# Patient Record
Sex: FEMALE | Race: WHITE | NOT HISPANIC OR LATINO | Employment: OTHER | URBAN - METROPOLITAN AREA
[De-identification: names, ages, dates, MRNs, and addresses within clinical notes are randomized per-mention and may not be internally consistent; named-entity substitution may affect disease eponyms.]

---

## 2017-04-26 DIAGNOSIS — Z12.31 ENCOUNTER FOR SCREENING MAMMOGRAM FOR MALIGNANT NEOPLASM OF BREAST: ICD-10-CM

## 2017-09-14 ENCOUNTER — TRANSCRIBE ORDERS (OUTPATIENT)
Dept: ADMINISTRATIVE | Facility: HOSPITAL | Age: 67
End: 2017-09-14

## 2017-09-14 ENCOUNTER — APPOINTMENT (OUTPATIENT)
Dept: RADIOLOGY | Facility: MEDICAL CENTER | Age: 67
End: 2017-09-14
Payer: COMMERCIAL

## 2017-09-14 ENCOUNTER — LAB REQUISITION (OUTPATIENT)
Dept: LAB | Facility: HOSPITAL | Age: 67
End: 2017-09-14
Payer: COMMERCIAL

## 2017-09-14 ENCOUNTER — ALLSCRIPTS OFFICE VISIT (OUTPATIENT)
Dept: OTHER | Facility: OTHER | Age: 67
End: 2017-09-14

## 2017-09-14 ENCOUNTER — GENERIC CONVERSION - ENCOUNTER (OUTPATIENT)
Dept: OTHER | Facility: OTHER | Age: 67
End: 2017-09-14

## 2017-09-14 DIAGNOSIS — D64.9 ANEMIA: ICD-10-CM

## 2017-09-14 DIAGNOSIS — E03.9 HYPOTHYROIDISM: ICD-10-CM

## 2017-09-14 DIAGNOSIS — R06.00 DYSPNEA: ICD-10-CM

## 2017-09-14 DIAGNOSIS — R06.03 ACUTE RESPIRATORY DISTRESS: Primary | ICD-10-CM

## 2017-09-14 LAB
ALBUMIN SERPL BCP-MCNC: 3 G/DL (ref 3.5–5)
ALP SERPL-CCNC: 66 U/L (ref 46–116)
ALT SERPL W P-5'-P-CCNC: 11 U/L (ref 12–78)
ANION GAP SERPL CALCULATED.3IONS-SCNC: 5 MMOL/L (ref 4–13)
AST SERPL W P-5'-P-CCNC: 21 U/L (ref 5–45)
BASOPHILS # BLD AUTO: 0.01 THOUSANDS/ΜL (ref 0–0.1)
BASOPHILS NFR BLD AUTO: 0 % (ref 0–1)
BILIRUB SERPL-MCNC: 0.37 MG/DL (ref 0.2–1)
BUN SERPL-MCNC: 11 MG/DL (ref 5–25)
CALCIUM SERPL-MCNC: 8.4 MG/DL (ref 8.3–10.1)
CHLORIDE SERPL-SCNC: 106 MMOL/L (ref 100–108)
CHOLEST SERPL-MCNC: 151 MG/DL (ref 50–200)
CO2 SERPL-SCNC: 27 MMOL/L (ref 21–32)
CREAT SERPL-MCNC: 0.82 MG/DL (ref 0.6–1.3)
EOSINOPHIL # BLD AUTO: 0.07 THOUSAND/ΜL (ref 0–0.61)
EOSINOPHIL NFR BLD AUTO: 1 % (ref 0–6)
ERYTHROCYTE [DISTWIDTH] IN BLOOD BY AUTOMATED COUNT: 14.7 % (ref 11.6–15.1)
GFR SERPL CREATININE-BSD FRML MDRD: 75 ML/MIN/1.73SQ M
GLUCOSE P FAST SERPL-MCNC: 82 MG/DL (ref 65–99)
HCT VFR BLD AUTO: 35.6 % (ref 34.8–46.1)
HDLC SERPL-MCNC: 35 MG/DL (ref 40–60)
HGB BLD-MCNC: 11.2 G/DL (ref 11.5–15.4)
LDLC SERPL CALC-MCNC: 93 MG/DL (ref 0–100)
LYMPHOCYTES # BLD AUTO: 2.31 THOUSANDS/ΜL (ref 0.6–4.47)
LYMPHOCYTES NFR BLD AUTO: 43 % (ref 14–44)
MCH RBC QN AUTO: 28.5 PG (ref 26.8–34.3)
MCHC RBC AUTO-ENTMCNC: 31.5 G/DL (ref 31.4–37.4)
MCV RBC AUTO: 91 FL (ref 82–98)
MONOCYTES # BLD AUTO: 0.21 THOUSAND/ΜL (ref 0.17–1.22)
MONOCYTES NFR BLD AUTO: 4 % (ref 4–12)
NEUTROPHILS # BLD AUTO: 2.82 THOUSANDS/ΜL (ref 1.85–7.62)
NEUTS SEG NFR BLD AUTO: 52 % (ref 43–75)
NRBC BLD AUTO-RTO: 0 /100 WBCS
PLATELET # BLD AUTO: 203 THOUSANDS/UL (ref 149–390)
PMV BLD AUTO: 10.7 FL (ref 8.9–12.7)
POTASSIUM SERPL-SCNC: 4.5 MMOL/L (ref 3.5–5.3)
PROT SERPL-MCNC: 9.4 G/DL (ref 6.4–8.2)
RBC # BLD AUTO: 3.93 MILLION/UL (ref 3.81–5.12)
SODIUM SERPL-SCNC: 138 MMOL/L (ref 136–145)
T4 FREE SERPL-MCNC: 1.16 NG/DL (ref 0.76–1.46)
TRIGL SERPL-MCNC: 115 MG/DL
TSH SERPL DL<=0.05 MIU/L-ACNC: 4.7 UIU/ML (ref 0.36–3.74)
WBC # BLD AUTO: 5.42 THOUSAND/UL (ref 4.31–10.16)

## 2017-09-14 PROCEDURE — 84443 ASSAY THYROID STIM HORMONE: CPT | Performed by: FAMILY MEDICINE

## 2017-09-14 PROCEDURE — 84439 ASSAY OF FREE THYROXINE: CPT | Performed by: FAMILY MEDICINE

## 2017-09-14 PROCEDURE — 85025 COMPLETE CBC W/AUTO DIFF WBC: CPT | Performed by: FAMILY MEDICINE

## 2017-09-14 PROCEDURE — 71020 HB CHEST X-RAY 2VW FRONTAL&LATL: CPT

## 2017-09-14 PROCEDURE — 80053 COMPREHEN METABOLIC PANEL: CPT | Performed by: FAMILY MEDICINE

## 2017-09-14 PROCEDURE — 80061 LIPID PANEL: CPT | Performed by: FAMILY MEDICINE

## 2017-09-18 ENCOUNTER — GENERIC CONVERSION - ENCOUNTER (OUTPATIENT)
Dept: OTHER | Facility: OTHER | Age: 67
End: 2017-09-18

## 2017-09-25 ENCOUNTER — HOSPITAL ENCOUNTER (OUTPATIENT)
Dept: NON INVASIVE DIAGNOSTICS | Facility: CLINIC | Age: 67
Discharge: HOME | End: 2017-09-25
Payer: COMMERCIAL

## 2017-09-25 DIAGNOSIS — R06.03 ACUTE RESPIRATORY DISTRESS: ICD-10-CM

## 2017-09-25 LAB
ARRHY DURING EX: NORMAL
CHEST PAIN STATEMENT: NORMAL
MAX DIASTOLIC BP: 64 MMHG
MAX HEART RATE: 142 BPM
MAX PREDICTED HEART RATE: 155 BPM
MAX. SYSTOLIC BP: 164 MMHG
PROTOCOL NAME: NORMAL
TARGET HR FORMULA: NORMAL
TEST INDICATION: NORMAL
TIME IN EXERCISE PHASE: 299 S

## 2017-09-25 PROCEDURE — 93350 STRESS TTE ONLY: CPT

## 2017-09-26 ENCOUNTER — GENERIC CONVERSION - ENCOUNTER (OUTPATIENT)
Dept: OTHER | Facility: OTHER | Age: 67
End: 2017-09-26

## 2017-11-03 DIAGNOSIS — E03.9 HYPOTHYROIDISM: ICD-10-CM

## 2017-11-03 DIAGNOSIS — L02.419 CUTANEOUS ABSCESS OF EXTREMITY: ICD-10-CM

## 2017-11-13 ENCOUNTER — APPOINTMENT (OUTPATIENT)
Dept: LAB | Facility: HOSPITAL | Age: 67
End: 2017-11-13
Payer: COMMERCIAL

## 2017-11-13 ENCOUNTER — OFFICE VISIT (OUTPATIENT)
Dept: URGENT CARE | Facility: MEDICAL CENTER | Age: 67
End: 2017-11-13
Payer: COMMERCIAL

## 2017-11-13 DIAGNOSIS — L02.419 CUTANEOUS ABSCESS OF EXTREMITY: ICD-10-CM

## 2017-11-13 PROCEDURE — 99214 OFFICE O/P EST MOD 30 MIN: CPT

## 2017-11-13 PROCEDURE — 87070 CULTURE OTHR SPECIMN AEROBIC: CPT

## 2017-11-13 PROCEDURE — 10060 I&D ABSCESS SIMPLE/SINGLE: CPT

## 2017-11-13 PROCEDURE — 87186 SC STD MICRODIL/AGAR DIL: CPT

## 2017-11-13 PROCEDURE — 87205 SMEAR GRAM STAIN: CPT

## 2017-11-13 PROCEDURE — 87147 CULTURE TYPE IMMUNOLOGIC: CPT

## 2017-11-15 ENCOUNTER — OFFICE VISIT (OUTPATIENT)
Dept: URGENT CARE | Facility: MEDICAL CENTER | Age: 67
End: 2017-11-15
Payer: COMMERCIAL

## 2017-11-15 LAB
BACTERIA WND AEROBE CULT: ABNORMAL
GRAM STN SPEC: ABNORMAL
GRAM STN SPEC: ABNORMAL

## 2017-11-15 PROCEDURE — 99213 OFFICE O/P EST LOW 20 MIN: CPT

## 2017-11-17 NOTE — PROGRESS NOTES
Assessment    1  Abscess packing removal (V58 30) (Z48 00)    Discussion/Summary  Discussion Summary:   Today you were evaluated for a wound re-check and packing removal  You wound appears to be healing well at this time  dressing dailywound can get wet, however be sure to pat it drythe course of the antibiotictylenol/motrin as needed  with PCP within 1 week-Go to ER with worsening symptoms, increased redness, fever, increased drainage, pain with movement of your elbow, fever, or any new concerns  Understands and agrees with treatment plan: The treatment plan was reviewed with the patient/guardian  The patient/guardian understands and agrees with the treatment plan      Chief Complaint    1  Skin Wound  Chief Complaint Free Text Note Form: Patient here for wound check left elbow  History of Present Illness  HPI: Patient presents today for a wound recheck and packing removal of an abscess to her left arm that was drained on Monday  Patient states that overall it is feeling better however she still has some pain to the area and has noticed increased drainage  Patient denies any pain in her elbow or pain with range of motion  She denies any fevers or chills  She has been taking the Bactrim as directed  Hospital Based Practices Required Assessment:  Pain Assessment  the patient states they have pain  The pain is located in the left elbow  (on a scale of 0 to 10, the patient rates the pain at 2 )  Abuse And Domestic Violence Screen   Yes, the patient is safe at home  Depression And Suicide Screen  No, the patient has not had thoughts of hurting themself  No, the patient has not felt depressed in the past 7 days  Prefered Language is  [place]  Primary Language is  English  Review of Systems  Focused-Female:  Constitutional: no fever-- and-- no chills  Musculoskeletal: no arthralgias  Integumentary: skin wound  Neurological: no numbness-- and-- no tingling  ROS Reviewed:   ROS reviewed        Active Problems  1  Abscess of elbow (682 3) (L02 419)   2  Abscess of left arm (682 3) (L02 414)   3  Acute colitis (558 9) (K52 9)   4  Anemia (285 9) (D64 9)   5  Cervical adenopathy (785 6) (R59 0)   6  Depression (311) (F32 9)   7  Diverticulitis of colon (562 11) (K57 32)   8  Dyspnea (786 09) (R06 00)   9  Encounter for screening mammogram for breast cancer (V76 12) (Z12 31)   10  Esophageal reflux (530 81) (K21 9)   11  Flu vaccine need (V04 81) (Z23)   12  Hypothyroidism (244 9) (E03 9)   13  Infected sebaceous cyst (706 2) (L72 3,L08 9)   14  Left shoulder pain (719 41) (M25 512)   15  Rhinitis (472 0) (J31 0)   16  Sjogren's syndrome (710 2) (M35 00)   17  Sjogren's syndrome (710 2) (M35 00)   18  Visit for pre-operative examination (V72 84) (Y67 210)    Past Medical History  1  History of cataract (V12 49) (Z86 69)   2  History of Menopause (V49 81)  Active Problems And Past Medical History Reviewed: The active problems and past medical history were reviewed and updated today  Family History  Mother    1  Family history of    2  Family history of cardiac disorder (V17 49) (Z82 49)   3  Family history of colon cancer (V16 0) (Z80 0)   4  No pertinent family history  Father    5  Family history of    6  Family history of liver cancer (V16 0) (Z80 0)  Sibling    7  Family history of cardiac disorder (V17 49) (Z82 49)  Family History Reviewed: The family history was reviewed and updated today  Social History   · Denied: History of Alcohol abuse   · Caffeine Use   · Denied: History of Drug use   · Never a smoker   · Never A Smoker  Social History Reviewed: The social history was reviewed and updated today  The social history was reviewed and is unchanged  Surgical History    1  History of Cataract Surgery   2  Denied: History Of Prior Surgery   3  History of Oral Surgery Tooth Extraction  Surgical History Reviewed: The surgical history was reviewed and updated today  Current Meds   1  Advil TABS; Therapy: (Recorded:90Qkk9279) to Recorded   2  ALPRAZolam 0 25 MG Oral Tablet; take 1 tablet by mouth twice a day if needed; Therapy: 12QZW9373 to (Evaluate:29Jan2017)  Requested for: 67JLB0723; Last Rx:85Hny4251 Ordered   3  Levothyroxine Sodium 75 MCG Oral Tablet; TAKE 1 TABLET DAILY; Therapy: 82IYL0388 to (Evaluate:16Jan2018)  Requested for: 58HOG0794; Last Rx:82Tqn2763 Ordered   4  Multiple Vitamin TABS; Therapy: (Recorded:29Bee7359) to Recorded   5  Omeprazole 20 MG Oral Capsule Delayed Release; take 1 capsule by mouth daily; Therapy: 22QDF5610 to (Alexandra Clements)  Requested for: 49ZPD1937; Last Rx:61Bxk5052 Ordered   6  Sulfamethoxazole-Trimethoprim 800-160 MG Oral Tablet; TAKE 1 TABLET TWICE DAILY WITH FOOD; Therapy: 57EAO6118 to (Say Presley)  Requested for: 54PLR5729; Last Rx:28Stx0078 Ordered  Medication List Reviewed: The medication list was reviewed and updated today  Allergies    1  No Known Drug Allergies    Vitals  Signs   Recorded: 40HKT8815 02:15PM   Temperature: 97 7 F, Tympanic  Heart Rate: 76  Respiration: 16  Systolic: 495  Diastolic: 70  Height: 5 ft 6 in  Weight: 127 lb   BMI Calculated: 20 5  BSA Calculated: 1 65  O2 Saturation: 99  Pain Scale: 3    Physical Exam   Constitutional  General appearance: No acute distress, well appearing and well nourished  Musculoskeletal  Inspection/palpation of joints, bones, and muscles: Normal  -- Left elbow is non-tender with full AROM  5/5 strength  Skin  Skin and subcutaneous tissue: Abnormal  -- Wound just superior to left elbow with packing in place  Upon removal of packing, I am able to express minimal purulent material  There is minimal surrounding erythema  No warmth  There is very mild tenderness upon palpation of the inferior aspect of the wound  However no fluctuance  Neurologic  Sensation: No sensory loss     Psychiatric  Orientation to person, place, and time: Normal        Signatures Electronically signed by : Cherylene Cuff, [address]; Nov 15 2017  3:06PM EST                       (Author)    Electronically signed by : WILLY Poole ; Nov 16 2017  4:02PM EST                       (Co-author)

## 2017-11-17 NOTE — PROGRESS NOTES
Assessment    1  Abscess of left arm (682 3) (L02 414)    Plan  Abscess of elbow    · (1) WOUND CULTURE; Status:Active - Retrospective By Protocol Authorization; Requested for:13Nov2017; Abscess of left arm    · Start: Sulfamethoxazole-Trimethoprim 800-160 MG Oral Tablet (Bactrim DS); TAKE 1TABLET TWICE DAILY WITH FOOD    Discussion/Summary  Discussion Summary:   Today you were found to have an abscess on your left arm that was incised and drained  No signs concerning for septic joint or septic bursitis  wound dry and covered with dressing  You may change the dressing daily and if it gets saturatedbactrim as directedtylenol/motrin as needed  in 2 days for re-check and packing removal-Go to ER with worsening symptoms, fever, worsening pain, increased redness, or any new concerns  Medication Side Effects Reviewed: Possible side effects of new medications were reviewed with the patient/guardian today  Understands and agrees with treatment plan: The treatment plan was reviewed with the patient/guardian  The patient/guardian understands and agrees with the treatment plan      Chief Complaint    1  Elbow Problem  Chief Complaint Free Text Note Form: Left elbow red swollen painful and draining patient states it's been hurting for 1 week but the redness just started last pm swelling and pain has been 1 week  denies injury  History of Present Illness  HPI: The patient presents today for an evaluation of left elbow pain  The patient states that it started hurting last Monday night after getting home from work  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 however she doesn't remember any injury or trauma  Last night she states that it started hurting worse and she noticed that it was red  The patient states that it started draining this morning  The patient rates her pain as 3/10  The patient denies fever or chills  She denies injury or trauma     Hospital Based Practices Required Assessment:  Pain Assessment  the patient states they have pain  (on a scale of 0 to 10, the patient rates the pain at 3 )  Abuse And Domestic Violence Screen   Yes, the patient is safe at home  -- The patient states no one is hurting them  Depression And Suicide Screen  No, the patient has not had thoughts of hurting themself  No, the patient has not felt depressed in the past 7 days  Prefered Language is  [place]  Primary Language is  English  Review of Systems  Focused-Female:  Constitutional: no fever-- and-- no chills  Cardiovascular: no chest pain  Respiratory: no shortness of breath  Musculoskeletal: joint swelling  Integumentary: skin wound  Neurological: no numbness-- and-- no tingling  ROS Reviewed:   ROS reviewed  Active Problems  1  Acute colitis (558 9) (K52 9)  2  Anemia (285 9) (D64 9)  3  Cervical adenopathy (785 6) (R59 0)  4  Depression (311) (F32 9)  5  Diverticulitis of colon (562 11) (K57 32)  6  Dyspnea (786 09) (R06 00)  7  Encounter for screening mammogram for breast cancer (V76 12) (Z12 31)  8  Esophageal reflux (530 81) (K21 9)  9  Flu vaccine need (V04 81) (Z23)  10  Hypothyroidism (244 9) (E03 9)  11  Infected sebaceous cyst (706 2) (L72 3,L08 9)  12  Left shoulder pain (719 41) (M25 512)  13  Rhinitis (472 0) (J31 0)  14  Sjogren's syndrome (710 2) (M35 00)  15  Sjogren's syndrome (710 2) (M35 00)  16  Visit for pre-operative examination (V72 84) (Q73 097)    Past Medical History  1  History of cataract (V12 49) (Z86 69)  2  History of Menopause (V49 81)  Active Problems And Past Medical History Reviewed: The active problems and past medical history were reviewed and updated today  Family History  Mother   1  Family history of   2  Family history of cardiac disorder (V17 49) (Z82 49)  3  Family history of colon cancer (V16 0) (Z80 0)  4  No pertinent family history  Father   5  Family history of   6  Family history of liver cancer (V16 0) (Z80 0)  Sibling   7   Family history of cardiac disorder (V17 49) (Z82 49)  Family History Reviewed: The family history was reviewed and updated today  Social History   · Denied: History of Alcohol abuse   · Caffeine Use   · Denied: History of Drug use   · Never a smoker   · Never A Smoker  Social History Reviewed: The social history was reviewed and updated today  The social history was reviewed and is unchanged  Surgical History    1  History of Cataract Surgery  2  Denied: History Of Prior Surgery  3  History of Oral Surgery Tooth Extraction  Surgical History Reviewed: The surgical history was reviewed and updated today  Current Meds  1  Advil TABS; Therapy: (Recorded:14Zsx7801) to Recorded  2  ALPRAZolam 0 25 MG Oral Tablet; take 1 tablet by mouth twice a day if needed; Therapy: 35VBA4448 to (Evaluate:29Jan2017)  Requested for: 79KMG4710; Last Rx:04Xfi6364 Ordered  3  Levothyroxine Sodium 75 MCG Oral Tablet; TAKE 1 TABLET DAILY; Therapy: 29WWB8212 to (Evaluate:16Jan2018)  Requested for: 46YKW0404; Last Rx:05Dxz7149 Ordered  4  Multiple Vitamin TABS; Therapy: (Recorded:17Gxw7553) to Recorded  5  Omeprazole 20 MG Oral Capsule Delayed Release; take 1 capsule by mouth daily; Therapy: 75EIP5355 to (Rhea Henson)  Requested for: 90ILE8904; Last Rx:47Wxi8325 Ordered  Medication List Reviewed: The medication list was reviewed and updated today  Allergies    1  No Known Drug Allergies    Vitals  Signs   Recorded: 72GQC7022 10:12AM   Temperature: 97 8 F  Heart Rate: 83  Respiration: 16  Systolic: 994  Diastolic: 67  O2 Saturation: 98  Pain Scale: 3    Physical Exam   Constitutional  General appearance: No acute distress, well appearing and well nourished  Musculoskeletal  Inspection/palpation of joints, bones, and muscles: Normal  -- The left elbow is non-tender upon palpation  Full AROM of the left elbow  Skin  Skin and subcutaneous tissue: Abnormal  -- 2 x 2 cm abscess of the skin above the left elbow   There is central fluctuance and already some drainage of material  There is some mild surrounding erythema  Neurologic  Sensation: No sensory loss  Procedure   Procedure: incision and drainage of an abscess located on the -- (left arm)  Were discussed with the patient  Verbal consent was obtained prior to the procedure  The site was prepped with Betadine  Anesthesia: The area was anesthetized with 3 ml of lidocaine 1% with epinephrine  Procedure Note:  The area was incised with a #11 blade  A moderate  amount of fluid was drained  The specimen was place in buffered formalin and sent for pathology  Dressing: in iodoform,-- the cavity was loosely packed with gauze-- and-- a sterile dressing was placed  Post-Procedure:  Patient Status:  the patient tolerated the procedure well  Complications: there were no complications  Wound care instructions were given to the patient  Follow-up in the office in 2 day(s)  Message  Return to work or school:   Junior Patel is under my professional care   She was seen in my office on 11/13/17   She is able to return to work on  11/14/17            Signatures   Electronically signed by : Aleena Pelayo, [facility]; Nov 13 2017 11:52AM EST                       (Author)    Electronically signed by : WILLY Acosta ; Nov 16 2017  4:01PM EST                       (Co-author)

## 2017-12-27 DIAGNOSIS — Z12.31 ENCOUNTER FOR SCREENING MAMMOGRAM FOR MALIGNANT NEOPLASM OF BREAST: ICD-10-CM

## 2018-01-11 NOTE — RESULT NOTES
Verified Results  (1) TSH WITH FT4 REFLEX 27Epw4613 02:33PM Madison Parra Order Number: [id number]     Test Name Result Flag Reference   TSH 3 980 uIU/mL H 0 358-3 740   Patients undergoing fluorescein dye angiography may retain small amounts of fluorescein in the body for 48-72 hours post procedure  Samples containing fluorescein can produce falsely depressed TSH values  If the patient had this procedure,a specimen should be resubmitted post fluorescein clearance  The recommended reference ranges for TSH during pregnancy are as follows:  First trimester 0 1 to 2 5 uIU/mL  Second trimester  0 2 to 3 0 uIU/mL  Third trimester 0 3 to 3 0 uIU/m   T4,FREE 1 08 ng/dL  0 76-1 46       Discussion/Summary   Thyroid function okay  Continue present treatment

## 2018-01-13 NOTE — PROGRESS NOTES
Assessment    1  Acute upper respiratory infection (465 9) (J06 9)   2  Cervical adenopathy (785 6) (R59 0)   3  Hypothyroidism (244 9) (E03 9)    Plan  Acute upper respiratory infection, Cervical adenopathy    · Cephalexin 500 MG Oral Capsule; TAKE 1 CAPSULE 3 TIMES DAILY   · (1) CBC/PLT/DIFF; Status: In Progress - Specimen/Data Collected;   Done: In Office  Collection   · (1) TSH WITH FT4 REFLEX; Status: In Progress - Specimen/Data Collected;   Done: In  Office Collection  Hypothyroidism    · Synthroid 50 MCG Oral Tablet (Levothyroxine Sodium)    Discussion/Summary    Labs drawn for CBC and TSH with reflex to free T4  Patient restart on cephalexin 500 mg one 3 times a day x10 days and may take Robitussin-DM when necessary  Patient encouraged drink plenty of fluids and rest  Patient return the office in one week or sooner when necessary  Patient instructed that if "swollen glands" on left-sided neck does not resolve she will need further evaluation  Possible side effects of new medications were reviewed with the patient/guardian today  The treatment plan was reviewed with the patient/guardian  The patient/guardian understands and agrees with the treatment plan      Chief Complaint  throat swollen times two days, cough, congestion,sore throat      History of Present Illness  HPI: Patient started 2 days ago with cold symptoms then yesterday noticed swollen glands left side of her neck  She admits to nasal congestion, postnasal drainage, sore throat and cough  Patient denies any fever  Patient treated this with over-the-counter cold medication  Cold Symptoms: Diamond Prasad presents with complaints of cold symptoms  Associated symptoms include nasal congestion, post nasal drainage, sore throat, hoarseness, productive cough, headache, plugged ear(s), swollen lymph nodes, shortness of breath, fatigue and chills, but no wheezing, no weakness, no nausea, no vomiting, no diarrhea and no fever     The patient presents with complaints of left ear pain  Active Problems    1  Abdominal pain, LLQ (left lower quadrant) (789 04) (R10 32)   2  Acute colitis (558 9) (K52 9)   3  Acute conjunctivitis (372 00) (H10 30)   4  Acute upper respiratory infection (465 9) (J06 9)   5  Anemia (285 9) (D64 9)   6  Depression (311) (F32 9)   7  Diverticulitis of colon (562 11) (K57 32)   8  Encounter for screening mammogram for breast cancer (V76 12) (Z12 31)   9  Esophageal reflux (530 81) (K21 9)   10  Facial cellulitis (682 0) (L03 211)   11  Facial cellulitis (682 0) (L03 211)   12  Hordeolum externum (stye), right (373 11) (H00 013)   13  Hypothyroidism (244 9) (E03 9)   14  Infected sebaceous cyst (706 2) (L72 3,L08 9)   15  Rhinitis (472 0) (J31 0)   16  Sjogren's syndrome (710 2) (M35 00)   17  Sjogren's syndrome (710 2) (M35 00)   18  Visit for pre-operative examination (V72 84) (V26 148)    Past Medical History    1  History of cataract (V12 49) (Z86 69)   2  History of Menopause (V49 81)    Family History    1  Family history of    2  Family history of cardiac disorder (V17 49) (Z82 49)   3  Family history of colon cancer (V16 0) (Z80 0)   4  No pertinent family history    5  Family history of    6  Family history of liver cancer (V16 0) (Z80 0)    7  Family history of cardiac disorder (V17 49) (Z82 49)    Social History    · Denied: History of Alcohol abuse   · Caffeine Use   · Denied: History of Drug use   · Never a smoker   · Never A Smoker    Surgical History    1  History of Cataract Surgery   2  Denied: History Of Prior Surgery   3  History of Oral Surgery Tooth Extraction    Current Meds   1  Advil TABS; Therapy: (Recorded:66Kwj5897) to Recorded   2  ALPRAZolam 0 25 MG Oral Tablet; take 1 tablet by mouth twice a day if needed; Therapy: 89ULV8739 to ([facility])  Requested for: 26JJG2885; Last   Rx:95Cxu8344 Ordered   3   Levothyroxine Sodium 50 MCG Oral Tablet; take 1 tablet by mouth once daily; Therapy: 49XBI8746 to (Evaluate:30Rji1636)  Requested for: 30PXQ6188; Last   Rx:11Pbd1659 Ordered   4  Multiple Vitamin TABS; Therapy: (Recorded:65Gia5640) to Recorded   5  Omeprazole 20 MG Oral Capsule Delayed Release; take 1 capsule by mouth daily; Therapy: 80HUM5751 to (Jb Broussard)  Requested for: 96TLY0055; Last   Rx:05Jun2015 Ordered   6  Pilocarpine HCl - 5 MG Oral Tablet; TAKE 1 TABLET 4 TIMES DAILY; Therapy: 50PYW3267 to (Evaluate:56Dqp7403)  Requested for: 41FFL8145; Last   Rx:08Jan2015 Ordered   7  Synthroid 50 MCG Oral Tablet; TAKE 1 TABLET BY MOUTH ONCE DAILY; Therapy: 46QDL2531 to (Evaluate:25Mux2770)  Requested for: 86TDQ8021; Last   Rx:47Gep3259 Ordered    Allergies    1  No Known Drug Allergies    Vitals   Recorded: 77OTV5261 09:46AM Recorded: 90OQJ4878 09:01AM   Temperature  98 3 F   Heart Rate 72    Respiration 16    Systolic  891   Diastolic  72   Height  5 ft 6 in   Weight  130 lb    BMI Calculated  20 98   BSA Calculated  1 67     Physical Exam    Constitutional   General appearance: No acute distress, well appearing and well nourished  Eyes   Conjunctiva and lids: No swelling, erythema or discharge  Ears, Nose, Mouth, and Throat   External inspection of ears and nose: Normal     Otoscopic examination: Tympanic membranes translucent with normal light reflex  Canals patent without erythema  Nasal mucosa, septum, and turbinates: Abnormal   There was a mucoid discharge from both nares  The bilateral nasal mucosa was edematous  Oropharynx: Abnormal   The posterior pharynx was erythematous  Oral mucosa was moist    Pulmonary   Respiratory effort: No increased work of breathing or signs of respiratory distress  Auscultation of lungs: Clear to auscultation  Cardiovascular   Auscultation of heart: Normal rate and rhythm, normal S1 and S2, without murmurs  Examination of extremities for edema and/or varicosities: Normal     Abdomen   Abdomen: Non-tender, no masses  Lymphatic   Palpation of lymph nodes in neck: Abnormal   left anterior cervical node enlargement  Negative thyroid tenderness  Musculoskeletal   Gait and station: Normal     Skin   Skin and subcutaneous tissue: Normal without rashes or lesions      Psychiatric   Orientation to person, place, and time: Normal     Mood and affect: Normal          Signatures   Electronically signed by : WILLY Holcomb DO; Jan 14 2016  9:55AM EST                       (Author)

## 2018-01-13 NOTE — RESULT NOTES
Discussion/Summary   Phone call to patient discussed lab results  Protein is elevated and albumin is decreased and thyroid function is underactive  Patient instructed to increase levothyroxine to 75 Âµg daily  Recommend recheck CMP in 2 weeks and TSH in 6 weeks  Verified Results  (1) CBC/PLT/DIFF [id number] 11:03AM [employer] Order Number: [id number]     Test Name Result Flag Reference   WBC COUNT 5 42 Thousand/uL  4 31-10 16   RBC COUNT 3 93 Million/uL  3 81-5 12   HEMOGLOBIN 11 2 g/dL L 11 5-15 4   HEMATOCRIT 35 6 %  34 8-46  1   MCV 91 fL  82-98   MCH 28 5 pg  26 8-34 3   MCHC 31 5 g/dL  31 4-37 4   RDW 14 7 %  11 6-15 1   MPV 10 7 fL  8 9-12 7   PLATELET COUNT 343 Thousands/uL  149-390   nRBC AUTOMATED 0 /100 WBCs     NEUTROPHILS RELATIVE PERCENT 52 %  43-75   LYMPHOCYTES RELATIVE PERCENT 43 %  14-44   MONOCYTES RELATIVE PERCENT 4 %  4-12   EOSINOPHILS RELATIVE PERCENT 1 %  0-6   BASOPHILS RELATIVE PERCENT 0 %  0-1   NEUTROPHILS ABSOLUTE COUNT 2 82 Thousands/? ??L  1 85-7 62   LYMPHOCYTES ABSOLUTE COUNT 2 31 Thousands/? ??L  0 60-4 47   MONOCYTES ABSOLUTE COUNT 0 21 Thousand/? ??L  0 17-1 22   EOSINOPHILS ABSOLUTE COUNT 0 07 Thousand/? ??L  0 00-0 61   BASOPHILS ABSOLUTE COUNT 0 01 Thousands/? ??L  0 00-0 10     (1) COMPREHENSIVE METABOLIC PANEL [id number] 99:05JC [employer] Order Number: [id number]     Test Name Result Flag Reference   SODIUM 138 mmol/L  136-145   POTASSIUM 4 5 mmol/L  3 5-5 3   CHLORIDE 106 mmol/L  100-108   CARBON DIOXIDE 27 mmol/L  21-32   ANION GAP (CALC) 5 mmol/L  4-13   BLOOD UREA NITROGEN 11 mg/dL  5-25   CREATININE 0 82 mg/dL  0 60-1 30   Standardized to IDMS reference method   CALCIUM 8 4 mg/dL  8 3-10 1   BILI, TOTAL 0 37 mg/dL  0 20-1 00   ALK PHOSPHATAS 66 U/L     ALT (SGPT) 11 U/L L 12-78   Specimen collection should occur prior to Sulfasalazine and/or Sulfapyridine administration due to the potential for falsely depressed results  AST(SGOT) 21 U/L  5-45   Specimen collection should occur prior to Sulfasalazine administration due to the potential for falsely depressed results  ALBUMIN 3 0 g/dL L 3 5-5 0   TOTAL PROTEIN 9 4 g/dL H 6 4-8 2   eGFR 75 ml/min/1 73sq m     National Kidney Disease Education Program recommendations are as follows:  GFR calculation is accurate only with a steady state creatinine  Chronic Kidney disease less than 60 ml/min/1 73 sq  meters  Kidney failure less than 15 ml/min/1 73 sq  meters  GLUCOSE FASTING 82 mg/dL  65-99   Specimen collection should occur prior to Sulfasalazine administration due to the potential for falsely depressed results  Specimen collection should occur prior to Sulfapyridine administration due to the potential for falsely elevated results  (1) LIPID PANEL, FASTING 14Sep2017 11:03AM [facility] Order Number: [id number]     Test Name Result Flag Reference   CHOLESTEROL 151 mg/dL     HDL,DIRECT 35 mg/dL L 40-60   Specimen collection should occur prior to Metamizole administration due to the potential for falsley depressed results  LDL CHOLESTEROL CALCULATED 93 mg/dL  0-100   Triglyceride:        Normal <150 mg/dl   Borderline High 150-199 mg/dl   High 200-499 mg/dl   Very High >499 mg/dl      Cholesterol:       Desirable <200 mg/dl    Borderline High 200-239 mg/dl    High >239 mg/dl      HDL Cholesterol:       High>59 mg/dL    Low <41 mg/dL      This screening LDL is a calculated result  It does not have the accuracy of the Direct Measured LDL in the monitoring of patients with hyperlipidemia and/or statin therapy  Direct Measure LDL (PTQ508) must be ordered separately in these patients  TRIGLYCERIDES 115 mg/dL  <=150   Specimen collection should occur prior to N-Acetylcysteine or Metamizole administration due to the potential for falsely depressed results       (1) TSH WITH FT4 REFLEX 14Sep2017 11:03AM Scar TALBERT Order Number: [id number]     Test Name Result Flag Reference   TSH 4 700 uIU/mL H 0 358-3 740   Patients undergoing fluorescein dye angiography may retain small amounts of fluorescein in the body for 48-72 hours post procedure  Samples containing fluorescein can produce falsely depressed TSH values  If the patient had this procedure,a specimen should be resubmitted post fluorescein clearance  The recommended reference ranges for TSH during pregnancy are as follows:  First trimester 0 1 to 2 5 uIU/mL  Second trimester  0 2 to 3 0 uIU/mL  Third trimester 0 3 to 3 0 uIU/m   T4,FREE 1 16 ng/dL  0 76-1 46   Specimen collection should occur prior to Sulfasalazine administration due to the potential for falsely elevated results  Plan  Dyspnea    · (1) COMPREHENSIVE METABOLIC PANEL; Status:Active; Requested for:06Oct2017;   Hypothyroidism    · (1) TSH WITH FT4 REFLEX; Status:Active;  Requested CQW:[id number];

## 2018-01-15 VITALS
RESPIRATION RATE: 16 BRPM | HEART RATE: 68 BPM | SYSTOLIC BLOOD PRESSURE: 130 MMHG | DIASTOLIC BLOOD PRESSURE: 70 MMHG | WEIGHT: 127 LBS | HEIGHT: 66 IN | TEMPERATURE: 97.4 F | BODY MASS INDEX: 20.41 KG/M2 | OXYGEN SATURATION: 98 %

## 2018-01-17 NOTE — RESULT NOTES
Discussion/Summary   Stress echo is normal      Verified Results  ECHO STRESS TEST W CONTRAST IF INDICATED [id number] 01:27PM Conny Alexis     Test Name Result Flag Reference   ECHO STRESS TEST W CONTRAST IF INDICATED (Report)     [address]  [place], [address]   [phone]     Exercise Stress Echocardiography     Study date: 25-Sep-2017     Patient: Belen Saldana   MR number: [id number]   Account number: [de-identified]   : 1950   Age: 74 years   Gender: Female   Study date: 25-Sep-2017   Status: Outpatient   Location: [facility]   Height: 66 in   Weight: 127 lb   BP: 120// 80 mmHg     Indications: Assessment of left ventricular function  Diagnosis: R06 02 - Shortness of breath     Sonographer: PAT Munguia   Primary Physician: Maury Jackson DO   Referring Physician: Maury Jackson DO   Group: [facility] [id number] [facility]   RN: Farhan Barnett RN BSN   EKG Technician: Nate Raza   Interpreting Physician: Abel Gunn MD     IMPRESSIONS:   Normal study after maximal exercise  No ECG, symptomatic or echo evidence for ischemia to a HR of 142=91% MPHR     SUMMARY     STRESS RESULTS:   Duration of exercise was 4 min and 59 sec  Maximal work rate was 6 METs  Maximal heart rate during stress was 142 bpm ( 91 % of maximal predicted heart rate)  Target heart rate was achieved  There was no chest pain during stress  ECG CONCLUSIONS:   The stress ECG was negative for ischemia  BASELINE:   Estimated left ventricular ejection fraction was 60 %   HISTORY: The patient is a 74year old female  Other symptoms: dyspnea of recent onset  Coronary artery disease risk factors: family history of coronary artery disease  REST ECG: Normal sinus rhythm  Nonspecific ST T wave abnls     PROCEDURE: The study was performed in the stress lab   The study was performed in the [address] [place] [address] and Vascular Center  Treadmill exercise testing was performed, using the Lindsay protocol  Stress and rest echocardiographic evaluation was   performed from multiple acoustic windows for evaluation of ventricular function  LINDSAY PROTOCOL:   HR bpm SBP mmHg DBP mmHg Symptoms   Baseline 98 120 80 none   Stage 1 125 144 74 --   Stage 2 142 -- -- --   Recovery 1 87 138 78 --     MEDICATIONS GIVEN: No medications or fluids given  STRESS RESULTS: Duration of exercise was 4 min and 59 sec  The patient exercised to protocol stage 2  Maximal work rate was 6 METs  Maximal heart rate during stress was 142 bpm ( 91 % of maximal predicted heart rate)  Target heart rate was   achieved  The heart rate response to stress was exaggerated  Maximal systolic blood pressure during stress was 164 mmHg  There was normal resting blood pressure with an appropriate response to stress  The rate-pressure product for the peak   heart rate and blood pressure was 46097  There was no chest pain during stress  The stress test was terminated due to moderate dyspnea and moderate fatigue  ECG CONCLUSIONS: The stress ECG was negative for ischemia  Arrhythmia during stress: isolated premature ventricular beats and pairs of premature ventricular beats  STRESS 2D ECHO RESULTS:     BASELINE: Left ventricular size was normal  Mild TR with normal PA pressures  mild MAC noted Overall left ventricular systolic function was normal  Estimated left ventricular ejection fraction was 60 %   PEAK STRESS: There was an appropriate reduction in left ventricular size  There was an appropriate augmentation in LV function       SYSTEM MEASUREMENT TABLES     CW   TR Vmax: 2 2 m/s   TR maxP 2 mmHg     Prepared and electronically signed by     Hussain Tomlinson MD   Signed [id number] 18:07:27

## 2018-01-18 NOTE — RESULT NOTES
Verified Results  (1) TSH WITH FT4 REFLEX [id number] 07:32PM Bekah Hustons   Patients undergoing fluorescein dye angiography may retain small amounts of fluorescein in the body for 48-72 hours post procedure  Samples containing fluorescein can produce falsely depressed TSH values  If the patient had this procedure,a specimen should be resubmitted post fluorescein clearance  The recommended reference ranges for TSH during pregnancy are as follows:  First trimester 0 1 to 2 5 uIU/mL  Second trimester  0 2 to 3 0 uIU/mL  Third trimester 0 3 to 3 0 uIU/m     Test Name Result Flag Reference   TSH 3 440 uIU/mL  0 358-3 740     (1) CBC/PLT/DIFF [id number] 07:17PM Bekah Kaylee     Test Name Result Flag Reference   WBC COUNT 8 29 Thousand/uL  4 31-10 16   RBC COUNT 3 69 Million/uL L 3 81-5 12   HEMOGLOBIN 10 6 g/dL L 11 5-15 4   HEMATOCRIT 33 5 % L 34 8-46  1   MCV 90 8 fL  82 0-98 0   MCH 28 7 pg  26 8-34 3   MCHC 31 6 g/dL  31 4-37 4   RDW 14 2 %  11 6-15 1   MPV 11 3 fL  8 9-12 7   PLATELET COUNT 518 Thousands/uL  149-390   nRBC AUTOMATED 0 /100 WBCs     NEUTROPHILS RELATIVE PERCENT 58 %  43-75   LYMPHOCYTES RELATIVE PERCENT 36 %  14-44   MONOCYTES RELATIVE PERCENT 5 %  4-12   EOSINOPHILS RELATIVE PERCENT 1 %  0-6   BASOPHILS RELATIVE PERCENT 0 %  0-1   NEUTROPHILS ABSOLUTE COUNT 4 73 Thousands/µL  1 85-7 62   LYMPHOCYTES ABSOLUTE COUNT 3 01 Thousands/µL  0 60-4 47   MONOCYTES ABSOLUTE COUNT 0 43 Thousand/µL  0 17-1 22   EOSINOPHILS ABSOLUTE COUNT 0 09 Thousand/µL  0 00-0 61   BASOPHILS ABSOLUTE COUNT 0 01 Thousands/µL  0 00-0 10       Discussion/Summary   Phone call to patient discussed lab results  Patient mildly anemic and thyroid function is normal  Patient to continue present treatment and add multivitamin with iron daily  Patient states she is up-to-date on colonoscopy which was last done 2012

## 2018-01-18 NOTE — MISCELLANEOUS
Message  Return to work or school:   Claudine Ramírez is under my professional care   She was seen in my office on 11/13/17   She is able to return to work on  11/14/17            Signatures   Electronically signed by : Fei Johnson, [facility]; Nov 13 2017 11:52AM EST                       (Author)    Electronically signed by : Fei Johnson, [facility]; Nov 13 2017  3:51PM EST                       (Author)

## 2018-01-24 DIAGNOSIS — E03.9 HYPOTHYROIDISM, UNSPECIFIED TYPE: Primary | ICD-10-CM

## 2018-01-24 RX ORDER — LEVOTHYROXINE SODIUM 0.07 MG/1
1 TABLET ORAL DAILY
COMMUNITY
Start: 2014-05-14 | End: 2018-01-24 | Stop reason: SDUPTHER

## 2018-01-24 RX ORDER — LEVOTHYROXINE SODIUM 0.07 MG/1
75 TABLET ORAL DAILY
Qty: 30 TABLET | Refills: 5 | Status: SHIPPED | OUTPATIENT
Start: 2018-01-24 | End: 2018-10-31 | Stop reason: SDUPTHER

## 2018-03-20 ENCOUNTER — OFFICE VISIT (OUTPATIENT)
Dept: FAMILY MEDICINE CLINIC | Facility: CLINIC | Age: 68
End: 2018-03-20
Payer: COMMERCIAL

## 2018-03-20 VITALS
BODY MASS INDEX: 19.93 KG/M2 | SYSTOLIC BLOOD PRESSURE: 130 MMHG | RESPIRATION RATE: 16 BRPM | TEMPERATURE: 98 F | DIASTOLIC BLOOD PRESSURE: 74 MMHG | HEIGHT: 66 IN | HEART RATE: 76 BPM | WEIGHT: 124 LBS

## 2018-03-20 DIAGNOSIS — R53.83 FATIGUE, UNSPECIFIED TYPE: ICD-10-CM

## 2018-03-20 DIAGNOSIS — F41.9 ANXIETY: ICD-10-CM

## 2018-03-20 DIAGNOSIS — E03.9 HYPOTHYROIDISM, UNSPECIFIED TYPE: ICD-10-CM

## 2018-03-20 DIAGNOSIS — R42 DIZZINESS: Primary | ICD-10-CM

## 2018-03-20 PROBLEM — R06.00 DYSPNEA: Status: ACTIVE | Noted: 2017-09-14

## 2018-03-20 PROCEDURE — 99214 OFFICE O/P EST MOD 30 MIN: CPT | Performed by: FAMILY MEDICINE

## 2018-03-20 RX ORDER — MULTIVITAMIN
TABLET ORAL
COMMUNITY
End: 2020-01-02

## 2018-03-20 RX ORDER — ALPRAZOLAM 0.25 MG/1
0.25 TABLET ORAL 2 TIMES DAILY PRN
Qty: 30 TABLET | Refills: 0 | Status: SHIPPED | OUTPATIENT
Start: 2018-03-20 | End: 2018-10-31 | Stop reason: SDUPTHER

## 2018-03-20 RX ORDER — IBUPROFEN 200 MG
TABLET ORAL
COMMUNITY
End: 2020-01-02

## 2018-03-20 RX ORDER — ALPRAZOLAM 0.25 MG/1
1 TABLET ORAL 2 TIMES DAILY PRN
COMMUNITY
Start: 2011-12-01 | End: 2018-03-20 | Stop reason: SDUPTHER

## 2018-03-20 RX ORDER — OMEPRAZOLE 20 MG/1
1 CAPSULE, DELAYED RELEASE ORAL DAILY
COMMUNITY
Start: 2011-12-01 | End: 2018-05-03

## 2018-03-20 RX ORDER — FERROUS SULFATE 325(65) MG
325 TABLET ORAL
COMMUNITY
End: 2020-01-02

## 2018-03-20 RX ORDER — MECLIZINE HYDROCHLORIDE 25 MG/1
25 TABLET ORAL 3 TIMES DAILY PRN
Qty: 30 TABLET | Refills: 0 | Status: SHIPPED | OUTPATIENT
Start: 2018-03-20 | End: 2018-04-09 | Stop reason: SDUPTHER

## 2018-03-20 NOTE — PROGRESS NOTES
Assessment/Plan:  Labs drawn for CBC, CMP and TSH  Will heed results  Patient will try meclizine 25 mg 1 t i d  p r n , caution regarding drowsiness  Patient encouraged to drink plenty of fluids and rest   Follow up with Dr Marquis Mckinnon, [facility] as scheduled  Return to the office in 6 months or sooner p r n  No problem-specific Assessment & Plan notes found for this encounter  Diagnoses and all orders for this visit:    Dizziness  Comments:  Labs drawn for CBC, CMP and TSH  Trial of meclizine p r n   Increase fluids and rest   Orders:  -     Comprehensive metabolic panel  -     meclizine (ANTIVERT) 25 mg tablet; Take 1 tablet (25 mg total) by mouth 3 (three) times a day as needed for dizziness    Fatigue, unspecified type  Comments:  Labs drawn, heed results  Orders:  -     CBC and Platelet  -     Comprehensive metabolic panel    Hypothyroidism, unspecified type  Comments:  Labs drawn for TSH, heed results  Continue present treatment  Orders:  -     TSH, 3rd generation with T4 reflex    Anxiety  -     ALPRAZolam (XANAX) 0 25 mg tablet; Take 1 tablet (0 25 mg total) by mouth 2 (two) times a day as needed for anxiety    Other orders  -     Discontinue: ALPRAZolam (XANAX) 0 25 mg tablet; Take 1 tablet by mouth 2 (two) times a day as needed  -     Multiple Vitamin tablet; Take by mouth  -     omeprazole (PriLOSEC) 20 mg delayed release capsule; Take 1 capsule by mouth daily  -     ibuprofen (ADVIL) 200 mg tablet; Take by mouth  -     ferrous sulfate 325 (65 Fe) mg tablet; Take 325 mg by mouth daily with breakfast          Subjective:      Patient ID: Katie Ybarra is a 74 y o  female  Patient complains of dizziness and feeling off balance for the past 1 week  Symptoms occur with changing positions  Patient complains of continued ongoing fatigue  Patient has an appointment with Dr Eli Patiño Cardiology on 03/26/2018  Dizziness   This is a new problem   The current episode started in the past 7 days  The problem occurs intermittently  The problem has been unchanged  Associated symptoms include chills, fatigue, vertigo and weakness  Pertinent negatives include no abdominal pain, anorexia, arthralgias, change in bowel habit, chest pain, congestion, coughing, diaphoresis, fever, headaches, joint swelling, myalgias, nausea, neck pain, numbness, rash, sore throat, swollen glands, urinary symptoms, visual change or vomiting  The symptoms are aggravated by standing and bending  She has tried nothing for the symptoms  Fatigue   This is a chronic problem  The current episode started more than 1 year ago  The problem occurs constantly  The problem has been gradually worsening  Associated symptoms include chills, fatigue, vertigo and weakness  Pertinent negatives include no abdominal pain, anorexia, arthralgias, change in bowel habit, chest pain, congestion, coughing, diaphoresis, fever, headaches, joint swelling, myalgias, nausea, neck pain, numbness, rash, sore throat, swollen glands, urinary symptoms, visual change or vomiting  She has tried drinking, rest and lying down for the symptoms  The treatment provided mild relief  The following portions of the patient's history were reviewed and updated as appropriate: allergies, current medications, past family history, past medical history, past social history, past surgical history and problem list     Review of Systems   Constitutional: Positive for chills and fatigue  Negative for diaphoresis and fever  HENT: Negative for congestion and sore throat  Respiratory: Negative for cough  Cardiovascular: Negative for chest pain  Gastrointestinal: Negative for abdominal pain, anorexia, change in bowel habit, nausea and vomiting  Musculoskeletal: Negative for arthralgias, joint swelling, myalgias and neck pain  Skin: Negative for rash  Neurological: Positive for dizziness, vertigo and weakness   Negative for numbness and headaches  Objective:      /74   Pulse 76   Temp 98 °F (36 7 °C) (Tympanic)   Resp 16   Ht 5' 5 5" (1 664 m)   Wt 56 2 kg (124 lb)   BMI 20 32 kg/m²          Physical Exam   Constitutional: She is oriented to person, place, and time  She appears well-developed and well-nourished  No distress  HENT:   Head: Normocephalic  Right Ear: External ear normal    Left Ear: External ear normal    Nose: Nose normal    Mouth/Throat: Oropharynx is clear and moist    Eyes: Conjunctivae are normal  No scleral icterus  Neck: Neck supple  No thyromegaly present  Cardiovascular: Normal rate and regular rhythm  Pulmonary/Chest: Effort normal and breath sounds normal    Abdominal: Soft  There is no tenderness  Musculoskeletal: She exhibits no edema  Lymphadenopathy:     She has no cervical adenopathy  Neurological: She is alert and oriented to person, place, and time  Skin: Skin is warm and dry  Psychiatric: She has a normal mood and affect

## 2018-03-20 NOTE — PATIENT INSTRUCTIONS
Try meclizine as needed for dizziness  Drink plenty of fluids and rest   Follow up with Dr Shobha Yang, cardiologist as scheduled

## 2018-03-21 LAB
ALBUMIN SERPL-MCNC: 3.5 G/DL (ref 3.6–4.8)
ALBUMIN/GLOB SERPL: 0.6 {RATIO} (ref 1.2–2.2)
ALP SERPL-CCNC: 74 IU/L (ref 39–117)
ALT SERPL-CCNC: 10 IU/L (ref 0–32)
AST SERPL-CCNC: 22 IU/L (ref 0–40)
BILIRUB SERPL-MCNC: 0.3 MG/DL (ref 0–1.2)
BUN SERPL-MCNC: 16 MG/DL (ref 8–27)
BUN/CREAT SERPL: 22 (ref 12–28)
CALCIUM SERPL-MCNC: 8.7 MG/DL (ref 8.7–10.3)
CHLORIDE SERPL-SCNC: 100 MMOL/L (ref 96–106)
CO2 SERPL-SCNC: 25 MMOL/L (ref 18–29)
CREAT SERPL-MCNC: 0.73 MG/DL (ref 0.57–1)
ERYTHROCYTE [DISTWIDTH] IN BLOOD BY AUTOMATED COUNT: 15.3 % (ref 12.3–15.4)
GLOBULIN SER-MCNC: 6 G/DL (ref 1.5–4.5)
GLUCOSE SERPL-MCNC: 94 MG/DL (ref 65–99)
HCT VFR BLD AUTO: 34.3 % (ref 34–46.6)
HGB BLD-MCNC: 11.6 G/DL (ref 11.1–15.9)
MCH RBC QN AUTO: 28.3 PG (ref 26.6–33)
MCHC RBC AUTO-ENTMCNC: 33.8 G/DL (ref 31.5–35.7)
MCV RBC AUTO: 84 FL (ref 79–97)
PLATELET # BLD AUTO: 257 X10E3/UL (ref 150–379)
POTASSIUM SERPL-SCNC: 4.6 MMOL/L (ref 3.5–5.2)
PROT SERPL-MCNC: 9.5 G/DL (ref 6–8.5)
RBC # BLD AUTO: 4.1 X10E6/UL (ref 3.77–5.28)
SL AMB EGFR AFRICAN AMERICAN: 100 ML/MIN/1.73
SL AMB EGFR NON AFRICAN AMERICAN: 87 ML/MIN/1.73
SODIUM SERPL-SCNC: 138 MMOL/L (ref 134–144)
TSH SERPL DL<=0.005 MIU/L-ACNC: 3.53 UIU/ML (ref 0.45–4.5)
WBC # BLD AUTO: 6.7 X10E3/UL (ref 3.4–10.8)

## 2018-03-23 DIAGNOSIS — R77.9 ELEVATED SERUM PROTEIN LEVEL: Primary | ICD-10-CM

## 2018-03-26 ENCOUNTER — OFFICE VISIT (OUTPATIENT)
Dept: CARDIOLOGY CLINIC | Facility: CLINIC | Age: 68
End: 2018-03-26
Payer: COMMERCIAL

## 2018-03-26 VITALS
DIASTOLIC BLOOD PRESSURE: 78 MMHG | RESPIRATION RATE: 16 BRPM | WEIGHT: 128.4 LBS | HEART RATE: 80 BPM | BODY MASS INDEX: 20.63 KG/M2 | SYSTOLIC BLOOD PRESSURE: 130 MMHG | HEIGHT: 66 IN

## 2018-03-26 DIAGNOSIS — R42 DIZZINESS: Primary | ICD-10-CM

## 2018-03-26 DIAGNOSIS — R94.31 ABNORMAL EKG: ICD-10-CM

## 2018-03-26 DIAGNOSIS — R53.83 OTHER FATIGUE: ICD-10-CM

## 2018-03-26 PROCEDURE — 93000 ELECTROCARDIOGRAM COMPLETE: CPT | Performed by: INTERNAL MEDICINE

## 2018-03-26 PROCEDURE — 99204 OFFICE O/P NEW MOD 45 MIN: CPT | Performed by: INTERNAL MEDICINE

## 2018-03-26 NOTE — PROGRESS NOTES
Cardiology Consultation     Jennifer Quinn  [id number]  1950  [address]  [address]      1  Dizziness  POCT ECG   2  Other fatigue     3  Abnormal EKG         Discussion/Summary:    62-year-old female without any cardiac history  She has prior testing with a stress echocardiogram in September for an abnormal EKG  EKG in the office today is unchanged from before  She is evaluated for symptoms of dizziness as well as weakness and fatigue overall  She says the symptoms are onset over the last week only  Dizziness I suspect in part is related to orthostasis  I checked orthostatic vitals in the office today, and these were normal   However, I have recommended increasing fluid intake overall which does seem to help her symptoms when she is at work  In addition to drinking water, I recommended electrolyte containing solutions such as Gatorade or sugar free options  Given prior stress echocardiogram without any clinical evidence of heart failure and unchanged EKG, I do not recommend any additional testing at this time  Weakness that she describes is nonspecific in constant  No clear cardiac etiology is identified  Abnormalities on recent blood work ordered by PCP are noted, and may be contributing  Follow-up blood work and further evaluation as per Dr Shante Be for this  History of Present Illness:    62-year-old female  She comes to the office today for evaluation of symptoms of dizziness and fatigue  She says she is very tired and has been dizzy which was onset over the last week  Prior to this, she did not experience any symptoms  Position will sometimes make things worse, standing will increase her dizziness  She got meclizine from PCP last week, this seemed to help as well  She tries to drink more fluid when she is at work  This makes her symptoms improved as well    She drinks really only coffee when she is at home, symptoms worse  Last September, she had an exercise stress echocardiogram   This was done for an abnormal EKG  She had a low exercise tolerance, but normal resting echo which I reviewed personally with the patient  Post exercise, normal augmentation  EKG in the office today unchanged from what it was before  Denies any chest discomfort  She snores, but denies any major daytime fatigue  She just feels tired all the time  Blood work was reviewed with the patient that she had done recently for her PCP  There was increase in her protein and globulin for which she has follow-up blood work schedule  Patient Active Problem List   Diagnosis    Acute colitis    Anemia    Cervical adenopathy    Depression    Diverticulitis of colon    Dyspnea    Esophageal reflux    Hypothyroidism    Rhinitis    Sjogren's syndrome ([facility] [address] )     No past medical history on file  Social History     Social History    Marital status: Single     Spouse name: N/A    Number of children: N/A    Years of education: N/A     Occupational History    Not on file  Social History Main Topics    Smoking status: Never Smoker    Smokeless tobacco: Never Used    Alcohol use No    Drug use: No    Sexual activity: Not on file     Other Topics Concern    Not on file     Social History Narrative    No narrative on file      No family history on file  No past surgical history on file      Current Outpatient Prescriptions:     ALPRAZolam (XANAX) 0 25 mg tablet, Take 1 tablet (0 25 mg total) by mouth 2 (two) times a day as needed for anxiety, Disp: 30 tablet, Rfl: 0    ferrous sulfate 325 (65 Fe) mg tablet, Take 325 mg by mouth daily with breakfast, Disp: , Rfl:     ibuprofen (ADVIL) 200 mg tablet, Take by mouth, Disp: , Rfl:     levothyroxine 75 mcg tablet, Take 1 tablet by mouth daily, Disp: 30 tablet, Rfl: 5    meclizine (ANTIVERT) 25 mg tablet, Take 1 tablet (25 mg total) by mouth 3 (three) times a day as needed for dizziness, Disp: 30 tablet, Rfl: 0    Multiple Vitamin tablet, Take by mouth, Disp: , Rfl:     omeprazole (PriLOSEC) 20 mg delayed release capsule, Take 1 capsule by mouth daily, Disp: , Rfl:   No Known Allergies    Vitals:    03/26/18 0848   BP: 130/78   BP Location: Right arm   Patient Position: Sitting   Cuff Size: Standard   Pulse: 80   Resp: 16   Weight: 58 2 kg (128 lb 6 4 oz)   Height: 5' 5 5" (1 664 m)     Vitals:    03/26/18 0848   Weight: 58 2 kg (128 lb 6 4 oz)      Height: 5' 5 5" (166 4 cm)   Body mass index is 21 04 kg/m²  Physical Exam:  GENERAL: Alert, well appearing, and in no distress  HEENT:  PERRL, EOMI, no scleral icterus, no conjunctival pallor  NECK:  Supple, No elevated JVP, no thyromegaly, no carotid bruits  HEART:  Regular rate and rhythm, normal S1/S2, no S3/S4, no murmur or rub  LUNGS:  Clear to auscultation bilaterally  ABDOMEN:  Soft, non-tender, positive bowel sounds, no rebound or guarding  EXTREMITIES:  No edema  VASCULAR:  Normal pedal pulses   NEURO: No focal deficits,  SKIN: Normal without suspicious lesions on exposed skin    ROS:  Positive for fatigue, weakness, snoring, dizziness  Except as noted in HPI, is otherwise reviewed in detail and a 12 point review of systems is negative  Labs:  Lab Results   Component Value Date     09/14/2017    K 4 5 09/14/2017     09/14/2017    CREATININE 0 73 03/20/2018    BUN 16 03/20/2018    CO2 27 09/14/2017    ALT 11 (L) 09/14/2017    AST 21 09/14/2017    GLUF 82 09/14/2017    WBC 5 42 09/14/2017    HGB 11 6 03/20/2018    HCT 34 3 03/20/2018     03/20/2018       Lab Results   Component Value Date    CHOL 151 09/14/2017     Lab Results   Component Value Date    HDL 35 (L) 09/14/2017     Lab Results   Component Value Date    LDLCALC 93 09/14/2017     Lab Results   Component Value Date    TRIG 115 09/14/2017       Imaging:  Stress Echo 9/25/17:  STRESS RESULTS: Duration of exercise was 4 min and 59 sec   The patient exercised to protocol stage 2  Maximal work rate was 6 METs  Maximal heart rate during stress was 142 bpm ( 91 % of maximal predicted heart rate)  Target heart rate was  achieved  The heart rate response to stress was exaggerated  Maximal systolic blood pressure during stress was 164 mmHg  There was normal resting blood pressure with an appropriate response to stress  The rate-pressure product for the peak  heart rate and blood pressure was 23269  There was no chest pain during stress  The stress test was terminated due to moderate dyspnea and moderate fatigue      ECG CONCLUSIONS: The stress ECG was negative for ischemia  Arrhythmia during stress: isolated premature ventricular beats and pairs of premature ventricular beats      STRESS 2D ECHO RESULTS:     BASELINE: Left ventricular size was normal  Mild TR with normal PA pressures  mild MAC noted Overall left ventricular systolic function was normal  Estimated left ventricular ejection fraction was 60 %       PEAK STRESS: There was an appropriate reduction in left ventricular size  There was an appropriate augmentation in LV function      EKG:  Sinus rhythm, 80 beats per minute  Nonspecific T wave abnormality inferior and anterolateral leads

## 2018-03-27 ENCOUNTER — CLINICAL SUPPORT (OUTPATIENT)
Dept: FAMILY MEDICINE CLINIC | Facility: CLINIC | Age: 68
End: 2018-03-27
Payer: COMMERCIAL

## 2018-03-27 DIAGNOSIS — R77.9 ELEVATED SERUM PROTEIN LEVEL: Primary | ICD-10-CM

## 2018-03-27 PROCEDURE — 36415 COLL VENOUS BLD VENIPUNCTURE: CPT

## 2018-03-29 LAB
ALBUMIN SERPL ELPH-MCNC: 3 G/DL (ref 2.9–4.4)
ALBUMIN/GLOB SERPL: 0.5 {RATIO} (ref 0.7–1.7)
ALPHA1 GLOB SERPL ELPH-MCNC: 0.2 G/DL (ref 0–0.4)
ALPHA2 GLOB SERPL ELPH-MCNC: 0.7 G/DL (ref 0.4–1)
B-GLOBULIN SERPL ELPH-MCNC: 0.9 G/DL (ref 0.7–1.3)
GAMMA GLOB SERPL ELPH-MCNC: 3.9 G/DL (ref 0.4–1.8)
GLOBULIN SER CALC-MCNC: 5.8 G/DL (ref 2.2–3.9)
LABORATORY COMMENT REPORT: ABNORMAL
M PROTEIN SERPL ELPH-MCNC: ABNORMAL G/DL
PROT SERPL-MCNC: 8.8 G/DL (ref 6–8.5)

## 2018-04-02 ENCOUNTER — TELEPHONE (OUTPATIENT)
Dept: FAMILY MEDICINE CLINIC | Facility: CLINIC | Age: 68
End: 2018-04-02

## 2018-04-02 NOTE — TELEPHONE ENCOUNTER
Called the office and the soonest they could get her in is 4/27/18, so I changed it to that date at 1:00 and LMOM to notify pt  They stated that if she can go to [place] they can see her sooner, but [facility]

## 2018-04-02 NOTE — TELEPHONE ENCOUNTER
Pt called stating that she made an appt for the hematologist, (Dr Katie Nance office, OC:[phone]) per your instructions, but it is not until the end of April  Pt was asking if you could try to get her an earlier appt with him  Please advise  Thank you

## 2018-04-02 NOTE — TELEPHONE ENCOUNTER
Please call Dr Belinda Keys office and ask them to review patient's labs to see if she needs to be seen sooner  Otherwise the end of April should be okay

## 2018-04-09 DIAGNOSIS — R42 DIZZINESS: ICD-10-CM

## 2018-04-09 RX ORDER — MECLIZINE HYDROCHLORIDE 25 MG/1
25 TABLET ORAL 3 TIMES DAILY PRN
Qty: 30 TABLET | Refills: 0 | Status: SHIPPED | OUTPATIENT
Start: 2018-04-09 | End: 2018-04-30 | Stop reason: SDUPTHER

## 2018-04-27 ENCOUNTER — APPOINTMENT (OUTPATIENT)
Dept: LAB | Facility: MEDICAL CENTER | Age: 68
End: 2018-04-27
Payer: COMMERCIAL

## 2018-04-27 ENCOUNTER — OFFICE VISIT (OUTPATIENT)
Dept: HEMATOLOGY ONCOLOGY | Facility: CLINIC | Age: 68
End: 2018-04-27
Payer: COMMERCIAL

## 2018-04-27 ENCOUNTER — TRANSCRIBE ORDERS (OUTPATIENT)
Dept: ADMINISTRATIVE | Facility: HOSPITAL | Age: 68
End: 2018-04-27

## 2018-04-27 VITALS
SYSTOLIC BLOOD PRESSURE: 122 MMHG | RESPIRATION RATE: 16 BRPM | HEIGHT: 66 IN | WEIGHT: 126.2 LBS | HEART RATE: 80 BPM | OXYGEN SATURATION: 100 % | BODY MASS INDEX: 20.28 KG/M2 | DIASTOLIC BLOOD PRESSURE: 80 MMHG | TEMPERATURE: 97.8 F

## 2018-04-27 DIAGNOSIS — D89.2 HYPERGAMMAGLOBULINEMIA: Primary | ICD-10-CM

## 2018-04-27 LAB
ALBUMIN SERPL BCP-MCNC: 3.1 G/DL (ref 3.5–5)
ALP SERPL-CCNC: 74 U/L (ref 46–116)
ALT SERPL W P-5'-P-CCNC: 12 U/L (ref 12–78)
ANION GAP SERPL CALCULATED.3IONS-SCNC: 5 MMOL/L (ref 4–13)
AST SERPL W P-5'-P-CCNC: 25 U/L (ref 5–45)
BASOPHILS # BLD AUTO: 0.02 THOUSANDS/ΜL (ref 0–0.1)
BASOPHILS NFR BLD AUTO: 0 % (ref 0–1)
BILIRUB SERPL-MCNC: 0.37 MG/DL (ref 0.2–1)
BUN SERPL-MCNC: 13 MG/DL (ref 5–25)
CALCIUM SERPL-MCNC: 8.9 MG/DL (ref 8.3–10.1)
CHLORIDE SERPL-SCNC: 104 MMOL/L (ref 100–108)
CO2 SERPL-SCNC: 28 MMOL/L (ref 21–32)
CREAT SERPL-MCNC: 0.88 MG/DL (ref 0.6–1.3)
CRP SERPL QL: 5.8 MG/L
EOSINOPHIL # BLD AUTO: 0.05 THOUSAND/ΜL (ref 0–0.61)
EOSINOPHIL NFR BLD AUTO: 1 % (ref 0–6)
ERYTHROCYTE [DISTWIDTH] IN BLOOD BY AUTOMATED COUNT: 15.6 % (ref 11.6–15.1)
ERYTHROCYTE [SEDIMENTATION RATE] IN BLOOD: 91 MM/HOUR (ref 0–20)
GFR SERPL CREATININE-BSD FRML MDRD: 69 ML/MIN/1.73SQ M
GLUCOSE SERPL-MCNC: 88 MG/DL (ref 65–140)
HCT VFR BLD AUTO: 37.1 % (ref 34.8–46.1)
HGB BLD-MCNC: 12 G/DL (ref 11.5–15.4)
IGA SERPL-MCNC: 414 MG/DL (ref 70–400)
IGG SERPL-MCNC: 3810 MG/DL (ref 700–1600)
IGM SERPL-MCNC: 258 MG/DL (ref 40–230)
LYMPHOCYTES # BLD AUTO: 3.12 THOUSANDS/ΜL (ref 0.6–4.47)
LYMPHOCYTES NFR BLD AUTO: 47 % (ref 14–44)
MCH RBC QN AUTO: 28.4 PG (ref 26.8–34.3)
MCHC RBC AUTO-ENTMCNC: 32.3 G/DL (ref 31.4–37.4)
MCV RBC AUTO: 88 FL (ref 82–98)
MONOCYTES # BLD AUTO: 0.15 THOUSAND/ΜL (ref 0.17–1.22)
MONOCYTES NFR BLD AUTO: 2 % (ref 4–12)
NEUTROPHILS # BLD AUTO: 3.26 THOUSANDS/ΜL (ref 1.85–7.62)
NEUTS SEG NFR BLD AUTO: 50 % (ref 43–75)
NRBC BLD AUTO-RTO: 0 /100 WBCS
PLATELET # BLD AUTO: 255 THOUSANDS/UL (ref 149–390)
PMV BLD AUTO: 10.1 FL (ref 8.9–12.7)
POTASSIUM SERPL-SCNC: 3.7 MMOL/L (ref 3.5–5.3)
PROT SERPL-MCNC: 10.4 G/DL (ref 6.4–8.2)
RBC # BLD AUTO: 4.22 MILLION/UL (ref 3.81–5.12)
SODIUM SERPL-SCNC: 137 MMOL/L (ref 136–145)
WBC # BLD AUTO: 6.62 THOUSAND/UL (ref 4.31–10.16)

## 2018-04-27 PROCEDURE — 86140 C-REACTIVE PROTEIN: CPT | Performed by: INTERNAL MEDICINE

## 2018-04-27 PROCEDURE — 36415 COLL VENOUS BLD VENIPUNCTURE: CPT | Performed by: INTERNAL MEDICINE

## 2018-04-27 PROCEDURE — 86334 IMMUNOFIX E-PHORESIS SERUM: CPT | Performed by: INTERNAL MEDICINE

## 2018-04-27 PROCEDURE — 84165 PROTEIN E-PHORESIS SERUM: CPT | Performed by: PATHOLOGY

## 2018-04-27 PROCEDURE — 86430 RHEUMATOID FACTOR TEST QUAL: CPT | Performed by: INTERNAL MEDICINE

## 2018-04-27 PROCEDURE — 84165 PROTEIN E-PHORESIS SERUM: CPT | Performed by: INTERNAL MEDICINE

## 2018-04-27 PROCEDURE — 86431 RHEUMATOID FACTOR QUANT: CPT | Performed by: INTERNAL MEDICINE

## 2018-04-27 PROCEDURE — 82784 ASSAY IGA/IGD/IGG/IGM EACH: CPT | Performed by: INTERNAL MEDICINE

## 2018-04-27 PROCEDURE — 99204 OFFICE O/P NEW MOD 45 MIN: CPT | Performed by: INTERNAL MEDICINE

## 2018-04-27 PROCEDURE — 80053 COMPREHEN METABOLIC PANEL: CPT | Performed by: INTERNAL MEDICINE

## 2018-04-27 PROCEDURE — 83883 ASSAY NEPHELOMETRY NOT SPEC: CPT | Performed by: INTERNAL MEDICINE

## 2018-04-27 PROCEDURE — 85652 RBC SED RATE AUTOMATED: CPT | Performed by: INTERNAL MEDICINE

## 2018-04-27 PROCEDURE — 85025 COMPLETE CBC W/AUTO DIFF WBC: CPT | Performed by: INTERNAL MEDICINE

## 2018-04-27 PROCEDURE — 84166 PROTEIN E-PHORESIS/URINE/CSF: CPT | Performed by: INTERNAL MEDICINE

## 2018-04-27 PROCEDURE — 86334 IMMUNOFIX E-PHORESIS SERUM: CPT | Performed by: PATHOLOGY

## 2018-04-27 PROCEDURE — 86038 ANTINUCLEAR ANTIBODIES: CPT | Performed by: INTERNAL MEDICINE

## 2018-04-27 PROCEDURE — 84166 PROTEIN E-PHORESIS/URINE/CSF: CPT | Performed by: PATHOLOGY

## 2018-04-27 PROCEDURE — 86039 ANTINUCLEAR ANTIBODIES (ANA): CPT | Performed by: INTERNAL MEDICINE

## 2018-04-27 NOTE — LETTER
April 27, 2018     Milton Mcfadden, [address] [facility],[address]  [address] [place] [address]    Patient: Master Mireles   YOB: 1950   Date of Visit: 4/27/2018       Dear Dr Shelby Dewey: Thank you for referring Rom Benjamin to me for evaluation  Below are my notes for this consultation  If you have questions, please do not hesitate to call me  I look forward to following your patient along with you  Sincerely,        Monica Baldwin MD        CC: No Recipients  Monica Baldwin MD  4/27/2018  1:31 PM  Sign at close encounter  Hematology / Oncology Outpatient Consult Note    Master Mireles 74 y o  female [id number]         Date:  4/27/2018    Assessment / Plan:  A 80-year-old female with hypergammaglobulinemia  SPEP showed no evidence of monoclonal protein  Her only symptom is fatigue  She has no anemia, hypercalcemia or renal insufficiency  She has history of Sjogren syndrome for 14 years for which she has never been on any treatment  Since SPEP showed no evidence of monoclonal protein, this is highly unlikely to be plasma cell disorder  This is likely to be polyclonal hypergammaglobulinemia due to the Sjogren syndrome  I still recommend her to have repeated SPEP, UPEP, free light chain and quantitative immunoglobulins to rule out plasma cell disorder  I would obtain inflammatory marker with ESR and CRP as well as BALDO and rheumatoid factor  If plasma cell disorder was completely ruled out, she should see a rheumatologist   She is in agreement with my recommendation  Once I obtain above labs to report, I will contact her  She will see me p r n  basis  Subjective:     HPI:  A 80-year-old postmenopausal woman who was recently found to have elevated total protein and decreased albumin  Therefore, she was referred to me for further evaluation  She underwent SPEP which showed hypergammaglobulinemia without M protein    Her CBC is completely normal  She has no hypercalcemia or renal insufficiency  She has history of Sjogren syndrome which was diagnosed 14 years ago with complaint of dry mouth and dry eye  She has not been on any treatment for Sjogren syndrome  She also have height for thyroidism for which she takes Synthroid  She has GERD  She feels quite fatigued as well as has complaint of dizziness  However, she denied fever, chills or night sweats  Her weight is absolutely stable  She has no complaint of pain  She denied any bleeding symptoms  She has no respiratory symptoms such as cough, sputum production or shortness of breast   She is up to date for colonoscopy  However, her last mammography was approximately 5 years ago  Her performance status is 1/4 on the ECOG scale  Interval History:          Objective:     Primary Diagnosis:    Hypergammaglobulinemia  Cancer Staging:  Cancer Staging  No matching staging information was found for the patient  Previous Hematologic/ Oncologic Treatment:         Current Hematologic/ Oncologic Treatment:      NA    Disease Status:         Test Results:    Pathology:        Radiology:    Chest x-ray was negative for pulmonary disease  Laboratory:      SPEP showed no evidence of monoclonal protein  Normal TSH  Total protein 9 5 albumin 3 5  Normal LFT %period% creatinine 0 7  CBC is within normal limits  Physical Exam:      General Appearance:    Alert, oriented        Eyes:    PERRL   Ears:    Normal external ear canals, both ears   Nose:   Nares normal, septum midline   Throat:   Mucosa moist  Pharynx without injection  Neck:   Supple       Lungs:     Clear to auscultation bilaterally   Chest Wall:    No tenderness or deformity    Heart:    Regular rate and rhythm       Abdomen:     Soft, non-tender, bowel sounds +, no organomegaly           Extremities:   Extremities no cyanosis or edema       Skin:   no rash or icterus      Lymph nodes:   Cervical, supraclavicular, and axillary nodes normal   Neurologic: CNII-XII intact, normal strength, sensation and reflexes     Throughout          Breast exam: Not applicable  ROS: Review of Systems   Constitutional:        Significant fatigue  All other systems reviewed and are negative  Imaging: No results found  Labs:   Lab Results   Component Value Date    WBC 5 42 09/14/2017    HGB 11 6 03/20/2018    HCT 34 3 03/20/2018    MCV 84 03/20/2018     03/20/2018     Lab Results   Component Value Date     09/14/2017    K 4 5 09/14/2017     09/14/2017    CO2 27 09/14/2017    ANIONGAP 5 09/14/2017    BUN 16 03/20/2018    CREATININE 0 73 03/20/2018    GLUF 82 09/14/2017    CALCIUM 8 4 09/14/2017    AST 21 09/14/2017    ALT 11 (L) 09/14/2017    ALKPHOS 66 09/14/2017    PROT 9 4 (H) 09/14/2017    BILITOT 0 37 09/14/2017    EGFR 75 09/14/2017           Vital Sign:    Body surface area is 1 64 meters squared  Wt Readings from Last 3 Encounters:   04/27/18 57 2 kg (126 lb 3 2 oz)   03/26/18 58 2 kg (128 lb 6 4 oz)   03/20/18 56 2 kg (124 lb)        Temp Readings from Last 3 Encounters:   04/27/18 97 8 °F (36 6 °C) (Tympanic)   03/20/18 98 °F (36 7 °C) (Tympanic)   09/14/17 (!) 97 4 °F (36 3 °C)        BP Readings from Last 3 Encounters:   04/27/18 122/80   03/26/18 130/78   03/20/18 130/74         Pulse Readings from Last 3 Encounters:   04/27/18 80   03/26/18 80   03/20/18 76     @LASTSAO2(3)@    Active Problems:   Patient Active Problem List   Diagnosis    Acute colitis    Anemia    Cervical adenopathy    Depression    Diverticulitis of colon    Dyspnea    Esophageal reflux    Hypothyroidism    Rhinitis    [facility]'s syndrome [facility]    Hypergammaglobulinemia       Past Medical History:   Past Medical History:   Diagnosis Date    GERD (gastroesophageal reflux disease)     Hypothyroidism     Sjogrens syndrome ([facility] )        Surgical History: History reviewed  No pertinent surgical history      Family History:    Family History   Problem Relation Age of Onset    Colon cancer Mother     Liver cancer Father        Cancer-related family history includes Colon cancer in her mother; Liver cancer in her father  Social History:   Social History     Social History    Marital status: Single     Spouse name: N/A    Number of children: N/A    Years of education: N/A     Occupational History    Not on file  Social History Main Topics    Smoking status: Never Smoker    Smokeless tobacco: Never Used    Alcohol use No    Drug use: No    Sexual activity: Not on file     Other Topics Concern    Not on file     Social History Narrative    No narrative on file       Current Medications:   Current Outpatient Prescriptions   Medication Sig Dispense Refill    ALPRAZolam (XANAX) 0 25 mg tablet Take 1 tablet (0 25 mg total) by mouth 2 (two) times a day as needed for anxiety 30 tablet 0    ferrous sulfate 325 (65 Fe) mg tablet Take 325 mg by mouth daily with breakfast      ibuprofen (ADVIL) 200 mg tablet Take by mouth      levothyroxine 75 mcg tablet Take 1 tablet by mouth daily 30 tablet 5    meclizine (ANTIVERT) 25 mg tablet Take 1 tablet (25 mg total) by mouth 3 (three) times a day as needed for dizziness 30 tablet 0    Multiple Vitamin tablet Take by mouth      omeprazole (PriLOSEC) 20 mg delayed release capsule Take 1 capsule by mouth daily       No current facility-administered medications for this visit          Allergies: No Known Allergies

## 2018-04-27 NOTE — PROGRESS NOTES
Hematology / Oncology Outpatient Consult Note    Maris Rehman 74 y o  female [id number]         Date:  4/27/2018    Assessment / Plan:  A 53-year-old female with hypergammaglobulinemia  SPEP showed no evidence of monoclonal protein  Her only symptom is fatigue  She has no anemia, hypercalcemia or renal insufficiency  She has history of Sjogren syndrome for 14 years for which she has never been on any treatment  Since SPEP showed no evidence of monoclonal protein, this is highly unlikely to be plasma cell disorder  This is likely to be polyclonal hypergammaglobulinemia due to the Sjogren syndrome  I still recommend her to have repeated SPEP, UPEP, free light chain and quantitative immunoglobulins to rule out plasma cell disorder  I would obtain inflammatory marker with ESR and CRP as well as BALDO and rheumatoid factor  If plasma cell disorder was completely ruled out, she should see a rheumatologist   She is in agreement with my recommendation  Once I obtain above labs to report, I will contact her  She will see me p r n  basis  Subjective:     HPI:  A 53-year-old postmenopausal woman who was recently found to have elevated total protein and decreased albumin  Therefore, she was referred to me for further evaluation  She underwent SPEP which showed hypergammaglobulinemia without M protein  Her CBC is completely normal  She has no hypercalcemia or renal insufficiency  She has history of Sjogren syndrome which was diagnosed 14 years ago with complaint of dry mouth and dry eye  She has not been on any treatment for Sjogren syndrome  She also have height for thyroidism for which she takes Synthroid  She has GERD  She feels quite fatigued as well as has complaint of dizziness  However, she denied fever, chills or night sweats  Her weight is absolutely stable  She has no complaint of pain  She denied any bleeding symptoms    She has no respiratory symptoms such as cough, sputum production or shortness of breast   She is up to date for colonoscopy  However, her last mammography was approximately 5 years ago  Her performance status is 1/4 on the ECOG scale  Interval History:          Objective:     Primary Diagnosis:    Hypergammaglobulinemia  Cancer Staging:  Cancer Staging  No matching staging information was found for the patient  Previous Hematologic/ Oncologic Treatment:         Current Hematologic/ Oncologic Treatment:      NA    Disease Status:         Test Results:    Pathology:        Radiology:    Chest x-ray was negative for pulmonary disease  Laboratory:      SPEP showed no evidence of monoclonal protein  Normal TSH  Total protein 9 5 albumin 3 5  Normal LFT %period% creatinine 0 7  CBC is within normal limits  Physical Exam:      General Appearance:    Alert, oriented        Eyes:    PERRL   Ears:    Normal external ear canals, both ears   Nose:   Nares normal, septum midline   Throat:   Mucosa moist  Pharynx without injection  Neck:   Supple       Lungs:     Clear to auscultation bilaterally   Chest Wall:    No tenderness or deformity    Heart:    Regular rate and rhythm       Abdomen:     Soft, non-tender, bowel sounds +, no organomegaly           Extremities:   Extremities no cyanosis or edema       Skin:   no rash or icterus  Lymph nodes:   Cervical, supraclavicular, and axillary nodes normal   Neurologic:   CNII-XII intact, normal strength, sensation and reflexes     Throughout          Breast exam: Not applicable  ROS: Review of Systems   Constitutional:        Significant fatigue  All other systems reviewed and are negative  Imaging: No results found        Labs:   Lab Results   Component Value Date    WBC 5 42 09/14/2017    HGB 11 6 03/20/2018    HCT 34 3 03/20/2018    MCV 84 03/20/2018     03/20/2018     Lab Results   Component Value Date     09/14/2017    K 4 5 09/14/2017     09/14/2017    CO2 27 09/14/2017    ANIONGAP 5 09/14/2017    BUN 16 03/20/2018    CREATININE 0 73 03/20/2018    GLUF 82 09/14/2017    CALCIUM 8 4 09/14/2017    AST 21 09/14/2017    ALT 11 (L) 09/14/2017    ALKPHOS 66 09/14/2017    PROT 9 4 (H) 09/14/2017    BILITOT 0 37 09/14/2017    EGFR 75 09/14/2017           Vital Sign:    Body surface area is 1 64 meters squared  Wt Readings from Last 3 Encounters:   04/27/18 57 2 kg (126 lb 3 2 oz)   03/26/18 58 2 kg (128 lb 6 4 oz)   03/20/18 56 2 kg (124 lb)        Temp Readings from Last 3 Encounters:   04/27/18 97 8 °F (36 6 °C) (Tympanic)   03/20/18 98 °F (36 7 °C) (Tympanic)   09/14/17 (!) 97 4 °F (36 3 °C)        BP Readings from Last 3 Encounters:   04/27/18 122/80   03/26/18 130/78   03/20/18 130/74         Pulse Readings from Last 3 Encounters:   04/27/18 80   03/26/18 80   03/20/18 76     @LASTSAO2(3)@    Active Problems:   Patient Active Problem List   Diagnosis    Acute colitis    Anemia    Cervical adenopathy    Depression    Diverticulitis of colon    Dyspnea    Esophageal reflux    Hypothyroidism    Rhinitis    [facility]    Hypergammaglobulinemia       Past Medical History:   Past Medical History:   Diagnosis Date    GERD (gastroesophageal reflux disease)     Hypothyroidism     [facility]        Surgical History: History reviewed  No pertinent surgical history  Family History:    Family History   Problem Relation Age of Onset    Colon cancer Mother     Liver cancer Father        Cancer-related family history includes Colon cancer in her mother; Liver cancer in her father  Social History:   Social History     Social History    Marital status: Single     Spouse name: N/A    Number of children: N/A    Years of education: N/A     Occupational History    Not on file       Social History Main Topics    Smoking status: Never Smoker    Smokeless tobacco: Never Used    Alcohol use No    Drug use: No    Sexual activity: Not on file     Other Topics Concern    Not on file     Social History Narrative    No narrative on file       Current Medications:   Current Outpatient Prescriptions   Medication Sig Dispense Refill    ALPRAZolam (XANAX) 0 25 mg tablet Take 1 tablet (0 25 mg total) by mouth 2 (two) times a day as needed for anxiety 30 tablet 0    ferrous sulfate 325 (65 Fe) mg tablet Take 325 mg by mouth daily with breakfast      ibuprofen (ADVIL) 200 mg tablet Take by mouth      levothyroxine 75 mcg tablet Take 1 tablet by mouth daily 30 tablet 5    meclizine (ANTIVERT) 25 mg tablet Take 1 tablet (25 mg total) by mouth 3 (three) times a day as needed for dizziness 30 tablet 0    Multiple Vitamin tablet Take by mouth      omeprazole (PriLOSEC) 20 mg delayed release capsule Take 1 capsule by mouth daily       No current facility-administered medications for this visit          Allergies: No Known Allergies

## 2018-04-28 LAB
KAPPA LC FREE SER-MCNC: 188 MG/L (ref 3.3–19.4)
KAPPA LC FREE/LAMBDA FREE SER: 1.23 {RATIO} (ref 0.26–1.65)
LAMBDA LC FREE SERPL-MCNC: 152.7 MG/L (ref 5.7–26.3)

## 2018-04-30 ENCOUNTER — TELEPHONE (OUTPATIENT)
Dept: FAMILY MEDICINE CLINIC | Facility: CLINIC | Age: 68
End: 2018-04-30

## 2018-04-30 DIAGNOSIS — R42 DIZZINESS: ICD-10-CM

## 2018-04-30 DIAGNOSIS — M35.00 SJOGREN'S SYNDROME, WITH UNSPECIFIED ORGAN INVOLVEMENT (HCC): Primary | ICD-10-CM

## 2018-04-30 LAB
ALBUMIN SERPL ELPH-MCNC: 3.63 G/DL (ref 3.5–5)
ALBUMIN SERPL ELPH-MCNC: 36.3 % (ref 52–65)
ALBUMIN UR ELPH-MCNC: 100 %
ALPHA1 GLOB MFR UR ELPH: 0 %
ALPHA1 GLOB SERPL ELPH-MCNC: 0.35 G/DL (ref 0.1–0.4)
ALPHA1 GLOB SERPL ELPH-MCNC: 3.5 % (ref 2.5–5)
ALPHA2 GLOB MFR UR ELPH: 0 %
ALPHA2 GLOB SERPL ELPH-MCNC: 0.85 G/DL (ref 0.4–1.2)
ALPHA2 GLOB SERPL ELPH-MCNC: 8.5 % (ref 7–13)
ANA HOMOGEN SER QL IF: NORMAL
ANA HOMOGEN TITR SER: NORMAL {TITER}
B-GLOBULIN MFR UR ELPH: 0 %
BETA GLOB ABNORMAL SERPL ELPH-MCNC: 0.42 G/DL (ref 0.4–0.8)
BETA1 GLOB SERPL ELPH-MCNC: 4.2 % (ref 5–13)
BETA2 GLOB SERPL ELPH-MCNC: 4.6 % (ref 2–8)
BETA2+GAMMA GLOB SERPL ELPH-MCNC: 0.46 G/DL (ref 0.2–0.5)
CRYOGLOB RF SER-ACNC: ABNORMAL [IU]/ML
GAMMA GLOB ABNORMAL SERPL ELPH-MCNC: 4.29 G/DL (ref 0.5–1.6)
GAMMA GLOB MFR UR ELPH: 0 %
GAMMA GLOB SERPL ELPH-MCNC: 42.9 % (ref 12–22)
IGG/ALB SER: 0.57 {RATIO} (ref 1.1–1.8)
INTERPRETATION UR IFE-IMP: NORMAL
PROT PATTERN UR ELPH-IMP: NORMAL
PROT SERPL-MCNC: 10 G/DL (ref 6.4–8.2)
PROT UR-MCNC: 17 MG/DL
RHEUMATOID FACT SER QL LA: POSITIVE
RYE IGE QN: POSITIVE

## 2018-04-30 RX ORDER — MECLIZINE HYDROCHLORIDE 25 MG/1
25 TABLET ORAL 3 TIMES DAILY PRN
Qty: 30 TABLET | Refills: 0 | Status: SHIPPED | OUTPATIENT
Start: 2018-04-30 | End: 2020-01-02

## 2018-04-30 NOTE — TELEPHONE ENCOUNTER
Call patient and recommend referral to Dr Paola Bejarano, rheumatologist   [facility] were reviewed

## 2018-05-01 ENCOUNTER — TELEPHONE (OUTPATIENT)
Dept: HEMATOLOGY ONCOLOGY | Facility: CLINIC | Age: 68
End: 2018-05-01

## 2018-05-01 NOTE — TELEPHONE ENCOUNTER
----- Message from Noreen Kumar MD sent at 5/1/2018 12:53 PM EDT -----  Her myeloma work up was all negative  But she has highly positive inflamatory marker, suggesting autoimmune disease  She needs to see rheumatologist  She might want to call her PCP about which rheumatology, she should see  Call and let her know

## 2018-05-03 ENCOUNTER — TELEPHONE (OUTPATIENT)
Dept: FAMILY MEDICINE CLINIC | Facility: CLINIC | Age: 68
End: 2018-05-03

## 2018-05-03 ENCOUNTER — APPOINTMENT (EMERGENCY)
Dept: CT IMAGING | Facility: HOSPITAL | Age: 68
End: 2018-05-03
Payer: COMMERCIAL

## 2018-05-03 ENCOUNTER — APPOINTMENT (EMERGENCY)
Dept: RADIOLOGY | Facility: HOSPITAL | Age: 68
End: 2018-05-03
Payer: COMMERCIAL

## 2018-05-03 ENCOUNTER — HOSPITAL ENCOUNTER (EMERGENCY)
Facility: HOSPITAL | Age: 68
Discharge: HOME | End: 2018-05-03
Attending: EMERGENCY MEDICINE | Admitting: EMERGENCY MEDICINE
Payer: COMMERCIAL

## 2018-05-03 VITALS
WEIGHT: 126 LBS | BODY MASS INDEX: 20.65 KG/M2 | OXYGEN SATURATION: 99 % | TEMPERATURE: 98.1 F | HEART RATE: 64 BPM | SYSTOLIC BLOOD PRESSURE: 139 MMHG | DIASTOLIC BLOOD PRESSURE: 71 MMHG | RESPIRATION RATE: 18 BRPM

## 2018-05-03 DIAGNOSIS — N39.0 URINARY TRACT INFECTION: Primary | ICD-10-CM

## 2018-05-03 DIAGNOSIS — R42 DIZZINESS: ICD-10-CM

## 2018-05-03 DIAGNOSIS — R53.83 FATIGUE: ICD-10-CM

## 2018-05-03 LAB
ALBUMIN SERPL BCP-MCNC: 2.7 G/DL (ref 3.5–5)
ALP SERPL-CCNC: 64 U/L (ref 46–116)
ALT SERPL W P-5'-P-CCNC: 11 U/L (ref 12–78)
ANION GAP SERPL CALCULATED.3IONS-SCNC: 6 MMOL/L (ref 4–13)
AST SERPL W P-5'-P-CCNC: 23 U/L (ref 5–45)
BACTERIA UR QL AUTO: ABNORMAL /HPF
BASOPHILS # BLD AUTO: 0.02 THOUSANDS/ΜL (ref 0–0.1)
BASOPHILS NFR BLD AUTO: 0 % (ref 0–1)
BILIRUB DIRECT SERPL-MCNC: 0.09 MG/DL (ref 0–0.2)
BILIRUB SERPL-MCNC: 0.42 MG/DL (ref 0.2–1)
BILIRUB UR QL STRIP: NEGATIVE
BUN SERPL-MCNC: 13 MG/DL (ref 5–25)
CALCIUM SERPL-MCNC: 8.3 MG/DL (ref 8.3–10.1)
CHLORIDE SERPL-SCNC: 105 MMOL/L (ref 100–108)
CLARITY UR: CLEAR
CO2 SERPL-SCNC: 27 MMOL/L (ref 21–32)
COLOR UR: YELLOW
COLOR, POC: YELLOW
CREAT SERPL-MCNC: 0.96 MG/DL (ref 0.6–1.3)
EOSINOPHIL # BLD AUTO: 0.05 THOUSAND/ΜL (ref 0–0.61)
EOSINOPHIL NFR BLD AUTO: 1 % (ref 0–6)
ERYTHROCYTE [DISTWIDTH] IN BLOOD BY AUTOMATED COUNT: 15 % (ref 11.6–15.1)
GFR SERPL CREATININE-BSD FRML MDRD: 62 ML/MIN/1.73SQ M
GLUCOSE SERPL-MCNC: 102 MG/DL (ref 65–140)
GLUCOSE UR STRIP-MCNC: NEGATIVE MG/DL
HCT VFR BLD AUTO: 35.8 % (ref 34.8–46.1)
HGB BLD-MCNC: 11.4 G/DL (ref 11.5–15.4)
HGB UR QL STRIP.AUTO: NEGATIVE
KETONES UR STRIP-MCNC: NEGATIVE MG/DL
LEUKOCYTE ESTERASE UR QL STRIP: ABNORMAL
LYMPHOCYTES # BLD AUTO: 2.3 THOUSANDS/ΜL (ref 0.6–4.47)
LYMPHOCYTES NFR BLD AUTO: 40 % (ref 14–44)
MAGNESIUM SERPL-MCNC: 1.9 MG/DL (ref 1.6–2.6)
MCH RBC QN AUTO: 27.9 PG (ref 26.8–34.3)
MCHC RBC AUTO-ENTMCNC: 31.8 G/DL (ref 31.4–37.4)
MCV RBC AUTO: 88 FL (ref 82–98)
MONOCYTES # BLD AUTO: 0.19 THOUSAND/ΜL (ref 0.17–1.22)
MONOCYTES NFR BLD AUTO: 3 % (ref 4–12)
NEUTROPHILS # BLD AUTO: 3.24 THOUSANDS/ΜL (ref 1.85–7.62)
NEUTS SEG NFR BLD AUTO: 56 % (ref 43–75)
NITRITE UR QL STRIP: NEGATIVE
NON-SQ EPI CELLS URNS QL MICRO: ABNORMAL /HPF
PH UR STRIP.AUTO: 7 [PH] (ref 4.5–8)
PLATELET # BLD AUTO: 216 THOUSANDS/UL (ref 149–390)
PMV BLD AUTO: 9.8 FL (ref 8.9–12.7)
POTASSIUM SERPL-SCNC: 3.4 MMOL/L (ref 3.5–5.3)
PROT SERPL-MCNC: 9.5 G/DL (ref 6.4–8.2)
PROT UR STRIP-MCNC: NEGATIVE MG/DL
RBC # BLD AUTO: 4.09 MILLION/UL (ref 3.81–5.12)
RBC #/AREA URNS AUTO: ABNORMAL /HPF
SODIUM SERPL-SCNC: 138 MMOL/L (ref 136–145)
SP GR UR STRIP.AUTO: 1.01 (ref 1–1.03)
UROBILINOGEN UR QL STRIP.AUTO: 0.2 E.U./DL
WBC # BLD AUTO: 5.8 THOUSAND/UL (ref 4.31–10.16)
WBC #/AREA URNS AUTO: ABNORMAL /HPF

## 2018-05-03 PROCEDURE — 96361 HYDRATE IV INFUSION ADD-ON: CPT

## 2018-05-03 PROCEDURE — 80048 BASIC METABOLIC PNL TOTAL CA: CPT | Performed by: EMERGENCY MEDICINE

## 2018-05-03 PROCEDURE — 81001 URINALYSIS AUTO W/SCOPE: CPT

## 2018-05-03 PROCEDURE — 70450 CT HEAD/BRAIN W/O DYE: CPT

## 2018-05-03 PROCEDURE — 96374 THER/PROPH/DIAG INJ IV PUSH: CPT

## 2018-05-03 PROCEDURE — 99285 EMERGENCY DEPT VISIT HI MDM: CPT

## 2018-05-03 PROCEDURE — 80076 HEPATIC FUNCTION PANEL: CPT | Performed by: EMERGENCY MEDICINE

## 2018-05-03 PROCEDURE — 81002 URINALYSIS NONAUTO W/O SCOPE: CPT | Performed by: EMERGENCY MEDICINE

## 2018-05-03 PROCEDURE — 36415 COLL VENOUS BLD VENIPUNCTURE: CPT | Performed by: EMERGENCY MEDICINE

## 2018-05-03 PROCEDURE — 71046 X-RAY EXAM CHEST 2 VIEWS: CPT

## 2018-05-03 PROCEDURE — 83735 ASSAY OF MAGNESIUM: CPT | Performed by: EMERGENCY MEDICINE

## 2018-05-03 PROCEDURE — 85025 COMPLETE CBC W/AUTO DIFF WBC: CPT | Performed by: EMERGENCY MEDICINE

## 2018-05-03 RX ORDER — CEPHALEXIN 250 MG/1
500 CAPSULE ORAL ONCE
Status: COMPLETED | OUTPATIENT
Start: 2018-05-03 | End: 2018-05-03

## 2018-05-03 RX ORDER — DIAZEPAM 5 MG/ML
2.5 INJECTION, SOLUTION INTRAMUSCULAR; INTRAVENOUS ONCE
Status: COMPLETED | OUTPATIENT
Start: 2018-05-03 | End: 2018-05-03

## 2018-05-03 RX ORDER — FERROUS SULFATE 325(65) MG
325 TABLET ORAL
COMMUNITY
End: 2018-05-03

## 2018-05-03 RX ORDER — CEPHALEXIN 500 MG/1
500 CAPSULE ORAL EVERY 6 HOURS SCHEDULED
Qty: 20 CAPSULE | Refills: 0 | Status: SHIPPED | OUTPATIENT
Start: 2018-05-03 | End: 2018-05-08

## 2018-05-03 RX ADMIN — SODIUM CHLORIDE 1000 ML: 0.9 INJECTION, SOLUTION INTRAVENOUS at 10:42

## 2018-05-03 RX ADMIN — DIAZEPAM 2.5 MG: 5 INJECTION, SOLUTION INTRAMUSCULAR; INTRAVENOUS at 12:41

## 2018-05-03 RX ADMIN — CEPHALEXIN 500 MG: 250 CAPSULE ORAL at 14:39

## 2018-05-03 NOTE — ED NOTES
Dizziness and fatigue x 1 month  Denies chest pain/sob  Denies fever/chills/nausea/vomiting/diarrhea  States "I have been to my family doctor, cardiologist, hematology, rheumatology and they have not done anything         Jacqueline Cheatham RN  05/03/18 [id number]

## 2018-05-03 NOTE — ED NOTES
Patient still unable to void at this time; provider notified      Eliza Terrazas RN  05/03/18 [phone]

## 2018-05-03 NOTE — ED PROVIDER NOTES
History  Chief Complaint   Patient presents with    Dizziness     Started one month ago   Fatigue     Started one month ago  75 YO female presents with 1 month of dizziness  States this has been mostly constant  Pt describes this as both lightheadedness as well as some vertigo  She notices this when she stands up  Pt has been able to ambulate with these symptoms, denies headaches, no focal neurologic deficits  Pt additionally notes fatigue for the last year, this has been constant  Pt feels this has been worsening over the last month  She has been seen by her PCP, cardiology, hematology and rheumatology for these symptoms in the last month  Cardiology felt there may be a complonent of orthostasis and recommended hydration  Oncology felt likely there was a rheumatologic process causing the symptoms, this prompted the evaluation by rheumatology  Pt states she has further blood work that was taken yesterday, ordered by the rheumatologist  Pt continues to feel poorly, this is why she has presented to the ED  Pt denies CP/SOB/F/C/N/V/D/C, no dysuria, burning on urination or blood in urine            History provided by:  Patient and friend   used: No    Dizziness   Quality:  Lightheadedness and vertigo  Severity:  Mild  Onset quality:  Unable to specify  Duration:  1 month  Timing:  Intermittent  Progression:  Waxing and waning  Chronicity:  New  Context: standing up    Relieved by:  Nothing  Worsened by:  Nothing  Ineffective treatments:  None tried  Associated symptoms: no chest pain, no diarrhea, no nausea, no palpitations, no shortness of breath, no syncope, no vomiting and no weakness    Fatigue   Severity:  Moderate  Onset quality:  Gradual  Timing:  Constant  Progression:  Worsening  Chronicity:  Chronic  Relieved by:  Nothing  Worsened by:  Nothing  Ineffective treatments:  None tried  Associated symptoms: dizziness    Associated symptoms: no abdominal pain, no arthralgias, no chest pain, no diarrhea, no dysuria, no fever, no frequency, no nausea, no shortness of breath, no syncope and no vomiting        Prior to Admission Medications   Prescriptions Last Dose Informant Patient Reported? Taking? ALPRAZolam (XANAX) 0 25 mg tablet  Self No Yes   Sig: Take 1 tablet (0 25 mg total) by mouth 2 (two) times a day as needed for anxiety   Multiple Vitamin tablet  Self Yes Yes   Sig: Take by mouth   ferrous sulfate 325 (65 Fe) mg tablet  Self Yes Yes   Sig: Take 325 mg by mouth daily with breakfast   ibuprofen (ADVIL) 200 mg tablet  Self Yes Yes   Sig: Take by mouth   levothyroxine 75 mcg tablet  Self No Yes   Sig: Take 1 tablet by mouth daily   meclizine (ANTIVERT) 25 mg tablet   No Yes   Sig: Take 1 tablet (25 mg total) by mouth 3 (three) times a day as needed for dizziness      Facility-Administered Medications: None       Past Medical History:   Diagnosis Date    GERD (gastroesophageal reflux disease)     Hypothyroidism     [facility]rens syndrome [facility] )        History reviewed  No pertinent surgical history  Family History   Problem Relation Age of Onset    Colon cancer Mother     Liver cancer Father      I have reviewed and agree with the history as documented  Social History   Substance Use Topics    Smoking status: Never Smoker    Smokeless tobacco: Never Used    Alcohol use No        Review of Systems   Constitutional: Positive for fatigue  Negative for chills and fever  HENT: Negative for dental problem  Eyes: Negative for visual disturbance  Respiratory: Negative for shortness of breath  Cardiovascular: Negative for chest pain, palpitations and syncope  Gastrointestinal: Negative for abdominal pain, diarrhea, nausea and vomiting  Genitourinary: Negative for dysuria and frequency  Musculoskeletal: Negative for arthralgias  Skin: Negative for rash  Neurological: Positive for dizziness  Negative for weakness and light-headedness     Psychiatric/Behavioral: Negative for agitation, behavioral problems and confusion  All other systems reviewed and are negative  Physical Exam  ED Triage Vitals   Temperature Pulse Respirations Blood Pressure SpO2   05/03/18 1003 05/03/18 1003 05/03/18 1003 05/03/18 1003 05/03/18 1003   98 1 °F (36 7 °C) 88 16 137/73 99 %      Temp Source Heart Rate Source Patient Position - Orthostatic VS BP Location FiO2 (%)   05/03/18 1003 05/03/18 1115 05/03/18 1003 05/03/18 1003 --   Oral Monitor Sitting Left arm       Pain Score       05/03/18 1003       No Pain           Orthostatic Vital Signs  Vitals:    05/03/18 1215 05/03/18 1245 05/03/18 1315 05/03/18 1345   BP: 119/64 148/70 140/68 139/71   Pulse: 74 68 70 64   Patient Position - Orthostatic VS: Lying Lying Lying Lying       Physical Exam   Constitutional: She is oriented to person, place, and time  She appears well-developed and well-nourished  HENT:   Head: Normocephalic and atraumatic  Eyes: EOM are normal    Neck: Normal range of motion  Cardiovascular: Normal rate, regular rhythm and normal heart sounds  Pulmonary/Chest: Effort normal and breath sounds normal    Abdominal: Soft  There is no tenderness  Musculoskeletal: Normal range of motion  Neurological: She is alert and oriented to person, place, and time  Skin: Skin is warm and dry  Psychiatric: She has a normal mood and affect  Her behavior is normal  Thought content normal    Nursing note and vitals reviewed        ED Medications  Medications   sodium chloride 0 9 % bolus 1,000 mL (0 mL Intravenous Stopped 5/3/18 1227)   diazepam (VALIUM) injection 2 5 mg (2 5 mg Intravenous Given 5/3/18 1241)   cephalexin (KEFLEX) capsule 500 mg (500 mg Oral Given 5/3/18 1439)       Diagnostic Studies  Results Reviewed     Procedure Component Value Units Date/Time    Urine Microscopic [[id number]]  (Abnormal) Collected:  05/03/18 1407    Lab Status:  Final result Specimen:  Urine from Urine, Clean Catch Updated:  05/03/18 1423     RBC, UA None Seen /hpf      WBC, UA 4-10 (A) /hpf      Epithelial Cells None Seen /hpf      Bacteria, UA Moderate (A) /hpf     POCT urinalysis dipstick [[id number]]  (Normal) Resulted:  05/03/18 1406    Lab Status:  Final result Specimen:  Urine Updated:  05/03/18 1406     Color, UA Yellow    ED Urine Macroscopic [[id number]]  (Abnormal) Collected:  05/03/18 1407    Lab Status:  Final result Specimen:  Urine Updated:  05/03/18 1405     Color, UA Yellow     Clarity, UA Clear     pH, UA 7 0     Leukocytes, UA Moderate (A)     Nitrite, UA Negative     Protein, UA Negative mg/dl      Glucose, UA Negative mg/dl      Ketones, UA Negative mg/dl      Urobilinogen, UA 0 2 E U /dl      Bilirubin, UA Negative     Blood, UA Negative     Specific Gravity, UA 1 010    Narrative:       CLINITEK RESULT    Basic metabolic panel [[id number]]  (Abnormal) Collected:  05/03/18 1042    Lab Status:  Final result Specimen:  Blood from Arm, Right Updated:  05/03/18 1106     Sodium 138 mmol/L      Potassium 3 4 (L) mmol/L      Chloride 105 mmol/L      CO2 27 mmol/L      Anion Gap 6 mmol/L      BUN 13 mg/dL      Creatinine 0 96 mg/dL      Glucose 102 mg/dL      Calcium 8 3 mg/dL      eGFR 62 ml/min/1 73sq m     Narrative:         National Kidney Disease Education Program recommendations are as follows:  GFR calculation is accurate only with a steady state creatinine  Chronic Kidney disease less than 60 ml/min/1 73 sq  meters  Kidney failure less than 15 ml/min/1 73 sq  meters      Hepatic function panel [[id number]]  (Abnormal) Collected:  05/03/18 1042    Lab Status:  Final result Specimen:  Blood from Arm, Right Updated:  05/03/18 1106     Total Bilirubin 0 42 mg/dL      Bilirubin, Direct 0 09 mg/dL      Alkaline Phosphatase 64 U/L      AST 23 U/L      ALT 11 (L) U/L      Total Protein 9 5 (H) g/dL      Albumin 2 7 (L) g/dL     Magnesium [[id number]]  (Normal) Collected:  05/03/18 1042    Lab Status:  Final result Specimen:  Blood from Arm, Right Updated: 05/03/18 1106     Magnesium 1 9 mg/dL     CBC and differential [[id number]]  (Abnormal) Collected:  05/03/18 1042    Lab Status:  Final result Specimen:  Blood from Arm, Right Updated:  05/03/18 1052     WBC 5 80 Thousand/uL      RBC 4 09 Million/uL      Hemoglobin 11 4 (L) g/dL      Hematocrit 35 8 %      MCV 88 fL      MCH 27 9 pg      MCHC 31 8 g/dL      RDW 15 0 %      MPV 9 8 fL      Platelets 071 Thousands/uL      Neutrophils Relative 56 %      Lymphocytes Relative 40 %      Monocytes Relative 3 (L) %      Eosinophils Relative 1 %      Basophils Relative 0 %      Neutrophils Absolute 3 24 Thousands/µL      Lymphocytes Absolute 2 30 Thousands/µL      Monocytes Absolute 0 19 Thousand/µL      Eosinophils Absolute 0 05 Thousand/µL      Basophils Absolute 0 02 Thousands/µL                  XR chest 2 views   Final Result by Anthony Alberts MD (05/03 1120)      No acute cardiopulmonary disease  Workstation performed: [id number]         CT head without contrast   Final Result by Anthony Alberts MD (05/03 1120)      No acute intracranial abnormality  Workstation performed: [id number]                    Procedures  Procedures       Phone Contacts  ED Phone Contact    ED Course  ED Course as of May 03 1622   Thu May 03, 2018   1353 Pt continues to have difficulty giving urine sample  MDM  Number of Diagnoses or Management Options  Dizziness: new and requires workup  Fatigue: new and requires workup  Urinary tract infection: new and requires workup  Diagnosis management comments: 1  Dizziness - Pt has no focal neurologic deficit, she has normal cerebellar function  Cardiology feels there may be some orthostatic hypotension  PCP has recommended MRI as well as ENT F/U  Will check CT, electrolytes, CBC, urine to rule out emergent process, agree with follow up      2  Fatigue - Pt with ongoing issues, looking through her records, various inflammatory markers are elevated, agree with continued management by rheumatology  Amount and/or Complexity of Data Reviewed  Clinical lab tests: ordered and reviewed  Tests in the radiology section of CPT®: ordered and reviewed    Patient Progress  Patient progress: stable    CritCare Time    Disposition  Final diagnoses:   Urinary tract infection   Fatigue   Dizziness     Time reflects when diagnosis was documented in both MDM as applicable and the Disposition within this note     Time User Action Codes Description Comment    5/3/2018  2:41 PM Savanahgeraldine Larose E Add [N39 0] Urinary tract infection     5/3/2018  2:42 PM Hunter Rogers Add [R53 83] Fatigue     5/3/2018  2:42 PM Savanah Thuan E Add [R42] Dizziness       ED Disposition     ED Disposition Condition Comment    Discharge  [address] discharge to home/self care  Condition at discharge: Stable        Follow-up Information     Follow up With Specialties Details Why Contact Info    Milton Mcfadden,  Family Medicine Schedule an appointment as soon as possible for a visit  [address], DO Otolaryngology Schedule an appointment as soon as possible for a visit  [address] [place] [address]          Discharge Medication List as of 5/3/2018  2:47 PM      START taking these medications    Details   cephalexin (KEFLEX) 500 mg capsule Take 1 capsule (500 mg total) by mouth every 6 (six) hours for 5 days, Starting Thu 5/3/2018, Until Tue 5/8/2018, Normal         CONTINUE these medications which have NOT CHANGED    Details   ALPRAZolam (XANAX) 0 25 mg tablet Take 1 tablet (0 25 mg total) by mouth 2 (two) times a day as needed for anxiety, Starting Tue 3/20/2018, Print      ferrous sulfate 325 (65 Fe) mg tablet Take 325 mg by mouth daily with breakfast, Historical Med      ibuprofen (ADVIL) 200 mg tablet Take by mouth, Historical Med      levothyroxine 75 mcg tablet Take 1 tablet by mouth daily, Starting Wed 1/24/2018, Normal      meclizine (ANTIVERT) 25 mg tablet Take 1 tablet (25 mg total) by mouth 3 (three) times a day as needed for dizziness, Starting Mon 4/30/2018, Normal      Multiple Vitamin tablet Take by mouth, Historical Med           No discharge procedures on file      ED Provider  Electronically Signed by           Irina Akbar MD  05/03/18 8490

## 2018-05-03 NOTE — DISCHARGE INSTRUCTIONS
Take the Keflex 4 times daily for the next 5 days  Let your family doctor know you were in the ER, he can review the labs and determine if there is other treatment required at this time  Continue to follow with your doctors to further evaluate your symptoms  The number for the [facility] Thro[facility] doctor is included in these discharge instructions, you can call to follow up for continue evaluation of your vertigo  Urinary Tract Infection in Women   WHAT YOU NEED TO KNOW:   A urinary tract infection (UTI) is caused by bacteria that get inside your urinary tract  Most bacteria that enter your urinary tract come out when you urinate  If the bacteria stay in your urinary tract, you may get an infection  Your urinary tract includes your kidneys, ureters, bladder, and urethra  Urine is made in your kidneys, and it flows from the ureters to the bladder  Urine leaves the bladder through the urethra  A UTI is more common in your lower urinary tract, which includes your bladder and urethra  DISCHARGE INSTRUCTIONS:   Return to the emergency department if:   · You are urinating very little or not at all  · You have a high fever with shaking chills  · You have side or back pain that gets worse  Contact your healthcare provider if:   · You have a fever  · You do not feel better after 2 days of taking antibiotics  · You are vomiting  · You have questions or concerns about your condition or care  Medicines:   · Antibiotics  help fight a bacterial infection  · Medicines  may be given to decrease pain and burning when you urinate  They will also help decrease the feeling that you need to urinate often  These medicines will make your urine orange or red  · Take your medicine as directed  Contact your healthcare provider if you think your medicine is not helping or if you have side effects  Tell him or her if you are allergic to any medicine   Keep a list of the medicines, vitamins, and herbs you take  Include the amounts, and when and why you take them  Bring the list or the pill bottles to follow-up visits  Carry your medicine list with you in case of an emergency  Follow up with your healthcare provider as directed:  Write down your questions so you remember to ask them during your visits  Prevent another UTI:   · Empty your bladder often  Urinate and empty your bladder as soon as you feel the need  Do not hold your urine for long periods of time  · Wipe from front to back after you urinate or have a bowel movement  This will help prevent germs from getting into your urinary tract through your urethra  · Drink liquids as directed  Ask how much liquid to drink each day and which liquids are best for you  You may need to drink more liquids than usual to help flush out the bacteria  Do not drink alcohol, caffeine, or citrus juices  These can irritate your bladder and increase your symptoms  Your healthcare provider may recommend cranberry juice to help prevent a UTI  · Urinate after you have sex  This can help flush out bacteria passed during sex  · Do not douche or use feminine deodorants  These can change the chemical balance in your vagina  · Change sanitary pads or tampons often  This will help prevent germs from getting into your urinary tract  · Do pelvic muscle exercises often  Pelvic muscle exercises may help you start and stop urinating  Strong pelvic muscles may help you empty your bladder easier  Squeeze these muscles tightly for 5 seconds like you are trying to hold back urine  Then relax for 5 seconds  Gradually work up to squeezing for 10 seconds  Do 3 sets of 15 repetitions a day, or as directed  © 2017 [address]  Information is for End User's use only and may not be sold, redistributed or otherwise used for commercial purposes   All illustrations and images included in CareNotes® are the copyrighted property of A D [employer] M [employer]  or [employer]  The above information is an  only  It is not intended as medical advice for individual conditions or treatments  Talk to your doctor, nurse or pharmacist before following any medical regimen to see if it is safe and effective for you

## 2018-05-03 NOTE — ED NOTES
Patient transported to [address] [place] [phone], RN  05/03/18 [facility], RN  05/03/18 1052

## 2018-05-03 NOTE — TELEPHONE ENCOUNTER
FRANCESCANicolle will be going to the ER because she said she is not getting any relief from her dizziness and feeling horrible

## 2018-05-03 NOTE — ED NOTES
Helped patient to the bathroom at this time; patient complaining of dizziness during ambulation; patient a little unsteady on feet; Dr Lopez Score notified        Shaune Pallas, RN  05/03/18 1551

## 2018-05-07 ENCOUNTER — TELEPHONE (OUTPATIENT)
Dept: FAMILY MEDICINE CLINIC | Facility: CLINIC | Age: 68
End: 2018-05-07

## 2018-05-07 NOTE — TELEPHONE ENCOUNTER
Pt called requesting to speak with you  She states that she was in about 1 month ago(3/20) and she is still feeling the same (note for that date states dizziness, feeling off-balance and fatigue) and is requesting that you call her  Her phone #[phone]  Thank you

## 2018-05-07 NOTE — TELEPHONE ENCOUNTER
Phone call to patient she complains of continued dizziness and fatigue  Meclizine was helping the dizziness initially but not now  Patient was evaluated by hematologist/ oncologist and rheumatologist and is scheduled for MRI of the brain on 05/09/2018 and was referred to Dr Kaylen Zimmerman, ENT specialist by Dr Jesus Wolf, rheumatologist but patient has not scheduled that appointment yet  Patient was also recently seen in the emergency room  Recommend patient schedule appointment with ENT specialist ASAP and vianca results of MRI

## 2018-06-21 ENCOUNTER — TRANSITIONAL CARE MANAGEMENT (OUTPATIENT)
Dept: FAMILY MEDICINE CLINIC | Facility: CLINIC | Age: 68
End: 2018-06-21

## 2018-07-12 DIAGNOSIS — E87.6 HYPOKALEMIA: ICD-10-CM

## 2018-07-12 DIAGNOSIS — F32.A DEPRESSION, UNSPECIFIED DEPRESSION TYPE: Primary | ICD-10-CM

## 2018-07-12 PROBLEM — R93.89 ABNORMAL MRI: Status: ACTIVE | Noted: 2018-05-09

## 2018-07-12 PROBLEM — E55.9 VITAMIN D DEFICIENCY: Status: ACTIVE | Noted: 2018-05-11

## 2018-07-12 PROBLEM — R42 DIZZINESS: Status: ACTIVE | Noted: 2018-05-09

## 2018-07-12 PROBLEM — R26.2 AMBULATORY DYSFUNCTION: Status: ACTIVE | Noted: 2018-05-21

## 2018-07-12 PROBLEM — M32.9 SLE (SYSTEMIC LUPUS ERYTHEMATOSUS) (HCC): Status: ACTIVE | Noted: 2018-05-10

## 2018-07-12 PROBLEM — R70.0 ELEVATED SEDIMENTATION RATE MEASUREMENT: Status: ACTIVE | Noted: 2018-05-02

## 2018-07-12 PROBLEM — H53.8 BLURRY VISION, RIGHT EYE: Status: ACTIVE | Noted: 2018-05-12

## 2018-07-12 PROBLEM — I73.9 SMALL VESSEL DISEASE (HCC): Status: ACTIVE | Noted: 2018-05-20

## 2018-07-12 RX ORDER — MIRTAZAPINE 15 MG/1
15 TABLET, FILM COATED ORAL
COMMUNITY
Start: 2018-06-08 | End: 2018-07-12 | Stop reason: SDUPTHER

## 2018-07-12 RX ORDER — POTASSIUM CHLORIDE 20 MEQ/1
20 TABLET, EXTENDED RELEASE ORAL
COMMUNITY
Start: 2018-06-08 | End: 2018-07-12 | Stop reason: SDUPTHER

## 2018-07-12 RX ORDER — MIRTAZAPINE 15 MG/1
15 TABLET, FILM COATED ORAL
Qty: 30 TABLET | Refills: 3 | Status: SHIPPED | OUTPATIENT
Start: 2018-07-12 | End: 2020-01-02

## 2018-07-12 RX ORDER — POTASSIUM CHLORIDE 20 MEQ/1
20 TABLET, EXTENDED RELEASE ORAL DAILY
Qty: 30 TABLET | Refills: 3 | Status: SHIPPED | OUTPATIENT
Start: 2018-07-12 | End: 2020-01-02

## 2018-07-12 NOTE — TELEPHONE ENCOUNTER
Patient has an appt scheduled with you on 7/25/18 for hospital follow up from 6/20/18, Son  Patient requesting refills on two medications prescribed while hospitalized

## 2018-07-25 ENCOUNTER — OFFICE VISIT (OUTPATIENT)
Dept: FAMILY MEDICINE CLINIC | Facility: CLINIC | Age: 68
End: 2018-07-25
Payer: COMMERCIAL

## 2018-07-25 VITALS
BODY MASS INDEX: 17.84 KG/M2 | TEMPERATURE: 98.8 F | HEART RATE: 80 BPM | HEIGHT: 66 IN | DIASTOLIC BLOOD PRESSURE: 76 MMHG | WEIGHT: 111 LBS | RESPIRATION RATE: 24 BRPM | SYSTOLIC BLOOD PRESSURE: 122 MMHG

## 2018-07-25 DIAGNOSIS — E03.9 HYPOTHYROIDISM, UNSPECIFIED TYPE: Primary | ICD-10-CM

## 2018-07-25 DIAGNOSIS — M32.8 OTHER FORMS OF SYSTEMIC LUPUS ERYTHEMATOSUS, UNSPECIFIED ORGAN INVOLVEMENT STATUS (HCC): ICD-10-CM

## 2018-07-25 DIAGNOSIS — R42 DIZZINESS: ICD-10-CM

## 2018-07-25 DIAGNOSIS — M35.00 SJOGREN'S SYNDROME, WITH UNSPECIFIED ORGAN INVOLVEMENT (HCC): ICD-10-CM

## 2018-07-25 DIAGNOSIS — Z12.31 SCREENING MAMMOGRAM, ENCOUNTER FOR: ICD-10-CM

## 2018-07-25 PROCEDURE — 99214 OFFICE O/P EST MOD 30 MIN: CPT | Performed by: FAMILY MEDICINE

## 2018-07-25 PROCEDURE — 3008F BODY MASS INDEX DOCD: CPT | Performed by: FAMILY MEDICINE

## 2018-07-25 RX ORDER — ERGOCALCIFEROL 1.25 MG/1
50000 CAPSULE ORAL WEEKLY
COMMUNITY
Start: 2018-06-15 | End: 2020-01-02

## 2018-07-25 NOTE — PROGRESS NOTES
Assessment/Plan:    Patient to continue present treatment  Patient to follow up with Neurology and Rheumatology as scheduled  Discussed possibly getting 2nd opinions regarding unsure diagnoses  Patient clinically is gradually improving  Return to the office in 3 months  Diagnoses and all orders for this visit:    Hypothyroidism, unspecified type    Dizziness    Other forms of systemic lupus erythematosus, unspecified organ involvement status ([facility] )    Sjogren's syndrome, with unspecified organ involvement ([facility] )    Screening mammogram, encounter for  -     Mammo screening bilateral w 3d & cad; Future    Other orders  -     ergocalciferol (VITAMIN D2) 50,000 units; Take 50,000 Units by mouth once a week  -     cyanocobalamin ([facility] VITAMIN B-12) 1000 MCG tablet; Take 1,000 mcg by mouth daily  -     Cholecalciferol 1000 units tablet; Take 1,000 Units by mouth daily          Subjective:      Patient ID: Maris Rehman is a 79 y o  female  Patient is being seen in follow-up from recent prolonged hospitalization at [facility] from 05/21/2018 until discharged home on 06/20/2018  Patient is being followed by Dr Jules Rice, neurologist was seen approximately 4 weeks ago and has a follow-up appointment in 3 months  Patient is being followed by Dr Lucille Mac, rheumatologist and had appointment 2 weeks ago and has a follow-up in October 2018  Patient was admitted with a 1 month history of dizziness and was diagnosed with possible demyelinating disease, Sjogren's syndrome and lupus  Patient has been feeling somewhat better overall and her strength is improving  Patient is walking with the use of her Rollator  Appetite remains decreased although patient is weight has stabilized and she is sleeping well overall  She continues with fatigue although is somewhat improved  Thyroid Problem   Presents for follow-up visit   Symptoms include anxiety, depressed mood, dry skin, fatigue, hoarse voice, visual change and weight loss  Patient reports no cold intolerance, constipation, diaphoresis, diarrhea, hair loss, heat intolerance, leg swelling, palpitations or tremors  The symptoms have been stable  The following portions of the patient's history were reviewed and updated as appropriate: allergies, current medications, past family history, past medical history, past social history, past surgical history and problem list     Review of Systems   Constitutional: Positive for activity change, appetite change, fatigue, unexpected weight change and weight loss  Negative for diaphoresis  HENT: Positive for hoarse voice  Eyes: Positive for photophobia and visual disturbance  Respiratory: Negative for cough, chest tightness, shortness of breath and wheezing  Cardiovascular: Negative for chest pain, palpitations and leg swelling  Gastrointestinal: Negative for blood in stool, constipation, diarrhea, nausea and vomiting  Endocrine: Negative for cold intolerance and heat intolerance  Genitourinary: Negative for difficulty urinating, dysuria, frequency, hematuria and urgency  Musculoskeletal: Positive for arthralgias, gait problem and myalgias  Negative for back pain and neck pain  Neurological: Positive for dizziness, weakness, light-headedness and numbness  Negative for tremors, syncope, speech difficulty and headaches  Hematological: Negative for adenopathy  Does not bruise/bleed easily  Psychiatric/Behavioral: Positive for dysphoric mood  Negative for sleep disturbance  The patient is nervous/anxious  Objective:      /76 (BP Location: Left arm, Patient Position: Sitting, Cuff Size: Adult)   Pulse 80   Temp 98 8 °F (37 1 °C) (Tympanic)   Resp (!) 24   Ht 5' 6" (1 676 m)   Wt 50 3 kg (111 lb)   BMI 17 92 kg/m²          Physical Exam   Constitutional: She is oriented to person, place, and time  No distress  Appears underweight and weakness overall     HENT:   Head: Normocephalic  Right Ear: External ear normal    Left Ear: External ear normal    Nose: Nose normal    Mouth/Throat: Oropharynx is clear and moist    Eyes: Conjunctivae are normal  No scleral icterus  Neck: Neck supple  No thyromegaly present  Cardiovascular: Normal rate and regular rhythm  Pulmonary/Chest: Effort normal and breath sounds normal    Abdominal: Soft  There is no tenderness  Musculoskeletal: She exhibits tenderness  She exhibits no edema  Lymphadenopathy:     She has no cervical adenopathy  Neurological: She is alert and oriented to person, place, and time  Skin: Skin is warm and dry  Psychiatric: She has a normal mood and affect

## 2018-10-31 ENCOUNTER — OFFICE VISIT (OUTPATIENT)
Dept: FAMILY MEDICINE CLINIC | Facility: CLINIC | Age: 68
End: 2018-10-31
Payer: COMMERCIAL

## 2018-10-31 VITALS
HEART RATE: 92 BPM | RESPIRATION RATE: 16 BRPM | WEIGHT: 113 LBS | DIASTOLIC BLOOD PRESSURE: 60 MMHG | OXYGEN SATURATION: 90 % | TEMPERATURE: 97.7 F | BODY MASS INDEX: 18.16 KG/M2 | SYSTOLIC BLOOD PRESSURE: 120 MMHG | HEIGHT: 66 IN

## 2018-10-31 DIAGNOSIS — E55.9 VITAMIN D DEFICIENCY: ICD-10-CM

## 2018-10-31 DIAGNOSIS — Z00.00 HEALTHCARE MAINTENANCE: ICD-10-CM

## 2018-10-31 DIAGNOSIS — E03.9 HYPOTHYROIDISM, UNSPECIFIED TYPE: Primary | ICD-10-CM

## 2018-10-31 DIAGNOSIS — F32.A DEPRESSION, UNSPECIFIED DEPRESSION TYPE: ICD-10-CM

## 2018-10-31 DIAGNOSIS — F41.9 ANXIETY: ICD-10-CM

## 2018-10-31 DIAGNOSIS — M35.00 SJOGREN'S SYNDROME, WITH UNSPECIFIED ORGAN INVOLVEMENT (HCC): ICD-10-CM

## 2018-10-31 LAB — TSH SERPL DL<=0.05 MIU/L-ACNC: 2.11 UIU/ML (ref 0.36–3.74)

## 2018-10-31 PROCEDURE — 90732 PPSV23 VACC 2 YRS+ SUBQ/IM: CPT

## 2018-10-31 PROCEDURE — 36415 COLL VENOUS BLD VENIPUNCTURE: CPT | Performed by: FAMILY MEDICINE

## 2018-10-31 PROCEDURE — G0009 ADMIN PNEUMOCOCCAL VACCINE: HCPCS

## 2018-10-31 PROCEDURE — 3008F BODY MASS INDEX DOCD: CPT | Performed by: FAMILY MEDICINE

## 2018-10-31 PROCEDURE — 1036F TOBACCO NON-USER: CPT | Performed by: FAMILY MEDICINE

## 2018-10-31 PROCEDURE — 84443 ASSAY THYROID STIM HORMONE: CPT | Performed by: FAMILY MEDICINE

## 2018-10-31 PROCEDURE — 1160F RVW MEDS BY RX/DR IN RCRD: CPT | Performed by: FAMILY MEDICINE

## 2018-10-31 PROCEDURE — 99214 OFFICE O/P EST MOD 30 MIN: CPT | Performed by: FAMILY MEDICINE

## 2018-10-31 PROCEDURE — 1160F RVW MEDS BY RX/DR IN RCRD: CPT

## 2018-10-31 RX ORDER — LEVOTHYROXINE SODIUM 0.07 MG/1
75 TABLET ORAL DAILY
Qty: 30 TABLET | Refills: 5 | Status: SHIPPED | OUTPATIENT
Start: 2018-10-31 | End: 2020-01-02

## 2018-10-31 RX ORDER — ALPRAZOLAM 0.25 MG/1
0.25 TABLET ORAL 2 TIMES DAILY PRN
Qty: 60 TABLET | Refills: 3 | Status: SHIPPED | OUTPATIENT
Start: 2018-10-31 | End: 2020-01-02

## 2018-10-31 RX ORDER — A/SINGAPORE/GP1908/2015 IVR-180 (H1N1) (AN A/MICHIGAN/45/2015 (H1N1)PDM09-LIKE VIRUS), A/HONG KONG/4801/2014, NYMC X-263B (H3N2) (AN A/HONG KONG/4801/2014-LIKE VIRUS), AND B/BRISBANE/60/2008, WILD TYPE (A B/BRISBANE/60/2008-LIKE VIRUS) 15; 15; 15 UG/.5ML; UG/.5ML; UG/.5ML
INJECTION, SUSPENSION INTRAMUSCULAR
Refills: 0 | COMMUNITY
Start: 2018-10-24 | End: 2020-01-02

## 2018-10-31 RX ORDER — HYDROXYCHLOROQUINE SULFATE 200 MG/1
200 TABLET, FILM COATED ORAL
COMMUNITY
Start: 2018-10-12 | End: 2020-01-02

## 2018-10-31 RX ORDER — HYDROXYCHLOROQUINE SULFATE 200 MG/1
200 TABLET, FILM COATED ORAL DAILY
Refills: 0 | COMMUNITY
Start: 2018-10-12 | End: 2020-01-02

## 2018-10-31 RX ORDER — PILOCARPINE HYDROCHLORIDE 5 MG/1
5 TABLET, FILM COATED ORAL 3 TIMES DAILY
Qty: 90 TABLET | Refills: 5 | Status: SHIPPED | OUTPATIENT
Start: 2018-10-31 | End: 2020-01-02

## 2018-10-31 NOTE — PROGRESS NOTES
Assessment/Plan:    Patient received Pneumovax 23 vaccine today and recommend Prevnar 13 vaccine in 1 year  Labs drawn for TSH  Patient to continue present treatment  Patient to continue walking daily as tolerated with use of her cane or walker  Follow up with specialists as scheduled and return to the office in 3 months  Patient to remain out of work  Diagnoses and all orders for this visit:    Hypothyroidism, unspecified type  -     levothyroxine 75 mcg tablet; Take 1 tablet (75 mcg total) by mouth daily  -     TSH, 3rd generation with Free T4 reflex    Depression, unspecified depression type    Vitamin D deficiency    Sjogren's syndrome, with unspecified organ involvement (HCC)  -     pilocarpine (SALAGEN) 5 mg tablet; Take 1 tablet (5 mg total) by mouth 3 (three) times a day    Anxiety  -     ALPRAZolam (XANAX) 0 25 mg tablet; Take 1 tablet (0 25 mg total) by mouth 2 (two) times a day as needed for anxiety    Healthcare maintenance  -     PNEUMOCOCCAL POLYSACCHARIDE VACCINE 23-VALENT =>3YO SQ IM    Other orders  -     hydroxychloroquine (PLAQUENIL) 200 mg tablet; Take 200 mg by mouth  -     hydroxychloroquine (PLAQUENIL) 200 mg tablet; Take 200 mg by mouth daily  -     FLUAD 0 5 ML CYN; inject 0 5 milliliter intramuscularly          Subjective:      Patient ID: Nneka Henry is a 79 y o  female  Patient is here for routine appointment for chronic conditions and she has been feeling somewhat better overall  Patient received her yearly flu vaccine 1 week ago  Patient has never had pneumonia vaccine  Patient has been walking daily  She continues out of work  Patient has been following with Rheumatology and Neurology regularly  Thyroid Problem   Presents for follow-up visit  Symptoms include anxiety, cold intolerance, depressed mood, dry skin, fatigue and visual change   Patient reports no constipation, diaphoresis, diarrhea, hair loss, heat intolerance, hoarse voice, leg swelling, palpitations, tremors, weight gain or weight loss  The symptoms have been stable  Anxiety   Presents for follow-up visit  Symptoms include depressed mood, dry mouth, excessive worry, nervous/anxious behavior and restlessness  Patient reports no chest pain, confusion, decreased concentration, dizziness, hyperventilation, irritability, nausea, palpitations, panic or suicidal ideas  Symptoms occur constantly  The quality of sleep is fair  The following portions of the patient's history were reviewed and updated as appropriate: allergies, current medications, past family history, past medical history, past social history, past surgical history and problem list     Review of Systems   Constitutional: Positive for fatigue  Negative for diaphoresis, irritability, weight gain and weight loss  HENT: Negative for hoarse voice  Cardiovascular: Negative for chest pain and palpitations  Gastrointestinal: Negative for constipation, diarrhea and nausea  Endocrine: Positive for cold intolerance  Negative for heat intolerance  Neurological: Negative for dizziness and tremors  Psychiatric/Behavioral: Negative for confusion, decreased concentration and suicidal ideas  The patient is nervous/anxious  Objective:      /60 (BP Location: Left arm, Patient Position: Sitting, Cuff Size: Adult)   Pulse 92   Temp 97 7 °F (36 5 °C)   Resp 16   Ht 5' 5 75" (1 67 m)   Wt 51 3 kg (113 lb)   SpO2 90%   BMI 18 38 kg/m²          Physical Exam   Constitutional: She is oriented to person, place, and time  She appears well-developed and well-nourished  No distress  HENT:   Head: Normocephalic  Right Ear: External ear normal    Left Ear: External ear normal    Nose: Nose normal    Mouth/Throat: Oropharynx is clear and moist    Eyes: Conjunctivae are normal  No scleral icterus  Neck: Neck supple  No thyromegaly present  Cardiovascular: Normal rate and regular rhythm      Pulmonary/Chest: Effort normal and breath sounds normal    Abdominal: Soft  There is no tenderness  Musculoskeletal: She exhibits no edema  Lymphadenopathy:     She has no cervical adenopathy  Neurological: She is alert and oriented to person, place, and time  Skin: Skin is warm and dry  Psychiatric: She has a normal mood and affect

## 2019-02-26 ENCOUNTER — TELEPHONE (OUTPATIENT)
Dept: FAMILY MEDICINE CLINIC | Facility: CLINIC | Age: 69
End: 2019-02-26

## 2019-02-26 NOTE — TELEPHONE ENCOUNTER
LMOM advised patient to return call  Patient was ordered a Mammogram on 7/25/18  Would like to know if patient went outside of the network for Mammogram of if she would like help scheduling

## 2019-05-31 ENCOUNTER — TELEPHONE (OUTPATIENT)
Dept: FAMILY MEDICINE CLINIC | Facility: CLINIC | Age: 69
End: 2019-05-31

## 2020-01-02 ENCOUNTER — OFFICE VISIT (OUTPATIENT)
Dept: FAMILY MEDICINE CLINIC | Facility: CLINIC | Age: 70
End: 2020-01-02
Payer: MEDICARE

## 2020-01-02 VITALS
WEIGHT: 105 LBS | SYSTOLIC BLOOD PRESSURE: 118 MMHG | HEART RATE: 100 BPM | DIASTOLIC BLOOD PRESSURE: 76 MMHG | BODY MASS INDEX: 16.88 KG/M2 | TEMPERATURE: 99 F | OXYGEN SATURATION: 99 % | HEIGHT: 66 IN | RESPIRATION RATE: 16 BRPM

## 2020-01-02 DIAGNOSIS — R63.4 WEIGHT LOSS: ICD-10-CM

## 2020-01-02 DIAGNOSIS — E03.9 HYPOTHYROIDISM, UNSPECIFIED TYPE: ICD-10-CM

## 2020-01-02 DIAGNOSIS — J06.9 UPPER RESPIRATORY TRACT INFECTION, UNSPECIFIED TYPE: Primary | ICD-10-CM

## 2020-01-02 LAB
ALBUMIN SERPL BCP-MCNC: 2.9 G/DL (ref 3.5–5)
ALP SERPL-CCNC: 136 U/L (ref 46–116)
ALT SERPL W P-5'-P-CCNC: 30 U/L (ref 12–78)
ANION GAP SERPL CALCULATED.3IONS-SCNC: 7 MMOL/L (ref 4–13)
AST SERPL W P-5'-P-CCNC: 37 U/L (ref 5–45)
BILIRUB SERPL-MCNC: 0.54 MG/DL (ref 0.2–1)
BUN SERPL-MCNC: 19 MG/DL (ref 5–25)
CALCIUM SERPL-MCNC: 9.5 MG/DL (ref 8.3–10.1)
CHLORIDE SERPL-SCNC: 102 MMOL/L (ref 100–108)
CO2 SERPL-SCNC: 25 MMOL/L (ref 21–32)
CREAT SERPL-MCNC: 0.91 MG/DL (ref 0.6–1.3)
ERYTHROCYTE [DISTWIDTH] IN BLOOD BY AUTOMATED COUNT: 14.7 % (ref 11.6–15.1)
GFR SERPL CREATININE-BSD FRML MDRD: 65 ML/MIN/1.73SQ M
GLUCOSE SERPL-MCNC: 96 MG/DL (ref 65–140)
HCT VFR BLD AUTO: 39.1 % (ref 34.8–46.1)
HGB BLD-MCNC: 12.3 G/DL (ref 11.5–15.4)
MCH RBC QN AUTO: 27.9 PG (ref 26.8–34.3)
MCHC RBC AUTO-ENTMCNC: 31.5 G/DL (ref 31.4–37.4)
MCV RBC AUTO: 89 FL (ref 82–98)
PLATELET # BLD AUTO: 246 THOUSANDS/UL (ref 149–390)
PMV BLD AUTO: 10.6 FL (ref 8.9–12.7)
POTASSIUM SERPL-SCNC: 3.7 MMOL/L (ref 3.5–5.3)
PROT SERPL-MCNC: 9.8 G/DL (ref 6.4–8.2)
RBC # BLD AUTO: 4.41 MILLION/UL (ref 3.81–5.12)
SODIUM SERPL-SCNC: 134 MMOL/L (ref 136–145)
TSH SERPL DL<=0.05 MIU/L-ACNC: 8.9 UIU/ML (ref 0.36–3.74)
WBC # BLD AUTO: 13.08 THOUSAND/UL (ref 4.31–10.16)

## 2020-01-02 PROCEDURE — 99214 OFFICE O/P EST MOD 30 MIN: CPT | Performed by: FAMILY MEDICINE

## 2020-01-02 PROCEDURE — 85027 COMPLETE CBC AUTOMATED: CPT | Performed by: FAMILY MEDICINE

## 2020-01-02 PROCEDURE — 84443 ASSAY THYROID STIM HORMONE: CPT | Performed by: FAMILY MEDICINE

## 2020-01-02 PROCEDURE — 36415 COLL VENOUS BLD VENIPUNCTURE: CPT | Performed by: FAMILY MEDICINE

## 2020-01-02 PROCEDURE — 80053 COMPREHEN METABOLIC PANEL: CPT | Performed by: FAMILY MEDICINE

## 2020-01-02 PROCEDURE — 84439 ASSAY OF FREE THYROXINE: CPT | Performed by: FAMILY MEDICINE

## 2020-01-02 RX ORDER — AZITHROMYCIN 250 MG/1
TABLET, FILM COATED ORAL
Qty: 6 TABLET | Refills: 0 | Status: SHIPPED | OUTPATIENT
Start: 2020-01-02 | End: 2020-01-06

## 2020-01-02 RX ORDER — POLYMYXIN B SULFATE AND TRIMETHOPRIM 1; 10000 MG/ML; [USP'U]/ML
SOLUTION OPHTHALMIC
COMMUNITY
Start: 2019-12-30 | End: 2020-06-22 | Stop reason: HOSPADM

## 2020-01-02 NOTE — PROGRESS NOTES
Assessment/Plan:  Patient agrees to nonfasting labs for CBC, CMP and TSH  Will heed results  Patient was started on a Z-Tao and may take Robitussin or Mucinex p r n  He is encouraged to drink plenty of fluids and rest   Discussed proper nutrition and recommend she add Ensure or boost calcium and protein supplement daily  Recommend referral back to Neurology and Rheumatology although patient declines  Recommend referral to Gastroenterology regarding unintentional weight loss although patient declines  Return to the office in 1 week or sooner p r n  Anibal Grider Diagnoses and all orders for this visit:    Upper respiratory tract infection, unspecified type  -     azithromycin (ZITHROMAX) 250 mg tablet; Take 2 tablets today then 1 tablet daily x 4 days    Hypothyroidism, unspecified type  -     TSH, 3rd generation with Free T4 reflex    Weight loss  -     TSH, 3rd generation with Free T4 reflex  -     CBC and Platelet  -     Comprehensive metabolic panel    Other orders  -     polymyxin b-trimethoprim (POLYTRIM) ophthalmic solution          Subjective:      Patient ID: Uziel Lutz is a 81 y o  female  Patient complains of cold symptoms for the past few days  She complains of nasal congestion, sore throat and nonproductive cough  She admits to occasional wheezing but denies fever  Patient states she discontinued all her medications on her own over the past year and discontinued levothyroxine 1 month ago  Patient has not been seen in this office in 15 months and stopped following up with her neurologist and rheumatologist a year ago as she states they could not help her  She admits to ongoing nonintentional weight loss  Appetite is fair and she drinks fluids well  She declines referral to Gastroenterology regarding weight loss  URI    This is a new problem  The current episode started in the past 7 days  The problem has been unchanged  There has been no fever   Associated symptoms include congestion, coughing, a plugged ear sensation, rhinorrhea, a sore throat and wheezing  Pertinent negatives include no diarrhea, ear pain, headaches, nausea, rash, sinus pain, sneezing or vomiting  She has tried increased fluids (delsym) for the symptoms  The treatment provided mild relief  The following portions of the patient's history were reviewed and updated as appropriate: allergies, current medications, past family history, past medical history, past social history, past surgical history and problem list     Review of Systems   HENT: Positive for congestion, rhinorrhea and sore throat  Negative for ear pain, sinus pain and sneezing  Respiratory: Positive for cough and wheezing  Gastrointestinal: Negative for diarrhea, nausea and vomiting  Skin: Negative for rash  Neurological: Negative for headaches  Objective:      /76 (BP Location: Left arm, Patient Position: Sitting, Cuff Size: Standard)   Pulse 100   Temp 99 °F (37 2 °C) (Tympanic)   Resp 16   Ht 5' 6" (1 676 m)   Wt 47 6 kg (105 lb)   SpO2 99%   BMI 16 95 kg/m²          Physical Exam   Constitutional: She is oriented to person, place, and time  No distress  Appears thin and malnourished  HENT:   Head: Normocephalic  Right Ear: External ear normal    Left Ear: External ear normal    Turbinates swelling with mucoid drainage  Throat with erythema  Mucous membranes and tongue chronically dry  Eyes: Conjunctivae are normal  No scleral icterus  Neck: Neck supple  No thyromegaly present  Cardiovascular: Normal rate and regular rhythm  Pulmonary/Chest: Effort normal and breath sounds normal  No respiratory distress  She has no wheezes  Abdominal: Soft  There is no tenderness  Musculoskeletal: She exhibits no edema  Lymphadenopathy:     She has no cervical adenopathy  Neurological: She is alert and oriented to person, place, and time  Skin: Skin is warm and dry  Psychiatric: She has a normal mood and affect  BMI Counseling: Body mass index is 16 95 kg/m²  The BMI is below normal  Patient was advised to gain weight  Dietary education for weight gain was provided to the patient today

## 2020-01-03 LAB — T4 FREE SERPL-MCNC: 1.07 NG/DL (ref 0.76–1.46)

## 2020-01-06 DIAGNOSIS — R77.9 ELEVATED SERUM PROTEIN LEVEL: ICD-10-CM

## 2020-01-06 DIAGNOSIS — R63.4 WEIGHT LOSS: ICD-10-CM

## 2020-01-06 DIAGNOSIS — D89.2 HYPERGAMMAGLOBULINEMIA: ICD-10-CM

## 2020-01-06 DIAGNOSIS — E03.9 HYPOTHYROIDISM, UNSPECIFIED TYPE: Primary | ICD-10-CM

## 2020-01-06 RX ORDER — LEVOTHYROXINE SODIUM 0.07 MG/1
75 TABLET ORAL
Qty: 30 TABLET | Refills: 5 | Status: SHIPPED | OUTPATIENT
Start: 2020-01-06 | End: 2020-09-25 | Stop reason: SDUPTHER

## 2020-04-22 ENCOUNTER — TELEPHONE (OUTPATIENT)
Dept: FAMILY MEDICINE CLINIC | Facility: CLINIC | Age: 70
End: 2020-04-22

## 2020-06-18 ENCOUNTER — APPOINTMENT (EMERGENCY)
Dept: RADIOLOGY | Facility: HOSPITAL | Age: 70
DRG: 813 | End: 2020-06-18
Payer: MEDICARE

## 2020-06-18 ENCOUNTER — HOSPITAL ENCOUNTER (INPATIENT)
Facility: HOSPITAL | Age: 70
LOS: 4 days | Discharge: HOME | DRG: 813 | End: 2020-06-22
Attending: EMERGENCY MEDICINE | Admitting: INTERNAL MEDICINE
Payer: MEDICARE

## 2020-06-18 ENCOUNTER — OFFICE VISIT (OUTPATIENT)
Dept: URGENT CARE | Age: 70
End: 2020-06-18
Payer: MEDICARE

## 2020-06-18 VITALS
WEIGHT: 104 LBS | SYSTOLIC BLOOD PRESSURE: 132 MMHG | DIASTOLIC BLOOD PRESSURE: 86 MMHG | BODY MASS INDEX: 16.71 KG/M2 | HEIGHT: 66 IN | RESPIRATION RATE: 18 BRPM | TEMPERATURE: 98.7 F | HEART RATE: 118 BPM | OXYGEN SATURATION: 100 %

## 2020-06-18 DIAGNOSIS — R23.3 PETECHIAE: ICD-10-CM

## 2020-06-18 DIAGNOSIS — D64.9 ANEMIA: ICD-10-CM

## 2020-06-18 DIAGNOSIS — M35.00 SJOGREN'S SYNDROME (HCC): ICD-10-CM

## 2020-06-18 DIAGNOSIS — R60.0 LOWER EXTREMITY EDEMA: ICD-10-CM

## 2020-06-18 DIAGNOSIS — S81.801A WOUND OF RIGHT LOWER EXTREMITY, INITIAL ENCOUNTER: ICD-10-CM

## 2020-06-18 DIAGNOSIS — D69.6 THROMBOCYTOPENIA (HCC): Primary | ICD-10-CM

## 2020-06-18 DIAGNOSIS — D69.3 ACUTE ITP (HCC): ICD-10-CM

## 2020-06-18 DIAGNOSIS — R60.0 EDEMA OF BOTH LOWER EXTREMITIES: Primary | ICD-10-CM

## 2020-06-18 LAB
ABO GROUP BLD: NORMAL
ABO GROUP BLD: NORMAL
ALBUMIN SERPL BCP-MCNC: 2.9 G/DL (ref 3.5–5)
ALP SERPL-CCNC: 62 U/L (ref 46–116)
ALT SERPL W P-5'-P-CCNC: 9 U/L (ref 12–78)
ANION GAP SERPL CALCULATED.3IONS-SCNC: 7 MMOL/L (ref 4–13)
APTT PPP: 32 SECONDS (ref 23–37)
AST SERPL W P-5'-P-CCNC: 17 U/L (ref 5–45)
BASOPHILS # BLD AUTO: 0.1 THOUSANDS/ΜL (ref 0–0.1)
BASOPHILS NFR BLD AUTO: 1 % (ref 0–1)
BILIRUB SERPL-MCNC: 0.55 MG/DL (ref 0.2–1)
BLD GP AB SCN SERPL QL: POSITIVE
BUN SERPL-MCNC: 13 MG/DL (ref 5–25)
C3 AG TISS QL IMSTN: NORMAL
CALCIUM SERPL-MCNC: 8.5 MG/DL (ref 8.3–10.1)
CHLORIDE SERPL-SCNC: 103 MMOL/L (ref 100–108)
CO2 SERPL-SCNC: 27 MMOL/L (ref 21–32)
CREAT SERPL-MCNC: 0.98 MG/DL (ref 0.6–1.3)
DAT C3-SP REAG RBC QL: NORMAL
DAT IGG-SP REAG RBCCO QL: NORMAL
DAT POLY-SP REAG RBC QL: NORMAL
EOSINOPHIL # BLD AUTO: 0.01 THOUSAND/ΜL (ref 0–0.61)
EOSINOPHIL NFR BLD AUTO: 0 % (ref 0–6)
ERYTHROCYTE [DISTWIDTH] IN BLOOD BY AUTOMATED COUNT: 15.9 % (ref 11.6–15.1)
GFR SERPL CREATININE-BSD FRML MDRD: 59 ML/MIN/1.73SQ M
GLUCOSE SERPL-MCNC: 111 MG/DL (ref 65–140)
HCT VFR BLD AUTO: 31.3 % (ref 34.8–46.1)
HGB BLD-MCNC: 10 G/DL (ref 11.5–15.4)
IMM GRANULOCYTES # BLD AUTO: 0.04 THOUSAND/UL (ref 0–0.2)
IMM GRANULOCYTES NFR BLD AUTO: 1 % (ref 0–2)
INR PPP: 1.11 (ref 0.84–1.19)
LYMPHOCYTES # BLD AUTO: 2.34 THOUSANDS/ΜL (ref 0.6–4.47)
LYMPHOCYTES NFR BLD AUTO: 32 % (ref 14–44)
MAGNESIUM SERPL-MCNC: 1.9 MG/DL (ref 1.6–2.6)
MCH RBC QN AUTO: 28.6 PG (ref 26.8–34.3)
MCHC RBC AUTO-ENTMCNC: 31.9 G/DL (ref 31.4–37.4)
MCV RBC AUTO: 89 FL (ref 82–98)
MONOCYTES # BLD AUTO: 0.2 THOUSAND/ΜL (ref 0.17–1.22)
MONOCYTES NFR BLD AUTO: 3 % (ref 4–12)
NEUTROPHILS # BLD AUTO: 4.59 THOUSANDS/ΜL (ref 1.85–7.62)
NEUTS SEG NFR BLD AUTO: 63 % (ref 43–75)
NRBC BLD AUTO-RTO: 0 /100 WBCS
NT-PROBNP SERPL-MCNC: 186 PG/ML
PLATELET # BLD AUTO: 1 THOUSANDS/UL (ref 149–390)
POTASSIUM SERPL-SCNC: 3.4 MMOL/L (ref 3.5–5.3)
PROT SERPL-MCNC: 9 G/DL (ref 6.4–8.2)
PROTHROMBIN TIME: 14.4 SECONDS (ref 11.6–14.5)
RBC # BLD AUTO: 3.5 MILLION/UL (ref 3.81–5.12)
RH BLD: POSITIVE
RH BLD: POSITIVE
SODIUM SERPL-SCNC: 137 MMOL/L (ref 136–145)
SPECIMEN EXPIRATION DATE: NORMAL
WBC # BLD AUTO: 7.28 THOUSAND/UL (ref 4.31–10.16)

## 2020-06-18 PROCEDURE — 85610 PROTHROMBIN TIME: CPT | Performed by: NURSE PRACTITIONER

## 2020-06-18 PROCEDURE — 71046 X-RAY EXAM CHEST 2 VIEWS: CPT

## 2020-06-18 PROCEDURE — P9037 PLATE PHERES LEUKOREDU IRRAD: HCPCS

## 2020-06-18 PROCEDURE — 86850 RBC ANTIBODY SCREEN: CPT | Performed by: NURSE PRACTITIONER

## 2020-06-18 PROCEDURE — 87070 CULTURE OTHR SPECIMN AEROBIC: CPT | Performed by: NURSE PRACTITIONER

## 2020-06-18 PROCEDURE — 99213 OFFICE O/P EST LOW 20 MIN: CPT | Performed by: PHYSICIAN ASSISTANT

## 2020-06-18 PROCEDURE — 99285 EMERGENCY DEPT VISIT HI MDM: CPT | Performed by: NURSE PRACTITIONER

## 2020-06-18 PROCEDURE — 86880 COOMBS TEST DIRECT: CPT | Performed by: NURSE PRACTITIONER

## 2020-06-18 PROCEDURE — 83735 ASSAY OF MAGNESIUM: CPT | Performed by: NURSE PRACTITIONER

## 2020-06-18 PROCEDURE — 83880 ASSAY OF NATRIURETIC PEPTIDE: CPT | Performed by: NURSE PRACTITIONER

## 2020-06-18 PROCEDURE — 86900 BLOOD TYPING SEROLOGIC ABO: CPT | Performed by: NURSE PRACTITIONER

## 2020-06-18 PROCEDURE — 87186 SC STD MICRODIL/AGAR DIL: CPT | Performed by: NURSE PRACTITIONER

## 2020-06-18 PROCEDURE — 86870 RBC ANTIBODY IDENTIFICATION: CPT | Performed by: NURSE PRACTITIONER

## 2020-06-18 PROCEDURE — 93005 ELECTROCARDIOGRAM TRACING: CPT

## 2020-06-18 PROCEDURE — 30233R1 TRANSFUSION OF NONAUTOLOGOUS PLATELETS INTO PERIPHERAL VEIN, PERCUTANEOUS APPROACH: ICD-10-PCS | Performed by: INTERNAL MEDICINE

## 2020-06-18 PROCEDURE — 99285 EMERGENCY DEPT VISIT HI MDM: CPT

## 2020-06-18 PROCEDURE — G0463 HOSPITAL OUTPT CLINIC VISIT: HCPCS | Performed by: PHYSICIAN ASSISTANT

## 2020-06-18 PROCEDURE — 87147 CULTURE TYPE IMMUNOLOGIC: CPT | Performed by: NURSE PRACTITIONER

## 2020-06-18 PROCEDURE — 86901 BLOOD TYPING SEROLOGIC RH(D): CPT | Performed by: NURSE PRACTITIONER

## 2020-06-18 PROCEDURE — 80053 COMPREHEN METABOLIC PANEL: CPT | Performed by: NURSE PRACTITIONER

## 2020-06-18 PROCEDURE — 85730 THROMBOPLASTIN TIME PARTIAL: CPT | Performed by: NURSE PRACTITIONER

## 2020-06-18 PROCEDURE — 99223 1ST HOSP IP/OBS HIGH 75: CPT | Performed by: PHYSICIAN ASSISTANT

## 2020-06-18 PROCEDURE — 36415 COLL VENOUS BLD VENIPUNCTURE: CPT | Performed by: NURSE PRACTITIONER

## 2020-06-18 PROCEDURE — 36430 TRANSFUSION BLD/BLD COMPNT: CPT

## 2020-06-18 PROCEDURE — 85025 COMPLETE CBC W/AUTO DIFF WBC: CPT | Performed by: NURSE PRACTITIONER

## 2020-06-18 PROCEDURE — 87205 SMEAR GRAM STAIN: CPT | Performed by: NURSE PRACTITIONER

## 2020-06-18 RX ORDER — ALPRAZOLAM 0.5 MG/1
TABLET ORAL
COMMUNITY
End: 2020-09-25 | Stop reason: SDUPTHER

## 2020-06-18 RX ORDER — LEVOTHYROXINE SODIUM 0.07 MG/1
75 TABLET ORAL
Status: DISCONTINUED | OUTPATIENT
Start: 2020-06-19 | End: 2020-06-22 | Stop reason: HOSPADM

## 2020-06-18 RX ORDER — ALPRAZOLAM 0.5 MG/1
0.5 TABLET ORAL
Status: DISCONTINUED | OUTPATIENT
Start: 2020-06-18 | End: 2020-06-22 | Stop reason: HOSPADM

## 2020-06-18 RX ORDER — ACETAMINOPHEN 325 MG/1
650 TABLET ORAL EVERY 6 HOURS PRN
Status: DISCONTINUED | OUTPATIENT
Start: 2020-06-18 | End: 2020-06-22 | Stop reason: HOSPADM

## 2020-06-18 RX ORDER — ONDANSETRON 2 MG/ML
4 INJECTION INTRAMUSCULAR; INTRAVENOUS EVERY 6 HOURS PRN
Status: DISCONTINUED | OUTPATIENT
Start: 2020-06-18 | End: 2020-06-22 | Stop reason: HOSPADM

## 2020-06-19 ENCOUNTER — APPOINTMENT (INPATIENT)
Dept: CT IMAGING | Facility: HOSPITAL | Age: 70
DRG: 813 | End: 2020-06-19
Payer: MEDICARE

## 2020-06-19 PROBLEM — R06.00 DYSPNEA: Status: RESOLVED | Noted: 2017-09-14 | Resolved: 2020-06-19

## 2020-06-19 PROBLEM — D69.41 EVAN'S SYNDROME (HCC): Status: ACTIVE | Noted: 2020-06-19

## 2020-06-19 LAB
ABO GROUP BLD BPU: NORMAL
AMORPH PHOS CRY URNS QL MICRO: ABNORMAL /HPF
ANION GAP SERPL CALCULATED.3IONS-SCNC: 7 MMOL/L (ref 4–13)
ATRIAL RATE: 87 BPM
BACTERIA UR QL AUTO: ABNORMAL /HPF
BASOPHILS # BLD AUTO: 0.1 THOUSANDS/ΜL (ref 0–0.1)
BASOPHILS NFR BLD AUTO: 2 % (ref 0–1)
BILIRUB UR QL STRIP: NEGATIVE
BPU ID: NORMAL
BUN SERPL-MCNC: 11 MG/DL (ref 5–25)
CALCIUM SERPL-MCNC: 8.3 MG/DL (ref 8.3–10.1)
CHLORIDE SERPL-SCNC: 105 MMOL/L (ref 100–108)
CLARITY UR: CLEAR
CO2 SERPL-SCNC: 26 MMOL/L (ref 21–32)
COLOR UR: YELLOW
CREAT SERPL-MCNC: 0.75 MG/DL (ref 0.6–1.3)
D DIMER PPP FEU-MCNC: 2 UG/ML FEU
EOSINOPHIL # BLD AUTO: 0.08 THOUSAND/ΜL (ref 0–0.61)
EOSINOPHIL NFR BLD AUTO: 1 % (ref 0–6)
ERYTHROCYTE [DISTWIDTH] IN BLOOD BY AUTOMATED COUNT: 15.9 % (ref 11.6–15.1)
ERYTHROCYTE [DISTWIDTH] IN BLOOD BY AUTOMATED COUNT: 16.1 % (ref 11.6–15.1)
GFR SERPL CREATININE-BSD FRML MDRD: 82 ML/MIN/1.73SQ M
GLUCOSE SERPL-MCNC: 91 MG/DL (ref 65–140)
GLUCOSE UR STRIP-MCNC: NEGATIVE MG/DL
HBV CORE AB SER QL: NORMAL
HBV SURFACE AG SER QL: NORMAL
HCT VFR BLD AUTO: 29.7 % (ref 34.8–46.1)
HCT VFR BLD AUTO: 31.1 % (ref 34.8–46.1)
HGB BLD-MCNC: 9.2 G/DL (ref 11.5–15.4)
HGB BLD-MCNC: 9.7 G/DL (ref 11.5–15.4)
HGB UR QL STRIP.AUTO: NEGATIVE
IMM GRANULOCYTES # BLD AUTO: 0.04 THOUSAND/UL (ref 0–0.2)
IMM GRANULOCYTES NFR BLD AUTO: 1 % (ref 0–2)
KETONES UR STRIP-MCNC: NEGATIVE MG/DL
LDH SERPL-CCNC: 239 U/L (ref 81–234)
LEUKOCYTE ESTERASE UR QL STRIP: ABNORMAL
LYMPHOCYTES # BLD AUTO: 2.55 THOUSANDS/ΜL (ref 0.6–4.47)
LYMPHOCYTES NFR BLD AUTO: 41 % (ref 14–44)
MCH RBC QN AUTO: 27.9 PG (ref 26.8–34.3)
MCH RBC QN AUTO: 28 PG (ref 26.8–34.3)
MCHC RBC AUTO-ENTMCNC: 31 G/DL (ref 31.4–37.4)
MCHC RBC AUTO-ENTMCNC: 31.2 G/DL (ref 31.4–37.4)
MCV RBC AUTO: 89 FL (ref 82–98)
MCV RBC AUTO: 90 FL (ref 82–98)
MONOCYTES # BLD AUTO: 0.19 THOUSAND/ΜL (ref 0.17–1.22)
MONOCYTES NFR BLD AUTO: 3 % (ref 4–12)
NEUTROPHILS # BLD AUTO: 3.32 THOUSANDS/ΜL (ref 1.85–7.62)
NEUTS SEG NFR BLD AUTO: 52 % (ref 43–75)
NITRITE UR QL STRIP: NEGATIVE
NON-SQ EPI CELLS URNS QL MICRO: ABNORMAL /HPF
NRBC BLD AUTO-RTO: 0 /100 WBCS
P AXIS: 45 DEGREES
PH UR STRIP.AUTO: 7.5 [PH]
PLATELET # BLD AUTO: 1 THOUSANDS/UL (ref 149–390)
PLATELET # BLD AUTO: 1 THOUSANDS/UL (ref 149–390)
POTASSIUM SERPL-SCNC: 3.4 MMOL/L (ref 3.5–5.3)
PR INTERVAL: 158 MS
PROT UR STRIP-MCNC: NEGATIVE MG/DL
QRS AXIS: 78 DEGREES
QRSD INTERVAL: 80 MS
QT INTERVAL: 364 MS
QTC INTERVAL: 438 MS
RBC # BLD AUTO: 3.29 MILLION/UL (ref 3.81–5.12)
RBC # BLD AUTO: 3.48 MILLION/UL (ref 3.81–5.12)
RBC #/AREA URNS AUTO: ABNORMAL /HPF
RETICS # AUTO: ABNORMAL 10*3/UL (ref 14097–95744)
RETICS # CALC: 2.06 % (ref 0.37–1.87)
SODIUM SERPL-SCNC: 138 MMOL/L (ref 136–145)
SP GR UR STRIP.AUTO: 1.02 (ref 1–1.03)
T WAVE AXIS: -65 DEGREES
UNIT DISPENSE STATUS: NORMAL
UNIT PRODUCT CODE: NORMAL
UNIT RH: NORMAL
URATE SERPL-MCNC: 4.6 MG/DL (ref 2–6.8)
UROBILINOGEN UR QL STRIP.AUTO: 0.2 E.U./DL
VENTRICULAR RATE: 87 BPM
WBC # BLD AUTO: 5.43 THOUSAND/UL (ref 4.31–10.16)
WBC # BLD AUTO: 6.28 THOUSAND/UL (ref 4.31–10.16)
WBC #/AREA URNS AUTO: ABNORMAL /HPF

## 2020-06-19 PROCEDURE — 85025 COMPLETE CBC W/AUTO DIFF WBC: CPT | Performed by: INTERNAL MEDICINE

## 2020-06-19 PROCEDURE — 71260 CT THORAX DX C+: CPT

## 2020-06-19 PROCEDURE — 86039 ANTINUCLEAR ANTIBODIES (ANA): CPT | Performed by: INTERNAL MEDICINE

## 2020-06-19 PROCEDURE — 99233 SBSQ HOSP IP/OBS HIGH 50: CPT | Performed by: INTERNAL MEDICINE

## 2020-06-19 PROCEDURE — 86901 BLOOD TYPING SEROLOGIC RH(D): CPT

## 2020-06-19 PROCEDURE — 86334 IMMUNOFIX E-PHORESIS SERUM: CPT | Performed by: INTERNAL MEDICINE

## 2020-06-19 PROCEDURE — 86860 RBC ANTIBODY ELUTION: CPT

## 2020-06-19 PROCEDURE — 86900 BLOOD TYPING SEROLOGIC ABO: CPT

## 2020-06-19 PROCEDURE — 93010 ELECTROCARDIOGRAM REPORT: CPT | Performed by: INTERNAL MEDICINE

## 2020-06-19 PROCEDURE — 30233R1 TRANSFUSION OF NONAUTOLOGOUS PLATELETS INTO PERIPHERAL VEIN, PERCUTANEOUS APPROACH: ICD-10-PCS | Performed by: INTERNAL MEDICINE

## 2020-06-19 PROCEDURE — 84165 PROTEIN E-PHORESIS SERUM: CPT | Performed by: PATHOLOGY

## 2020-06-19 PROCEDURE — 85045 AUTOMATED RETICULOCYTE COUNT: CPT | Performed by: INTERNAL MEDICINE

## 2020-06-19 PROCEDURE — 86870 RBC ANTIBODY IDENTIFICATION: CPT

## 2020-06-19 PROCEDURE — 86334 IMMUNOFIX E-PHORESIS SERUM: CPT | Performed by: PATHOLOGY

## 2020-06-19 PROCEDURE — 85379 FIBRIN DEGRADATION QUANT: CPT | Performed by: INTERNAL MEDICINE

## 2020-06-19 PROCEDURE — 86038 ANTINUCLEAR ANTIBODIES: CPT | Performed by: INTERNAL MEDICINE

## 2020-06-19 PROCEDURE — 86880 COOMBS TEST DIRECT: CPT

## 2020-06-19 PROCEDURE — 86430 RHEUMATOID FACTOR TEST QUAL: CPT | Performed by: INTERNAL MEDICINE

## 2020-06-19 PROCEDURE — 86704 HEP B CORE ANTIBODY TOTAL: CPT | Performed by: INTERNAL MEDICINE

## 2020-06-19 PROCEDURE — 99222 1ST HOSP IP/OBS MODERATE 55: CPT | Performed by: INTERNAL MEDICINE

## 2020-06-19 PROCEDURE — 83615 LACTATE (LD) (LDH) ENZYME: CPT | Performed by: INTERNAL MEDICINE

## 2020-06-19 PROCEDURE — 81001 URINALYSIS AUTO W/SCOPE: CPT | Performed by: NURSE PRACTITIONER

## 2020-06-19 PROCEDURE — 86431 RHEUMATOID FACTOR QUANT: CPT | Performed by: INTERNAL MEDICINE

## 2020-06-19 PROCEDURE — 74177 CT ABD & PELVIS W/CONTRAST: CPT

## 2020-06-19 PROCEDURE — 83883 ASSAY NEPHELOMETRY NOT SPEC: CPT | Performed by: INTERNAL MEDICINE

## 2020-06-19 PROCEDURE — 87340 HEPATITIS B SURFACE AG IA: CPT | Performed by: INTERNAL MEDICINE

## 2020-06-19 PROCEDURE — 86905 BLOOD TYPING RBC ANTIGENS: CPT

## 2020-06-19 PROCEDURE — 83010 ASSAY OF HAPTOGLOBIN QUANT: CPT | Performed by: INTERNAL MEDICINE

## 2020-06-19 PROCEDURE — 86906 BLD TYPING SEROLOGIC RH PHNT: CPT

## 2020-06-19 PROCEDURE — P9037 PLATE PHERES LEUKOREDU IRRAD: HCPCS

## 2020-06-19 PROCEDURE — 84550 ASSAY OF BLOOD/URIC ACID: CPT | Performed by: INTERNAL MEDICINE

## 2020-06-19 PROCEDURE — 85027 COMPLETE CBC AUTOMATED: CPT | Performed by: PHYSICIAN ASSISTANT

## 2020-06-19 PROCEDURE — 80048 BASIC METABOLIC PNL TOTAL CA: CPT | Performed by: PHYSICIAN ASSISTANT

## 2020-06-19 PROCEDURE — 84165 PROTEIN E-PHORESIS SERUM: CPT | Performed by: INTERNAL MEDICINE

## 2020-06-19 RX ORDER — DEXAMETHASONE 4 MG/1
20 TABLET ORAL DAILY
Status: DISCONTINUED | OUTPATIENT
Start: 2020-06-19 | End: 2020-06-22 | Stop reason: HOSPADM

## 2020-06-19 RX ADMIN — DEXAMETHASONE 20 MG: 4 TABLET ORAL at 17:33

## 2020-06-19 RX ADMIN — IOHEXOL 100 ML: 350 INJECTION, SOLUTION INTRAVENOUS at 13:44

## 2020-06-19 RX ADMIN — LEVOTHYROXINE SODIUM 75 MCG: 75 TABLET ORAL at 05:55

## 2020-06-20 PROBLEM — D69.3 ACUTE ITP (HCC): Status: ACTIVE | Noted: 2020-06-18

## 2020-06-20 LAB
ABO GROUP BLD BPU: NORMAL
ABO GROUP BLD BPU: NORMAL
ALBUMIN SERPL BCP-MCNC: 2.9 G/DL (ref 3.5–5)
ALP SERPL-CCNC: 54 U/L (ref 46–116)
ALT SERPL W P-5'-P-CCNC: 12 U/L (ref 12–78)
ANION GAP SERPL CALCULATED.3IONS-SCNC: 8 MMOL/L (ref 4–13)
AST SERPL W P-5'-P-CCNC: 21 U/L (ref 5–45)
BACTERIA WND AEROBE CULT: ABNORMAL
BASOPHILS # BLD MANUAL: 0 THOUSAND/UL (ref 0–0.1)
BASOPHILS NFR MAR MANUAL: 0 % (ref 0–1)
BILIRUB SERPL-MCNC: 0.44 MG/DL (ref 0.2–1)
BPU ID: NORMAL
BPU ID: NORMAL
BUN SERPL-MCNC: 13 MG/DL (ref 5–25)
CALCIUM SERPL-MCNC: 8.9 MG/DL (ref 8.3–10.1)
CHLORIDE SERPL-SCNC: 103 MMOL/L (ref 100–108)
CO2 SERPL-SCNC: 25 MMOL/L (ref 21–32)
CREAT SERPL-MCNC: 0.76 MG/DL (ref 0.6–1.3)
EOSINOPHIL # BLD MANUAL: 0 THOUSAND/UL (ref 0–0.4)
EOSINOPHIL NFR BLD MANUAL: 0 % (ref 0–6)
ERYTHROCYTE [DISTWIDTH] IN BLOOD BY AUTOMATED COUNT: 15.9 % (ref 11.6–15.1)
GFR SERPL CREATININE-BSD FRML MDRD: 81 ML/MIN/1.73SQ M
GLUCOSE SERPL-MCNC: 145 MG/DL (ref 65–140)
GRAM STN SPEC: ABNORMAL
GRAM STN SPEC: ABNORMAL
HAPTOGLOB SERPL-MCNC: 139 MG/DL (ref 37–355)
HCT VFR BLD AUTO: 30.3 % (ref 34.8–46.1)
HGB BLD-MCNC: 9.4 G/DL (ref 11.5–15.4)
INR PPP: 1.12 (ref 0.84–1.19)
KAPPA LC FREE SER-MCNC: 164.2 MG/L (ref 3.3–19.4)
KAPPA LC FREE/LAMBDA FREE SER: 1.22 {RATIO} (ref 0.26–1.65)
LAMBDA LC FREE SERPL-MCNC: 135.1 MG/L (ref 5.7–26.3)
LYMPHOCYTES # BLD AUTO: 1.82 THOUSAND/UL (ref 0.6–4.47)
LYMPHOCYTES # BLD AUTO: 26 % (ref 14–44)
MAGNESIUM SERPL-MCNC: 2.4 MG/DL (ref 1.6–2.6)
MCH RBC QN AUTO: 27.9 PG (ref 26.8–34.3)
MCHC RBC AUTO-ENTMCNC: 31 G/DL (ref 31.4–37.4)
MCV RBC AUTO: 90 FL (ref 82–98)
MONOCYTES # BLD AUTO: 0.07 THOUSAND/UL (ref 0–1.22)
MONOCYTES NFR BLD: 1 % (ref 4–12)
NEUTROPHILS # BLD MANUAL: 5.12 THOUSAND/UL (ref 1.85–7.62)
NEUTS SEG NFR BLD AUTO: 73 % (ref 43–75)
NRBC BLD AUTO-RTO: 0 /100 WBCS
PHOSPHATE SERPL-MCNC: 4.4 MG/DL (ref 2.3–4.1)
PLATELET # BLD AUTO: 12 THOUSANDS/UL (ref 149–390)
PLATELET BLD QL SMEAR: ABNORMAL
PMV BLD AUTO: 11 FL (ref 8.9–12.7)
POTASSIUM SERPL-SCNC: 4.1 MMOL/L (ref 3.5–5.3)
PROT SERPL-MCNC: 8.9 G/DL (ref 6.4–8.2)
PROTHROMBIN TIME: 14.6 SECONDS (ref 11.6–14.5)
RBC # BLD AUTO: 3.37 MILLION/UL (ref 3.81–5.12)
RBC MORPH BLD: NORMAL
SODIUM SERPL-SCNC: 136 MMOL/L (ref 136–145)
TOTAL CELLS COUNTED SPEC: 100
UNIT DISPENSE STATUS: NORMAL
UNIT DISPENSE STATUS: NORMAL
UNIT PRODUCT CODE: NORMAL
UNIT PRODUCT CODE: NORMAL
UNIT RH: NORMAL
UNIT RH: NORMAL
WBC # BLD AUTO: 7.01 THOUSAND/UL (ref 4.31–10.16)

## 2020-06-20 PROCEDURE — 83735 ASSAY OF MAGNESIUM: CPT | Performed by: INTERNAL MEDICINE

## 2020-06-20 PROCEDURE — 84100 ASSAY OF PHOSPHORUS: CPT | Performed by: INTERNAL MEDICINE

## 2020-06-20 PROCEDURE — 85610 PROTHROMBIN TIME: CPT | Performed by: INTERNAL MEDICINE

## 2020-06-20 PROCEDURE — 99232 SBSQ HOSP IP/OBS MODERATE 35: CPT | Performed by: INTERNAL MEDICINE

## 2020-06-20 PROCEDURE — 85007 BL SMEAR W/DIFF WBC COUNT: CPT | Performed by: INTERNAL MEDICINE

## 2020-06-20 PROCEDURE — 85027 COMPLETE CBC AUTOMATED: CPT | Performed by: INTERNAL MEDICINE

## 2020-06-20 PROCEDURE — 80053 COMPREHEN METABOLIC PANEL: CPT | Performed by: INTERNAL MEDICINE

## 2020-06-20 RX ADMIN — DEXAMETHASONE 20 MG: 4 TABLET ORAL at 08:05

## 2020-06-20 RX ADMIN — LEVOTHYROXINE SODIUM 75 MCG: 75 TABLET ORAL at 05:07

## 2020-06-21 PROBLEM — S80.11XA HEMATOMA OF RIGHT LOWER LEG: Status: ACTIVE | Noted: 2020-06-21

## 2020-06-21 LAB
ERYTHROCYTE [DISTWIDTH] IN BLOOD BY AUTOMATED COUNT: 15.9 % (ref 11.6–15.1)
HCT VFR BLD AUTO: 27.5 % (ref 34.8–46.1)
HGB BLD-MCNC: 8.5 G/DL (ref 11.5–15.4)
MCH RBC QN AUTO: 27.8 PG (ref 26.8–34.3)
MCHC RBC AUTO-ENTMCNC: 30.9 G/DL (ref 31.4–37.4)
MCV RBC AUTO: 90 FL (ref 82–98)
NEUTROPHILS # BLD AUTO: 8.86 THOUSANDS/ΜL (ref 1.85–7.62)
NRBC BLD AUTO-RTO: 0 /100 WBCS
PLATELET # BLD AUTO: 44 THOUSANDS/UL (ref 149–390)
PMV BLD AUTO: 12.8 FL (ref 8.9–12.7)
RBC # BLD AUTO: 3.06 MILLION/UL (ref 3.81–5.12)
WBC # BLD AUTO: 13.42 THOUSAND/UL (ref 4.31–10.16)

## 2020-06-21 PROCEDURE — 99232 SBSQ HOSP IP/OBS MODERATE 35: CPT | Performed by: INTERNAL MEDICINE

## 2020-06-21 PROCEDURE — 85027 COMPLETE CBC AUTOMATED: CPT | Performed by: INTERNAL MEDICINE

## 2020-06-21 RX ADMIN — DEXAMETHASONE 20 MG: 4 TABLET ORAL at 08:40

## 2020-06-21 RX ADMIN — LEVOTHYROXINE SODIUM 75 MCG: 75 TABLET ORAL at 05:09

## 2020-06-22 ENCOUNTER — TELEPHONE (OUTPATIENT)
Dept: HEMATOLOGY ONCOLOGY | Facility: CLINIC | Age: 70
End: 2020-06-22

## 2020-06-22 ENCOUNTER — TELEPHONE (OUTPATIENT)
Dept: OTHER | Facility: OTHER | Age: 70
End: 2020-06-22

## 2020-06-22 VITALS
RESPIRATION RATE: 20 BRPM | HEART RATE: 60 BPM | TEMPERATURE: 97.2 F | OXYGEN SATURATION: 99 % | SYSTOLIC BLOOD PRESSURE: 110 MMHG | WEIGHT: 103.4 LBS | BODY MASS INDEX: 16.69 KG/M2 | DIASTOLIC BLOOD PRESSURE: 62 MMHG

## 2020-06-22 LAB
ALBUMIN SERPL ELPH-MCNC: 3.55 G/DL (ref 3.5–5)
ALBUMIN SERPL ELPH-MCNC: 40.3 % (ref 52–65)
ALPHA1 GLOB SERPL ELPH-MCNC: 0.38 G/DL (ref 0.1–0.4)
ALPHA1 GLOB SERPL ELPH-MCNC: 4.3 % (ref 2.5–5)
ALPHA2 GLOB SERPL ELPH-MCNC: 0.74 G/DL (ref 0.4–1.2)
ALPHA2 GLOB SERPL ELPH-MCNC: 8.4 % (ref 7–13)
ANA HOMOGEN SER QL IF: NORMAL
ANA HOMOGEN TITR SER: NORMAL {TITER}
BASOPHILS NFR BLD AUTO: 0 % (ref 0–1)
BETA GLOB ABNORMAL SERPL ELPH-MCNC: 0.35 G/DL (ref 0.4–0.8)
BETA1 GLOB SERPL ELPH-MCNC: 4 % (ref 5–13)
BETA2 GLOB SERPL ELPH-MCNC: 4.7 % (ref 2–8)
BETA2+GAMMA GLOB SERPL ELPH-MCNC: 0.41 G/DL (ref 0.2–0.5)
CRYOGLOB RF SER-ACNC: ABNORMAL [IU]/ML
EOSINOPHIL NFR BLD AUTO: 0 % (ref 0–6)
ERYTHROCYTE [DISTWIDTH] IN BLOOD BY AUTOMATED COUNT: 16.4 % (ref 11.6–15.1)
GAMMA GLOB ABNORMAL SERPL ELPH-MCNC: 3.37 G/DL (ref 0.5–1.6)
GAMMA GLOB SERPL ELPH-MCNC: 38.3 % (ref 12–22)
HCT VFR BLD AUTO: 28.6 % (ref 34.8–46.1)
HGB BLD-MCNC: 8.9 G/DL (ref 11.5–15.4)
IGG/ALB SER: 0.68 {RATIO} (ref 1.1–1.8)
IMM GRANULOCYTES NFR BLD AUTO: 1 % (ref 0–2)
INTERPRETATION UR IFE-IMP: NORMAL
LYMPHOCYTES NFR BLD AUTO: 37 % (ref 14–44)
MCH RBC QN AUTO: 28.2 PG (ref 26.8–34.3)
MCHC RBC AUTO-ENTMCNC: 31.1 G/DL (ref 31.4–37.4)
MCV RBC AUTO: 91 FL (ref 82–98)
MONOCYTES NFR BLD AUTO: 6 % (ref 4–12)
NEUTS SEG NFR BLD AUTO: 56 % (ref 43–75)
NRBC BLD AUTO-RTO: 0 /100 WBCS
PLATELET # BLD AUTO: 62 THOUSANDS/UL (ref 149–390)
PMV BLD AUTO: 11.4 FL (ref 8.9–12.7)
PROT PATTERN SERPL ELPH-IMP: ABNORMAL
PROT SERPL-MCNC: 8.8 G/DL (ref 6.4–8.2)
RBC # BLD AUTO: 3.16 MILLION/UL (ref 3.81–5.12)
RHEUMATOID FACT SER QL LA: POSITIVE
RYE IGE QN: POSITIVE
WBC # BLD AUTO: 11.68 THOUSAND/UL (ref 4.31–10.16)

## 2020-06-22 PROCEDURE — 99239 HOSP IP/OBS DSCHRG MGMT >30: CPT | Performed by: INTERNAL MEDICINE

## 2020-06-22 PROCEDURE — 85027 COMPLETE CBC AUTOMATED: CPT | Performed by: INTERNAL MEDICINE

## 2020-06-22 RX ADMIN — DEXAMETHASONE 20 MG: 4 TABLET ORAL at 09:50

## 2020-06-22 RX ADMIN — LEVOTHYROXINE SODIUM 75 MCG: 75 TABLET ORAL at 06:13

## 2020-06-23 ENCOUNTER — APPOINTMENT (OUTPATIENT)
Dept: LAB | Age: 70
End: 2020-06-23
Payer: MEDICARE

## 2020-06-23 ENCOUNTER — TELEPHONE (OUTPATIENT)
Dept: HEMATOLOGY ONCOLOGY | Facility: CLINIC | Age: 70
End: 2020-06-23

## 2020-06-23 ENCOUNTER — TRANSITIONAL CARE MANAGEMENT (OUTPATIENT)
Dept: FAMILY MEDICINE CLINIC | Facility: CLINIC | Age: 70
End: 2020-06-23

## 2020-06-23 ENCOUNTER — TELEPHONE (OUTPATIENT)
Dept: SURGICAL ONCOLOGY | Facility: CLINIC | Age: 70
End: 2020-06-23

## 2020-06-23 ENCOUNTER — APPOINTMENT (OUTPATIENT)
Dept: RADIOLOGY | Age: 70
End: 2020-06-23
Payer: MEDICARE

## 2020-06-23 ENCOUNTER — OFFICE VISIT (OUTPATIENT)
Dept: URGENT CARE | Age: 70
End: 2020-06-23
Payer: MEDICARE

## 2020-06-23 VITALS
BODY MASS INDEX: 17.19 KG/M2 | RESPIRATION RATE: 18 BRPM | DIASTOLIC BLOOD PRESSURE: 71 MMHG | HEIGHT: 66 IN | SYSTOLIC BLOOD PRESSURE: 130 MMHG | HEART RATE: 87 BPM | WEIGHT: 107 LBS | OXYGEN SATURATION: 98 % | TEMPERATURE: 97.9 F

## 2020-06-23 DIAGNOSIS — T81.30XA WOUND DEHISCENCE: ICD-10-CM

## 2020-06-23 DIAGNOSIS — L03.90 CELLULITIS, UNSPECIFIED CELLULITIS SITE: ICD-10-CM

## 2020-06-23 DIAGNOSIS — D69.41 EVAN'S SYNDROME (HCC): ICD-10-CM

## 2020-06-23 DIAGNOSIS — T81.30XA WOUND DEHISCENCE: Primary | ICD-10-CM

## 2020-06-23 DIAGNOSIS — D69.41 EVAN'S SYNDROME (HCC): Primary | ICD-10-CM

## 2020-06-23 LAB
ALBUMIN SERPL BCP-MCNC: 3.1 G/DL (ref 3.5–5)
ALP SERPL-CCNC: 49 U/L (ref 46–116)
ALT SERPL W P-5'-P-CCNC: 18 U/L (ref 12–78)
ANION GAP SERPL CALCULATED.3IONS-SCNC: 5 MMOL/L (ref 4–13)
AST SERPL W P-5'-P-CCNC: 19 U/L (ref 5–45)
BILIRUB SERPL-MCNC: 0.46 MG/DL (ref 0.2–1)
BLOOD GROUP ANTIBODIES SERPL: NORMAL
BUN SERPL-MCNC: 20 MG/DL (ref 5–25)
C TETANI IGG SER-ACNC: NORMAL
CALCIUM SERPL-MCNC: 8.3 MG/DL (ref 8.3–10.1)
CHLORIDE SERPL-SCNC: 107 MMOL/L (ref 100–108)
CO2 SERPL-SCNC: 25 MMOL/L (ref 21–32)
CREAT SERPL-MCNC: 0.72 MG/DL (ref 0.6–1.3)
DAT C3-SP REAG RBC QL: NEGATIVE
DAT POLY-SP REAG RBC QL: NORMAL
GFR SERPL CREATININE-BSD FRML MDRD: 86 ML/MIN/1.73SQ M
GLUCOSE P FAST SERPL-MCNC: 89 MG/DL (ref 65–99)
LDH SERPL-CCNC: 250 U/L (ref 81–234)
POTASSIUM SERPL-SCNC: 3.7 MMOL/L (ref 3.5–5.3)
PROT SERPL-MCNC: 8.8 G/DL (ref 6.4–8.2)
SODIUM SERPL-SCNC: 137 MMOL/L (ref 136–145)

## 2020-06-23 PROCEDURE — 73590 X-RAY EXAM OF LOWER LEG: CPT

## 2020-06-23 PROCEDURE — 80053 COMPREHEN METABOLIC PANEL: CPT

## 2020-06-23 PROCEDURE — 36415 COLL VENOUS BLD VENIPUNCTURE: CPT

## 2020-06-23 PROCEDURE — 87070 CULTURE OTHR SPECIMN AEROBIC: CPT | Performed by: PHYSICIAN ASSISTANT

## 2020-06-23 PROCEDURE — 86880 COOMBS TEST DIRECT: CPT

## 2020-06-23 PROCEDURE — 83010 ASSAY OF HAPTOGLOBIN QUANT: CPT

## 2020-06-23 PROCEDURE — 87186 SC STD MICRODIL/AGAR DIL: CPT | Performed by: PHYSICIAN ASSISTANT

## 2020-06-23 PROCEDURE — 87147 CULTURE TYPE IMMUNOLOGIC: CPT | Performed by: PHYSICIAN ASSISTANT

## 2020-06-23 PROCEDURE — 87205 SMEAR GRAM STAIN: CPT | Performed by: PHYSICIAN ASSISTANT

## 2020-06-23 PROCEDURE — 83615 LACTATE (LD) (LDH) ENZYME: CPT

## 2020-06-23 PROCEDURE — G0463 HOSPITAL OUTPT CLINIC VISIT: HCPCS | Performed by: PHYSICIAN ASSISTANT

## 2020-06-23 PROCEDURE — 99213 OFFICE O/P EST LOW 20 MIN: CPT | Performed by: PHYSICIAN ASSISTANT

## 2020-06-23 RX ORDER — CLINDAMYCIN HYDROCHLORIDE 300 MG/1
300 CAPSULE ORAL 3 TIMES DAILY
Qty: 30 CAPSULE | Refills: 0 | Status: SHIPPED | OUTPATIENT
Start: 2020-06-23 | End: 2020-07-03

## 2020-06-24 LAB — HAPTOGLOB SERPL-MCNC: 163 MG/DL (ref 37–355)

## 2020-06-25 ENCOUNTER — TELEPHONE (OUTPATIENT)
Dept: OTHER | Facility: OTHER | Age: 70
End: 2020-06-25

## 2020-06-25 ENCOUNTER — OFFICE VISIT (OUTPATIENT)
Dept: FAMILY MEDICINE CLINIC | Facility: CLINIC | Age: 70
End: 2020-06-25
Payer: MEDICARE

## 2020-06-25 VITALS
WEIGHT: 104 LBS | DIASTOLIC BLOOD PRESSURE: 70 MMHG | OXYGEN SATURATION: 97 % | BODY MASS INDEX: 16.71 KG/M2 | HEIGHT: 66 IN | SYSTOLIC BLOOD PRESSURE: 108 MMHG | HEART RATE: 96 BPM | RESPIRATION RATE: 18 BRPM | TEMPERATURE: 99 F

## 2020-06-25 DIAGNOSIS — Z12.31 SCREENING MAMMOGRAM, ENCOUNTER FOR: ICD-10-CM

## 2020-06-25 DIAGNOSIS — D64.9 ANEMIA, UNSPECIFIED TYPE: ICD-10-CM

## 2020-06-25 DIAGNOSIS — D69.3 ACUTE ITP (HCC): Primary | ICD-10-CM

## 2020-06-25 DIAGNOSIS — S80.11XA HEMATOMA OF RIGHT LOWER LEG: ICD-10-CM

## 2020-06-25 DIAGNOSIS — E03.4 HYPOTHYROIDISM DUE TO ACQUIRED ATROPHY OF THYROID: ICD-10-CM

## 2020-06-25 LAB
BACTERIA WND AEROBE CULT: ABNORMAL
ERYTHROCYTE [DISTWIDTH] IN BLOOD BY AUTOMATED COUNT: 17.2 % (ref 11.6–15.1)
GRAM STN SPEC: ABNORMAL
GRAM STN SPEC: ABNORMAL
HCT VFR BLD AUTO: 33.6 % (ref 34.8–46.1)
HGB BLD-MCNC: 10.5 G/DL (ref 11.5–15.4)
MCH RBC QN AUTO: 27.9 PG (ref 26.8–34.3)
MCHC RBC AUTO-ENTMCNC: 31.3 G/DL (ref 31.4–37.4)
MCV RBC AUTO: 89 FL (ref 82–98)
PLATELET # BLD AUTO: 10 THOUSANDS/UL (ref 149–390)
PMV BLD AUTO: 11.1 FL (ref 8.9–12.7)
RBC # BLD AUTO: 3.76 MILLION/UL (ref 3.81–5.12)
TSH SERPL DL<=0.05 MIU/L-ACNC: 3.3 UIU/ML (ref 0.36–3.74)
WBC # BLD AUTO: 11.39 THOUSAND/UL (ref 4.31–10.16)

## 2020-06-25 PROCEDURE — 36415 COLL VENOUS BLD VENIPUNCTURE: CPT | Performed by: FAMILY MEDICINE

## 2020-06-25 PROCEDURE — 99496 TRANSJ CARE MGMT HIGH F2F 7D: CPT | Performed by: FAMILY MEDICINE

## 2020-06-25 PROCEDURE — 84443 ASSAY THYROID STIM HORMONE: CPT | Performed by: FAMILY MEDICINE

## 2020-06-25 PROCEDURE — 85027 COMPLETE CBC AUTOMATED: CPT | Performed by: FAMILY MEDICINE

## 2020-06-26 ENCOUNTER — OFFICE VISIT (OUTPATIENT)
Dept: HEMATOLOGY ONCOLOGY | Facility: CLINIC | Age: 70
End: 2020-06-26
Payer: MEDICARE

## 2020-06-26 ENCOUNTER — TELEPHONE (OUTPATIENT)
Dept: FAMILY MEDICINE CLINIC | Facility: CLINIC | Age: 70
End: 2020-06-26

## 2020-06-26 ENCOUNTER — TELEPHONE (OUTPATIENT)
Dept: HEMATOLOGY ONCOLOGY | Facility: CLINIC | Age: 70
End: 2020-06-26

## 2020-06-26 VITALS
RESPIRATION RATE: 18 BRPM | WEIGHT: 104.4 LBS | TEMPERATURE: 98.1 F | HEIGHT: 66 IN | BODY MASS INDEX: 16.78 KG/M2 | DIASTOLIC BLOOD PRESSURE: 74 MMHG | HEART RATE: 88 BPM | OXYGEN SATURATION: 97 % | SYSTOLIC BLOOD PRESSURE: 118 MMHG

## 2020-06-26 DIAGNOSIS — D59.10 AIHA (AUTOIMMUNE HEMOLYTIC ANEMIA) (HCC): Primary | ICD-10-CM

## 2020-06-26 DIAGNOSIS — D69.3 ACUTE ITP (HCC): ICD-10-CM

## 2020-06-26 DIAGNOSIS — M35.00 SJOGREN'S SYNDROME, WITH UNSPECIFIED ORGAN INVOLVEMENT (HCC): ICD-10-CM

## 2020-06-26 LAB — MISCELLANEOUS LAB TEST RESULT: NORMAL

## 2020-06-26 PROCEDURE — 99215 OFFICE O/P EST HI 40 MIN: CPT | Performed by: INTERNAL MEDICINE

## 2020-06-26 PROCEDURE — 1111F DSCHRG MED/CURRENT MED MERGE: CPT | Performed by: INTERNAL MEDICINE

## 2020-06-26 PROCEDURE — 4040F PNEUMOC VAC/ADMIN/RCVD: CPT | Performed by: INTERNAL MEDICINE

## 2020-06-26 PROCEDURE — 1036F TOBACCO NON-USER: CPT | Performed by: INTERNAL MEDICINE

## 2020-06-26 PROCEDURE — 1160F RVW MEDS BY RX/DR IN RCRD: CPT | Performed by: INTERNAL MEDICINE

## 2020-06-26 PROCEDURE — 3008F BODY MASS INDEX DOCD: CPT | Performed by: INTERNAL MEDICINE

## 2020-06-29 ENCOUNTER — TELEPHONE (OUTPATIENT)
Dept: SURGICAL ONCOLOGY | Facility: CLINIC | Age: 70
End: 2020-06-29

## 2020-06-29 ENCOUNTER — APPOINTMENT (OUTPATIENT)
Dept: LAB | Age: 70
End: 2020-06-29
Payer: MEDICARE

## 2020-06-29 ENCOUNTER — TELEPHONE (OUTPATIENT)
Dept: OTHER | Facility: HOSPITAL | Age: 70
End: 2020-06-29

## 2020-06-29 LAB
BASOPHILS # BLD AUTO: 0.11 THOUSANDS/ΜL (ref 0–0.1)
BASOPHILS NFR BLD AUTO: 2 % (ref 0–1)
EOSINOPHIL # BLD AUTO: 0.05 THOUSAND/ΜL (ref 0–0.61)
EOSINOPHIL NFR BLD AUTO: 1 % (ref 0–6)
ERYTHROCYTE [DISTWIDTH] IN BLOOD BY AUTOMATED COUNT: 17 % (ref 11.6–15.1)
HCT VFR BLD AUTO: 33 % (ref 34.8–46.1)
HGB BLD-MCNC: 10.2 G/DL (ref 11.5–15.4)
IMM GRANULOCYTES # BLD AUTO: 0.06 THOUSAND/UL (ref 0–0.2)
IMM GRANULOCYTES NFR BLD AUTO: 1 % (ref 0–2)
LYMPHOCYTES # BLD AUTO: 2.7 THOUSANDS/ΜL (ref 0.6–4.47)
LYMPHOCYTES NFR BLD AUTO: 43 % (ref 14–44)
MCH RBC QN AUTO: 28.4 PG (ref 26.8–34.3)
MCHC RBC AUTO-ENTMCNC: 30.9 G/DL (ref 31.4–37.4)
MCV RBC AUTO: 92 FL (ref 82–98)
MONOCYTES # BLD AUTO: 0.32 THOUSAND/ΜL (ref 0.17–1.22)
MONOCYTES NFR BLD AUTO: 5 % (ref 4–12)
NEUTROPHILS # BLD AUTO: 2.98 THOUSANDS/ΜL (ref 1.85–7.62)
NEUTS SEG NFR BLD AUTO: 48 % (ref 43–75)
NRBC BLD AUTO-RTO: 0 /100 WBCS
PLATELET # BLD AUTO: 2 THOUSANDS/UL (ref 149–390)
RBC # BLD AUTO: 3.59 MILLION/UL (ref 3.81–5.12)
WBC # BLD AUTO: 6.22 THOUSAND/UL (ref 4.31–10.16)

## 2020-06-29 PROCEDURE — 85025 COMPLETE CBC W/AUTO DIFF WBC: CPT | Performed by: INTERNAL MEDICINE

## 2020-06-29 PROCEDURE — 36415 COLL VENOUS BLD VENIPUNCTURE: CPT | Performed by: INTERNAL MEDICINE

## 2020-06-30 ENCOUNTER — TELEPHONE (OUTPATIENT)
Dept: HEMATOLOGY ONCOLOGY | Facility: CLINIC | Age: 70
End: 2020-06-30

## 2020-06-30 DIAGNOSIS — D69.3 ACUTE ITP (HCC): Primary | ICD-10-CM

## 2020-06-30 RX ORDER — METHYLPREDNISOLONE 4 MG/1
TABLET ORAL
Qty: 1 EACH | Refills: 0 | Status: SHIPPED | OUTPATIENT
Start: 2020-06-30 | End: 2020-09-18

## 2020-07-02 ENCOUNTER — APPOINTMENT (OUTPATIENT)
Dept: LAB | Age: 70
End: 2020-07-02
Payer: MEDICARE

## 2020-07-02 ENCOUNTER — TELEPHONE (OUTPATIENT)
Dept: HEMATOLOGY ONCOLOGY | Facility: CLINIC | Age: 70
End: 2020-07-02

## 2020-07-02 DIAGNOSIS — D59.10 AIHA (AUTOIMMUNE HEMOLYTIC ANEMIA) (HCC): ICD-10-CM

## 2020-07-02 DIAGNOSIS — D69.3 ACUTE ITP (HCC): ICD-10-CM

## 2020-07-02 DIAGNOSIS — M35.00 SJOGREN'S SYNDROME, WITH UNSPECIFIED ORGAN INVOLVEMENT (HCC): ICD-10-CM

## 2020-07-02 LAB
ALBUMIN SERPL BCP-MCNC: 3.1 G/DL (ref 3.5–5)
ALP SERPL-CCNC: 53 U/L (ref 46–116)
ALT SERPL W P-5'-P-CCNC: 16 U/L (ref 12–78)
ANION GAP SERPL CALCULATED.3IONS-SCNC: 3 MMOL/L (ref 4–13)
AST SERPL W P-5'-P-CCNC: 14 U/L (ref 5–45)
BILIRUB SERPL-MCNC: 0.64 MG/DL (ref 0.2–1)
BUN SERPL-MCNC: 15 MG/DL (ref 5–25)
CALCIUM SERPL-MCNC: 8.9 MG/DL (ref 8.3–10.1)
CHLORIDE SERPL-SCNC: 110 MMOL/L (ref 100–108)
CO2 SERPL-SCNC: 26 MMOL/L (ref 21–32)
CREAT SERPL-MCNC: 0.77 MG/DL (ref 0.6–1.3)
GFR SERPL CREATININE-BSD FRML MDRD: 80 ML/MIN/1.73SQ M
GLUCOSE SERPL-MCNC: 107 MG/DL (ref 65–140)
POTASSIUM SERPL-SCNC: 4.4 MMOL/L (ref 3.5–5.3)
PROT SERPL-MCNC: 8.4 G/DL (ref 6.4–8.2)
SODIUM SERPL-SCNC: 139 MMOL/L (ref 136–145)

## 2020-07-02 PROCEDURE — 80053 COMPREHEN METABOLIC PANEL: CPT

## 2020-07-02 NOTE — TELEPHONE ENCOUNTER
Lawrence Ramirez is calling in from [facility] with a critical result     PC- 40    Tiger text has been sent to [facility]

## 2020-07-06 ENCOUNTER — TELEPHONE (OUTPATIENT)
Dept: OTHER | Facility: OTHER | Age: 70
End: 2020-07-06

## 2020-07-06 ENCOUNTER — TELEPHONE (OUTPATIENT)
Dept: HEMATOLOGY ONCOLOGY | Facility: CLINIC | Age: 70
End: 2020-07-06

## 2020-07-06 ENCOUNTER — TELEPHONE (OUTPATIENT)
Dept: OTHER | Facility: HOSPITAL | Age: 70
End: 2020-07-06

## 2020-07-06 ENCOUNTER — APPOINTMENT (OUTPATIENT)
Dept: LAB | Age: 70
End: 2020-07-06
Payer: MEDICARE

## 2020-07-06 NOTE — TELEPHONE ENCOUNTER
[address] [facility]/ [phone]/ [facility] [place]/ 12-/ critical results

## 2020-07-06 NOTE — TELEPHONE ENCOUNTER
Patient called requesting lab results      Labs draw date: 06/29  Labs drawn at: [address] [facility]   Patient's call back #  [phone]

## 2020-07-06 NOTE — TELEPHONE ENCOUNTER
Patient aware that results are still pending  Will call her once results are available    Patient stated she took her last dose os steroid yesterday

## 2020-07-06 NOTE — TELEPHONE ENCOUNTER
I was called with a critical platelet count of 16964  The patient was called with the result  She stated that she continues to take her steroids on a daily basis without interruption  She denied any bleeding from any sites or easy bruising  I asked her to get in touch with Dr Licha Poole office tomorrow for further directions  She agreed with the plan

## 2020-07-07 NOTE — TELEPHONE ENCOUNTER
Spoke with patients relative Karol Lee  Reviewed platelet count = 60,739  Karol Lee will confirm with patient that she is to be taking Promacta 75mg PO daily  Patient will have CBC repeated on Thursday 7/9/20  Follow up in the office on Friday 7/10/20  Karol Lee aware to call the office if patient develops any bleeding

## 2020-07-09 ENCOUNTER — TRANSCRIBE ORDERS (OUTPATIENT)
Dept: ADMINISTRATIVE | Facility: HOSPITAL | Age: 70
End: 2020-07-09

## 2020-07-09 ENCOUNTER — TELEPHONE (OUTPATIENT)
Dept: HEMATOLOGY ONCOLOGY | Facility: CLINIC | Age: 70
End: 2020-07-09

## 2020-07-09 ENCOUNTER — APPOINTMENT (OUTPATIENT)
Dept: LAB | Age: 70
End: 2020-07-09
Payer: MEDICARE

## 2020-07-09 DIAGNOSIS — D59.10 AIHA (AUTOIMMUNE HEMOLYTIC ANEMIA) (HCC): ICD-10-CM

## 2020-07-09 DIAGNOSIS — D69.3 ACUTE ITP (HCC): Primary | ICD-10-CM

## 2020-07-09 DIAGNOSIS — M35.00 SJOGREN'S SYNDROME, WITH UNSPECIFIED ORGAN INVOLVEMENT (HCC): ICD-10-CM

## 2020-07-09 DIAGNOSIS — D69.3 ACUTE ITP (HCC): ICD-10-CM

## 2020-07-09 LAB
ALBUMIN SERPL BCP-MCNC: 2.9 G/DL (ref 3.5–5)
ALP SERPL-CCNC: 54 U/L (ref 46–116)
ALT SERPL W P-5'-P-CCNC: 14 U/L (ref 12–78)
ANION GAP SERPL CALCULATED.3IONS-SCNC: 4 MMOL/L (ref 4–13)
AST SERPL W P-5'-P-CCNC: 14 U/L (ref 5–45)
BILIRUB SERPL-MCNC: 0.47 MG/DL (ref 0.2–1)
BUN SERPL-MCNC: 8 MG/DL (ref 5–25)
CALCIUM SERPL-MCNC: 8.5 MG/DL (ref 8.3–10.1)
CHLORIDE SERPL-SCNC: 108 MMOL/L (ref 100–108)
CO2 SERPL-SCNC: 27 MMOL/L (ref 21–32)
CREAT SERPL-MCNC: 0.74 MG/DL (ref 0.6–1.3)
GFR SERPL CREATININE-BSD FRML MDRD: 84 ML/MIN/1.73SQ M
GLUCOSE SERPL-MCNC: 87 MG/DL (ref 65–140)
POTASSIUM SERPL-SCNC: 4.3 MMOL/L (ref 3.5–5.3)
PROT SERPL-MCNC: 7.7 G/DL (ref 6.4–8.2)
SODIUM SERPL-SCNC: 139 MMOL/L (ref 136–145)

## 2020-07-09 PROCEDURE — 80053 COMPREHEN METABOLIC PANEL: CPT

## 2020-07-09 PROCEDURE — 36415 COLL VENOUS BLD VENIPUNCTURE: CPT

## 2020-07-09 NOTE — TELEPHONE ENCOUNTER
Plt count 32,000  Patient took her last promacta 75 mg today and has an OV tomorrow  Thrombocytopenic precautions reviewed with patient who verbalizes understanding    Will update Dr Mandy Garcia

## 2020-07-10 ENCOUNTER — OFFICE VISIT (OUTPATIENT)
Dept: HEMATOLOGY ONCOLOGY | Facility: CLINIC | Age: 70
End: 2020-07-10
Payer: MEDICARE

## 2020-07-10 ENCOUNTER — DOCUMENTATION (OUTPATIENT)
Dept: HEMATOLOGY ONCOLOGY | Facility: CLINIC | Age: 70
End: 2020-07-10

## 2020-07-10 VITALS
HEIGHT: 65 IN | RESPIRATION RATE: 17 BRPM | WEIGHT: 105.2 LBS | BODY MASS INDEX: 17.53 KG/M2 | SYSTOLIC BLOOD PRESSURE: 118 MMHG | HEART RATE: 73 BPM | OXYGEN SATURATION: 98 % | DIASTOLIC BLOOD PRESSURE: 80 MMHG | TEMPERATURE: 97.7 F

## 2020-07-10 DIAGNOSIS — M35.00 SJOGREN'S SYNDROME, WITH UNSPECIFIED ORGAN INVOLVEMENT (HCC): Primary | ICD-10-CM

## 2020-07-10 DIAGNOSIS — D59.10 AIHA (AUTOIMMUNE HEMOLYTIC ANEMIA) (HCC): ICD-10-CM

## 2020-07-10 PROCEDURE — 99214 OFFICE O/P EST MOD 30 MIN: CPT | Performed by: INTERNAL MEDICINE

## 2020-07-10 PROCEDURE — 3008F BODY MASS INDEX DOCD: CPT | Performed by: INTERNAL MEDICINE

## 2020-07-10 PROCEDURE — 1160F RVW MEDS BY RX/DR IN RCRD: CPT | Performed by: INTERNAL MEDICINE

## 2020-07-10 PROCEDURE — 1036F TOBACCO NON-USER: CPT | Performed by: INTERNAL MEDICINE

## 2020-07-10 PROCEDURE — 4040F PNEUMOC VAC/ADMIN/RCVD: CPT | Performed by: INTERNAL MEDICINE

## 2020-07-10 PROCEDURE — 1111F DSCHRG MED/CURRENT MED MERGE: CPT | Performed by: INTERNAL MEDICINE

## 2020-07-10 NOTE — TELEPHONE ENCOUNTER
Dr Esqueda Pleasure aware - no new orders at this time    Patient to continue with CBC check and Promacta

## 2020-07-10 NOTE — PROGRESS NOTES
Hematology Outpatient Follow - Up Note  [facility] 78 [facility]  [facility] MRN: @ Encounter: [id number]        Date:  7/10/2020        Assessment/ Plan:    1  Autoimmune hemolytic anemia, IgG subtype  2  Acute/subacute immune thrombocytopenic purpura  3  Yasir syndrome with autoimmune hemolytic anemia, ITP, splenomegaly  4  Sjogren syndrome positive BALDO, rheumatoid factor, CCP  5  Patient received Decadron in the hospital in June 2020 with short-lived response for platelet count of 8333 up to 262 1000 and now back to 10,000     6  She had wound of the left shin bone, currently on clindamycin  7  Because of the wound and being on clindamycin she has not candidate for prednisone, the patient was given Promacta 50 milligram p o  Daily 14 days samples with improvement in platelet count to 15,064, disappearance of ecchymosis    She was given more samples today of Promacta 75 mg p o  Daily for 2 months, will arrange for approval for additional supply CBC every week follow-up in 1 month with LDH, flow cytometry results and CMP    8  Will order flow cytometry on the peripheral blood to rule out marginal zone lymphoma associated with chronic Sjogren syndrome    9   Will do CT scan of the chest abdomen and pelvis in 3 months to assess the lymphadenopathy status             HPI:78year-old  female with polyclonal gammopathy, no evidence of monoclonal protein, she had Sjogren syndrome, autoimmune connective tissue disease with highly elevated sed rate, CRP, BALDO, rheumatoid factor, had been followed by Rheumatology she was prescribed hydroxychloroquine 200 milligram p o  B i d      She was seen again in the hospital on 06/19/2020 because of grade 4 thrombocytopenia with platelet count of 0150, hemoglobin 10, WBC 7 2, workup was positive for autoimmune hemolytic anemia with mehrdad positive +for IgG, plus for C3, bilirubin 0 5     CT scan of the chest abdomen pelvis on 06/19/2020 showed mild confluent periaortic/retroperitoneal lymphadenopathy measuring up to 1 1 centimeter in short axis, bilateral external iliac lymphadenopathy up to 10 millimeters, retrocrural lymph node measuring 1 3 centimeters spleen 15 centimeter, liver with hepatomegaly no evidence of masses     She was treated with dexamethasone for 3 days with improvement of platelet count up to 62,000     On 06/25/2020 platelet count of 51,391     She reported easy bruisability    She was given samples of Promacta 75 mg p o  Daily in July 2020, platelet count went up to 32,000 no more bleeding or easy bruisability               Test Results:    Imaging: Xr Chest 2 Views    Result Date: 6/18/2020  Narrative: CHEST INDICATION:   leg edema  COMPARISON:  5/3/2018 EXAM PERFORMED/VIEWS:  XR CHEST PA & LATERAL FINDINGS: Cardiomediastinal silhouette appears unremarkable  The lungs are clear  No pneumothorax or pleural effusion  Osseous structures appear within normal limits for patient age  Impression: No acute cardiopulmonary disease  Workstation performed: [id number]     Xr Tibia Fibula 2 Vw Right    Result Date: 6/23/2020  Narrative: RIGHT TIBIA AND FIBULA INDICATION:  T81 30XA: Disruption of wound, unspecified, initial encounter  COMPARISON:  Right knee 12/6/2016 VIEWS:  XR TIBIA FIBULA 2 VW RIGHT Images: 5 FINDINGS: There is no acute fracture or dislocation  Tiny plantar calcaneal spur  No lytic or blastic osseous lesion  Mild streaky opacities seen within the subcutaneous tissues of the mid lateral aspect lower extremity, correlate for varicosities versus edema  No diffuse subcutaneous gas or radiopaque foreign bodies  Impression: No acute osseous abnormality  Workstation performed: [id number]     Ct Chest Abdomen Pelvis W Contrast    Result Date: 6/19/2020  Narrative: CT CHEST, ABDOMEN AND PELVIS WITH IV CONTRAST INDICATION:   Blas syndrome, evaluate for adenopathy    COMPARISON:  CT abdomen pelvis 7/24/2012, CT chest abdomen pelvis 9/8/2008   TECHNIQUE: CT examination of the chest, abdomen and pelvis was performed  Axial, sagittal, and coronal 2D reformatted images were created from the source data and submitted for interpretation  Radiation dose length product (DLP) for this visit:  541 mGy-cm   This examination, like all CT scans performed in the [facility], was performed utilizing techniques to minimize radiation dose exposure, including the use of iterative reconstruction and automated exposure control  IV Contrast:  100 mL of iohexol (OMNIPAQUE) Enteric Contrast: Enteric contrast was administered  FINDINGS: CHEST LUNGS:  Lungs are clear  There is no tracheal or endobronchial lesion  PLEURA:  Unremarkable  HEART/GREAT VESSELS:  Unremarkable for patient's age  MEDIASTINUM AND TRUDI:  Unremarkable  CHEST WALL AND LOWER NECK:   Unremarkable  ABDOMEN LIVER/BILIARY TREE:  Hepatomegaly, unchanged since the prior study  No focal hepatic lesions are noted  Hepatic contours are within normal limits  No biliary dilatation  GALLBLADDER:  Marked distention of the gallbladder, similar to the prior studies  No calcified gallstones or pericholecystic inflammatory changes identified  SPLEEN:  Splenomegaly measuring 15 cm, new since the prior exam  PANCREAS:  Unremarkable  ADRENAL GLANDS:  Unremarkable  KIDNEYS/URETERS:  Unremarkable  No hydronephrosis  STOMACH AND BOWEL:  Unremarkable  APPENDIX:  No findings to suggest appendicitis  ABDOMINOPELVIC CAVITY:  There is diffuse confluent periaortic retroperitoneal adenopathy, measuring up to 1 1 cm in short axis  Top normal sized bilateral external iliac lymph nodes measuring up to 10 mm in short axis  There is a mildly enlarged retrocrural lymph node measuring 1 3 cm in short axis on image 2/50  No mesenteric adenopathy  No ascites  No pneumoperitoneum  VESSELS:  Unremarkable for patient's age  PELVIS REPRODUCTIVE ORGANS:  Unremarkable for patient's age  URINARY BLADDER:  Unremarkable   ABDOMINAL WALL/INGUINAL REGIONS: Unremarkable  OSSEOUS STRUCTURES:  No acute fracture or destructive osseous lesion  Impression: No adenopathy in the chest  Diffuse mild retroperitoneal adenopathy, and mildly enlarged retrocrural lymph node  Unchanged hepatomegaly  Splenomegaly, new since the prior study Workstation performed: [id number]       Labs:   Lab Results   Component Value Date    WBC 5 21 07/09/2020    HGB 10 9 (L) 07/09/2020    HCT 36 4 07/09/2020    MCV 94 07/09/2020    PLT 32 (LL) 07/09/2020     Lab Results   Component Value Date    K 4 3 07/09/2020     07/09/2020    CO2 27 07/09/2020    BUN 8 07/09/2020    CREATININE 0 74 07/09/2020    GLUF 89 06/23/2020    CALCIUM 8 5 07/09/2020    AST 14 07/09/2020    ALT 14 07/09/2020    ALKPHOS 54 07/09/2020    EGFR 84 07/09/2020       No results found for: IRON, TIBC, FERRITIN    No results found for: [id number]      ROS: Review of Systems   Constitutional: Positive for appetite change and fatigue  Negative for chills, diaphoresis and unexpected weight change  HENT:   Negative for mouth sores, nosebleeds, sore throat, trouble swallowing and voice change  Dry mouth   Eyes: Negative for eye problems and icterus  Respiratory: Negative for chest tightness, cough, hemoptysis and wheezing  Cardiovascular: Negative for chest pain, leg swelling and palpitations  Gastrointestinal: Negative for abdominal distention, abdominal pain, blood in stool, constipation, diarrhea, nausea and vomiting  Endocrine: Negative for hot flashes  Genitourinary: Negative for bladder incontinence, difficulty urinating, dyspareunia, dysuria and frequency  Musculoskeletal: Negative for arthralgias, back pain, gait problem, neck pain and neck stiffness  Skin: Positive for wound (Of the right anterior shin bone area)  Negative for itching and rash  Neurological: Negative for dizziness, gait problem, headaches, numbness, seizures and speech difficulty  Hematological: Negative for adenopathy  Does not bruise/bleed easily  Psychiatric/Behavioral: Negative for decreased concentration, depression, sleep disturbance and suicidal ideas  The patient is not nervous/anxious  Current Medications: Reviewed  Allergies: Reviewed  PMH/FH/SH:  Reviewed      Physical Exam:    Body surface area is 1 51 meters squared  Wt Readings from Last 3 Encounters:   07/10/20 47 7 kg (105 lb 3 2 oz)   20 47 4 kg (104 lb 6 4 oz)   20 47 2 kg (104 lb)        Temp Readings from Last 3 Encounters:   07/10/20 97 7 °F (36 5 °C)   20 98 1 °F (36 7 °C)   20 99 °F (37 2 °C) (Tympanic)        BP Readings from Last 3 Encounters:   07/10/20 118/80   20 118/74   20 108/70         Pulse Readings from Last 3 Encounters:   07/10/20 73   20 88   20 96        Physical Exam   Constitutional: She is oriented to person, place, and time  She appears well-developed  No distress  HENT:   Head: Normocephalic and atraumatic  Eyes: Conjunctivae are normal    Neck: Normal range of motion  Neck supple  No tracheal deviation present  Cardiovascular: Normal rate and regular rhythm  Exam reveals no gallop and no friction rub  No murmur heard  Pulmonary/Chest: Effort normal and breath sounds normal  No respiratory distress  She has no wheezes  She has no rales  She exhibits no tenderness  Abdominal: Soft  She exhibits no distension  There is no tenderness  Musculoskeletal: She exhibits tenderness (Of the right anterior shin bone area)  She exhibits no edema  Lymphadenopathy:     She has no cervical adenopathy  Neurological: She is alert and oriented to person, place, and time  Skin: Skin is warm and dry  She is not diaphoretic  No erythema  No pallor  Psychiatric: She has a normal mood and affect  Her behavior is normal  Judgment and thought content normal    Vitals reviewed  ECO    Goals and Barriers:  Current Goal: Minimize effects of disease  Barriers: None  Patient's Capacity to Self Care:  Patient is able to self care      Code Status: [unfilled]

## 2020-07-10 NOTE — PROGRESS NOTES
7-10-20  Received new oral chemo start -PROMACTA    Copay $ 2899 97    Completed PANO Jared online  7-14-20  Additional information required  Obtained and faxed to [facility]      [birth year]  Application is in Benefits Investigation      7-22-20  Patient Bairon Capellan [birth year]    Received notification patient has been approved for free drug program thru [employer]     Eff 7-20-20  Thru 12-31-20    [facility] [address] [address]  will be reaching out to the patient to set up shipment    Called the patient and she is aware of upcoming call    Please forward script to Pharmacy     EPIC Noted, Email to team, Patient notified

## 2020-07-13 ENCOUNTER — DOCUMENTATION (OUTPATIENT)
Dept: HEMATOLOGY ONCOLOGY | Facility: CLINIC | Age: 70
End: 2020-07-13

## 2020-07-13 ENCOUNTER — APPOINTMENT (OUTPATIENT)
Dept: LAB | Age: 70
End: 2020-07-13
Payer: MEDICARE

## 2020-07-13 NOTE — PROGRESS NOTES
7/13/2020 received notification from clinical pt will be starting Promacta 75 mg q d  Pt has [facility]  ID # [id number]  BIN # [id number]  PCN # [id number]  GRP # [id number]    Per hom[facility]ar, this went through without an authorization  CLincial and finance were also notified       7/22/2020 received notification from finance pt will be receiving free drug from [facility]

## 2020-07-14 ENCOUNTER — TELEPHONE (OUTPATIENT)
Dept: HEMATOLOGY ONCOLOGY | Facility: CLINIC | Age: 70
End: 2020-07-14

## 2020-07-14 ENCOUNTER — TELEPHONE (OUTPATIENT)
Dept: HEMATOLOGY ONCOLOGY | Facility: MEDICAL CENTER | Age: 70
End: 2020-07-14

## 2020-07-14 NOTE — TELEPHONE ENCOUNTER
Returned call to patient  Plt 45,000  Per OV note:  She was given more samples today of Promacta 75 mg p o  Daily for 2 months, will arrange for approval for additional supply CBC every week follow-up in 1 month with LDH, flow cytometry results and CMP     Next CBC to be drawn in one week  Patient denies any bleeding or dark stools  Thrombocytic precautions reviewed with patient who verbalizes understanding      Will update Dr René Diaz

## 2020-07-14 NOTE — TELEPHONE ENCOUNTER
Call received from:      Cat  Lab location:[facility]  Date of lab draw:7/13/20  Critical value:Platelet 45 thousand  Read back occurred:Yes  Tasked and Belen Elise RN

## 2020-07-20 ENCOUNTER — TELEPHONE (OUTPATIENT)
Dept: HEMATOLOGY ONCOLOGY | Facility: CLINIC | Age: 70
End: 2020-07-20

## 2020-07-20 ENCOUNTER — APPOINTMENT (OUTPATIENT)
Dept: LAB | Age: 70
End: 2020-07-20
Payer: MEDICARE

## 2020-07-21 ENCOUNTER — TELEPHONE (OUTPATIENT)
Dept: HEMATOLOGY ONCOLOGY | Facility: CLINIC | Age: 70
End: 2020-07-21

## 2020-07-21 NOTE — TELEPHONE ENCOUNTER
Patient called requesting lab results      Labs draw date: 7/20/2020  Labs drawn at: [facility] lab  Patient's call back # [phone]

## 2020-07-21 NOTE — TELEPHONE ENCOUNTER
Returned call to patient   Plt 44,000  Promacta 75mg  CBC to be rechecked next week  Patient denies any bleeding  Dr Mauro Macias RN aware

## 2020-07-22 DIAGNOSIS — D69.3 ACUTE ITP (HCC): Primary | ICD-10-CM

## 2020-07-27 ENCOUNTER — APPOINTMENT (OUTPATIENT)
Dept: LAB | Age: 70
End: 2020-07-27
Payer: MEDICARE

## 2020-07-28 ENCOUNTER — TELEPHONE (OUTPATIENT)
Dept: HEMATOLOGY ONCOLOGY | Facility: CLINIC | Age: 70
End: 2020-07-28

## 2020-07-28 NOTE — TELEPHONE ENCOUNTER
Plt count reviewed with patient  Patient is on promacta 75 mg daily  Patient will have CBC done next week and call for result  Will update Dr Ling Armijo

## 2020-07-28 NOTE — TELEPHONE ENCOUNTER
Patient called in, had blood work done yesterday and would like results  Please call patient back on [phone]

## 2020-08-03 ENCOUNTER — APPOINTMENT (OUTPATIENT)
Dept: LAB | Age: 70
End: 2020-08-03
Payer: MEDICARE

## 2020-08-04 ENCOUNTER — TELEPHONE (OUTPATIENT)
Dept: HEMATOLOGY ONCOLOGY | Facility: CLINIC | Age: 70
End: 2020-08-04

## 2020-08-04 NOTE — TELEPHONE ENCOUNTER
Plt 60,000  plt 87,000last week  Patient remains on promacta 75 mg and is concerned about decrease in plt level  Will pass on concern to Dr Maddy Ruiz RN to discuss with MD and advise patient

## 2020-08-04 NOTE — TELEPHONE ENCOUNTER
Reviewed with Dr Bhavin Penny and he is states the platelet count is good as long as it is above 50,000  Called the patient and informed her and she verbalized understanding

## 2020-08-04 NOTE — TELEPHONE ENCOUNTER
Patient called requesting lab results      Labs draw [id number]  Labs drawn at:[facility]   Patient's call back #[phone]

## 2020-08-10 ENCOUNTER — APPOINTMENT (OUTPATIENT)
Dept: LAB | Age: 70
End: 2020-08-10
Payer: MEDICARE

## 2020-08-10 ENCOUNTER — TELEPHONE (OUTPATIENT)
Dept: OTHER | Facility: OTHER | Age: 70
End: 2020-08-10

## 2020-08-10 DIAGNOSIS — M35.00 SJOGREN'S SYNDROME, WITH UNSPECIFIED ORGAN INVOLVEMENT (HCC): ICD-10-CM

## 2020-08-10 DIAGNOSIS — D59.10 AIHA (AUTOIMMUNE HEMOLYTIC ANEMIA) (HCC): ICD-10-CM

## 2020-08-10 LAB
ALBUMIN SERPL BCP-MCNC: 3.1 G/DL (ref 3.5–5)
ALP SERPL-CCNC: 50 U/L (ref 46–116)
ALT SERPL W P-5'-P-CCNC: 18 U/L (ref 12–78)
ANION GAP SERPL CALCULATED.3IONS-SCNC: 6 MMOL/L (ref 4–13)
AST SERPL W P-5'-P-CCNC: 13 U/L (ref 5–45)
BILIRUB SERPL-MCNC: 0.99 MG/DL (ref 0.2–1)
BUN SERPL-MCNC: 12 MG/DL (ref 5–25)
CALCIUM SERPL-MCNC: 8.6 MG/DL (ref 8.3–10.1)
CHLORIDE SERPL-SCNC: 106 MMOL/L (ref 100–108)
CO2 SERPL-SCNC: 27 MMOL/L (ref 21–32)
CREAT SERPL-MCNC: 0.72 MG/DL (ref 0.6–1.3)
GFR SERPL CREATININE-BSD FRML MDRD: 86 ML/MIN/1.73SQ M
GLUCOSE SERPL-MCNC: 130 MG/DL (ref 65–140)
LDH SERPL-CCNC: 251 U/L (ref 81–234)
POTASSIUM SERPL-SCNC: 4.1 MMOL/L (ref 3.5–5.3)
PROT SERPL-MCNC: 8.2 G/DL (ref 6.4–8.2)
SODIUM SERPL-SCNC: 139 MMOL/L (ref 136–145)

## 2020-08-10 PROCEDURE — 83615 LACTATE (LD) (LDH) ENZYME: CPT

## 2020-08-10 PROCEDURE — 80053 COMPREHEN METABOLIC PANEL: CPT

## 2020-08-11 LAB — SCAN RESULT: NORMAL

## 2020-08-12 ENCOUNTER — TELEPHONE (OUTPATIENT)
Dept: HEMATOLOGY ONCOLOGY | Facility: CLINIC | Age: 70
End: 2020-08-12

## 2020-08-12 NOTE — TELEPHONE ENCOUNTER
Reviewed with Dr Elisa Torres he advised he will review with patient at upcoming appointment on 8/21/20

## 2020-08-12 NOTE — TELEPHONE ENCOUNTER
Patient would like their lab results     Person calling in  Patient   Lab Draw Date 8-   Lab Draw Place [facility]   Call Back Number  [phone]

## 2020-08-17 ENCOUNTER — TELEPHONE (OUTPATIENT)
Dept: HEMATOLOGY ONCOLOGY | Facility: CLINIC | Age: 70
End: 2020-08-17

## 2020-08-17 ENCOUNTER — APPOINTMENT (OUTPATIENT)
Dept: LAB | Age: 70
End: 2020-08-17
Payer: MEDICARE

## 2020-08-17 DIAGNOSIS — D69.3 ACUTE ITP (HCC): ICD-10-CM

## 2020-08-17 DIAGNOSIS — D69.3 ACUTE ITP (HCC): Primary | ICD-10-CM

## 2020-08-17 LAB
BASOPHILS # BLD AUTO: 0.07 THOUSANDS/ΜL (ref 0–0.1)
BASOPHILS NFR BLD AUTO: 1 % (ref 0–1)
EOSINOPHIL # BLD AUTO: 0.05 THOUSAND/ΜL (ref 0–0.61)
EOSINOPHIL NFR BLD AUTO: 1 % (ref 0–6)
ERYTHROCYTE [DISTWIDTH] IN BLOOD BY AUTOMATED COUNT: 15 % (ref 11.6–15.1)
HCT VFR BLD AUTO: 37.8 % (ref 34.8–46.1)
HGB BLD-MCNC: 11.4 G/DL (ref 11.5–15.4)
IMM GRANULOCYTES # BLD AUTO: 0.04 THOUSAND/UL (ref 0–0.2)
IMM GRANULOCYTES NFR BLD AUTO: 1 % (ref 0–2)
LYMPHOCYTES # BLD AUTO: 1.71 THOUSANDS/ΜL (ref 0.6–4.47)
LYMPHOCYTES NFR BLD AUTO: 34 % (ref 14–44)
MCH RBC QN AUTO: 27.7 PG (ref 26.8–34.3)
MCHC RBC AUTO-ENTMCNC: 30.2 G/DL (ref 31.4–37.4)
MCV RBC AUTO: 92 FL (ref 82–98)
MONOCYTES # BLD AUTO: 0.29 THOUSAND/ΜL (ref 0.17–1.22)
MONOCYTES NFR BLD AUTO: 6 % (ref 4–12)
NEUTROPHILS # BLD AUTO: 2.87 THOUSANDS/ΜL (ref 1.85–7.62)
NEUTS SEG NFR BLD AUTO: 57 % (ref 43–75)
NRBC BLD AUTO-RTO: 0 /100 WBCS
PLATELET # BLD AUTO: 73 THOUSANDS/UL (ref 149–390)
PMV BLD AUTO: 12.4 FL (ref 8.9–12.7)
RBC # BLD AUTO: 4.11 MILLION/UL (ref 3.81–5.12)
WBC # BLD AUTO: 5.03 THOUSAND/UL (ref 4.31–10.16)

## 2020-08-17 PROCEDURE — 36415 COLL VENOUS BLD VENIPUNCTURE: CPT

## 2020-08-17 PROCEDURE — 85025 COMPLETE CBC W/AUTO DIFF WBC: CPT

## 2020-08-21 ENCOUNTER — OFFICE VISIT (OUTPATIENT)
Dept: HEMATOLOGY ONCOLOGY | Facility: CLINIC | Age: 70
End: 2020-08-21
Payer: MEDICARE

## 2020-08-21 VITALS
WEIGHT: 108.8 LBS | BODY MASS INDEX: 18.13 KG/M2 | TEMPERATURE: 97.7 F | OXYGEN SATURATION: 97 % | RESPIRATION RATE: 18 BRPM | HEIGHT: 65 IN | HEART RATE: 83 BPM | DIASTOLIC BLOOD PRESSURE: 72 MMHG | SYSTOLIC BLOOD PRESSURE: 102 MMHG

## 2020-08-21 DIAGNOSIS — F32.A DEPRESSION, UNSPECIFIED DEPRESSION TYPE: ICD-10-CM

## 2020-08-21 DIAGNOSIS — M32.8 OTHER FORMS OF SYSTEMIC LUPUS ERYTHEMATOSUS, UNSPECIFIED ORGAN INVOLVEMENT STATUS (HCC): ICD-10-CM

## 2020-08-21 DIAGNOSIS — C91.10 CLL (CHRONIC LYMPHOCYTIC LEUKEMIA) (HCC): ICD-10-CM

## 2020-08-21 DIAGNOSIS — D69.3 ACUTE ITP (HCC): Primary | ICD-10-CM

## 2020-08-21 PROCEDURE — 1036F TOBACCO NON-USER: CPT | Performed by: INTERNAL MEDICINE

## 2020-08-21 PROCEDURE — 99215 OFFICE O/P EST HI 40 MIN: CPT | Performed by: INTERNAL MEDICINE

## 2020-08-21 PROCEDURE — 1111F DSCHRG MED/CURRENT MED MERGE: CPT | Performed by: INTERNAL MEDICINE

## 2020-08-21 PROCEDURE — 4040F PNEUMOC VAC/ADMIN/RCVD: CPT | Performed by: INTERNAL MEDICINE

## 2020-08-21 PROCEDURE — 1160F RVW MEDS BY RX/DR IN RCRD: CPT | Performed by: INTERNAL MEDICINE

## 2020-08-21 PROCEDURE — 3008F BODY MASS INDEX DOCD: CPT | Performed by: INTERNAL MEDICINE

## 2020-08-21 RX ORDER — SERTRALINE HYDROCHLORIDE 25 MG/1
25 TABLET, FILM COATED ORAL DAILY
Qty: 90 TABLET | Refills: 0 | Status: SHIPPED | OUTPATIENT
Start: 2020-08-21 | End: 2020-09-18

## 2020-08-21 NOTE — PROGRESS NOTES
Hematology Outpatient Follow - Up Note  Ekaterina Barrera 78 y o  female MRN: @ Encounter: [id number]        Date:  8/21/2020        Assessment/ Plan:    42-year-old  female who presented with autoimmune hemolytic anemia, IgG subtype, acute immune thrombocytopenic purpura and splenomegaly, she had Sjogren syndrome, positive BALDO, rheumatoid factor and CCP    Initiated in the hospital on Decadron in June 2020 with short-lived response    Initiated on Promacta 75 milligram p o  Daily, current platelet count [place] 513[address]    Flow cytometry on the peripheral blood showed CLL pattern    This is most likely chronic lymphocytic leukemia associated with autoimmune hemolytic anemia and ITP    No evidence of anemia, platelet count 39868, WBC is normal with differential, will continue watchful observation and follow-up in 6 weeks I will order I GVH mutational status as well as fish panel for CLL    CT scan in the hospital showed lymphadenopathy again consistent with possible marginal zone lymphoma versus SLL    Depression Zoloft 20 milligram p o   Daily and she will follow up with you            HPI: 42-year-old  female with polyclonal gammopathy, no evidence of monoclonal protein, she had Sjogren syndrome, autoimmune connective tissue disease with highly elevated sed rate, CRP, BALDO, rheumatoid factor, had been followed by Rheumatology she was prescribed hydroxychloroquine 200 milligram p o  B i d      She was seen again in the hospital on 06/19/2020 because of grade 4 thrombocytopenia with platelet count of 4654, hemoglobin 10, WBC 7 2, workup was positive for autoimmune hemolytic anemia with mehrdad positive +for IgG, plus for C3, bilirubin 0 5     CT scan of the chest abdomen pelvis on 06/19/2020 showed mild confluent periaortic/retroperitoneal lymphadenopathy measuring up to 1 1 centimeter in short axis, bilateral external iliac lymphadenopathy up to 10 millimeters, retrocrural lymph node measuring 1 3 centimeters spleen 15 centimeter, liver with hepatomegaly no evidence of masses     She was treated with dexamethasone for 3 days with improvement of platelet count up to 62,000     On 06/25/2020 platelet count ZE 44,159    Initiated on Promacta 75 milligram p o  Daily in July 2020, platelets count up gradually with platelet count of 75,488 on 08/17/2020, hemoglobin 11 4, WBC 5 0, 57 percent neutrophils, 34 percent lymphocytes    Flow cytometry on the peripheral blood showed CLL pattern positive for CD 23, CD 20         Interval History:  She feels bored       Previous Treatment:         Test Results:    Imaging: No results found  Labs:   Lab Results   Component Value Date    WBC 5 03 08/17/2020    HGB 11 4 (L) 08/17/2020    HCT 37 8 08/17/2020    MCV 92 08/17/2020    PLT 73 (L) 08/17/2020     Lab Results   Component Value Date    K 4 1 08/10/2020     08/10/2020    CO2 27 08/10/2020    BUN 12 08/10/2020    CREATININE 0 72 08/10/2020    GLUF 89 06/23/2020    CALCIUM 8 6 08/10/2020    AST 13 08/10/2020    ALT 18 08/10/2020    ALKPHOS 50 08/10/2020    EGFR 86 08/10/2020       No results found for: IRON, TIBC, FERRITIN    No results found for: [id number]      ROS: Review of Systems   Constitutional: Negative for appetite change, chills, diaphoresis, fatigue and unexpected weight change  HENT:   Negative for mouth sores, nosebleeds, sore throat, trouble swallowing and voice change  Eyes: Negative for eye problems and icterus  Respiratory: Negative for chest tightness, cough, hemoptysis and wheezing  Cardiovascular: Negative for chest pain, leg swelling and palpitations  Gastrointestinal: Negative for abdominal distention, abdominal pain, blood in stool, constipation, diarrhea, nausea and vomiting  Endocrine: Negative for hot flashes  Genitourinary: Negative for bladder incontinence, difficulty urinating, dyspareunia, dysuria and frequency      Musculoskeletal: Negative for arthralgias, back pain, gait problem, neck pain and neck stiffness  Skin: Negative for itching and rash  Neurological: Negative for dizziness, gait problem, headaches, numbness, seizures and speech difficulty  Hematological: Negative for adenopathy  Does not bruise/bleed easily  Psychiatric/Behavioral: Positive for depression and sleep disturbance  Negative for decreased concentration and suicidal ideas  The patient is nervous/anxious  Current Medications: Reviewed  Allergies: Reviewed  PMH/FH/SH:  Reviewed      Physical Exam:    Body surface area is 1 53 meters squared  Wt Readings from Last 3 Encounters:   08/21/20 49 4 kg (108 lb 12 8 oz)   07/10/20 47 7 kg (105 lb 3 2 oz)   06/26/20 47 4 kg (104 lb 6 4 oz)        Temp Readings from Last 3 Encounters:   08/21/20 97 7 °F (36 5 °C) (Tympanic)   07/10/20 97 7 °F (36 5 °C)   06/26/20 98 1 °F (36 7 °C)        BP Readings from Last 3 Encounters:   08/21/20 102/72   07/10/20 118/80   06/26/20 118/74         Pulse Readings from Last 3 Encounters:   08/21/20 83   07/10/20 73   06/26/20 88        Physical Exam  Vitals signs reviewed  Constitutional:       General: She is not in acute distress  Appearance: She is well-developed  She is not diaphoretic  HENT:      Head: Normocephalic and atraumatic  Eyes:      Conjunctiva/sclera: Conjunctivae normal    Neck:      Musculoskeletal: Normal range of motion and neck supple  Trachea: No tracheal deviation  Cardiovascular:      Rate and Rhythm: Normal rate and regular rhythm  Heart sounds: No murmur  No friction rub  No gallop  Pulmonary:      Effort: Pulmonary effort is normal  No respiratory distress  Breath sounds: Normal breath sounds  No wheezing or rales  Chest:      Chest wall: No tenderness  Abdominal:      General: There is no distension  Palpations: Abdomen is soft  Tenderness: There is no abdominal tenderness  Lymphadenopathy:      Cervical: No cervical adenopathy     Skin: General: Skin is warm and dry  Coloration: Skin is not pale  Findings: Rash (Chronic stasis of the right lower extremity) present  No erythema or lesion  Neurological:      Mental Status: She is alert and oriented to person, place, and time  Psychiatric:         Behavior: Behavior normal          Thought Content: Thought content normal          Judgment: Judgment normal          ECO    Goals and Barriers:  Current Goal: Minimize effects of disease  Barriers: None  Patient's Capacity to Self Care:  Patient is able to self care      Code Status: @[facility]@

## 2020-08-28 ENCOUNTER — TELEPHONE (OUTPATIENT)
Dept: HEMATOLOGY ONCOLOGY | Facility: MEDICAL CENTER | Age: 70
End: 2020-08-28

## 2020-08-28 ENCOUNTER — TELEPHONE (OUTPATIENT)
Dept: HEMATOLOGY ONCOLOGY | Facility: CLINIC | Age: 70
End: 2020-08-28

## 2020-08-28 ENCOUNTER — APPOINTMENT (OUTPATIENT)
Dept: LAB | Facility: HOSPITAL | Age: 70
End: 2020-08-28
Attending: INTERNAL MEDICINE
Payer: MEDICARE

## 2020-08-28 DIAGNOSIS — M32.8 OTHER FORMS OF SYSTEMIC LUPUS ERYTHEMATOSUS, UNSPECIFIED ORGAN INVOLVEMENT STATUS (HCC): ICD-10-CM

## 2020-08-28 DIAGNOSIS — C91.10 CLL (CHRONIC LYMPHOCYTIC LEUKEMIA) (HCC): ICD-10-CM

## 2020-08-28 DIAGNOSIS — D69.3 ACUTE ITP (HCC): ICD-10-CM

## 2020-08-28 DIAGNOSIS — F32.A DEPRESSION, UNSPECIFIED DEPRESSION TYPE: ICD-10-CM

## 2020-08-28 LAB
BASOPHILS # BLD MANUAL: 0 THOUSAND/UL (ref 0–0.1)
BASOPHILS NFR MAR MANUAL: 0 % (ref 0–1)
EOSINOPHIL # BLD MANUAL: 0.05 THOUSAND/UL (ref 0–0.4)
EOSINOPHIL NFR BLD MANUAL: 1 % (ref 0–6)
ERYTHROCYTE [DISTWIDTH] IN BLOOD BY AUTOMATED COUNT: 14.9 % (ref 11.6–15.1)
HCT VFR BLD AUTO: 38.6 % (ref 34.8–46.1)
HGB BLD-MCNC: 11.7 G/DL (ref 11.5–15.4)
LG PLATELETS BLD QL SMEAR: PRESENT
LYMPHOCYTES # BLD AUTO: 1.36 THOUSAND/UL (ref 0.6–4.47)
LYMPHOCYTES # BLD AUTO: 25 % (ref 14–44)
MCH RBC QN AUTO: 27.6 PG (ref 26.8–34.3)
MCHC RBC AUTO-ENTMCNC: 30.3 G/DL (ref 31.4–37.4)
MCV RBC AUTO: 91 FL (ref 82–98)
METAMYELOCYTES NFR BLD MANUAL: 1 % (ref 0–1)
MONOCYTES # BLD AUTO: 0.33 THOUSAND/UL (ref 0–1.22)
MONOCYTES NFR BLD: 6 % (ref 4–12)
NEUTROPHILS # BLD MANUAL: 3.64 THOUSAND/UL (ref 1.85–7.62)
NEUTS SEG NFR BLD AUTO: 67 % (ref 43–75)
NRBC BLD AUTO-RTO: 0 /100 WBCS
PLATELET # BLD AUTO: 19 THOUSANDS/UL (ref 149–390)
PLATELET BLD QL SMEAR: ABNORMAL
RBC # BLD AUTO: 4.24 MILLION/UL (ref 3.81–5.12)
RBC MORPH BLD: NORMAL
TOTAL CELLS COUNTED SPEC: 100
WBC # BLD AUTO: 5.44 THOUSAND/UL (ref 4.31–10.16)

## 2020-08-28 PROCEDURE — 85027 COMPLETE CBC AUTOMATED: CPT

## 2020-08-28 PROCEDURE — 36415 COLL VENOUS BLD VENIPUNCTURE: CPT

## 2020-08-28 PROCEDURE — 85007 BL SMEAR W/DIFF WBC COUNT: CPT

## 2020-08-28 NOTE — TELEPHONE ENCOUNTER
Patient will go for CBC today at a hospital lab today and call for result today  Will call with worsening symptoms

## 2020-08-28 NOTE — TELEPHONE ENCOUNTER
Laura Hernandez from [facility] lab called to report critical platelet count of 28,718   Please call Laura Hernandez with any questions at [phone]

## 2020-08-28 NOTE — TELEPHONE ENCOUNTER
Reviewed with Dr Liz James  He would like the patient to take 150 mg of promacta every other day and 75 mg the other days  She should recheck a cbc on Monday  Called patient to review and also instructed her to hold the zoloft for right now  Patient should report to the ER over the weekend if she has uncontrolled bleeding  Patient verbalized understanding of all discussed

## 2020-08-28 NOTE — TELEPHONE ENCOUNTER
Patient called stating she has not received a call back  I relayed the below to patient and patient understood  I informed patient we are awaiting a answer from Dr Odilia Sanchez and she will receive a call

## 2020-08-28 NOTE — TELEPHONE ENCOUNTER
Patient had CBC today as gums were bleeding this AM , no further bleeding   Plts are 19,000  Patient is on promacta 75 mg   Patient will report to ED with any sustained bleeding  Will pass on to Dr Sandrea Paget

## 2020-08-31 ENCOUNTER — APPOINTMENT (OUTPATIENT)
Dept: LAB | Age: 70
End: 2020-08-31
Payer: MEDICARE

## 2020-08-31 ENCOUNTER — TELEPHONE (OUTPATIENT)
Dept: HEMATOLOGY ONCOLOGY | Facility: CLINIC | Age: 70
End: 2020-08-31

## 2020-08-31 DIAGNOSIS — F32.A DEPRESSION, UNSPECIFIED DEPRESSION TYPE: ICD-10-CM

## 2020-08-31 DIAGNOSIS — M32.8 OTHER FORMS OF SYSTEMIC LUPUS ERYTHEMATOSUS, UNSPECIFIED ORGAN INVOLVEMENT STATUS (HCC): ICD-10-CM

## 2020-08-31 DIAGNOSIS — C91.10 CLL (CHRONIC LYMPHOCYTIC LEUKEMIA) (HCC): ICD-10-CM

## 2020-08-31 DIAGNOSIS — D69.3 ACUTE ITP (HCC): ICD-10-CM

## 2020-08-31 LAB
ALBUMIN SERPL BCP-MCNC: 3.1 G/DL (ref 3.5–5)
ALP SERPL-CCNC: 50 U/L (ref 46–116)
ALT SERPL W P-5'-P-CCNC: 16 U/L (ref 12–78)
ANION GAP SERPL CALCULATED.3IONS-SCNC: 5 MMOL/L (ref 4–13)
AST SERPL W P-5'-P-CCNC: 14 U/L (ref 5–45)
BASOPHILS # BLD AUTO: 0.09 THOUSANDS/ΜL (ref 0–0.1)
BASOPHILS NFR BLD AUTO: 2 % (ref 0–1)
BILIRUB SERPL-MCNC: 1 MG/DL (ref 0.2–1)
BUN SERPL-MCNC: 10 MG/DL (ref 5–25)
CALCIUM SERPL-MCNC: 8.6 MG/DL (ref 8.3–10.1)
CHLORIDE SERPL-SCNC: 111 MMOL/L (ref 100–108)
CO2 SERPL-SCNC: 26 MMOL/L (ref 21–32)
CREAT SERPL-MCNC: 0.61 MG/DL (ref 0.6–1.3)
EOSINOPHIL # BLD AUTO: 0.07 THOUSAND/ΜL (ref 0–0.61)
EOSINOPHIL NFR BLD AUTO: 1 % (ref 0–6)
ERYTHROCYTE [DISTWIDTH] IN BLOOD BY AUTOMATED COUNT: 15.2 % (ref 11.6–15.1)
GFR SERPL CREATININE-BSD FRML MDRD: 93 ML/MIN/1.73SQ M
GLUCOSE P FAST SERPL-MCNC: 101 MG/DL (ref 65–99)
HCT VFR BLD AUTO: 39.8 % (ref 34.8–46.1)
HGB BLD-MCNC: 12.4 G/DL (ref 11.5–15.4)
IMM GRANULOCYTES # BLD AUTO: 0.05 THOUSAND/UL (ref 0–0.2)
IMM GRANULOCYTES NFR BLD AUTO: 1 % (ref 0–2)
LYMPHOCYTES # BLD AUTO: 1.93 THOUSANDS/ΜL (ref 0.6–4.47)
LYMPHOCYTES NFR BLD AUTO: 35 % (ref 14–44)
MCH RBC QN AUTO: 28.4 PG (ref 26.8–34.3)
MCHC RBC AUTO-ENTMCNC: 31.2 G/DL (ref 31.4–37.4)
MCV RBC AUTO: 91 FL (ref 82–98)
MONOCYTES # BLD AUTO: 0.29 THOUSAND/ΜL (ref 0.17–1.22)
MONOCYTES NFR BLD AUTO: 5 % (ref 4–12)
NEUTROPHILS # BLD AUTO: 3.02 THOUSANDS/ΜL (ref 1.85–7.62)
NEUTS SEG NFR BLD AUTO: 56 % (ref 43–75)
NRBC BLD AUTO-RTO: 0 /100 WBCS
PLATELET # BLD AUTO: 12 THOUSANDS/UL (ref 149–390)
POTASSIUM SERPL-SCNC: 4.4 MMOL/L (ref 3.5–5.3)
PROT SERPL-MCNC: 8.5 G/DL (ref 6.4–8.2)
RBC # BLD AUTO: 4.36 MILLION/UL (ref 3.81–5.12)
SODIUM SERPL-SCNC: 142 MMOL/L (ref 136–145)
WBC # BLD AUTO: 5.45 THOUSAND/UL (ref 4.31–10.16)

## 2020-08-31 PROCEDURE — 85025 COMPLETE CBC W/AUTO DIFF WBC: CPT

## 2020-08-31 PROCEDURE — 80053 COMPREHEN METABOLIC PANEL: CPT

## 2020-08-31 PROCEDURE — 36415 COLL VENOUS BLD VENIPUNCTURE: CPT

## 2020-09-01 ENCOUNTER — TELEPHONE (OUTPATIENT)
Dept: HEMATOLOGY ONCOLOGY | Facility: CLINIC | Age: 70
End: 2020-09-01

## 2020-09-01 DIAGNOSIS — D69.3 ACUTE ITP (HCC): Primary | ICD-10-CM

## 2020-09-01 RX ORDER — PREDNISONE 20 MG/1
40 TABLET ORAL DAILY
Qty: 30 TABLET | Refills: 1 | Status: SHIPPED | OUTPATIENT
Start: 2020-09-01 | End: 2020-10-01

## 2020-09-01 NOTE — TELEPHONE ENCOUNTER
Patient would like their lab results     Person calling in  Patient    Lab Draw Date 8/31/2020   Lab [facility]    Call Back Number [phone]

## 2020-09-01 NOTE — TELEPHONE ENCOUNTER
Returned call to patient  Reviewed plats = 12,000  Patient denies bleeding at this time  Confirmed she is taking her Promacta daily      Please call patient back with further instructions

## 2020-09-01 NOTE — TELEPHONE ENCOUNTER
Reviewed with Dr Yi Brunson and he would like the patient to take prednisone 40 mg daily and recheck a cbc on Friday  Patient will also need a bone marrow bx  Called patient to review and provided her with an appt next week  Patient verbalized understanding

## 2020-09-04 ENCOUNTER — TELEPHONE (OUTPATIENT)
Dept: HEMATOLOGY ONCOLOGY | Facility: CLINIC | Age: 70
End: 2020-09-04

## 2020-09-04 ENCOUNTER — APPOINTMENT (OUTPATIENT)
Dept: LAB | Facility: HOSPITAL | Age: 70
End: 2020-09-04
Attending: INTERNAL MEDICINE
Payer: MEDICARE

## 2020-09-04 DIAGNOSIS — D69.3 ACUTE ITP (HCC): ICD-10-CM

## 2020-09-04 LAB
BASOPHILS # BLD AUTO: 0.15 THOUSANDS/ΜL (ref 0–0.1)
BASOPHILS NFR BLD AUTO: 1 % (ref 0–1)
EOSINOPHIL # BLD AUTO: 0.02 THOUSAND/ΜL (ref 0–0.61)
EOSINOPHIL NFR BLD AUTO: 0 % (ref 0–6)
ERYTHROCYTE [DISTWIDTH] IN BLOOD BY AUTOMATED COUNT: 15.3 % (ref 11.6–15.1)
HCT VFR BLD AUTO: 37 % (ref 34.8–46.1)
HGB BLD-MCNC: 11.4 G/DL (ref 11.5–15.4)
IMM GRANULOCYTES # BLD AUTO: 0.05 THOUSAND/UL (ref 0–0.2)
IMM GRANULOCYTES NFR BLD AUTO: 1 % (ref 0–2)
LYMPHOCYTES # BLD AUTO: 3.92 THOUSANDS/ΜL (ref 0.6–4.47)
LYMPHOCYTES NFR BLD AUTO: 36 % (ref 14–44)
MCH RBC QN AUTO: 27.9 PG (ref 26.8–34.3)
MCHC RBC AUTO-ENTMCNC: 30.8 G/DL (ref 31.4–37.4)
MCV RBC AUTO: 91 FL (ref 82–98)
MONOCYTES # BLD AUTO: 0.54 THOUSAND/ΜL (ref 0.17–1.22)
MONOCYTES NFR BLD AUTO: 5 % (ref 4–12)
NEUTROPHILS # BLD AUTO: 6.15 THOUSANDS/ΜL (ref 1.85–7.62)
NEUTS SEG NFR BLD AUTO: 57 % (ref 43–75)
NRBC BLD AUTO-RTO: 0 /100 WBCS
PLATELET # BLD AUTO: 173 THOUSANDS/UL (ref 149–390)
PMV BLD AUTO: 10.8 FL (ref 8.9–12.7)
RBC # BLD AUTO: 4.09 MILLION/UL (ref 3.81–5.12)
WBC # BLD AUTO: 10.83 THOUSAND/UL (ref 4.31–10.16)

## 2020-09-04 PROCEDURE — 36415 COLL VENOUS BLD VENIPUNCTURE: CPT

## 2020-09-04 PROCEDURE — 85025 COMPLETE CBC W/AUTO DIFF WBC: CPT

## 2020-09-04 NOTE — TELEPHONE ENCOUNTER
Called patient and instructed her to have her cbc rechecked on 9/8   Patient verbalized understanding

## 2020-09-04 NOTE — TELEPHONE ENCOUNTER
Patient would like their lab results     Person calling in  Patient    Lab Draw Date 9/4/2020   [place] [address]    Call Back Number [phone]

## 2020-09-04 NOTE — TELEPHONE ENCOUNTER
Platelet count 071,838  Patient wondering when she needs to have CBC repeated    Will forward to RN with Dr April Caban to discuss with him

## 2020-09-08 ENCOUNTER — APPOINTMENT (OUTPATIENT)
Dept: LAB | Age: 70
End: 2020-09-08
Payer: MEDICARE

## 2020-09-08 ENCOUNTER — TELEPHONE (OUTPATIENT)
Dept: HEMATOLOGY ONCOLOGY | Facility: CLINIC | Age: 70
End: 2020-09-08

## 2020-09-08 DIAGNOSIS — D69.3 ACUTE ITP (HCC): ICD-10-CM

## 2020-09-08 DIAGNOSIS — D69.3 ACUTE ITP (HCC): Primary | ICD-10-CM

## 2020-09-08 LAB
BASOPHILS # BLD AUTO: 0.15 THOUSANDS/ΜL (ref 0–0.1)
BASOPHILS NFR BLD AUTO: 2 % (ref 0–1)
EOSINOPHIL # BLD AUTO: 0 THOUSAND/ΜL (ref 0–0.61)
EOSINOPHIL NFR BLD AUTO: 0 % (ref 0–6)
ERYTHROCYTE [DISTWIDTH] IN BLOOD BY AUTOMATED COUNT: 15.5 % (ref 11.6–15.1)
HCT VFR BLD AUTO: 42.6 % (ref 34.8–46.1)
HGB BLD-MCNC: 12.8 G/DL (ref 11.5–15.4)
IMM GRANULOCYTES # BLD AUTO: 0.11 THOUSAND/UL (ref 0–0.2)
IMM GRANULOCYTES NFR BLD AUTO: 1 % (ref 0–2)
LYMPHOCYTES # BLD AUTO: 3.47 THOUSANDS/ΜL (ref 0.6–4.47)
LYMPHOCYTES NFR BLD AUTO: 34 % (ref 14–44)
MCH RBC QN AUTO: 27.4 PG (ref 26.8–34.3)
MCHC RBC AUTO-ENTMCNC: 30 G/DL (ref 31.4–37.4)
MCV RBC AUTO: 91 FL (ref 82–98)
MONOCYTES # BLD AUTO: 0.53 THOUSAND/ΜL (ref 0.17–1.22)
MONOCYTES NFR BLD AUTO: 5 % (ref 4–12)
NEUTROPHILS # BLD AUTO: 6.04 THOUSANDS/ΜL (ref 1.85–7.62)
NEUTS SEG NFR BLD AUTO: 58 % (ref 43–75)
NRBC BLD AUTO-RTO: 0 /100 WBCS
PLATELET # BLD AUTO: 260 THOUSANDS/UL (ref 149–390)
PMV BLD AUTO: 11.3 FL (ref 8.9–12.7)
RBC # BLD AUTO: 4.67 MILLION/UL (ref 3.81–5.12)
WBC # BLD AUTO: 10.3 THOUSAND/UL (ref 4.31–10.16)

## 2020-09-08 PROCEDURE — 36415 COLL VENOUS BLD VENIPUNCTURE: CPT

## 2020-09-08 PROCEDURE — 85025 COMPLETE CBC W/AUTO DIFF WBC: CPT

## 2020-09-09 ENCOUNTER — PROCEDURE VISIT (OUTPATIENT)
Dept: HEMATOLOGY ONCOLOGY | Facility: CLINIC | Age: 70
End: 2020-09-09
Payer: MEDICARE

## 2020-09-09 VITALS
SYSTOLIC BLOOD PRESSURE: 110 MMHG | TEMPERATURE: 97.6 F | WEIGHT: 106.2 LBS | HEART RATE: 71 BPM | OXYGEN SATURATION: 98 % | DIASTOLIC BLOOD PRESSURE: 70 MMHG | BODY MASS INDEX: 17.69 KG/M2 | HEIGHT: 65 IN | RESPIRATION RATE: 18 BRPM

## 2020-09-09 DIAGNOSIS — D69.3 ACUTE ITP (HCC): ICD-10-CM

## 2020-09-09 DIAGNOSIS — D59.10 AIHA (AUTOIMMUNE HEMOLYTIC ANEMIA) (HCC): Primary | ICD-10-CM

## 2020-09-09 PROCEDURE — 88313 SPECIAL STAINS GROUP 2: CPT | Performed by: PATHOLOGY

## 2020-09-09 PROCEDURE — 88305 TISSUE EXAM BY PATHOLOGIST: CPT | Performed by: PATHOLOGY

## 2020-09-09 PROCEDURE — 88311 DECALCIFY TISSUE: CPT | Performed by: PATHOLOGY

## 2020-09-09 PROCEDURE — 99214 OFFICE O/P EST MOD 30 MIN: CPT | Performed by: INTERNAL MEDICINE

## 2020-09-09 PROCEDURE — 88364 INSITU HYBRIDIZATION (FISH): CPT | Performed by: PATHOLOGY

## 2020-09-09 PROCEDURE — 38222 DX BONE MARROW BX & ASPIR: CPT | Performed by: INTERNAL MEDICINE

## 2020-09-09 PROCEDURE — 88365 INSITU HYBRIDIZATION (FISH): CPT | Performed by: PATHOLOGY

## 2020-09-09 PROCEDURE — 85097 BONE MARROW INTERPRETATION: CPT | Performed by: PATHOLOGY

## 2020-09-09 PROCEDURE — 88341 IMHCHEM/IMCYTCHM EA ADD ANTB: CPT | Performed by: PATHOLOGY

## 2020-09-09 PROCEDURE — 88342 IMHCHEM/IMCYTCHM 1ST ANTB: CPT | Performed by: PATHOLOGY

## 2020-09-09 PROCEDURE — 36415 COLL VENOUS BLD VENIPUNCTURE: CPT | Performed by: INTERNAL MEDICINE

## 2020-09-09 NOTE — PROGRESS NOTES
A bone marrow biopsy and aspirate procedure, consent obtained, right superior posterior iliac crest prepared a normal fashion, 1% lidocaine used for local anesthesia, we proceeded with [employer] bone marrow biopsy needle and obtained a good size biopsy and aspirate, the patient tolerated procedure very well, no immediate complications, we sent for flow cytometry and pathology

## 2020-09-09 NOTE — PROGRESS NOTES
Hematology Outpatient Follow - Up Note  Richard Srinivas 78 y o  female MRN: @ Encounter: [id number]        Date:  9/9/2020        Assessment/ Plan:      73-year-old  female who presented with autoimmune hemolytic anemia, IgG subtype, acute immune thrombocytopenic purpura and splenomegaly, she had Sjogren syndrome, positive BALDO, rheumatoid factor and CCP     Initiated in the hospital on Decadron in June 2020 with short-lived response     Initiated on Promacta 75 milligram p o  Daily, current platelet count of 09129     Flow cytometry on the peripheral blood showed CLL pattern     This is most likely chronic lymphocytic leukemia associated with autoimmune hemolytic anemia and ITP    The response to Promacta was short-lived, currently on prednisone 40 mg p o   Daily    Will proceed with bone marrow biopsy and aspirate and send for diagnosis and fish panel for CLL          HPI: 73-year-old  female with polyclonal gammopathy, no evidence of monoclonal protein, she had Sjogren syndrome, autoimmune connective tissue disease with highly elevated sed rate, CRP, BALDO, rheumatoid factor, had been followed by Rheumatology she was prescribed hydroxychloroquine 200 milligram p o  B i d      She was seen again in the hospital on 06/19/2020 because of grade 4 thrombocytopenia with platelet count of 1553, hemoglobin 10, WBC 7 2, workup was positive for autoimmune hemolytic anemia with mehrdad positive +for IgG, plus for C3, bilirubin 0 5     CT scan of the chest abdomen pelvis on 06/19/2020 showed mild confluent periaortic/retroperitoneal lymphadenopathy measuring up to 1 1 centimeter in short axis, bilateral external iliac lymphadenopathy up to 10 millimeters, retrocrural lymph node measuring 1 3 centimeters spleen 15 centimeter, liver with hepatomegaly no evidence of masses     She was treated with dexamethasone for 3 days with improvement of platelet count up to 62,000     On 06/25/2020 platelet count of 10,000     Initiated on Promacta 75 milligram p o  Daily in July 2020, platelets count up gradually with platelet count of 96,219 on 08/17/2020, hemoglobin 11 4, WBC 5 0, 57 percent neutrophils, 34 percent lymphocytes     Flow cytometry on the peripheral blood showed CLL pattern positive for CD 23, CD 20    Platelet count went down to 19,000 initiated on prednisone 40 mg p o  Daily, after 1 week platelet up to 922,109     Interval History:        Previous Treatment:         Test Results:    Imaging: No results found  Labs:   Lab Results   Component Value Date    WBC 10 30 (H) 09/08/2020    HGB 12 8 09/08/2020    HCT 42 6 09/08/2020    MCV 91 09/08/2020     09/08/2020     Lab Results   Component Value Date    K 4 4 08/31/2020     (H) 08/31/2020    CO2 26 08/31/2020    BUN 10 08/31/2020    CREATININE 0 61 08/31/2020    GLUF 101 (H) 08/31/2020    CALCIUM 8 6 08/31/2020    AST 14 08/31/2020    ALT 16 08/31/2020    ALKPHOS 50 08/31/2020    EGFR 93 08/31/2020       No results found for: IRON, TIBC, FERRITIN    No results found for: [id number]      ROS: Review of Systems   Constitutional: Negative for appetite change, chills, diaphoresis, fatigue and unexpected weight change  HENT:   Negative for mouth sores, nosebleeds, sore throat, trouble swallowing and voice change  Eyes: Negative for eye problems and icterus  Respiratory: Negative for chest tightness, cough, hemoptysis and wheezing  Cardiovascular: Negative for chest pain, leg swelling and palpitations  Gastrointestinal: Negative for abdominal distention, abdominal pain, blood in stool, constipation, diarrhea, nausea and vomiting  Endocrine: Negative for hot flashes  Genitourinary: Negative for bladder incontinence, difficulty urinating, dyspareunia, dysuria and frequency  Musculoskeletal: Negative for arthralgias, back pain, gait problem, neck pain and neck stiffness  Skin: Negative for itching and rash     Neurological: Negative for dizziness, gait problem, headaches, numbness, seizures and speech difficulty  Hematological: Negative for adenopathy  Does not bruise/bleed easily  Psychiatric/Behavioral: Negative for decreased concentration, depression, sleep disturbance and suicidal ideas  The patient is not nervous/anxious  Current Medications: Reviewed  Allergies: Reviewed  PMH/FH/SH:  Reviewed      Physical Exam:    Body surface area is 1 51 meters squared  Wt Readings from Last 3 Encounters:   09/09/20 48 2 kg (106 lb 3 2 oz)   08/21/20 49 4 kg (108 lb 12 8 oz)   07/10/20 47 7 kg (105 lb 3 2 oz)        Temp Readings from Last 3 Encounters:   09/09/20 97 6 °F (36 4 °C) (Tympanic)   08/21/20 97 7 °F (36 5 °C) (Tympanic)   07/10/20 97 7 °F (36 5 °C)        BP Readings from Last 3 Encounters:   09/09/20 110/70   08/21/20 102/72   07/10/20 118/80         Pulse Readings from Last 3 Encounters:   09/09/20 71   08/21/20 83   07/10/20 73        Physical Exam  Vitals signs reviewed  Constitutional:       General: She is not in acute distress  Appearance: She is well-developed  She is not diaphoretic  HENT:      Head: Normocephalic and atraumatic  Eyes:      Conjunctiva/sclera: Conjunctivae normal    Neck:      Musculoskeletal: Normal range of motion and neck supple  Trachea: No tracheal deviation  Cardiovascular:      Rate and Rhythm: Normal rate and regular rhythm  Heart sounds: No murmur  No friction rub  No gallop  Pulmonary:      Effort: Pulmonary effort is normal  No respiratory distress  Breath sounds: Normal breath sounds  No wheezing or rales  Chest:      Chest wall: No tenderness  Abdominal:      General: There is no distension  Palpations: Abdomen is soft  Tenderness: There is no abdominal tenderness  Lymphadenopathy:      Cervical: No cervical adenopathy  Skin:     General: Skin is warm and dry  Coloration: Skin is not pale  Findings: No erythema  Neurological:      Mental Status: She is alert and oriented to person, place, and time  Psychiatric:         Behavior: Behavior normal          Thought Content: Thought content normal          Judgment: Judgment normal          ECO    Goals and Barriers:  Current Goal: Minimize effects of disease  Barriers: None  Patient's Capacity to Self Care:  Patient is able to self care      Code Status: [unfilled]

## 2020-09-11 LAB — MISCELLANEOUS LAB TEST RESULT: NORMAL

## 2020-09-14 ENCOUNTER — TELEPHONE (OUTPATIENT)
Dept: HEMATOLOGY ONCOLOGY | Facility: CLINIC | Age: 70
End: 2020-09-14

## 2020-09-14 ENCOUNTER — APPOINTMENT (OUTPATIENT)
Dept: LAB | Age: 70
End: 2020-09-14
Payer: MEDICARE

## 2020-09-14 LAB
BASOPHILS # BLD AUTO: 0.12 THOUSANDS/ΜL (ref 0–0.1)
BASOPHILS NFR BLD AUTO: 1 % (ref 0–1)
EOSINOPHIL # BLD AUTO: 0 THOUSAND/ΜL (ref 0–0.61)
EOSINOPHIL NFR BLD AUTO: 0 % (ref 0–6)
ERYTHROCYTE [DISTWIDTH] IN BLOOD BY AUTOMATED COUNT: 15.7 % (ref 11.6–15.1)
HCT VFR BLD AUTO: 40.5 % (ref 34.8–46.1)
HGB BLD-MCNC: 12.7 G/DL (ref 11.5–15.4)
IMM GRANULOCYTES # BLD AUTO: 0.08 THOUSAND/UL (ref 0–0.2)
IMM GRANULOCYTES NFR BLD AUTO: 1 % (ref 0–2)
LYMPHOCYTES # BLD AUTO: 3.51 THOUSANDS/ΜL (ref 0.6–4.47)
LYMPHOCYTES NFR BLD AUTO: 35 % (ref 14–44)
MCH RBC QN AUTO: 27.6 PG (ref 26.8–34.3)
MCHC RBC AUTO-ENTMCNC: 31.4 G/DL (ref 31.4–37.4)
MCV RBC AUTO: 88 FL (ref 82–98)
MONOCYTES # BLD AUTO: 0.45 THOUSAND/ΜL (ref 0.17–1.22)
MONOCYTES NFR BLD AUTO: 5 % (ref 4–12)
NEUTROPHILS # BLD AUTO: 5.86 THOUSANDS/ΜL (ref 1.85–7.62)
NEUTS SEG NFR BLD AUTO: 58 % (ref 43–75)
NRBC BLD AUTO-RTO: 0 /100 WBCS
PLATELET # BLD AUTO: 283 THOUSANDS/UL (ref 149–390)
PMV BLD AUTO: 11.6 FL (ref 8.9–12.7)
RBC # BLD AUTO: 4.6 MILLION/UL (ref 3.81–5.12)
WBC # BLD AUTO: 10.02 THOUSAND/UL (ref 4.31–10.16)

## 2020-09-14 PROCEDURE — 36415 COLL VENOUS BLD VENIPUNCTURE: CPT | Performed by: INTERNAL MEDICINE

## 2020-09-14 PROCEDURE — 85025 COMPLETE CBC W/AUTO DIFF WBC: CPT | Performed by: INTERNAL MEDICINE

## 2020-09-14 NOTE — TELEPHONE ENCOUNTER
Per OV note:    Initiated on Promacta 75 milligram p o  Daily, current platelet count of 17080     Flow cytometry on the peripheral blood showed CLL pattern     This is most likely chronic lymphocytic leukemia associated with autoimmune hemolytic anemia and ITP     The response to Promacta was short-lived, currently on prednisone 40 mg p o  Daily     Will proceed with bone marrow biopsy and aspirate and send for diagnosis and fish panel for CLL      Spoke to patient, she has 1 refill left on the prednisone and will not need a refill  Patient is having issues with promacta, she is calling [employer] this AM and will call [facility] with issues

## 2020-09-14 NOTE — TELEPHONE ENCOUNTER
Medication Refill     Who is Calling  Patient   Medication  predniSONE        How many pills left  4   Preferred Pharmacy  [facility]    Call back number  [phone]   Relevant Information

## 2020-09-15 LAB — MISCELLANEOUS LAB TEST RESULT: NORMAL

## 2020-09-16 ENCOUNTER — TELEPHONE (OUTPATIENT)
Dept: HEMATOLOGY ONCOLOGY | Facility: CLINIC | Age: 70
End: 2020-09-16

## 2020-09-16 NOTE — TELEPHONE ENCOUNTER
Patient would like their lab results     Person calling in  Patient    Lab Draw Date 9/14/2020   Lab Draw [address] [facility]    Call Back Number [phone]

## 2020-09-16 NOTE — TELEPHONE ENCOUNTER
Plt count reviewed with patient  Patient is on prednisone taper  Currently on 30 mg daily since Monday  Will reduce to 20 mg next week   Next CBC in one week  Patient will call result

## 2020-09-17 ENCOUNTER — TELEPHONE (OUTPATIENT)
Dept: HEMATOLOGY ONCOLOGY | Facility: CLINIC | Age: 70
End: 2020-09-17

## 2020-09-17 NOTE — TELEPHONE ENCOUNTER
Patient had requested that I call her when lab results were finalized  Called patient to move appt to tomorrow with PA    Patient will try to arrange transportation to appt and call [facility] back

## 2020-09-17 NOTE — TELEPHONE ENCOUNTER
Patient called back to confirm that she can take the appointment tomorrow with Damian Taveras at 2:15 PM   Please place in schedule  I was unable to override security to book appointment  Please retask me when appointment is entered so I can call patient back

## 2020-09-17 NOTE — TELEPHONE ENCOUNTER
Patient called and advised of 9/18/20 @ 2:15 PM SAEED Lawson appointment in [place] office  Patient verbalized understanding of above

## 2020-09-18 ENCOUNTER — TELEPHONE (OUTPATIENT)
Dept: HEMATOLOGY ONCOLOGY | Facility: CLINIC | Age: 70
End: 2020-09-18

## 2020-09-18 ENCOUNTER — OFFICE VISIT (OUTPATIENT)
Dept: HEMATOLOGY ONCOLOGY | Facility: CLINIC | Age: 70
End: 2020-09-18
Payer: MEDICARE

## 2020-09-18 VITALS
DIASTOLIC BLOOD PRESSURE: 78 MMHG | OXYGEN SATURATION: 98 % | HEIGHT: 65 IN | SYSTOLIC BLOOD PRESSURE: 140 MMHG | BODY MASS INDEX: 18.16 KG/M2 | TEMPERATURE: 98.6 F | HEART RATE: 76 BPM | WEIGHT: 109 LBS | RESPIRATION RATE: 18 BRPM

## 2020-09-18 DIAGNOSIS — D69.3 ACUTE ITP (HCC): Primary | ICD-10-CM

## 2020-09-18 DIAGNOSIS — B37.0 THRUSH: ICD-10-CM

## 2020-09-18 DIAGNOSIS — C91.10 CLL (CHRONIC LYMPHOCYTIC LEUKEMIA) (HCC): ICD-10-CM

## 2020-09-18 LAB — MISCELLANEOUS LAB TEST RESULT: NORMAL

## 2020-09-18 PROCEDURE — 99215 OFFICE O/P EST HI 40 MIN: CPT | Performed by: PHYSICIAN ASSISTANT

## 2020-09-18 NOTE — PROGRESS NOTES
Hematology/Oncology Outpatient Follow- up Note  Margarita Brock 78 y o  female MRN: @ Encounter: [id number]        Date:  9/18/2020      Assessment / Plan:    28-year-old  female who presented with autoimmune hemolytic anemia, IgG subtype, acute immune thrombocytopenic purpura and splenomegaly, she had Sjogren syndrome, positive BALDO, rheumatoid factor and CCP     Initiated in the hospital on Decadron in June 2020 with short-lived response     Initiated on Promacta 75 milligram p o  Daily with improvement in platelet count initially  Flow cytometry 8/10/20 on the peripheral blood showed CLL pattern      CT scan 6/19/20 identified diffuse mild retroperitoneal adenopathy and mildly enlarged retrocrural lymph node, hepatomegaly and splenomegaly 15cm  2   Platelet count decreased from 73,000 on 08/17/2020 to 19,000 8/28/20  She did not respond to promacta 75mg alternating with 150mg and therefore, dose returned to 75mg po daily and prednisone 40mg po weekly with taper of 10mg po weekly initiated 9/1/20  By 9/4/20, platelet count up to 173,000  She is currently on prednisone 30mg po daily  3  BMBX 9/9/20:  B-cell lymphoproliferative disorder, compatible with chronic lymphocytic leukemia  Virtually absent stainable storage iron  On flow cytometry, CLL phenotype accounted for 11%; CD5+, CD23+, CD20+ (dim) and CD 38-  9/14/20:  platelet count 415  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     Reviewed with patient and her daughter the findings on BMBX  Reviewed that she has CLL and that this is likely the  of the ITP  Treatment plan established by Dr Ricky Valencia is with acalabrutinib 100 milligrams p o  b i d  She will continue with her prednisone taper as planned  Once her current supply of Promacta is finished, she may discontinue this       Potential side effects could include but may not be limited to:  thrombocytopenia, diarrhea, anemia, neutropenia, musculoskeletal pain, rash, bruising, nausea, fatigue, hemorrhage, and pyrexia, secondary malignancies such as skin cancer, stomatitis, cardiac arrhythmias, hemorrhage, HTN, tumor lysis, infection  Questions asked and answered  Signed informed consent obtained  4   Mouth pain/ thrush  Nystatin swish and swallow sent to pharmacy  HPI: Brooke Fabian is a 57-year-old  female who [facility] regarding easy bruising and bleeding  She has history of polyclonal gammopathy, with no evidence of monoclonal protein, Sjogren syndrome, autoimmune connective tissue disease with highly elevated sed rate, CRP, BALDO, rheumatoid factor  She is followed by Rheumatology she was prescribed hydroxychloroquine 200 milligram p o  B i d  She was found to have grade 4 thrombocytopenia with platelet count of 7112, hemoglobin 10, WBC 7 2, neutrophils, 34 percent lymphocytes     Flow cytometry on the peripheral blood showed CLL pattern positive for CD 23, CD 20    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 with PATTI positive +for IgG, plus for C3, bilirubin 0 5     CT scan of the chest abdomen pelvis on 06/19/2020 showed mild confluent periaortic/retroperitoneal lymphadenopathy measuring up to 1 1 centimeter in short axis, bilateral external iliac lymphadenopathy up to 10 millimeters, retrocrural lymph node measuring 1 3 centimeters spleen 15 centimeter, liver with hepatomegaly no evidence of masses     She was treated with dexamethasone for 3 days with improvement of platelet count up to 62,000     On 06/25/2020 platelet count of 40,478     Initiated on Promacta 75 milligram p o  Daily in July 2020, platelets count up gradually with platelet count of 28,034 on 08/17/2020, hemoglobin 11 4, WBC 5 0, 57 percent       8/28/20:  Patient had CBC as gums were bleeding that morning AM, no further bleeding  Platelet count was 53,759  She was advised to take 150 mg of promacta every other day and 75 mg the other days  Platelet count was 78,604 8/31/20 and she was  advised to initiate prednisone 40mg po daily, tapering by 10mg po weekly  Promacta returned to 75mg po daily  Platelet count increased to 173,000 9/4/20  She had BMBX 9/9/20:  B-cell lymphoproliferative disorder, compatible with chronic lymphocytic leukemia  Virtually absent stainable storage iron  On flow cytometry, CLL phenotype accounted for 11%; CD5+, CD23+, CD20+ (dim) and CD 38-  9/14/20:  platelet count 751  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            Test Results:        Labs:   Lab Results   Component Value Date    HGB 12 7 09/14/2020    HCT 40 5 09/14/2020    MCV 88 09/14/2020     09/14/2020    WBC 10 02 09/14/2020    NRBC 0 09/14/2020     Lab Results   Component Value Date    K 4 4 08/31/2020     (H) 08/31/2020    CO2 26 08/31/2020    BUN 10 08/31/2020    CREATININE 0 61 08/31/2020    GLUF 101 (H) 08/31/2020    CALCIUM 8 6 08/31/2020    AST 14 08/31/2020    ALT 16 08/31/2020    ALKPHOS 50 08/31/2020    EGFR 93 08/31/2020       Imaging: No results found  ROS:  As mentioned in HPI & Interval History otherwise 14 point ROS negative  Allergies: No Known Allergies  Current Medications: Reviewed  PMH/FH/SH:  Reviewed      Physical Exam:    There is no height or weight on file to calculate BSA  Ht Readings from Last 3 Encounters:   09/09/20 5' 5" (1 651 m)   08/21/20 5' 5" (1 651 m)   07/10/20 5' 5" (1 651 m)        Wt Readings from Last 3 Encounters:   09/09/20 48 2 kg (106 lb 3 2 oz)   08/21/20 49 4 kg (108 lb 12 8 oz)   07/10/20 47 7 kg (105 lb 3 2 oz)        Temp Readings from Last 3 Encounters:   09/09/20 97 6 °F (36 4 °C) (Tympanic)   08/21/20 97 7 °F (36 5 °C) (Tympanic)   07/10/20 97 7 °F (36 5 °C)        BP Readings from Last 3 Encounters:   09/09/20 110/70   08/21/20 102/72   07/10/20 118/80           Physical Exam  Vitals signs reviewed  Constitutional:       General: She is not in acute distress  Appearance: She is underweight  She is not diaphoretic  HENT:      Head: Normocephalic and atraumatic  Mouth/Throat:      Mouth: Mucous membranes are dry  No oral lesions  Eyes:      Conjunctiva/sclera: Conjunctivae normal    Neck:      Musculoskeletal: Normal range of motion and neck supple  Trachea: No tracheal deviation  Cardiovascular:      Rate and Rhythm: Normal rate and regular rhythm  Heart sounds: No murmur  No friction rub  No gallop  Pulmonary:      Effort: Pulmonary effort is normal  No respiratory distress  Breath sounds: Normal breath sounds  No wheezing or rales  Chest:      Chest wall: No tenderness  Abdominal:      General: There is no distension  Palpations: Abdomen is soft  Tenderness: There is no abdominal tenderness  Musculoskeletal:      Comments: Single point cane  Arthritic changes of hands   Lymphadenopathy:      Cervical: No cervical adenopathy  Skin:     General: Skin is warm and dry  Coloration: Skin is not pale  Findings: No erythema  Neurological:      Mental Status: She is alert and oriented to person, place, and time  Psychiatric:         Behavior: Behavior normal          Thought Content:  Thought content normal          Judgment: Judgment normal          ECO      Emergency Contacts:    Sundar Shepard, [phone],

## 2020-09-21 ENCOUNTER — DOCUMENTATION (OUTPATIENT)
Dept: HEMATOLOGY ONCOLOGY | Facility: CLINIC | Age: 70
End: 2020-09-21

## 2020-09-21 ENCOUNTER — TELEPHONE (OUTPATIENT)
Dept: HEMATOLOGY ONCOLOGY | Facility: CLINIC | Age: 70
End: 2020-09-21

## 2020-09-21 ENCOUNTER — APPOINTMENT (OUTPATIENT)
Dept: LAB | Age: 70
End: 2020-09-21
Payer: MEDICARE

## 2020-09-21 DIAGNOSIS — D69.3 ACUTE ITP (HCC): ICD-10-CM

## 2020-09-21 LAB
ALBUMIN SERPL BCP-MCNC: 3.1 G/DL (ref 3.5–5)
ALP SERPL-CCNC: 38 U/L (ref 46–116)
ALT SERPL W P-5'-P-CCNC: 26 U/L (ref 12–78)
ANION GAP SERPL CALCULATED.3IONS-SCNC: 6 MMOL/L (ref 4–13)
AST SERPL W P-5'-P-CCNC: 15 U/L (ref 5–45)
BILIRUB SERPL-MCNC: 1.85 MG/DL (ref 0.2–1)
BUN SERPL-MCNC: 20 MG/DL (ref 5–25)
CALCIUM ALBUM COR SERPL-MCNC: 9.6 MG/DL (ref 8.3–10.1)
CALCIUM SERPL-MCNC: 8.9 MG/DL (ref 8.3–10.1)
CHLORIDE SERPL-SCNC: 105 MMOL/L (ref 100–108)
CO2 SERPL-SCNC: 26 MMOL/L (ref 21–32)
CREAT SERPL-MCNC: 0.52 MG/DL (ref 0.6–1.3)
GFR SERPL CREATININE-BSD FRML MDRD: 98 ML/MIN/1.73SQ M
GLUCOSE P FAST SERPL-MCNC: 76 MG/DL (ref 65–99)
MISCELLANEOUS LAB TEST RESULT: NORMAL
POTASSIUM SERPL-SCNC: 4.6 MMOL/L (ref 3.5–5.3)
PROT SERPL-MCNC: 7.8 G/DL (ref 6.4–8.2)
SODIUM SERPL-SCNC: 137 MMOL/L (ref 136–145)

## 2020-09-21 PROCEDURE — 80053 COMPREHEN METABOLIC PANEL: CPT

## 2020-09-21 NOTE — PROGRESS NOTES
9-21-20  Received email for new oral start- acalabrutinib    Per Leana Mclain is needed     Enrolled patient in [address] [place] [address] ID#  [id number]  ID#  [id number]  BIN#  [id number]  PCN#  [id number]  GRP#  [id number]  AMT$  [id number]  EFF 9-21-20    Email to team, epic noted

## 2020-09-21 NOTE — PROGRESS NOTES
9/21/2020 received notification from clinical pt will be starting Calquence 100 mg b i d  Pt has [employer] ([employer])   ID # [id number]  Alicia Archuleta #[id number]  PCN # [id number]  [facility] # [id number]    Submitted for auth through cover my meds  9/22/2020  Received approval letter from [employer]  Per the approval letter the calquence has been approved  [facility] is valid from 9/21/2020 through 9/21/2021      Notified clinical, homestar, and finance

## 2020-09-22 DIAGNOSIS — C91.10 CLL (CHRONIC LYMPHOCYTIC LEUKEMIA) (HCC): ICD-10-CM

## 2020-09-23 ENCOUNTER — TELEPHONE (OUTPATIENT)
Dept: HEMATOLOGY ONCOLOGY | Facility: CLINIC | Age: 70
End: 2020-09-23

## 2020-09-23 NOTE — TELEPHONE ENCOUNTER
Patient advised of 9/21/20 platelets 873  Patient is currently on Prednisone 20 mg po daily  No active signs of bleeding  Call placed to [address], spoke with Vasu Smith  Patient will be contacted by [facility] today to set up delivery on her Acalabrutinib 100 MG CAPS       Patient verbalized understanding of above

## 2020-09-25 ENCOUNTER — OFFICE VISIT (OUTPATIENT)
Dept: FAMILY MEDICINE CLINIC | Facility: CLINIC | Age: 70
End: 2020-09-25
Payer: MEDICARE

## 2020-09-25 VITALS
RESPIRATION RATE: 16 BRPM | WEIGHT: 103 LBS | TEMPERATURE: 96.7 F | HEIGHT: 65 IN | DIASTOLIC BLOOD PRESSURE: 78 MMHG | BODY MASS INDEX: 17.16 KG/M2 | SYSTOLIC BLOOD PRESSURE: 120 MMHG | HEART RATE: 68 BPM

## 2020-09-25 DIAGNOSIS — Z23 NEED FOR PNEUMOCOCCAL VACCINE: ICD-10-CM

## 2020-09-25 DIAGNOSIS — E55.9 VITAMIN D DEFICIENCY: ICD-10-CM

## 2020-09-25 DIAGNOSIS — E03.9 HYPOTHYROIDISM, UNSPECIFIED TYPE: Primary | ICD-10-CM

## 2020-09-25 DIAGNOSIS — F41.9 ANXIETY: ICD-10-CM

## 2020-09-25 DIAGNOSIS — M35.00 SJOGREN'S SYNDROME, WITH UNSPECIFIED ORGAN INVOLVEMENT (HCC): ICD-10-CM

## 2020-09-25 DIAGNOSIS — F32.A DEPRESSION, UNSPECIFIED DEPRESSION TYPE: ICD-10-CM

## 2020-09-25 DIAGNOSIS — D59.10 AIHA (AUTOIMMUNE HEMOLYTIC ANEMIA) (HCC): ICD-10-CM

## 2020-09-25 PROCEDURE — G0009 ADMIN PNEUMOCOCCAL VACCINE: HCPCS

## 2020-09-25 PROCEDURE — 90670 PCV13 VACCINE IM: CPT

## 2020-09-25 PROCEDURE — 99214 OFFICE O/P EST MOD 30 MIN: CPT | Performed by: FAMILY MEDICINE

## 2020-09-25 RX ORDER — LEVOTHYROXINE SODIUM 0.07 MG/1
75 TABLET ORAL
Qty: 30 TABLET | Refills: 5 | Status: SHIPPED | OUTPATIENT
Start: 2020-09-25 | End: 2020-11-27 | Stop reason: SDUPTHER

## 2020-09-25 RX ORDER — ALPRAZOLAM 0.5 MG/1
0.5 TABLET ORAL
Qty: 30 TABLET | Refills: 3 | Status: SHIPPED | OUTPATIENT
Start: 2020-09-25 | End: 2021-07-01 | Stop reason: SDUPTHER

## 2020-09-25 NOTE — PROGRESS NOTES
Assessment/Plan:  Patient received Prevnar 13 vaccine today  Patient to obtain high-dose flu vaccine at the pharmacy  Patient to continue present treatment and restart levothyroxine and recommend she not run out of medication, discussed importance of medication compliance  Patient will check with Oncology whether she can restart Zoloft  Recommend patient drink Ensure as nutritional supplement  Follow up with specialists as scheduled and return to the office in 6 months  Diagnoses and all orders for this visit:    Hypothyroidism, unspecified type  -     levothyroxine 75 mcg tablet; Take 1 tablet (75 mcg total) by mouth daily in the early morning    Depression, unspecified depression type    Anxiety  -     ALPRAZolam (XANAX) 0 5 mg tablet; Take 1 tablet (0 5 mg total) by mouth daily at bedtime as needed for anxiety    [facility]'s syndrome, with unspecified [facility] )    AIHA (autoimmune hemolytic anemia) ([facility])    Vitamin D deficiency    Need for pneumococcal vaccine  -     PNEUMOCOCCAL CONJUGATE VACCINE 13-VALENT GREATER THAN 6 MONTHS    Other orders  -     Cancel: Mammo screening bilateral w 3d & cad; Future          Subjective:      Patient ID: Richard Espino is a 78 y o  female  Patient is here for follow-up appoint for chronic conditions  Patient declines flu vaccine today and plans to get flu vaccine at the pharmacy  Patient states she ran out levothyroxine 1 week ago  Patient states oncologist discontinue her Zoloft secondary to thrombocytopenia  She requests refill on alprazolam   Patient follows with oncology regularly and has labs done weekly  Patient declines mammogram or colonoscopy at this time  Patient denies feeling any better overall  Thyroid Problem   Presents for follow-up visit  Symptoms include anxiety, cold intolerance, depressed mood, dry skin, fatigue and weight loss   Patient reports no constipation, diaphoresis, diarrhea, hair loss, heat intolerance, hoarse voice, leg swelling, palpitations, tremors, visual change or weight gain  The symptoms have been worsening  Anxiety   Presents for follow-up visit  Symptoms include decreased concentration, depressed mood, dizziness, dry mouth, excessive worry, nervous/anxious behavior and restlessness  Patient reports no chest pain, confusion, hyperventilation, insomnia, irritability, nausea, palpitations, panic, shortness of breath or suicidal ideas  Symptoms occur constantly  The severity of symptoms is interfering with daily activities and causing significant distress  The quality of sleep is fair  Nighttime awakenings: occasional            The following portions of the patient's history were reviewed and updated as appropriate: allergies, current medications, past family history, past medical history, past social history, past surgical history and problem list     Review of Systems   Constitutional: Positive for fatigue and weight loss  Negative for diaphoresis, irritability and weight gain  HENT: Negative for hoarse voice  Respiratory: Negative for shortness of breath  Cardiovascular: Negative for chest pain and palpitations  Gastrointestinal: Negative for constipation, diarrhea and nausea  Endocrine: Positive for cold intolerance  Negative for heat intolerance  Neurological: Positive for dizziness  Negative for tremors  Psychiatric/Behavioral: Positive for decreased concentration  Negative for confusion and suicidal ideas  The patient is nervous/anxious  The patient does not have insomnia  Objective:      /78   Pulse 68   Temp (!) 96 7 °F (35 9 °C)   Resp 16   Ht 5' 5" (1 651 m)   Wt 46 7 kg (103 lb)   BMI 17 14 kg/m²          Physical Exam  Constitutional:       General: She is not in acute distress  Appearance: Normal appearance  She is not ill-appearing, toxic-appearing or diaphoretic  HENT:      Head: Normocephalic  Mouth/Throat:      Mouth: Mucous membranes are dry  Eyes:      General: No scleral icterus  Conjunctiva/sclera: Conjunctivae normal    Neck:      Musculoskeletal: Neck supple  Vascular: No carotid bruit  Cardiovascular:      Rate and Rhythm: Normal rate and regular rhythm  Pulmonary:      Effort: Pulmonary effort is normal       Breath sounds: Normal breath sounds  Abdominal:      Palpations: Abdomen is soft  Tenderness: There is no abdominal tenderness  Musculoskeletal:      Right lower leg: No edema  Left lower leg: No edema  Lymphadenopathy:      Cervical: No cervical adenopathy  Skin:     General: Skin is warm and dry  Neurological:      Mental Status: She is alert and oriented to person, place, and time  Psychiatric:      Comments: Patient appears depressed with flattened affect  BMI Counseling: Body mass index is 17 14 kg/m²  The BMI is below normal  Patient was advised to gain weight

## 2020-09-28 ENCOUNTER — APPOINTMENT (OUTPATIENT)
Dept: LAB | Age: 70
End: 2020-09-28
Payer: MEDICARE

## 2020-10-05 ENCOUNTER — APPOINTMENT (OUTPATIENT)
Dept: LAB | Age: 70
End: 2020-10-05
Payer: MEDICARE

## 2020-10-05 DIAGNOSIS — D59.10 AIHA (AUTOIMMUNE HEMOLYTIC ANEMIA) (HCC): ICD-10-CM

## 2020-10-05 DIAGNOSIS — D69.3 ACUTE ITP (HCC): ICD-10-CM

## 2020-10-05 LAB
ANISOCYTOSIS BLD QL SMEAR: PRESENT
BASOPHILS # BLD MANUAL: 0 THOUSAND/UL (ref 0–0.1)
BASOPHILS NFR MAR MANUAL: 0 % (ref 0–1)
EOSINOPHIL # BLD MANUAL: 0 THOUSAND/UL (ref 0–0.4)
EOSINOPHIL NFR BLD MANUAL: 0 % (ref 0–6)
ERYTHROCYTE [DISTWIDTH] IN BLOOD BY AUTOMATED COUNT: 19.1 % (ref 11.6–15.1)
HCT VFR BLD AUTO: 46.4 % (ref 34.8–46.1)
HGB BLD-MCNC: 14.4 G/DL (ref 11.5–15.4)
LDH SERPL-CCNC: 317 U/L (ref 81–234)
LYMPHOCYTES # BLD AUTO: 29 % (ref 14–44)
LYMPHOCYTES # BLD AUTO: 4.37 THOUSAND/UL (ref 0.6–4.47)
MCH RBC QN AUTO: 27.6 PG (ref 26.8–34.3)
MCHC RBC AUTO-ENTMCNC: 31 G/DL (ref 31.4–37.4)
MCV RBC AUTO: 89 FL (ref 82–98)
MONOCYTES # BLD AUTO: 1.2 THOUSAND/UL (ref 0–1.22)
MONOCYTES NFR BLD: 8 % (ref 4–12)
NEUTROPHILS # BLD MANUAL: 9.49 THOUSAND/UL (ref 1.85–7.62)
NEUTS SEG NFR BLD AUTO: 63 % (ref 43–75)
NRBC BLD AUTO-RTO: 0 /100 WBCS
PLATELET # BLD AUTO: 398 THOUSANDS/UL (ref 149–390)
PLATELET BLD QL SMEAR: ADEQUATE
PMV BLD AUTO: 11 FL (ref 8.9–12.7)
RBC # BLD AUTO: 5.21 MILLION/UL (ref 3.81–5.12)
SMUDGE CELLS BLD QL SMEAR: PRESENT
TOTAL CELLS COUNTED SPEC: 100
WBC # BLD AUTO: 15.06 THOUSAND/UL (ref 4.31–10.16)

## 2020-10-05 PROCEDURE — 85027 COMPLETE CBC AUTOMATED: CPT

## 2020-10-05 PROCEDURE — 83615 LACTATE (LD) (LDH) ENZYME: CPT

## 2020-10-05 PROCEDURE — 85007 BL SMEAR W/DIFF WBC COUNT: CPT

## 2020-10-05 PROCEDURE — 36415 COLL VENOUS BLD VENIPUNCTURE: CPT

## 2020-10-08 ENCOUNTER — TELEPHONE (OUTPATIENT)
Dept: HEMATOLOGY ONCOLOGY | Facility: CLINIC | Age: 70
End: 2020-10-08

## 2020-10-12 ENCOUNTER — TELEPHONE (OUTPATIENT)
Dept: HEMATOLOGY ONCOLOGY | Facility: CLINIC | Age: 70
End: 2020-10-12

## 2020-10-12 ENCOUNTER — LAB (OUTPATIENT)
Dept: LAB | Age: 70
DRG: 640 | End: 2020-10-12
Payer: MEDICARE

## 2020-10-12 DIAGNOSIS — D59.10 AIHA (AUTOIMMUNE HEMOLYTIC ANEMIA) (HCC): ICD-10-CM

## 2020-10-12 DIAGNOSIS — D69.3 ACUTE ITP (HCC): ICD-10-CM

## 2020-10-12 LAB
BASOPHILS # BLD AUTO: 0.27 THOUSANDS/ΜL (ref 0–0.1)
BASOPHILS NFR BLD AUTO: 2 % (ref 0–1)
EOSINOPHIL # BLD AUTO: 0.26 THOUSAND/ΜL (ref 0–0.61)
EOSINOPHIL NFR BLD AUTO: 2 % (ref 0–6)
ERYTHROCYTE [DISTWIDTH] IN BLOOD BY AUTOMATED COUNT: 18.4 % (ref 11.6–15.1)
HCT VFR BLD AUTO: 43.1 % (ref 34.8–46.1)
HGB BLD-MCNC: 13 G/DL (ref 11.5–15.4)
IMM GRANULOCYTES # BLD AUTO: 0.26 THOUSAND/UL (ref 0–0.2)
IMM GRANULOCYTES NFR BLD AUTO: 2 % (ref 0–2)
LYMPHOCYTES # BLD AUTO: 4.12 THOUSANDS/ΜL (ref 0.6–4.47)
LYMPHOCYTES NFR BLD AUTO: 30 % (ref 14–44)
MCH RBC QN AUTO: 27 PG (ref 26.8–34.3)
MCHC RBC AUTO-ENTMCNC: 30.2 G/DL (ref 31.4–37.4)
MCV RBC AUTO: 90 FL (ref 82–98)
MONOCYTES # BLD AUTO: 0.48 THOUSAND/ΜL (ref 0.17–1.22)
MONOCYTES NFR BLD AUTO: 4 % (ref 4–12)
NEUTROPHILS # BLD AUTO: 8.23 THOUSANDS/ΜL (ref 1.85–7.62)
NEUTS SEG NFR BLD AUTO: 60 % (ref 43–75)
NRBC BLD AUTO-RTO: 0 /100 WBCS
PLATELET # BLD AUTO: 399 THOUSANDS/UL (ref 149–390)
PMV BLD AUTO: 11.8 FL (ref 8.9–12.7)
RBC # BLD AUTO: 4.81 MILLION/UL (ref 3.81–5.12)
WBC # BLD AUTO: 13.62 THOUSAND/UL (ref 4.31–10.16)

## 2020-10-12 PROCEDURE — 36415 COLL VENOUS BLD VENIPUNCTURE: CPT

## 2020-10-12 PROCEDURE — 85025 COMPLETE CBC W/AUTO DIFF WBC: CPT

## 2020-10-14 ENCOUNTER — HOSPITAL ENCOUNTER (INPATIENT)
Facility: HOSPITAL | Age: 70
LOS: 3 days | Discharge: NURSING, REHAB OR LONG-TERM CARE | DRG: 640 | End: 2020-10-17
Attending: INTERNAL MEDICINE | Admitting: INTERNAL MEDICINE
Payer: MEDICARE

## 2020-10-14 ENCOUNTER — OFFICE VISIT (OUTPATIENT)
Dept: HEMATOLOGY ONCOLOGY | Facility: CLINIC | Age: 70
End: 2020-10-14
Payer: MEDICARE

## 2020-10-14 VITALS
RESPIRATION RATE: 16 BRPM | WEIGHT: 100.8 LBS | DIASTOLIC BLOOD PRESSURE: 70 MMHG | SYSTOLIC BLOOD PRESSURE: 102 MMHG | OXYGEN SATURATION: 90 % | BODY MASS INDEX: 16.79 KG/M2 | TEMPERATURE: 98.1 F | HEART RATE: 52 BPM | HEIGHT: 65 IN

## 2020-10-14 DIAGNOSIS — R33.9 URINARY RETENTION: ICD-10-CM

## 2020-10-14 DIAGNOSIS — D59.10 AUTOIMMUNE HEMOLYTIC ANEMIA (HCC): ICD-10-CM

## 2020-10-14 DIAGNOSIS — E86.0 DEHYDRATION: ICD-10-CM

## 2020-10-14 DIAGNOSIS — C91.10 CLL (CHRONIC LYMPHOCYTIC LEUKEMIA) (HCC): Primary | ICD-10-CM

## 2020-10-14 DIAGNOSIS — D89.2 HYPERGAMMAGLOBULINEMIA: ICD-10-CM

## 2020-10-14 DIAGNOSIS — R53.1 GENERALIZED WEAKNESS: ICD-10-CM

## 2020-10-14 DIAGNOSIS — F32.A DEPRESSION, UNSPECIFIED DEPRESSION TYPE: ICD-10-CM

## 2020-10-14 PROBLEM — F41.9 ANXIETY: Status: ACTIVE | Noted: 2020-10-14

## 2020-10-14 PROBLEM — E43 SEVERE PROTEIN-CALORIE MALNUTRITION (GOMEZ: LESS THAN 60% OF STANDARD WEIGHT) (HCC): Status: ACTIVE | Noted: 2020-10-14

## 2020-10-14 LAB
ANION GAP SERPL CALCULATED.3IONS-SCNC: 8 MMOL/L (ref 4–13)
BASOPHILS # BLD AUTO: 0.2 THOUSANDS/ΜL (ref 0–0.1)
BASOPHILS NFR BLD AUTO: 2 % (ref 0–1)
BUN SERPL-MCNC: 14 MG/DL (ref 5–25)
CALCIUM SERPL-MCNC: 8.5 MG/DL (ref 8.3–10.1)
CHLORIDE SERPL-SCNC: 104 MMOL/L (ref 100–108)
CO2 SERPL-SCNC: 26 MMOL/L (ref 21–32)
CREAT SERPL-MCNC: 0.87 MG/DL (ref 0.6–1.3)
EOSINOPHIL # BLD AUTO: 0.11 THOUSAND/ΜL (ref 0–0.61)
EOSINOPHIL NFR BLD AUTO: 1 % (ref 0–6)
ERYTHROCYTE [DISTWIDTH] IN BLOOD BY AUTOMATED COUNT: 18.1 % (ref 11.6–15.1)
GFR SERPL CREATININE-BSD FRML MDRD: 69 ML/MIN/1.73SQ M
GLUCOSE SERPL-MCNC: 80 MG/DL (ref 65–140)
HCT VFR BLD AUTO: 42.3 % (ref 34.8–46.1)
HGB BLD-MCNC: 13.1 G/DL (ref 11.5–15.4)
IMM GRANULOCYTES # BLD AUTO: 0.2 THOUSAND/UL (ref 0–0.2)
IMM GRANULOCYTES NFR BLD AUTO: 2 % (ref 0–2)
LYMPHOCYTES # BLD AUTO: 2.65 THOUSANDS/ΜL (ref 0.6–4.47)
LYMPHOCYTES NFR BLD AUTO: 26 % (ref 14–44)
MCH RBC QN AUTO: 27.3 PG (ref 26.8–34.3)
MCHC RBC AUTO-ENTMCNC: 31 G/DL (ref 31.4–37.4)
MCV RBC AUTO: 88 FL (ref 82–98)
MONOCYTES # BLD AUTO: 0.34 THOUSAND/ΜL (ref 0.17–1.22)
MONOCYTES NFR BLD AUTO: 3 % (ref 4–12)
NEUTROPHILS # BLD AUTO: 6.56 THOUSANDS/ΜL (ref 1.85–7.62)
NEUTS SEG NFR BLD AUTO: 66 % (ref 43–75)
NRBC BLD AUTO-RTO: 0 /100 WBCS
PLATELET # BLD AUTO: 290 THOUSANDS/UL (ref 149–390)
PMV BLD AUTO: 10.9 FL (ref 8.9–12.7)
POTASSIUM SERPL-SCNC: 3.4 MMOL/L (ref 3.5–5.3)
RBC # BLD AUTO: 4.79 MILLION/UL (ref 3.81–5.12)
SODIUM SERPL-SCNC: 138 MMOL/L (ref 136–145)
WBC # BLD AUTO: 10.06 THOUSAND/UL (ref 4.31–10.16)

## 2020-10-14 PROCEDURE — 85025 COMPLETE CBC W/AUTO DIFF WBC: CPT | Performed by: INTERNAL MEDICINE

## 2020-10-14 PROCEDURE — 99223 1ST HOSP IP/OBS HIGH 75: CPT | Performed by: INTERNAL MEDICINE

## 2020-10-14 PROCEDURE — 93005 ELECTROCARDIOGRAM TRACING: CPT

## 2020-10-14 PROCEDURE — 99215 OFFICE O/P EST HI 40 MIN: CPT | Performed by: INTERNAL MEDICINE

## 2020-10-14 PROCEDURE — G0008 ADMIN INFLUENZA VIRUS VAC: HCPCS | Performed by: INTERNAL MEDICINE

## 2020-10-14 PROCEDURE — 80048 BASIC METABOLIC PNL TOTAL CA: CPT | Performed by: INTERNAL MEDICINE

## 2020-10-14 PROCEDURE — 90662 IIV NO PRSV INCREASED AG IM: CPT | Performed by: INTERNAL MEDICINE

## 2020-10-14 RX ORDER — ACETAMINOPHEN 325 MG/1
650 TABLET ORAL EVERY 4 HOURS PRN
Status: DISCONTINUED | OUTPATIENT
Start: 2020-10-14 | End: 2020-10-17

## 2020-10-14 RX ORDER — LEVOTHYROXINE SODIUM 0.07 MG/1
75 TABLET ORAL
Status: DISCONTINUED | OUTPATIENT
Start: 2020-10-15 | End: 2020-10-17 | Stop reason: HOSPADM

## 2020-10-14 RX ORDER — PREDNISONE 1 MG/1
5 TABLET ORAL EVERY OTHER DAY
COMMUNITY
End: 2020-11-13 | Stop reason: SDUPTHER

## 2020-10-14 RX ORDER — POTASSIUM CHLORIDE 20 MEQ/1
40 TABLET, EXTENDED RELEASE ORAL ONCE
Status: COMPLETED | OUTPATIENT
Start: 2020-10-14 | End: 2020-10-14

## 2020-10-14 RX ORDER — SODIUM CHLORIDE 9 MG/ML
75 INJECTION, SOLUTION INTRAVENOUS CONTINUOUS
Status: DISCONTINUED | OUTPATIENT
Start: 2020-10-14 | End: 2020-10-16

## 2020-10-14 RX ORDER — PREDNISONE 1 MG/1
5 TABLET ORAL EVERY OTHER DAY
Status: COMPLETED | OUTPATIENT
Start: 2020-10-14 | End: 2020-10-16

## 2020-10-14 RX ORDER — ONDANSETRON 2 MG/ML
4 INJECTION INTRAMUSCULAR; INTRAVENOUS EVERY 6 HOURS PRN
Status: DISCONTINUED | OUTPATIENT
Start: 2020-10-14 | End: 2020-10-17

## 2020-10-14 RX ORDER — ALPRAZOLAM 0.5 MG/1
0.5 TABLET ORAL
Status: DISCONTINUED | OUTPATIENT
Start: 2020-10-14 | End: 2020-10-17

## 2020-10-14 RX ORDER — DOCUSATE SODIUM 100 MG/1
100 CAPSULE, LIQUID FILLED ORAL 2 TIMES DAILY
Status: DISCONTINUED | OUTPATIENT
Start: 2020-10-14 | End: 2020-10-17 | Stop reason: HOSPADM

## 2020-10-14 RX ORDER — SERTRALINE HYDROCHLORIDE 25 MG/1
25 TABLET, FILM COATED ORAL DAILY
Status: DISCONTINUED | OUTPATIENT
Start: 2020-10-14 | End: 2020-10-17 | Stop reason: HOSPADM

## 2020-10-14 RX ADMIN — SODIUM CHLORIDE 75 ML/HR: 0.9 INJECTION, SOLUTION INTRAVENOUS at 16:08

## 2020-10-14 RX ADMIN — SERTRALINE HYDROCHLORIDE 25 MG: 25 TABLET ORAL at 16:15

## 2020-10-14 RX ADMIN — POTASSIUM CHLORIDE 40 MEQ: 1500 TABLET, EXTENDED RELEASE ORAL at 19:21

## 2020-10-14 RX ADMIN — INFLUENZA A VIRUS A/MICHIGAN/45/2015 X-275 (H1N1) ANTIGEN (FORMALDEHYDE INACTIVATED), INFLUENZA A VIRUS A/SINGAPORE/INFIMH-16-0019/2016 IVR-186 (H3N2) ANTIGEN (FORMALDEHYDE INACTIVATED), INFLUENZA B VIRUS B/PHUKET/3073/2013 ANTIGEN (FORMALDEHYDE INACTIVATED), AND INFLUENZA B VIRUS B/MARYLAND/15/2016 BX-69A ANTIGEN (FORMALDEHYDE INACTIVATED) 0.7 ML: 60; 60; 60; 60 INJECTION, SUSPENSION INTRAMUSCULAR at 16:15

## 2020-10-14 RX ADMIN — DOCUSATE SODIUM 100 MG: 100 CAPSULE, LIQUID FILLED ORAL at 17:23

## 2020-10-14 RX ADMIN — CYANOCOBALAMIN TAB 500 MCG 1000 MCG: 500 TAB at 16:15

## 2020-10-14 RX ADMIN — PREDNISONE 5 MG: 5 TABLET ORAL at 16:15

## 2020-10-15 PROBLEM — R33.9 URINARY RETENTION: Status: ACTIVE | Noted: 2020-10-15

## 2020-10-15 PROBLEM — E44.0 MODERATE PROTEIN-CALORIE MALNUTRITION (HCC): Status: ACTIVE | Noted: 2020-10-15

## 2020-10-15 LAB
ANION GAP SERPL CALCULATED.3IONS-SCNC: 9 MMOL/L (ref 4–13)
BUN SERPL-MCNC: 13 MG/DL (ref 5–25)
CALCIUM SERPL-MCNC: 7.6 MG/DL (ref 8.3–10.1)
CHLORIDE SERPL-SCNC: 109 MMOL/L (ref 100–108)
CO2 SERPL-SCNC: 22 MMOL/L (ref 21–32)
CREAT SERPL-MCNC: 0.66 MG/DL (ref 0.6–1.3)
ERYTHROCYTE [DISTWIDTH] IN BLOOD BY AUTOMATED COUNT: 18 % (ref 11.6–15.1)
GFR SERPL CREATININE-BSD FRML MDRD: 91 ML/MIN/1.73SQ M
GLUCOSE SERPL-MCNC: 79 MG/DL (ref 65–140)
HCT VFR BLD AUTO: 34.2 % (ref 34.8–46.1)
HGB BLD-MCNC: 10.6 G/DL (ref 11.5–15.4)
MCH RBC QN AUTO: 27.3 PG (ref 26.8–34.3)
MCHC RBC AUTO-ENTMCNC: 31 G/DL (ref 31.4–37.4)
MCV RBC AUTO: 88 FL (ref 82–98)
PLATELET # BLD AUTO: 259 THOUSANDS/UL (ref 149–390)
PMV BLD AUTO: 11.1 FL (ref 8.9–12.7)
POTASSIUM SERPL-SCNC: 3.8 MMOL/L (ref 3.5–5.3)
RBC # BLD AUTO: 3.88 MILLION/UL (ref 3.81–5.12)
SODIUM SERPL-SCNC: 140 MMOL/L (ref 136–145)
T4 FREE SERPL-MCNC: 1 NG/DL (ref 0.76–1.46)
TSH SERPL DL<=0.05 MIU/L-ACNC: 4.47 UIU/ML (ref 0.36–3.74)
WBC # BLD AUTO: 9.13 THOUSAND/UL (ref 4.31–10.16)

## 2020-10-15 PROCEDURE — 97166 OT EVAL MOD COMPLEX 45 MIN: CPT

## 2020-10-15 PROCEDURE — 80048 BASIC METABOLIC PNL TOTAL CA: CPT | Performed by: INTERNAL MEDICINE

## 2020-10-15 PROCEDURE — 84443 ASSAY THYROID STIM HORMONE: CPT | Performed by: INTERNAL MEDICINE

## 2020-10-15 PROCEDURE — 99232 SBSQ HOSP IP/OBS MODERATE 35: CPT | Performed by: INTERNAL MEDICINE

## 2020-10-15 PROCEDURE — 99222 1ST HOSP IP/OBS MODERATE 55: CPT | Performed by: INTERNAL MEDICINE

## 2020-10-15 PROCEDURE — 84439 ASSAY OF FREE THYROXINE: CPT | Performed by: INTERNAL MEDICINE

## 2020-10-15 PROCEDURE — 85027 COMPLETE CBC AUTOMATED: CPT | Performed by: INTERNAL MEDICINE

## 2020-10-15 PROCEDURE — 97163 PT EVAL HIGH COMPLEX 45 MIN: CPT

## 2020-10-15 RX ADMIN — CYANOCOBALAMIN TAB 500 MCG 1000 MCG: 500 TAB at 09:49

## 2020-10-15 RX ADMIN — ENOXAPARIN SODIUM 30 MG: 30 INJECTION SUBCUTANEOUS at 18:40

## 2020-10-15 RX ADMIN — SERTRALINE HYDROCHLORIDE 25 MG: 25 TABLET ORAL at 09:49

## 2020-10-15 RX ADMIN — SODIUM CHLORIDE 75 ML/HR: 0.9 INJECTION, SOLUTION INTRAVENOUS at 18:21

## 2020-10-15 RX ADMIN — SODIUM CHLORIDE 75 ML/HR: 0.9 INJECTION, SOLUTION INTRAVENOUS at 05:12

## 2020-10-15 RX ADMIN — LEVOTHYROXINE SODIUM 75 MCG: 75 TABLET ORAL at 05:12

## 2020-10-15 RX ADMIN — DOCUSATE SODIUM 100 MG: 100 CAPSULE, LIQUID FILLED ORAL at 09:49

## 2020-10-15 RX ADMIN — DOCUSATE SODIUM 100 MG: 100 CAPSULE, LIQUID FILLED ORAL at 18:21

## 2020-10-16 LAB
ATRIAL RATE: 73 BPM
BACTERIA UR QL AUTO: ABNORMAL /HPF
BASOPHILS # BLD AUTO: 0.16 THOUSANDS/ΜL (ref 0–0.1)
BASOPHILS NFR BLD AUTO: 2 % (ref 0–1)
BILIRUB UR QL STRIP: NEGATIVE
CLARITY UR: ABNORMAL
COLOR UR: YELLOW
EOSINOPHIL # BLD AUTO: 0.15 THOUSAND/ΜL (ref 0–0.61)
EOSINOPHIL NFR BLD AUTO: 2 % (ref 0–6)
ERYTHROCYTE [DISTWIDTH] IN BLOOD BY AUTOMATED COUNT: 18 % (ref 11.6–15.1)
GLUCOSE UR STRIP-MCNC: NEGATIVE MG/DL
HCT VFR BLD AUTO: 32.6 % (ref 34.8–46.1)
HGB BLD-MCNC: 9.9 G/DL (ref 11.5–15.4)
HGB UR QL STRIP.AUTO: ABNORMAL
IMM GRANULOCYTES # BLD AUTO: 0.16 THOUSAND/UL (ref 0–0.2)
IMM GRANULOCYTES NFR BLD AUTO: 2 % (ref 0–2)
KETONES UR STRIP-MCNC: NEGATIVE MG/DL
LEUKOCYTE ESTERASE UR QL STRIP: ABNORMAL
LYMPHOCYTES # BLD AUTO: 2.61 THOUSANDS/ΜL (ref 0.6–4.47)
LYMPHOCYTES NFR BLD AUTO: 35 % (ref 14–44)
MCH RBC QN AUTO: 27 PG (ref 26.8–34.3)
MCHC RBC AUTO-ENTMCNC: 30.4 G/DL (ref 31.4–37.4)
MCV RBC AUTO: 89 FL (ref 82–98)
MONOCYTES # BLD AUTO: 0.33 THOUSAND/ΜL (ref 0.17–1.22)
MONOCYTES NFR BLD AUTO: 4 % (ref 4–12)
NEUTROPHILS # BLD AUTO: 4.01 THOUSANDS/ΜL (ref 1.85–7.62)
NEUTS SEG NFR BLD AUTO: 55 % (ref 43–75)
NITRITE UR QL STRIP: NEGATIVE
NON-SQ EPI CELLS URNS QL MICRO: ABNORMAL /HPF
NRBC BLD AUTO-RTO: 0 /100 WBCS
P AXIS: 48 DEGREES
PH UR STRIP.AUTO: 6 [PH]
PLATELET # BLD AUTO: 227 THOUSANDS/UL (ref 149–390)
PMV BLD AUTO: 11.5 FL (ref 8.9–12.7)
PR INTERVAL: 146 MS
PROT UR STRIP-MCNC: NEGATIVE MG/DL
QRS AXIS: 78 DEGREES
QRSD INTERVAL: 78 MS
QT INTERVAL: 418 MS
QTC INTERVAL: 460 MS
RBC # BLD AUTO: 3.66 MILLION/UL (ref 3.81–5.12)
RBC #/AREA URNS AUTO: ABNORMAL /HPF
SP GR UR STRIP.AUTO: 1.01 (ref 1–1.03)
T WAVE AXIS: 65 DEGREES
UROBILINOGEN UR QL STRIP.AUTO: 0.2 E.U./DL
VENTRICULAR RATE: 73 BPM
WBC # BLD AUTO: 7.42 THOUSAND/UL (ref 4.31–10.16)
WBC #/AREA URNS AUTO: ABNORMAL /HPF

## 2020-10-16 PROCEDURE — 97110 THERAPEUTIC EXERCISES: CPT

## 2020-10-16 PROCEDURE — 85025 COMPLETE CBC W/AUTO DIFF WBC: CPT | Performed by: INTERNAL MEDICINE

## 2020-10-16 PROCEDURE — 99232 SBSQ HOSP IP/OBS MODERATE 35: CPT | Performed by: INTERNAL MEDICINE

## 2020-10-16 PROCEDURE — 87086 URINE CULTURE/COLONY COUNT: CPT | Performed by: INTERNAL MEDICINE

## 2020-10-16 PROCEDURE — 87186 SC STD MICRODIL/AGAR DIL: CPT | Performed by: INTERNAL MEDICINE

## 2020-10-16 PROCEDURE — 93010 ELECTROCARDIOGRAM REPORT: CPT

## 2020-10-16 PROCEDURE — 87077 CULTURE AEROBIC IDENTIFY: CPT | Performed by: INTERNAL MEDICINE

## 2020-10-16 PROCEDURE — 81001 URINALYSIS AUTO W/SCOPE: CPT | Performed by: INTERNAL MEDICINE

## 2020-10-16 PROCEDURE — 97530 THERAPEUTIC ACTIVITIES: CPT

## 2020-10-16 PROCEDURE — 87147 CULTURE TYPE IMMUNOLOGIC: CPT | Performed by: INTERNAL MEDICINE

## 2020-10-16 PROCEDURE — 97116 GAIT TRAINING THERAPY: CPT

## 2020-10-16 RX ADMIN — CYANOCOBALAMIN TAB 500 MCG 1000 MCG: 500 TAB at 09:29

## 2020-10-16 RX ADMIN — DOCUSATE SODIUM 100 MG: 100 CAPSULE, LIQUID FILLED ORAL at 09:29

## 2020-10-16 RX ADMIN — DOCUSATE SODIUM 100 MG: 100 CAPSULE, LIQUID FILLED ORAL at 17:31

## 2020-10-16 RX ADMIN — LEVOTHYROXINE SODIUM 75 MCG: 75 TABLET ORAL at 05:10

## 2020-10-16 RX ADMIN — ENOXAPARIN SODIUM 30 MG: 30 INJECTION SUBCUTANEOUS at 09:29

## 2020-10-16 RX ADMIN — SODIUM CHLORIDE 75 ML/HR: 0.9 INJECTION, SOLUTION INTRAVENOUS at 06:34

## 2020-10-16 RX ADMIN — SERTRALINE HYDROCHLORIDE 25 MG: 25 TABLET ORAL at 09:29

## 2020-10-16 RX ADMIN — PREDNISONE 5 MG: 5 TABLET ORAL at 09:29

## 2020-10-17 VITALS
OXYGEN SATURATION: 98 % | DIASTOLIC BLOOD PRESSURE: 63 MMHG | RESPIRATION RATE: 17 BRPM | HEART RATE: 81 BPM | TEMPERATURE: 98.2 F | SYSTOLIC BLOOD PRESSURE: 109 MMHG

## 2020-10-17 LAB — SARS-COV-2 RNA RESP QL NAA+PROBE: NEGATIVE

## 2020-10-17 PROCEDURE — 99239 HOSP IP/OBS DSCHRG MGMT >30: CPT | Performed by: INTERNAL MEDICINE

## 2020-10-17 PROCEDURE — 87635 SARS-COV-2 COVID-19 AMP PRB: CPT | Performed by: INTERNAL MEDICINE

## 2020-10-17 RX ORDER — CEFUROXIME AXETIL 250 MG/1
250 TABLET ORAL EVERY 12 HOURS SCHEDULED
Refills: 0
Start: 2020-10-17 | End: 2020-10-22

## 2020-10-17 RX ORDER — DOCUSATE SODIUM 100 MG/1
100 CAPSULE, LIQUID FILLED ORAL 2 TIMES DAILY
Qty: 10 CAPSULE | Refills: 0
Start: 2020-10-17 | End: 2021-01-20

## 2020-10-17 RX ORDER — SERTRALINE HYDROCHLORIDE 25 MG/1
25 TABLET, FILM COATED ORAL DAILY
Refills: 0
Start: 2020-10-18 | End: 2021-02-18 | Stop reason: SDUPTHER

## 2020-10-17 RX ORDER — CEFUROXIME AXETIL 250 MG/1
250 TABLET ORAL EVERY 12 HOURS SCHEDULED
Status: DISCONTINUED | OUTPATIENT
Start: 2020-10-17 | End: 2020-10-17 | Stop reason: HOSPADM

## 2020-10-17 RX ADMIN — CYANOCOBALAMIN TAB 500 MCG 1000 MCG: 500 TAB at 08:33

## 2020-10-17 RX ADMIN — LEVOTHYROXINE SODIUM 75 MCG: 75 TABLET ORAL at 05:02

## 2020-10-17 RX ADMIN — ENOXAPARIN SODIUM 30 MG: 30 INJECTION SUBCUTANEOUS at 08:33

## 2020-10-17 RX ADMIN — DOCUSATE SODIUM 100 MG: 100 CAPSULE, LIQUID FILLED ORAL at 08:33

## 2020-10-17 RX ADMIN — CEFUROXIME AXETIL 250 MG: 250 TABLET ORAL at 12:16

## 2020-10-17 RX ADMIN — SERTRALINE HYDROCHLORIDE 25 MG: 25 TABLET ORAL at 08:33

## 2020-10-19 ENCOUNTER — TELEPHONE (OUTPATIENT)
Dept: PALLIATIVE MEDICINE | Facility: CLINIC | Age: 70
End: 2020-10-19

## 2020-10-19 ENCOUNTER — TRANSITIONAL CARE MANAGEMENT (OUTPATIENT)
Dept: FAMILY MEDICINE CLINIC | Facility: CLINIC | Age: 70
End: 2020-10-19

## 2020-10-20 ENCOUNTER — TELEPHONE (OUTPATIENT)
Dept: HEMATOLOGY ONCOLOGY | Facility: CLINIC | Age: 70
End: 2020-10-20

## 2020-10-20 LAB
BACTERIA UR CULT: ABNORMAL
BACTERIA UR CULT: ABNORMAL

## 2020-10-21 ENCOUNTER — TELEPHONE (OUTPATIENT)
Dept: HEMATOLOGY ONCOLOGY | Facility: CLINIC | Age: 70
End: 2020-10-21

## 2020-10-22 ENCOUNTER — TELEPHONE (OUTPATIENT)
Dept: PALLIATIVE MEDICINE | Facility: HOSPITAL | Age: 70
End: 2020-10-22

## 2020-10-23 ENCOUNTER — TELEPHONE (OUTPATIENT)
Dept: PALLIATIVE MEDICINE | Facility: CLINIC | Age: 70
End: 2020-10-23

## 2020-10-23 ENCOUNTER — TELEPHONE (OUTPATIENT)
Dept: HEMATOLOGY ONCOLOGY | Facility: CLINIC | Age: 70
End: 2020-10-23

## 2020-10-27 ENCOUNTER — TELEPHONE (OUTPATIENT)
Dept: FAMILY MEDICINE CLINIC | Facility: CLINIC | Age: 70
End: 2020-10-27

## 2020-10-30 ENCOUNTER — TELEPHONE (OUTPATIENT)
Dept: OTHER | Facility: OTHER | Age: 70
End: 2020-10-30

## 2020-11-02 ENCOUNTER — LAB (OUTPATIENT)
Dept: LAB | Age: 70
End: 2020-11-02
Payer: MEDICARE

## 2020-11-02 ENCOUNTER — TELEPHONE (OUTPATIENT)
Dept: OTHER | Facility: OTHER | Age: 70
End: 2020-11-02

## 2020-11-02 DIAGNOSIS — C91.10 CLL (CHRONIC LYMPHOCYTIC LEUKEMIA) (HCC): ICD-10-CM

## 2020-11-02 DIAGNOSIS — D89.2 HYPERGAMMAGLOBULINEMIA: ICD-10-CM

## 2020-11-02 LAB
ALBUMIN SERPL BCP-MCNC: 2.8 G/DL (ref 3.5–5)
ALP SERPL-CCNC: 60 U/L (ref 46–116)
ALT SERPL W P-5'-P-CCNC: 13 U/L (ref 12–78)
ANION GAP SERPL CALCULATED.3IONS-SCNC: 3 MMOL/L (ref 4–13)
AST SERPL W P-5'-P-CCNC: 16 U/L (ref 5–45)
BILIRUB SERPL-MCNC: 0.45 MG/DL (ref 0.2–1)
BUN SERPL-MCNC: 9 MG/DL (ref 5–25)
CALCIUM ALBUM COR SERPL-MCNC: 9.4 MG/DL (ref 8.3–10.1)
CALCIUM SERPL-MCNC: 8.4 MG/DL (ref 8.3–10.1)
CHLORIDE SERPL-SCNC: 109 MMOL/L (ref 100–108)
CO2 SERPL-SCNC: 28 MMOL/L (ref 21–32)
CREAT SERPL-MCNC: 0.78 MG/DL (ref 0.6–1.3)
GFR SERPL CREATININE-BSD FRML MDRD: 78 ML/MIN/1.73SQ M
GLUCOSE SERPL-MCNC: 90 MG/DL (ref 65–140)
POTASSIUM SERPL-SCNC: 4.1 MMOL/L (ref 3.5–5.3)
PROT SERPL-MCNC: 7.2 G/DL (ref 6.4–8.2)
SODIUM SERPL-SCNC: 140 MMOL/L (ref 136–145)

## 2020-11-02 PROCEDURE — 80053 COMPREHEN METABOLIC PANEL: CPT

## 2020-11-03 ENCOUNTER — OFFICE VISIT (OUTPATIENT)
Dept: HEMATOLOGY ONCOLOGY | Facility: CLINIC | Age: 70
End: 2020-11-03
Payer: MEDICARE

## 2020-11-03 VITALS
HEART RATE: 74 BPM | SYSTOLIC BLOOD PRESSURE: 104 MMHG | WEIGHT: 100.5 LBS | TEMPERATURE: 97.2 F | RESPIRATION RATE: 18 BRPM | OXYGEN SATURATION: 96 % | DIASTOLIC BLOOD PRESSURE: 60 MMHG | BODY MASS INDEX: 16.75 KG/M2 | HEIGHT: 65 IN

## 2020-11-03 DIAGNOSIS — D59.10 AUTOIMMUNE HEMOLYTIC ANEMIA (HCC): ICD-10-CM

## 2020-11-03 DIAGNOSIS — D59.10 AIHA (AUTOIMMUNE HEMOLYTIC ANEMIA) (HCC): ICD-10-CM

## 2020-11-03 DIAGNOSIS — D69.3 IDIOPATHIC THROMBOCYTOPENIC PURPURA (ITP) (HCC): Primary | ICD-10-CM

## 2020-11-03 PROCEDURE — 99214 OFFICE O/P EST MOD 30 MIN: CPT | Performed by: INTERNAL MEDICINE

## 2020-11-06 ENCOUNTER — APPOINTMENT (OUTPATIENT)
Dept: LAB | Age: 70
End: 2020-11-06
Payer: MEDICARE

## 2020-11-06 DIAGNOSIS — D59.10 AUTOIMMUNE HEMOLYTIC ANEMIA (HCC): ICD-10-CM

## 2020-11-06 DIAGNOSIS — D69.3 IDIOPATHIC THROMBOCYTOPENIC PURPURA (ITP) (HCC): ICD-10-CM

## 2020-11-06 DIAGNOSIS — D59.10 AIHA (AUTOIMMUNE HEMOLYTIC ANEMIA) (HCC): ICD-10-CM

## 2020-11-06 LAB
ALBUMIN SERPL BCP-MCNC: 3.1 G/DL (ref 3.5–5)
ALP SERPL-CCNC: 56 U/L (ref 46–116)
ALT SERPL W P-5'-P-CCNC: 13 U/L (ref 12–78)
ANION GAP SERPL CALCULATED.3IONS-SCNC: 5 MMOL/L (ref 4–13)
ANISOCYTOSIS BLD QL SMEAR: PRESENT
AST SERPL W P-5'-P-CCNC: 13 U/L (ref 5–45)
BASOPHILS # BLD MANUAL: 0.09 THOUSAND/UL (ref 0–0.1)
BASOPHILS NFR MAR MANUAL: 1 % (ref 0–1)
BILIRUB SERPL-MCNC: 0.5 MG/DL (ref 0.2–1)
BUN SERPL-MCNC: 9 MG/DL (ref 5–25)
CALCIUM ALBUM COR SERPL-MCNC: 9.8 MG/DL (ref 8.3–10.1)
CALCIUM SERPL-MCNC: 9.1 MG/DL (ref 8.3–10.1)
CHLORIDE SERPL-SCNC: 110 MMOL/L (ref 100–108)
CO2 SERPL-SCNC: 27 MMOL/L (ref 21–32)
CREAT SERPL-MCNC: 0.75 MG/DL (ref 0.6–1.3)
EOSINOPHIL # BLD MANUAL: 0.09 THOUSAND/UL (ref 0–0.4)
EOSINOPHIL NFR BLD MANUAL: 1 % (ref 0–6)
ERYTHROCYTE [DISTWIDTH] IN BLOOD BY AUTOMATED COUNT: 19.2 % (ref 11.6–15.1)
GFR SERPL CREATININE-BSD FRML MDRD: 82 ML/MIN/1.73SQ M
GIANT PLATELETS BLD QL SMEAR: PRESENT
GLUCOSE SERPL-MCNC: 95 MG/DL (ref 65–140)
HCT VFR BLD AUTO: 38.7 % (ref 34.8–46.1)
HGB BLD-MCNC: 11.9 G/DL (ref 11.5–15.4)
LYMPHOCYTES # BLD AUTO: 4.56 THOUSAND/UL (ref 0.6–4.47)
LYMPHOCYTES # BLD AUTO: 48 % (ref 14–44)
MCH RBC QN AUTO: 28.5 PG (ref 26.8–34.3)
MCHC RBC AUTO-ENTMCNC: 30.7 G/DL (ref 31.4–37.4)
MCV RBC AUTO: 93 FL (ref 82–98)
MONOCYTES # BLD AUTO: 0.47 THOUSAND/UL (ref 0–1.22)
MONOCYTES NFR BLD: 5 % (ref 4–12)
NEUTROPHILS # BLD MANUAL: 3.51 THOUSAND/UL (ref 1.85–7.62)
NEUTS SEG NFR BLD AUTO: 37 % (ref 43–75)
NRBC BLD AUTO-RTO: 0 /100 WBCS
PLATELET # BLD AUTO: 59 THOUSANDS/UL (ref 149–390)
PLATELET BLD QL SMEAR: ABNORMAL
PMV BLD AUTO: 11.7 FL (ref 8.9–12.7)
POLYCHROMASIA BLD QL SMEAR: PRESENT
POTASSIUM SERPL-SCNC: 3.7 MMOL/L (ref 3.5–5.3)
PROT SERPL-MCNC: 7.5 G/DL (ref 6.4–8.2)
RBC # BLD AUTO: 4.18 MILLION/UL (ref 3.81–5.12)
RBC MORPH BLD: PRESENT
SODIUM SERPL-SCNC: 142 MMOL/L (ref 136–145)
VARIANT LYMPHS # BLD AUTO: 8 %
WBC # BLD AUTO: 9.49 THOUSAND/UL (ref 4.31–10.16)

## 2020-11-06 PROCEDURE — 85007 BL SMEAR W/DIFF WBC COUNT: CPT

## 2020-11-06 PROCEDURE — 80053 COMPREHEN METABOLIC PANEL: CPT

## 2020-11-06 PROCEDURE — 36415 COLL VENOUS BLD VENIPUNCTURE: CPT

## 2020-11-06 PROCEDURE — 85027 COMPLETE CBC AUTOMATED: CPT

## 2020-11-09 ENCOUNTER — TELEPHONE (OUTPATIENT)
Dept: HEMATOLOGY ONCOLOGY | Facility: CLINIC | Age: 70
End: 2020-11-09

## 2020-11-11 DIAGNOSIS — C91.10 CLL (CHRONIC LYMPHOCYTIC LEUKEMIA) (HCC): ICD-10-CM

## 2020-11-11 RX ORDER — ACALABRUTINIB 100 MG/1
CAPSULE, GELATIN COATED ORAL
Qty: 60 CAPSULE | Refills: 11 | Status: SHIPPED | OUTPATIENT
Start: 2020-11-11 | End: 2021-09-07 | Stop reason: SDUPTHER

## 2020-11-12 ENCOUNTER — LAB (OUTPATIENT)
Dept: LAB | Age: 70
End: 2020-11-12
Payer: MEDICARE

## 2020-11-12 ENCOUNTER — TELEPHONE (OUTPATIENT)
Dept: HEMATOLOGY ONCOLOGY | Facility: CLINIC | Age: 70
End: 2020-11-12

## 2020-11-12 DIAGNOSIS — D59.10 AUTOIMMUNE HEMOLYTIC ANEMIA (HCC): ICD-10-CM

## 2020-11-12 DIAGNOSIS — D69.3 IDIOPATHIC THROMBOCYTOPENIC PURPURA (ITP) (HCC): ICD-10-CM

## 2020-11-12 DIAGNOSIS — D59.10 AIHA (AUTOIMMUNE HEMOLYTIC ANEMIA) (HCC): ICD-10-CM

## 2020-11-12 LAB
ANISOCYTOSIS BLD QL SMEAR: PRESENT
BASOPHILS # BLD MANUAL: 0 THOUSAND/UL (ref 0–0.1)
BASOPHILS NFR MAR MANUAL: 0 % (ref 0–1)
EOSINOPHIL # BLD MANUAL: 0 THOUSAND/UL (ref 0–0.4)
EOSINOPHIL NFR BLD MANUAL: 0 % (ref 0–6)
ERYTHROCYTE [DISTWIDTH] IN BLOOD BY AUTOMATED COUNT: 19.8 % (ref 11.6–15.1)
HCT VFR BLD AUTO: 38.9 % (ref 34.8–46.1)
HGB BLD-MCNC: 11.6 G/DL (ref 11.5–15.4)
LYMPHOCYTES # BLD AUTO: 4.84 THOUSAND/UL (ref 0.6–4.47)
LYMPHOCYTES # BLD AUTO: 49 % (ref 14–44)
MCH RBC QN AUTO: 28.2 PG (ref 26.8–34.3)
MCHC RBC AUTO-ENTMCNC: 29.8 G/DL (ref 31.4–37.4)
MCV RBC AUTO: 95 FL (ref 82–98)
MONOCYTES # BLD AUTO: 0.79 THOUSAND/UL (ref 0–1.22)
MONOCYTES NFR BLD: 8 % (ref 4–12)
NEUTROPHILS # BLD MANUAL: 4.25 THOUSAND/UL (ref 1.85–7.62)
NEUTS SEG NFR BLD AUTO: 43 % (ref 43–75)
NRBC BLD AUTO-RTO: 0 /100 WBCS
PLATELET # BLD AUTO: 29 THOUSANDS/UL (ref 149–390)
PLATELET BLD QL SMEAR: ABNORMAL
PMV BLD AUTO: 13.2 FL (ref 8.9–12.7)
RBC # BLD AUTO: 4.11 MILLION/UL (ref 3.81–5.12)
RBC MORPH BLD: PRESENT
TOTAL CELLS COUNTED SPEC: 100
WBC # BLD AUTO: 9.88 THOUSAND/UL (ref 4.31–10.16)

## 2020-11-12 PROCEDURE — 85027 COMPLETE CBC AUTOMATED: CPT

## 2020-11-12 PROCEDURE — 36415 COLL VENOUS BLD VENIPUNCTURE: CPT

## 2020-11-12 PROCEDURE — 85007 BL SMEAR W/DIFF WBC COUNT: CPT

## 2020-11-13 DIAGNOSIS — D69.3 ACUTE ITP (HCC): Primary | ICD-10-CM

## 2020-11-13 DIAGNOSIS — C91.10 CLL (CHRONIC LYMPHOCYTIC LEUKEMIA) (HCC): Primary | ICD-10-CM

## 2020-11-13 DIAGNOSIS — C91.10 CLL (CHRONIC LYMPHOCYTIC LEUKEMIA) (HCC): ICD-10-CM

## 2020-11-13 RX ORDER — PREDNISONE 1 MG/1
TABLET ORAL
Qty: 30 TABLET | Refills: 1 | Status: SHIPPED | OUTPATIENT
Start: 2020-11-13 | End: 2021-01-20

## 2020-11-16 ENCOUNTER — TELEPHONE (OUTPATIENT)
Dept: OTHER | Facility: OTHER | Age: 70
End: 2020-11-16

## 2020-11-16 ENCOUNTER — LAB (OUTPATIENT)
Dept: LAB | Age: 70
End: 2020-11-16
Payer: MEDICARE

## 2020-11-16 DIAGNOSIS — D69.3 ACUTE ITP (HCC): ICD-10-CM

## 2020-11-16 DIAGNOSIS — C91.10 CLL (CHRONIC LYMPHOCYTIC LEUKEMIA) (HCC): ICD-10-CM

## 2020-11-16 PROCEDURE — 85007 BL SMEAR W/DIFF WBC COUNT: CPT

## 2020-11-16 PROCEDURE — 36415 COLL VENOUS BLD VENIPUNCTURE: CPT

## 2020-11-16 PROCEDURE — 85027 COMPLETE CBC AUTOMATED: CPT

## 2020-11-17 ENCOUNTER — TELEPHONE (OUTPATIENT)
Dept: HEMATOLOGY ONCOLOGY | Facility: CLINIC | Age: 70
End: 2020-11-17

## 2020-11-17 LAB
ANISOCYTOSIS BLD QL SMEAR: PRESENT
BASOPHILS # BLD MANUAL: 0.15 THOUSAND/UL (ref 0–0.1)
BASOPHILS NFR MAR MANUAL: 1 % (ref 0–1)
EOSINOPHIL # BLD MANUAL: 0 THOUSAND/UL (ref 0–0.4)
EOSINOPHIL NFR BLD MANUAL: 0 % (ref 0–6)
ERYTHROCYTE [DISTWIDTH] IN BLOOD BY AUTOMATED COUNT: 19.1 % (ref 11.6–15.1)
HCT VFR BLD AUTO: 42.3 % (ref 34.8–46.1)
HGB BLD-MCNC: 12.5 G/DL (ref 11.5–15.4)
LYMPHOCYTES # BLD AUTO: 33 % (ref 14–44)
LYMPHOCYTES # BLD AUTO: 5.05 THOUSAND/UL (ref 0.6–4.47)
MCH RBC QN AUTO: 27.7 PG (ref 26.8–34.3)
MCHC RBC AUTO-ENTMCNC: 29.6 G/DL (ref 31.4–37.4)
MCV RBC AUTO: 94 FL (ref 82–98)
MONOCYTES # BLD AUTO: 0.46 THOUSAND/UL (ref 0–1.22)
MONOCYTES NFR BLD: 3 % (ref 4–12)
NEUTROPHILS # BLD MANUAL: 9.64 THOUSAND/UL (ref 1.85–7.62)
NEUTS SEG NFR BLD AUTO: 63 % (ref 43–75)
NRBC BLD AUTO-RTO: 0 /100 WBCS
OVALOCYTES BLD QL SMEAR: PRESENT
PLATELET # BLD AUTO: 29 THOUSANDS/UL (ref 149–390)
PLATELET BLD QL SMEAR: ABNORMAL
PMV BLD AUTO: 12.3 FL (ref 8.9–12.7)
POIKILOCYTOSIS BLD QL SMEAR: PRESENT
RBC # BLD AUTO: 4.51 MILLION/UL (ref 3.81–5.12)
RBC MORPH BLD: PRESENT
WBC # BLD AUTO: 15.3 THOUSAND/UL (ref 4.31–10.16)

## 2020-11-23 ENCOUNTER — LAB (OUTPATIENT)
Dept: LAB | Age: 70
End: 2020-11-23
Payer: MEDICARE

## 2020-11-23 ENCOUNTER — TELEPHONE (OUTPATIENT)
Dept: HEMATOLOGY ONCOLOGY | Facility: CLINIC | Age: 70
End: 2020-11-23

## 2020-11-23 DIAGNOSIS — D69.3 ACUTE ITP (HCC): Primary | ICD-10-CM

## 2020-11-23 LAB
ANISOCYTOSIS BLD QL SMEAR: PRESENT
BASOPHILS # BLD MANUAL: 0 THOUSAND/UL (ref 0–0.1)
BASOPHILS NFR MAR MANUAL: 0 % (ref 0–1)
EOSINOPHIL # BLD MANUAL: 0.15 THOUSAND/UL (ref 0–0.4)
EOSINOPHIL NFR BLD MANUAL: 1 % (ref 0–6)
ERYTHROCYTE [DISTWIDTH] IN BLOOD BY AUTOMATED COUNT: 19 % (ref 11.6–15.1)
HCT VFR BLD AUTO: 40.7 % (ref 34.8–46.1)
HGB BLD-MCNC: 12 G/DL (ref 11.5–15.4)
LYMPHOCYTES # BLD AUTO: 45 % (ref 14–44)
LYMPHOCYTES # BLD AUTO: 6.6 THOUSAND/UL (ref 0.6–4.47)
MCH RBC QN AUTO: 28.4 PG (ref 26.8–34.3)
MCHC RBC AUTO-ENTMCNC: 29.5 G/DL (ref 31.4–37.4)
MCV RBC AUTO: 96 FL (ref 82–98)
MONOCYTES # BLD AUTO: 0.88 THOUSAND/UL (ref 0–1.22)
MONOCYTES NFR BLD: 6 % (ref 4–12)
NEUTROPHILS # BLD MANUAL: 7.04 THOUSAND/UL (ref 1.85–7.62)
NEUTS BAND NFR BLD MANUAL: 1 % (ref 0–8)
NEUTS SEG NFR BLD AUTO: 47 % (ref 43–75)
NRBC BLD AUTO-RTO: 0 /100 WBCS
PLATELET # BLD AUTO: 12 THOUSANDS/UL (ref 149–390)
PLATELET BLD QL SMEAR: ABNORMAL
PMV BLD AUTO: 12 FL (ref 8.9–12.7)
RBC # BLD AUTO: 4.23 MILLION/UL (ref 3.81–5.12)
SMUDGE CELLS BLD QL SMEAR: PRESENT
TOTAL CELLS COUNTED SPEC: 100
WBC # BLD AUTO: 14.66 THOUSAND/UL (ref 4.31–10.16)

## 2020-11-23 PROCEDURE — 36415 COLL VENOUS BLD VENIPUNCTURE: CPT

## 2020-11-23 PROCEDURE — 85007 BL SMEAR W/DIFF WBC COUNT: CPT

## 2020-11-23 PROCEDURE — 85027 COMPLETE CBC AUTOMATED: CPT

## 2020-11-25 ENCOUNTER — TELEPHONE (OUTPATIENT)
Dept: OTHER | Facility: OTHER | Age: 70
End: 2020-11-25

## 2020-11-25 ENCOUNTER — LAB (OUTPATIENT)
Dept: LAB | Age: 70
End: 2020-11-25
Payer: MEDICARE

## 2020-11-25 DIAGNOSIS — D69.3 ACUTE ITP (HCC): ICD-10-CM

## 2020-11-25 LAB
BASOPHILS # BLD AUTO: 0.05 THOUSANDS/ΜL (ref 0–0.1)
BASOPHILS NFR BLD AUTO: 0 % (ref 0–1)
EOSINOPHIL # BLD AUTO: 0.03 THOUSAND/ΜL (ref 0–0.61)
EOSINOPHIL NFR BLD AUTO: 0 % (ref 0–6)
ERYTHROCYTE [DISTWIDTH] IN BLOOD BY AUTOMATED COUNT: 18.6 % (ref 11.6–15.1)
HCT VFR BLD AUTO: 41.1 % (ref 34.8–46.1)
HGB BLD-MCNC: 12.2 G/DL (ref 11.5–15.4)
IMM GRANULOCYTES # BLD AUTO: 0.08 THOUSAND/UL (ref 0–0.2)
IMM GRANULOCYTES NFR BLD AUTO: 1 % (ref 0–2)
LYMPHOCYTES # BLD AUTO: 6.9 THOUSANDS/ΜL (ref 0.6–4.47)
LYMPHOCYTES NFR BLD AUTO: 47 % (ref 14–44)
MCH RBC QN AUTO: 28.2 PG (ref 26.8–34.3)
MCHC RBC AUTO-ENTMCNC: 29.7 G/DL (ref 31.4–37.4)
MCV RBC AUTO: 95 FL (ref 82–98)
MONOCYTES # BLD AUTO: 0.81 THOUSAND/ΜL (ref 0.17–1.22)
MONOCYTES NFR BLD AUTO: 6 % (ref 4–12)
NEUTROPHILS # BLD AUTO: 6.79 THOUSANDS/ΜL (ref 1.85–7.62)
NEUTS SEG NFR BLD AUTO: 46 % (ref 43–75)
NRBC BLD AUTO-RTO: 0 /100 WBCS
PLATELET # BLD AUTO: 29 THOUSANDS/UL (ref 149–390)
PMV BLD AUTO: 12.9 FL (ref 8.9–12.7)
RBC # BLD AUTO: 4.32 MILLION/UL (ref 3.81–5.12)
WBC # BLD AUTO: 16.82 THOUSAND/UL (ref 4.31–10.16)

## 2020-11-25 PROCEDURE — 36415 COLL VENOUS BLD VENIPUNCTURE: CPT

## 2020-11-25 PROCEDURE — 85025 COMPLETE CBC W/AUTO DIFF WBC: CPT

## 2020-11-27 ENCOUNTER — TELEPHONE (OUTPATIENT)
Dept: HEMATOLOGY ONCOLOGY | Facility: CLINIC | Age: 70
End: 2020-11-27

## 2020-11-27 DIAGNOSIS — E03.9 HYPOTHYROIDISM, UNSPECIFIED TYPE: ICD-10-CM

## 2020-11-27 RX ORDER — LEVOTHYROXINE SODIUM 0.07 MG/1
75 TABLET ORAL
Qty: 30 TABLET | Refills: 5 | Status: SHIPPED | OUTPATIENT
Start: 2020-11-27 | End: 2021-07-01 | Stop reason: SDUPTHER

## 2020-11-30 ENCOUNTER — LAB (OUTPATIENT)
Dept: LAB | Age: 70
End: 2020-11-30
Payer: MEDICARE

## 2020-11-30 DIAGNOSIS — D69.3 ACUTE ITP (HCC): ICD-10-CM

## 2020-11-30 LAB
ANISOCYTOSIS BLD QL SMEAR: PRESENT
BASOPHILS # BLD MANUAL: 0 THOUSAND/UL (ref 0–0.1)
BASOPHILS NFR MAR MANUAL: 0 % (ref 0–1)
EOSINOPHIL # BLD MANUAL: 0 THOUSAND/UL (ref 0–0.4)
EOSINOPHIL NFR BLD MANUAL: 0 % (ref 0–6)
ERYTHROCYTE [DISTWIDTH] IN BLOOD BY AUTOMATED COUNT: 18.9 % (ref 11.6–15.1)
HCT VFR BLD AUTO: 42.2 % (ref 34.8–46.1)
HGB BLD-MCNC: 12.6 G/DL (ref 11.5–15.4)
LYMPHOCYTES # BLD AUTO: 33 % (ref 14–44)
LYMPHOCYTES # BLD AUTO: 5.29 THOUSAND/UL (ref 0.6–4.47)
MCH RBC QN AUTO: 28.9 PG (ref 26.8–34.3)
MCHC RBC AUTO-ENTMCNC: 29.9 G/DL (ref 31.4–37.4)
MCV RBC AUTO: 97 FL (ref 82–98)
MONOCYTES # BLD AUTO: 1.28 THOUSAND/UL (ref 0–1.22)
MONOCYTES NFR BLD: 8 % (ref 4–12)
NEUTROPHILS # BLD MANUAL: 8.97 THOUSAND/UL (ref 1.85–7.62)
NEUTS SEG NFR BLD AUTO: 56 % (ref 43–75)
NRBC BLD AUTO-RTO: 0 /100 WBCS
PLATELET # BLD AUTO: 70 THOUSANDS/UL (ref 149–390)
PLATELET BLD QL SMEAR: ABNORMAL
PMV BLD AUTO: 11.9 FL (ref 8.9–12.7)
RBC # BLD AUTO: 4.36 MILLION/UL (ref 3.81–5.12)
TOTAL CELLS COUNTED SPEC: 100
VARIANT LYMPHS # BLD AUTO: 3 %
WBC # BLD AUTO: 16.02 THOUSAND/UL (ref 4.31–10.16)

## 2020-11-30 PROCEDURE — 85007 BL SMEAR W/DIFF WBC COUNT: CPT

## 2020-11-30 PROCEDURE — 85027 COMPLETE CBC AUTOMATED: CPT

## 2020-11-30 PROCEDURE — 36415 COLL VENOUS BLD VENIPUNCTURE: CPT

## 2020-12-02 ENCOUNTER — TELEPHONE (OUTPATIENT)
Dept: OTHER | Facility: OTHER | Age: 70
End: 2020-12-02

## 2020-12-02 ENCOUNTER — TELEPHONE (OUTPATIENT)
Dept: HEMATOLOGY ONCOLOGY | Facility: CLINIC | Age: 70
End: 2020-12-02

## 2020-12-02 DIAGNOSIS — D69.3 ACUTE ITP (HCC): Primary | ICD-10-CM

## 2020-12-02 RX ORDER — PREDNISONE 10 MG/1
TABLET ORAL
Qty: 60 TABLET | Refills: 1 | Status: SHIPPED | OUTPATIENT
Start: 2020-12-02 | End: 2021-02-18

## 2020-12-07 ENCOUNTER — DOCUMENTATION (OUTPATIENT)
Dept: HEMATOLOGY ONCOLOGY | Facility: CLINIC | Age: 70
End: 2020-12-07

## 2020-12-07 ENCOUNTER — LAB (OUTPATIENT)
Dept: LAB | Age: 70
End: 2020-12-07
Payer: MEDICARE

## 2020-12-07 DIAGNOSIS — D69.3 ACUTE ITP (HCC): ICD-10-CM

## 2020-12-07 LAB
BASOPHILS # BLD MANUAL: 0 THOUSAND/UL (ref 0–0.1)
BASOPHILS NFR MAR MANUAL: 0 % (ref 0–1)
EOSINOPHIL # BLD MANUAL: 0 THOUSAND/UL (ref 0–0.4)
EOSINOPHIL NFR BLD MANUAL: 0 % (ref 0–6)
ERYTHROCYTE [DISTWIDTH] IN BLOOD BY AUTOMATED COUNT: 17.6 % (ref 11.6–15.1)
GIANT PLATELETS BLD QL SMEAR: PRESENT
HCT VFR BLD AUTO: 41.7 % (ref 34.8–46.1)
HGB BLD-MCNC: 12.5 G/DL (ref 11.5–15.4)
LYMPHOCYTES # BLD AUTO: 46 % (ref 14–44)
LYMPHOCYTES # BLD AUTO: 7.14 THOUSAND/UL (ref 0.6–4.47)
MCH RBC QN AUTO: 28.9 PG (ref 26.8–34.3)
MCHC RBC AUTO-ENTMCNC: 30 G/DL (ref 31.4–37.4)
MCV RBC AUTO: 97 FL (ref 82–98)
METAMYELOCYTES NFR BLD MANUAL: 1 % (ref 0–1)
MONOCYTES # BLD AUTO: 0.47 THOUSAND/UL (ref 0–1.22)
MONOCYTES NFR BLD: 3 % (ref 4–12)
NEUTROPHILS # BLD MANUAL: 7.6 THOUSAND/UL (ref 1.85–7.62)
NEUTS BAND NFR BLD MANUAL: 1 % (ref 0–8)
NEUTS SEG NFR BLD AUTO: 48 % (ref 43–75)
NRBC BLD AUTO-RTO: 0 /100 WBCS
PLATELET # BLD AUTO: 51 THOUSANDS/UL (ref 149–390)
PLATELET BLD QL SMEAR: ABNORMAL
PMV BLD AUTO: 13.2 FL (ref 8.9–12.7)
RBC # BLD AUTO: 4.32 MILLION/UL (ref 3.81–5.12)
VARIANT LYMPHS # BLD AUTO: 1 %
WBC # BLD AUTO: 15.52 THOUSAND/UL (ref 4.31–10.16)

## 2020-12-07 PROCEDURE — 85027 COMPLETE CBC AUTOMATED: CPT

## 2020-12-07 PROCEDURE — 36415 COLL VENOUS BLD VENIPUNCTURE: CPT

## 2020-12-07 PROCEDURE — 85007 BL SMEAR W/DIFF WBC COUNT: CPT

## 2020-12-09 ENCOUNTER — OFFICE VISIT (OUTPATIENT)
Dept: HEMATOLOGY ONCOLOGY | Facility: CLINIC | Age: 70
End: 2020-12-09
Payer: MEDICARE

## 2020-12-09 VITALS
SYSTOLIC BLOOD PRESSURE: 110 MMHG | RESPIRATION RATE: 18 BRPM | HEART RATE: 100 BPM | WEIGHT: 106.4 LBS | BODY MASS INDEX: 17.73 KG/M2 | TEMPERATURE: 97.9 F | OXYGEN SATURATION: 95 % | DIASTOLIC BLOOD PRESSURE: 68 MMHG | HEIGHT: 65 IN

## 2020-12-09 DIAGNOSIS — C91.10 CLL (CHRONIC LYMPHOCYTIC LEUKEMIA) (HCC): ICD-10-CM

## 2020-12-09 DIAGNOSIS — D69.41 EVAN'S SYNDROME (HCC): ICD-10-CM

## 2020-12-09 DIAGNOSIS — D69.3 IDIOPATHIC THROMBOCYTOPENIC PURPURA (ITP) (HCC): Primary | ICD-10-CM

## 2020-12-09 PROCEDURE — 99214 OFFICE O/P EST MOD 30 MIN: CPT | Performed by: INTERNAL MEDICINE

## 2020-12-15 ENCOUNTER — LAB (OUTPATIENT)
Dept: LAB | Age: 70
End: 2020-12-15
Payer: MEDICARE

## 2020-12-15 ENCOUNTER — TELEPHONE (OUTPATIENT)
Dept: HEMATOLOGY ONCOLOGY | Facility: CLINIC | Age: 70
End: 2020-12-15

## 2020-12-15 DIAGNOSIS — D69.3 ACUTE ITP (HCC): ICD-10-CM

## 2020-12-15 DIAGNOSIS — D69.3 IDIOPATHIC THROMBOCYTOPENIC PURPURA (ITP) (HCC): ICD-10-CM

## 2020-12-15 DIAGNOSIS — C91.10 CLL (CHRONIC LYMPHOCYTIC LEUKEMIA) (HCC): Primary | ICD-10-CM

## 2020-12-15 LAB
BASOPHILS # BLD MANUAL: 0 THOUSAND/UL (ref 0–0.1)
BASOPHILS NFR MAR MANUAL: 0 % (ref 0–1)
EOSINOPHIL # BLD MANUAL: 0 THOUSAND/UL (ref 0–0.4)
EOSINOPHIL NFR BLD MANUAL: 0 % (ref 0–6)
ERYTHROCYTE [DISTWIDTH] IN BLOOD BY AUTOMATED COUNT: 17 % (ref 11.6–15.1)
HCT VFR BLD AUTO: 42.3 % (ref 34.8–46.1)
HGB BLD-MCNC: 12.7 G/DL (ref 11.5–15.4)
LYMPHOCYTES # BLD AUTO: 54 % (ref 14–44)
LYMPHOCYTES # BLD AUTO: 8.45 THOUSAND/UL (ref 0.6–4.47)
MCH RBC QN AUTO: 29.3 PG (ref 26.8–34.3)
MCHC RBC AUTO-ENTMCNC: 30 G/DL (ref 31.4–37.4)
MCV RBC AUTO: 98 FL (ref 82–98)
MONOCYTES # BLD AUTO: 0.31 THOUSAND/UL (ref 0–1.22)
MONOCYTES NFR BLD: 2 % (ref 4–12)
NEUTROPHILS # BLD MANUAL: 5.95 THOUSAND/UL (ref 1.85–7.62)
NEUTS SEG NFR BLD AUTO: 38 % (ref 43–75)
NRBC BLD AUTO-RTO: 0 /100 WBCS
PLATELET # BLD AUTO: 29 THOUSANDS/UL (ref 149–390)
PLATELET BLD QL SMEAR: ABNORMAL
PMV BLD AUTO: 12 FL (ref 8.9–12.7)
RBC # BLD AUTO: 4.34 MILLION/UL (ref 3.81–5.12)
TOTAL CELLS COUNTED SPEC: 100
VARIANT LYMPHS # BLD AUTO: 6 %
WBC # BLD AUTO: 15.65 THOUSAND/UL (ref 4.31–10.16)

## 2020-12-15 PROCEDURE — 36415 COLL VENOUS BLD VENIPUNCTURE: CPT

## 2020-12-15 PROCEDURE — 85007 BL SMEAR W/DIFF WBC COUNT: CPT

## 2020-12-15 PROCEDURE — 85027 COMPLETE CBC AUTOMATED: CPT

## 2020-12-16 ENCOUNTER — TELEPHONE (OUTPATIENT)
Dept: HEMATOLOGY ONCOLOGY | Facility: CLINIC | Age: 70
End: 2020-12-16

## 2020-12-21 ENCOUNTER — LAB (OUTPATIENT)
Dept: LAB | Age: 70
End: 2020-12-21
Payer: MEDICARE

## 2020-12-21 ENCOUNTER — HOSPITAL ENCOUNTER (OUTPATIENT)
Dept: INFUSION CENTER | Facility: HOSPITAL | Age: 70
Discharge: HOME | End: 2020-12-21
Attending: INTERNAL MEDICINE
Payer: MEDICARE

## 2020-12-21 VITALS
DIASTOLIC BLOOD PRESSURE: 79 MMHG | RESPIRATION RATE: 18 BRPM | SYSTOLIC BLOOD PRESSURE: 134 MMHG | HEART RATE: 74 BPM | TEMPERATURE: 97.7 F

## 2020-12-21 DIAGNOSIS — C91.10 CLL (CHRONIC LYMPHOCYTIC LEUKEMIA) (HCC): Primary | ICD-10-CM

## 2020-12-21 DIAGNOSIS — D69.3 IDIOPATHIC THROMBOCYTOPENIC PURPURA (ITP) (HCC): ICD-10-CM

## 2020-12-21 DIAGNOSIS — D69.3 ACUTE ITP (HCC): ICD-10-CM

## 2020-12-21 LAB
BASOPHILS # BLD MANUAL: 0 THOUSAND/UL (ref 0–0.1)
BASOPHILS NFR MAR MANUAL: 0 % (ref 0–1)
EOSINOPHIL # BLD MANUAL: 0 THOUSAND/UL (ref 0–0.4)
EOSINOPHIL NFR BLD MANUAL: 0 % (ref 0–6)
ERYTHROCYTE [DISTWIDTH] IN BLOOD BY AUTOMATED COUNT: 16.2 % (ref 11.6–15.1)
HCT VFR BLD AUTO: 44.7 % (ref 34.8–46.1)
HGB BLD-MCNC: 13.6 G/DL (ref 11.5–15.4)
LYMPHOCYTES # BLD AUTO: 28 % (ref 14–44)
LYMPHOCYTES # BLD AUTO: 4.02 THOUSAND/UL (ref 0.6–4.47)
MCH RBC QN AUTO: 29.4 PG (ref 26.8–34.3)
MCHC RBC AUTO-ENTMCNC: 30.4 G/DL (ref 31.4–37.4)
MCV RBC AUTO: 97 FL (ref 82–98)
MONOCYTES # BLD AUTO: 0.43 THOUSAND/UL (ref 0–1.22)
MONOCYTES NFR BLD: 3 % (ref 4–12)
NEUTROPHILS # BLD MANUAL: 9.61 THOUSAND/UL (ref 1.85–7.62)
NEUTS SEG NFR BLD AUTO: 67 % (ref 43–75)
NRBC BLD AUTO-RTO: 0 /100 WBCS
PLATELET # BLD AUTO: 59 THOUSANDS/UL (ref 149–390)
PLATELET BLD QL SMEAR: ABNORMAL
PMV BLD AUTO: 10.8 FL (ref 8.9–12.7)
RBC # BLD AUTO: 4.63 MILLION/UL (ref 3.81–5.12)
RBC MORPH BLD: NORMAL
VARIANT LYMPHS # BLD AUTO: 2 %
WBC # BLD AUTO: 14.34 THOUSAND/UL (ref 4.31–10.16)

## 2020-12-21 PROCEDURE — 85007 BL SMEAR W/DIFF WBC COUNT: CPT

## 2020-12-21 PROCEDURE — 85027 COMPLETE CBC AUTOMATED: CPT

## 2020-12-21 PROCEDURE — 96372 THER/PROPH/DIAG INJ SC/IM: CPT

## 2020-12-21 PROCEDURE — 36415 COLL VENOUS BLD VENIPUNCTURE: CPT

## 2020-12-21 RX ADMIN — ROMIPLOSTIM 125 MCG: 125 INJECTION, POWDER, LYOPHILIZED, FOR SOLUTION SUBCUTANEOUS at 13:38

## 2020-12-28 ENCOUNTER — LAB (OUTPATIENT)
Dept: LAB | Age: 70
End: 2020-12-28
Payer: MEDICARE

## 2020-12-28 DIAGNOSIS — D69.3 ACUTE ITP (HCC): ICD-10-CM

## 2020-12-28 LAB
BASOPHILS # BLD MANUAL: 0 THOUSAND/UL (ref 0–0.1)
BASOPHILS NFR MAR MANUAL: 0 % (ref 0–1)
EOSINOPHIL # BLD MANUAL: 0 THOUSAND/UL (ref 0–0.4)
EOSINOPHIL NFR BLD MANUAL: 0 % (ref 0–6)
ERYTHROCYTE [DISTWIDTH] IN BLOOD BY AUTOMATED COUNT: 15.9 % (ref 11.6–15.1)
HCT VFR BLD AUTO: 44.7 % (ref 34.8–46.1)
HGB BLD-MCNC: 13.2 G/DL (ref 11.5–15.4)
LYMPHOCYTES # BLD AUTO: 38 % (ref 14–44)
LYMPHOCYTES # BLD AUTO: 5.67 THOUSAND/UL (ref 0.6–4.47)
MCH RBC QN AUTO: 29.1 PG (ref 26.8–34.3)
MCHC RBC AUTO-ENTMCNC: 29.5 G/DL (ref 31.4–37.4)
MCV RBC AUTO: 99 FL (ref 82–98)
MONOCYTES # BLD AUTO: 0.6 THOUSAND/UL (ref 0–1.22)
MONOCYTES NFR BLD: 4 % (ref 4–12)
NEUTROPHILS # BLD MANUAL: 8.65 THOUSAND/UL (ref 1.85–7.62)
NEUTS BAND NFR BLD MANUAL: 1 % (ref 0–8)
NEUTS SEG NFR BLD AUTO: 57 % (ref 43–75)
NRBC BLD AUTO-RTO: 0 /100 WBCS
PLATELET # BLD AUTO: 307 THOUSANDS/UL (ref 149–390)
PLATELET BLD QL SMEAR: ADEQUATE
PMV BLD AUTO: 10.6 FL (ref 8.9–12.7)
RBC # BLD AUTO: 4.53 MILLION/UL (ref 3.81–5.12)
RBC MORPH BLD: NORMAL
TOTAL CELLS COUNTED SPEC: 100
WBC # BLD AUTO: 14.91 THOUSAND/UL (ref 4.31–10.16)

## 2020-12-28 PROCEDURE — 85007 BL SMEAR W/DIFF WBC COUNT: CPT

## 2020-12-28 PROCEDURE — 36415 COLL VENOUS BLD VENIPUNCTURE: CPT

## 2020-12-28 PROCEDURE — 85027 COMPLETE CBC AUTOMATED: CPT

## 2021-01-04 ENCOUNTER — TELEPHONE (OUTPATIENT)
Dept: HEMATOLOGY ONCOLOGY | Facility: CLINIC | Age: 71
End: 2021-01-04

## 2021-01-04 ENCOUNTER — HOSPITAL ENCOUNTER (OUTPATIENT)
Dept: INFUSION CENTER | Facility: HOSPITAL | Age: 71
Discharge: HOME | End: 2021-01-04

## 2021-01-04 DIAGNOSIS — C91.10 CLL (CHRONIC LYMPHOCYTIC LEUKEMIA) (HCC): Primary | ICD-10-CM

## 2021-01-04 DIAGNOSIS — D69.3 IDIOPATHIC THROMBOCYTOPENIC PURPURA (ITP) (HCC): ICD-10-CM

## 2021-01-04 NOTE — TELEPHONE ENCOUNTER
Called pt and let her know platelet count 587! Advised she does not need Nplate and I will cancel the appointment  Next lab draw 1/11

## 2021-01-11 ENCOUNTER — LAB (OUTPATIENT)
Dept: LAB | Age: 71
End: 2021-01-11
Payer: MEDICARE

## 2021-01-18 ENCOUNTER — TELEPHONE (OUTPATIENT)
Dept: HEMATOLOGY ONCOLOGY | Facility: CLINIC | Age: 71
End: 2021-01-18

## 2021-01-18 ENCOUNTER — LAB (OUTPATIENT)
Dept: LAB | Age: 71
End: 2021-01-18
Payer: MEDICARE

## 2021-01-18 ENCOUNTER — HOSPITAL ENCOUNTER (OUTPATIENT)
Dept: INFUSION CENTER | Facility: HOSPITAL | Age: 71
Discharge: HOME | End: 2021-01-18
Payer: MEDICARE

## 2021-01-18 VITALS
SYSTOLIC BLOOD PRESSURE: 140 MMHG | DIASTOLIC BLOOD PRESSURE: 69 MMHG | RESPIRATION RATE: 20 BRPM | HEART RATE: 76 BPM | OXYGEN SATURATION: 98 %

## 2021-01-18 DIAGNOSIS — D69.3 IDIOPATHIC THROMBOCYTOPENIC PURPURA (ITP) (HCC): ICD-10-CM

## 2021-01-18 DIAGNOSIS — C91.10 CLL (CHRONIC LYMPHOCYTIC LEUKEMIA) (HCC): Primary | ICD-10-CM

## 2021-01-18 DIAGNOSIS — D69.3 IDIOPATHIC THROMBOCYTOPENIC PURPURA (ITP) (HCC): Primary | ICD-10-CM

## 2021-01-18 LAB
BASOPHILS # BLD MANUAL: 0 THOUSAND/UL (ref 0–0.1)
BASOPHILS NFR MAR MANUAL: 0 % (ref 0–1)
EOSINOPHIL # BLD MANUAL: 0 THOUSAND/UL (ref 0–0.4)
EOSINOPHIL NFR BLD MANUAL: 0 % (ref 0–6)
ERYTHROCYTE [DISTWIDTH] IN BLOOD BY AUTOMATED COUNT: 14.2 % (ref 11.6–15.1)
HCT VFR BLD AUTO: 41.8 % (ref 34.8–46.1)
HGB BLD-MCNC: 12.8 G/DL (ref 11.5–15.4)
LYMPHOCYTES # BLD AUTO: 39 % (ref 14–44)
LYMPHOCYTES # BLD AUTO: 4.93 THOUSAND/UL (ref 0.6–4.47)
MCH RBC QN AUTO: 29.4 PG (ref 26.8–34.3)
MCHC RBC AUTO-ENTMCNC: 30.6 G/DL (ref 31.4–37.4)
MCV RBC AUTO: 96 FL (ref 82–98)
MONOCYTES # BLD AUTO: 0.25 THOUSAND/UL (ref 0–1.22)
MONOCYTES NFR BLD: 2 % (ref 4–12)
NEUTROPHILS # BLD MANUAL: 7.45 THOUSAND/UL (ref 1.85–7.62)
NEUTS SEG NFR BLD AUTO: 59 % (ref 43–75)
NRBC BLD AUTO-RTO: 0 /100 WBCS
PLATELET # BLD AUTO: 28 THOUSANDS/UL (ref 149–390)
PLATELET BLD QL SMEAR: ABNORMAL
PMV BLD AUTO: 12.8 FL (ref 8.9–12.7)
RBC # BLD AUTO: 4.36 MILLION/UL (ref 3.81–5.12)
RBC MORPH BLD: NORMAL
WBC # BLD AUTO: 12.63 THOUSAND/UL (ref 4.31–10.16)

## 2021-01-18 PROCEDURE — 85027 COMPLETE CBC AUTOMATED: CPT

## 2021-01-18 PROCEDURE — 96372 THER/PROPH/DIAG INJ SC/IM: CPT

## 2021-01-18 PROCEDURE — 36415 COLL VENOUS BLD VENIPUNCTURE: CPT

## 2021-01-18 PROCEDURE — 85007 BL SMEAR W/DIFF WBC COUNT: CPT

## 2021-01-18 RX ADMIN — ROMIPLOSTIM 125 MCG: 125 INJECTION, POWDER, LYOPHILIZED, FOR SOLUTION SUBCUTANEOUS at 14:01

## 2021-01-18 NOTE — TELEPHONE ENCOUNTER
Call received from: Hailey Turcios       Lab location:[facility]  Date of lab draw:1/18/21  Critical value:Platelets 28 thousand  Read back occurred:Yes  Tasked and Angie Monae RN

## 2021-01-18 NOTE — PLAN OF CARE
Problem: Potential for Falls  Goal: Patient will remain free of falls  Description: INTERVENTIONS:  - Assess patient frequently for physical needs  -  Identify cognitive and physical deficits and behaviors that affect risk of falls    -  [place] fall precautions as indicated by assessment   - Educate patient/family on patient safety including physical limitations  - Instruct patient to call for assistance with activity based on assessment  - Modify environment to reduce risk of injury  - Consider OT/PT consult to assist with strengthening/mobility  Outcome: Progressing

## 2021-01-20 ENCOUNTER — OFFICE VISIT (OUTPATIENT)
Dept: HEMATOLOGY ONCOLOGY | Facility: CLINIC | Age: 71
End: 2021-01-20
Payer: MEDICARE

## 2021-01-20 VITALS
OXYGEN SATURATION: 99 % | BODY MASS INDEX: 18.69 KG/M2 | HEART RATE: 61 BPM | TEMPERATURE: 97.8 F | RESPIRATION RATE: 17 BRPM | HEIGHT: 65 IN | SYSTOLIC BLOOD PRESSURE: 141 MMHG | WEIGHT: 112.2 LBS | DIASTOLIC BLOOD PRESSURE: 72 MMHG

## 2021-01-20 DIAGNOSIS — D69.3 ACUTE ITP (HCC): Primary | ICD-10-CM

## 2021-01-20 PROCEDURE — 99214 OFFICE O/P EST MOD 30 MIN: CPT | Performed by: PHYSICIAN ASSISTANT

## 2021-01-20 NOTE — PROGRESS NOTES
Hematology/Oncology Outpatient Follow- up Note  Adriana Monroy 78 y o  female MRN: @ Encounter: [id number]        Date:  1/20/2021      Assessment / Plan:    Autoimmune hemolytic anemia, IgG subtype, acute immune thrombocytopenic purpura and splenomegaly, she had Sjogren syndrome, positive BALDO, rheumatoid factor and CCP     Initiated in the hospital on Decadron in June 2020 with short-lived response     Initiated on Promacta 75 milligram p o  Daily with improvement in platelet count initially but no sustained response despite increase to Promacta 75 mg alternating with 150 mg and therefore, it was discontinued          CT scan 6/19/20 identified diffuse mild retroperitoneal adenopathy and mildly enlarged retrocrural lymph node, hepatomegaly and splenomegaly 15cm    Flow cytometry 8/10/20 on the peripheral blood showed CLL pattern      Bone marrow biopsy 9/2020 showed CLL, initiated on acalabrutinib 100 mg p o  b i d  with good response 9/2020 and normalization of CBC and differential later on the dose was reduced to 100 mg p o  daily mid October because of fatigue  2   Recurrence of thrombocytopenia 27,000 11/2/20 once prednisone was discontinued  Prednisone re-introduced back at 5 mg p o  daily, platelet count increased to 50,000 range, fluctuated to 20,000 ranged, prednisone increased to 10mg daily 11/17/20, increased to 20mg 11/23/20 when platelets decreased to 12,000  Prednisone dose was 20 milligrams alternating with 15 milligrams every other day 12/2/2020  and N-plate ordered 03/82/19 when platelet count 64,258 12/15/20  Platelet count up to 307,000 12/28/20 and Nplate deferred 7/1/4276  Platelet count 68,788 1/11/21 and decreased to 28,000 1/18/21 and N-plate administered  She will continue with weekly CBC and platelet assessment  Will taper prednisone and dose Nplate based on counts  Follow-up in approximately 6 weeks      3    Oral thrush continue with Magic mouth rinse/baking soda rinses daily               HPI: Clifton Cedeno is a 73-year-old  female who was admitted to [facility] 6/2020 regarding easy bruising and bleeding       She has history of polyclonal gammopathy, with no evidence of monoclonal protein, Sjogren syndrome, autoimmune connective tissue disease with highly elevated sed rate, CRP, BALDO, rheumatoid factor   She is followed by Rheumatology she was prescribed hydroxychloroquine 200 milligram p o  B i d         She was found to have grade 4 thrombocytopenia with platelet count of 0745, hemoglobin 10, WBC 7 2, neutrophils, 34 percent lymphocytes     Flow cytometry on the peripheral blood showed CLL pattern positive for CD 23, CD 20     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 with PATTI positive +for IgG, plus for C3, bilirubin 0 5     CT scan of the chest abdomen pelvis on 06/19/2020 showed mild confluent periaortic/retroperitoneal lymphadenopathy measuring up to 1 1 centimeter in short axis, bilateral external iliac lymphadenopathy up to 10 millimeters, retrocrural lymph node measuring 1 3 centimeters spleen 15 centimeter, liver with hepatomegaly no evidence of masses     She was treated with dexamethasone for 3 days with improvement of platelet count up to 62,000     On 06/25/2020 platelet count CF 73,611     Initiated on Promacta 75 milligram p o  Daily in July 2020, platelets count up gradually with platelet count of 23,758 on 08/17/2020, hemoglobin 11 4, WBC 5 0, 57 percent        8/28/20:  Patient had CBC as gums were bleeding that morning AM, no further bleeding   Platelet count was 10,312    She was advised to take 150 mg of promacta every other day and 75 mg the other days        Platelet count was 27,186 8/31/20 and she was  advised to initiate prednisone 40mg po daily, tapering by 10mg po weekly  Armand Kwok returned to 75mg po daily      Platelet count increased to 173,000 9/4/20           She had BMBX 9/9/20:  B-cell lymphoproliferative disorder, compatible with chronic lymphocytic leukemia   Virtually absent stainable storage iron  On flow cytometry, CLL phenotype accounted for 11%; CD5+, CD23+, CD20+ (dim) and CD 38-  9/14/20:  platelet count 240     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     Initiated on acalabrutinib 100 mg p o  b i d  since the beginning of October 2020   She was seen on 10/14/2020 with severe depression requiring admission to the hospital, she is on Zoloft 25 mg p o  daily, Synthroid 75 micro g p o  daily     Acalabrutinib was reduced to 100 mg p o  daily   Discontinued prednisone, however CBC on 11/02/2020 of 23,000, initiated on prednisone 20 mg p o  daily, platelet went up to 93,617, the dose was reduced slowly to 20 mg alternate with 15 mg the next days, platelet count of 28937 12/7/20    Interval History:    She feels well  She has been able to put on weight  No bruising or bleeding  Fatigue is improved      Test Results:        Labs:   Lab Results   Component Value Date    HGB 12 8 01/18/2021    HCT 41 8 01/18/2021    MCV 96 01/18/2021    PLT 28 (LL) 01/18/2021    WBC 12 63 (H) 01/18/2021    NRBC 0 01/18/2021    BANDSPCT 1 12/28/2020    ATYLMPCT 2 (H) 12/21/2020     Lab Results   Component Value Date    K 3 7 11/06/2020     (H) 11/06/2020    CO2 27 11/06/2020    BUN 9 11/06/2020    CREATININE 0 75 11/06/2020    GLUF 76 09/21/2020    CALCIUM 9 1 11/06/2020    CORRECTEDCA 9 8 11/06/2020    AST 13 11/06/2020    ALT 13 11/06/2020    ALKPHOS 56 11/06/2020    EGFR 82 11/06/2020       Imaging: No results found  ROS:  As mentioned in HPI & Interval History otherwise 14 point ROS negative  Allergies: No Known Allergies  Current Medications: Reviewed  PMH/FH/SH:  Reviewed      Physical Exam:    There is no height or weight on file to calculate BSA      Ht Readings from Last 3 Encounters:   12/09/20 5' 5" (1 651 m)   11/03/20 5' 5" (1 651 m)   10/14/20 5' 5" (1 651 m)        Wt Readings from Last 3 Encounters:   12/09/20 48 3 kg (106 lb 6 4 oz)   20 45 6 kg (100 lb 8 oz)   10/14/20 45 7 kg (100 lb 12 8 oz)        Temp Readings from Last 3 Encounters:   20 97 7 °F (36 5 °C) (Temporal)   20 97 9 °F (36 6 °C) (Tympanic)   20 (!) 97 2 °F (36 2 °C) (Tympanic)        BP Readings from Last 3 Encounters:   21 140/69   20 134/79   20 110/68           Physical Exam  Constitutional:       Appearance: She is well-developed  HENT:      Head: Normocephalic and atraumatic  Cardiovascular:      Rate and Rhythm: Normal rate and regular rhythm  Pulses: Normal pulses  Heart sounds: Normal heart sounds  Pulmonary:      Effort: Pulmonary effort is normal  No respiratory distress  Breath sounds: Normal breath sounds  No stridor  No wheezing or rhonchi  Skin:     General: Skin is warm and dry  Neurological:      Mental Status: She is alert     Psychiatric:         Behavior: Behavior normal          ECO    Emergency Contacts:    Gurjit Guerin, [phone],

## 2021-01-25 ENCOUNTER — LAB (OUTPATIENT)
Dept: LAB | Age: 71
End: 2021-01-25
Payer: MEDICARE

## 2021-01-25 LAB
ANISOCYTOSIS BLD QL SMEAR: PRESENT
BASOPHILS # BLD MANUAL: 0 THOUSAND/UL (ref 0–0.1)
BASOPHILS NFR MAR MANUAL: 0 % (ref 0–1)
BURR CELLS BLD QL SMEAR: PRESENT
EOSINOPHIL # BLD MANUAL: 0 THOUSAND/UL (ref 0–0.4)
EOSINOPHIL NFR BLD MANUAL: 0 % (ref 0–6)
ERYTHROCYTE [DISTWIDTH] IN BLOOD BY AUTOMATED COUNT: 14.4 % (ref 11.6–15.1)
HCT VFR BLD AUTO: 44.2 % (ref 34.8–46.1)
HGB BLD-MCNC: 12.9 G/DL (ref 11.5–15.4)
LG PLATELETS BLD QL SMEAR: PRESENT
LYMPHOCYTES # BLD AUTO: 42 % (ref 14–44)
LYMPHOCYTES # BLD AUTO: 5.7 THOUSAND/UL (ref 0.6–4.47)
MCH RBC QN AUTO: 28.5 PG (ref 26.8–34.3)
MCHC RBC AUTO-ENTMCNC: 29.2 G/DL (ref 31.4–37.4)
MCV RBC AUTO: 98 FL (ref 82–98)
MONOCYTES # BLD AUTO: 0.27 THOUSAND/UL (ref 0–1.22)
MONOCYTES NFR BLD: 2 % (ref 4–12)
MYELOCYTES NFR BLD MANUAL: 2 % (ref 0–1)
NEUTROPHILS # BLD MANUAL: 7.33 THOUSAND/UL (ref 1.85–7.62)
NEUTS BAND NFR BLD MANUAL: 2 % (ref 0–8)
NEUTS SEG NFR BLD AUTO: 52 % (ref 43–75)
NRBC BLD AUTO-RTO: 0 /100 WBCS
PLATELET # BLD AUTO: 408 THOUSANDS/UL (ref 149–390)
PLATELET BLD QL SMEAR: ABNORMAL
PMV BLD AUTO: 11 FL (ref 8.9–12.7)
POIKILOCYTOSIS BLD QL SMEAR: PRESENT
RBC # BLD AUTO: 4.53 MILLION/UL (ref 3.81–5.12)
SMUDGE CELLS BLD QL SMEAR: PRESENT
TOTAL CELLS COUNTED SPEC: 100
WBC # BLD AUTO: 13.58 THOUSAND/UL (ref 4.31–10.16)

## 2021-01-25 PROCEDURE — 36415 COLL VENOUS BLD VENIPUNCTURE: CPT | Performed by: PHYSICIAN ASSISTANT

## 2021-01-25 PROCEDURE — 85007 BL SMEAR W/DIFF WBC COUNT: CPT | Performed by: PHYSICIAN ASSISTANT

## 2021-01-25 PROCEDURE — 85027 COMPLETE CBC AUTOMATED: CPT | Performed by: PHYSICIAN ASSISTANT

## 2021-01-26 ENCOUNTER — TELEPHONE (OUTPATIENT)
Dept: HEMATOLOGY ONCOLOGY | Facility: CLINIC | Age: 71
End: 2021-01-26

## 2021-01-26 NOTE — TELEPHONE ENCOUNTER
Called and spoke with Usman Daigle  Reduce prednisone to 15mg daily  She will recheck CBC next week

## 2021-02-05 ENCOUNTER — LAB (OUTPATIENT)
Dept: LAB | Age: 71
End: 2021-02-05
Payer: MEDICARE

## 2021-02-08 NOTE — PROGRESS NOTES
Called pt and instructed her to alternate prednisone 10mg and 15mg this week per Adonis Nascimento  Confirmed Nplate appt next Monday  Pt verbalized understanding

## 2021-02-12 ENCOUNTER — TELEPHONE (OUTPATIENT)
Dept: OTHER | Facility: OTHER | Age: 71
End: 2021-02-12

## 2021-02-12 ENCOUNTER — LAB (OUTPATIENT)
Dept: LAB | Age: 71
End: 2021-02-12
Payer: MEDICARE

## 2021-02-15 ENCOUNTER — HOSPITAL ENCOUNTER (OUTPATIENT)
Dept: INFUSION CENTER | Facility: HOSPITAL | Age: 71
Discharge: HOME | End: 2021-02-15
Payer: MEDICARE

## 2021-02-15 VITALS — TEMPERATURE: 98.2 F

## 2021-02-15 DIAGNOSIS — C91.10 CLL (CHRONIC LYMPHOCYTIC LEUKEMIA) (HCC): Primary | ICD-10-CM

## 2021-02-15 DIAGNOSIS — D69.3 IDIOPATHIC THROMBOCYTOPENIC PURPURA (ITP) (HCC): ICD-10-CM

## 2021-02-15 PROCEDURE — 96372 THER/PROPH/DIAG INJ SC/IM: CPT

## 2021-02-15 RX ADMIN — ROMIPLOSTIM 125 MCG: 125 INJECTION, POWDER, LYOPHILIZED, FOR SOLUTION SUBCUTANEOUS at 14:11

## 2021-02-15 NOTE — PLAN OF CARE
Problem: Potential for Falls  Goal: Patient will remain free of falls  Description: INTERVENTIONS:  - Assess patient frequently for physical needs  -  Identify cognitive and physical deficits and behaviors that affect risk of falls    -  [place] fall precautions as indicated by assessment   - Educate patient/family on patient safety including physical limitations  - Instruct patient to call for assistance with activity based on assessment  - Modify environment to reduce risk of injury  - Consider OT/PT consult to assist with strengthening/mobility  Outcome: Progressing

## 2021-02-17 ENCOUNTER — TELEPHONE (OUTPATIENT)
Dept: OTHER | Facility: OTHER | Age: 71
End: 2021-02-17

## 2021-02-17 ENCOUNTER — LAB (OUTPATIENT)
Dept: LAB | Age: 71
End: 2021-02-17
Payer: MEDICARE

## 2021-02-17 DIAGNOSIS — D69.3 ACUTE ITP (HCC): ICD-10-CM

## 2021-02-17 LAB
BASOPHILS # BLD AUTO: 0.04 THOUSANDS/ΜL (ref 0–0.1)
BASOPHILS NFR BLD AUTO: 0 % (ref 0–1)
EOSINOPHIL # BLD AUTO: 0.03 THOUSAND/ΜL (ref 0–0.61)
EOSINOPHIL NFR BLD AUTO: 0 % (ref 0–6)
ERYTHROCYTE [DISTWIDTH] IN BLOOD BY AUTOMATED COUNT: 14.9 % (ref 11.6–15.1)
HCT VFR BLD AUTO: 44.2 % (ref 34.8–46.1)
HGB BLD-MCNC: 13.2 G/DL (ref 11.5–15.4)
IMM GRANULOCYTES # BLD AUTO: 0.13 THOUSAND/UL (ref 0–0.2)
IMM GRANULOCYTES NFR BLD AUTO: 1 % (ref 0–2)
LYMPHOCYTES # BLD AUTO: 4.97 THOUSANDS/ΜL (ref 0.6–4.47)
LYMPHOCYTES NFR BLD AUTO: 38 % (ref 14–44)
MCH RBC QN AUTO: 28.3 PG (ref 26.8–34.3)
MCHC RBC AUTO-ENTMCNC: 29.9 G/DL (ref 31.4–37.4)
MCV RBC AUTO: 95 FL (ref 82–98)
MONOCYTES # BLD AUTO: 0.79 THOUSAND/ΜL (ref 0.17–1.22)
MONOCYTES NFR BLD AUTO: 6 % (ref 4–12)
NEUTROPHILS # BLD AUTO: 7.11 THOUSANDS/ΜL (ref 1.85–7.62)
NEUTS SEG NFR BLD AUTO: 55 % (ref 43–75)
NRBC BLD AUTO-RTO: 0 /100 WBCS
PLATELET # BLD AUTO: 26 THOUSANDS/UL (ref 149–390)
PMV BLD AUTO: 13.2 FL (ref 8.9–12.7)
RBC # BLD AUTO: 4.67 MILLION/UL (ref 3.81–5.12)
WBC # BLD AUTO: 13.07 THOUSAND/UL (ref 4.31–10.16)

## 2021-02-17 PROCEDURE — 85025 COMPLETE CBC W/AUTO DIFF WBC: CPT

## 2021-02-17 PROCEDURE — 36415 COLL VENOUS BLD VENIPUNCTURE: CPT

## 2021-02-17 NOTE — TELEPHONE ENCOUNTER
Phone: [phone] Hema Oconnell from [address]: [place] Townsend : [phone] Reason: Critical Results  Needs the on call Provider to call her back   Thank You

## 2021-02-18 DIAGNOSIS — F32.A DEPRESSION, UNSPECIFIED DEPRESSION TYPE: ICD-10-CM

## 2021-02-18 DIAGNOSIS — D69.3 ACUTE ITP (HCC): ICD-10-CM

## 2021-02-18 RX ORDER — PREDNISONE 1 MG/1
15 TABLET ORAL DAILY
Qty: 90 TABLET | Refills: 3 | Status: SHIPPED | OUTPATIENT
Start: 2021-02-18 | End: 2021-06-14 | Stop reason: SDUPTHER

## 2021-02-18 RX ORDER — SERTRALINE HYDROCHLORIDE 25 MG/1
25 TABLET, FILM COATED ORAL DAILY
Qty: 30 TABLET | Refills: 1 | Status: SHIPPED | OUTPATIENT
Start: 2021-02-18 | End: 2021-07-01 | Stop reason: SDUPTHER

## 2021-02-18 NOTE — TELEPHONE ENCOUNTER
Called pt and instructed her to increase Prednisone to 15mg daily  She requested a new prescription for prednisone as well as sertraline

## 2021-02-25 ENCOUNTER — LAB (OUTPATIENT)
Dept: LAB | Age: 71
End: 2021-02-25
Payer: MEDICARE

## 2021-02-25 DIAGNOSIS — C91.10 CLL (CHRONIC LYMPHOCYTIC LEUKEMIA) (HCC): ICD-10-CM

## 2021-02-25 DIAGNOSIS — D69.3 ACUTE ITP (HCC): ICD-10-CM

## 2021-02-25 DIAGNOSIS — D69.3 IDIOPATHIC THROMBOCYTOPENIC PURPURA (ITP) (HCC): Primary | ICD-10-CM

## 2021-02-25 LAB
BASOPHILS # BLD MANUAL: 0.13 THOUSAND/UL (ref 0–0.1)
BASOPHILS NFR MAR MANUAL: 1 % (ref 0–1)
EOSINOPHIL # BLD MANUAL: 0.13 THOUSAND/UL (ref 0–0.4)
EOSINOPHIL NFR BLD MANUAL: 1 % (ref 0–6)
ERYTHROCYTE [DISTWIDTH] IN BLOOD BY AUTOMATED COUNT: 14.9 % (ref 11.6–15.1)
HCT VFR BLD AUTO: 42.7 % (ref 34.8–46.1)
HGB BLD-MCNC: 12.8 G/DL (ref 11.5–15.4)
LYMPHOCYTES # BLD AUTO: 32 % (ref 14–44)
LYMPHOCYTES # BLD AUTO: 4.03 THOUSAND/UL (ref 0.6–4.47)
MCH RBC QN AUTO: 28.1 PG (ref 26.8–34.3)
MCHC RBC AUTO-ENTMCNC: 30 G/DL (ref 31.4–37.4)
MCV RBC AUTO: 94 FL (ref 82–98)
MONOCYTES # BLD AUTO: 0.63 THOUSAND/UL (ref 0–1.22)
MONOCYTES NFR BLD: 5 % (ref 4–12)
MYELOCYTES NFR BLD MANUAL: 1 % (ref 0–1)
NEUTROPHILS # BLD MANUAL: 7.3 THOUSAND/UL (ref 1.85–7.62)
NEUTS SEG NFR BLD AUTO: 58 % (ref 43–75)
NRBC BLD AUTO-RTO: 0 /100 WBCS
PLATELET # BLD AUTO: 510 THOUSANDS/UL (ref 149–390)
PLATELET BLD QL SMEAR: ABNORMAL
PLATELET CLUMP BLD QL SMEAR: PRESENT
PMV BLD AUTO: 9.9 FL (ref 8.9–12.7)
RBC # BLD AUTO: 4.55 MILLION/UL (ref 3.81–5.12)
RBC MORPH BLD: NORMAL
TOTAL CELLS COUNTED SPEC: 100
VARIANT LYMPHS # BLD AUTO: 2 %
WBC # BLD AUTO: 12.59 THOUSAND/UL (ref 4.31–10.16)

## 2021-02-25 PROCEDURE — 85027 COMPLETE CBC AUTOMATED: CPT

## 2021-02-25 PROCEDURE — 36415 COLL VENOUS BLD VENIPUNCTURE: CPT

## 2021-02-25 PROCEDURE — 85007 BL SMEAR W/DIFF WBC COUNT: CPT

## 2021-02-26 ENCOUNTER — TELEPHONE (OUTPATIENT)
Dept: HEMATOLOGY ONCOLOGY | Facility: MEDICAL CENTER | Age: 71
End: 2021-02-26

## 2021-02-26 ENCOUNTER — TELEPHONE (OUTPATIENT)
Dept: HEMATOLOGY ONCOLOGY | Facility: CLINIC | Age: 71
End: 2021-02-26

## 2021-02-26 DIAGNOSIS — C91.10 CLL (CHRONIC LYMPHOCYTIC LEUKEMIA) (HCC): Primary | ICD-10-CM

## 2021-02-26 DIAGNOSIS — D69.3 IDIOPATHIC THROMBOCYTOPENIC PURPURA (ITP) (HCC): ICD-10-CM

## 2021-02-26 NOTE — TELEPHONE ENCOUNTER
patient called in stated that she was following up on appointment date of 03/01/2021 and 03/15/2021 (for an injection)  - it is now scheduled 03/16/2021         Patient also wanted to know if she can be scheduled for covid vaccine a good call back number is [phone]

## 2021-02-26 NOTE — TELEPHONE ENCOUNTER
----- Message from Starla Walters PA-C sent at 2/25/2021  4:19 PM EST -----  Reduce prednisone to 15mg alt with 10mg  Nplate has been very beneficial on q 4 week basis    Please arrange for repeat Nplate  to coordinate with 3/16/2021 appointment)

## 2021-02-26 NOTE — TELEPHONE ENCOUNTER
Advised patient that Angely Goode is currently scheduling COVID vaccines for age 76 and above  Patient does not have a computer to pre-register for a vaccine  Patient given [phone] opt [phone] number to call

## 2021-02-26 NOTE — TELEPHONE ENCOUNTER
Patient made aware of new [facility] appointment on 3/16  She is aware NPLATE appointments for 3/1 and 3/15 are cancelled  She will decrease prednisone to 15 mg and 10 mg every other day  All questions answered and patient verbalized understanding

## 2021-03-01 ENCOUNTER — HOSPITAL ENCOUNTER (OUTPATIENT)
Dept: INFUSION CENTER | Facility: HOSPITAL | Age: 71
End: 2021-03-01

## 2021-03-03 ENCOUNTER — LAB (OUTPATIENT)
Dept: LAB | Age: 71
End: 2021-03-03
Payer: MEDICARE

## 2021-03-03 DIAGNOSIS — D69.3 ACUTE ITP (HCC): ICD-10-CM

## 2021-03-03 LAB
BASOPHILS # BLD MANUAL: 0 THOUSAND/UL (ref 0–0.1)
BASOPHILS NFR MAR MANUAL: 0 % (ref 0–1)
EOSINOPHIL # BLD MANUAL: 0 THOUSAND/UL (ref 0–0.4)
EOSINOPHIL NFR BLD MANUAL: 0 % (ref 0–6)
ERYTHROCYTE [DISTWIDTH] IN BLOOD BY AUTOMATED COUNT: 15 % (ref 11.6–15.1)
HCT VFR BLD AUTO: 43.6 % (ref 34.8–46.1)
HGB BLD-MCNC: 13.2 G/DL (ref 11.5–15.4)
LYMPHOCYTES # BLD AUTO: 36 % (ref 14–44)
LYMPHOCYTES # BLD AUTO: 4.87 THOUSAND/UL (ref 0.6–4.47)
MCH RBC QN AUTO: 28.6 PG (ref 26.8–34.3)
MCHC RBC AUTO-ENTMCNC: 30.3 G/DL (ref 31.4–37.4)
MCV RBC AUTO: 94 FL (ref 82–98)
MONOCYTES # BLD AUTO: 0.14 THOUSAND/UL (ref 0–1.22)
MONOCYTES NFR BLD: 1 % (ref 4–12)
NEUTROPHILS # BLD MANUAL: 8.25 THOUSAND/UL (ref 1.85–7.62)
NEUTS BAND NFR BLD MANUAL: 1 % (ref 0–8)
NEUTS SEG NFR BLD AUTO: 60 % (ref 43–75)
NRBC BLD AUTO-RTO: 0 /100 WBCS
PLATELET # BLD AUTO: 281 THOUSANDS/UL (ref 149–390)
PLATELET BLD QL SMEAR: ADEQUATE
PMV BLD AUTO: 10.7 FL (ref 8.9–12.7)
RBC # BLD AUTO: 4.62 MILLION/UL (ref 3.81–5.12)
RBC MORPH BLD: NORMAL
SMUDGE CELLS BLD QL SMEAR: PRESENT
TOTAL CELLS COUNTED SPEC: 100
VARIANT LYMPHS # BLD AUTO: 2 %
WBC # BLD AUTO: 13.53 THOUSAND/UL (ref 4.31–10.16)

## 2021-03-03 PROCEDURE — 85027 COMPLETE CBC AUTOMATED: CPT

## 2021-03-03 PROCEDURE — 36415 COLL VENOUS BLD VENIPUNCTURE: CPT

## 2021-03-03 PROCEDURE — 85007 BL SMEAR W/DIFF WBC COUNT: CPT

## 2021-03-08 ENCOUNTER — LAB (OUTPATIENT)
Dept: LAB | Age: 71
End: 2021-03-08
Payer: MEDICARE

## 2021-03-08 DIAGNOSIS — D69.3 ACUTE ITP (HCC): ICD-10-CM

## 2021-03-08 LAB
BASOPHILS # BLD AUTO: 0.19 THOUSANDS/ΜL (ref 0–0.1)
BASOPHILS NFR BLD AUTO: 2 % (ref 0–1)
EOSINOPHIL # BLD AUTO: 0.01 THOUSAND/ΜL (ref 0–0.61)
EOSINOPHIL NFR BLD AUTO: 0 % (ref 0–6)
ERYTHROCYTE [DISTWIDTH] IN BLOOD BY AUTOMATED COUNT: 15 % (ref 11.6–15.1)
HCT VFR BLD AUTO: 43.2 % (ref 34.8–46.1)
HGB BLD-MCNC: 12.9 G/DL (ref 11.5–15.4)
IMM GRANULOCYTES # BLD AUTO: 0.09 THOUSAND/UL (ref 0–0.2)
IMM GRANULOCYTES NFR BLD AUTO: 1 % (ref 0–2)
LYMPHOCYTES # BLD AUTO: 4.02 THOUSANDS/ΜL (ref 0.6–4.47)
LYMPHOCYTES NFR BLD AUTO: 35 % (ref 14–44)
MCH RBC QN AUTO: 28 PG (ref 26.8–34.3)
MCHC RBC AUTO-ENTMCNC: 29.9 G/DL (ref 31.4–37.4)
MCV RBC AUTO: 94 FL (ref 82–98)
MONOCYTES # BLD AUTO: 0.57 THOUSAND/ΜL (ref 0.17–1.22)
MONOCYTES NFR BLD AUTO: 5 % (ref 4–12)
NEUTROPHILS # BLD AUTO: 6.63 THOUSANDS/ΜL (ref 1.85–7.62)
NEUTS SEG NFR BLD AUTO: 57 % (ref 43–75)
NRBC BLD AUTO-RTO: 0 /100 WBCS
PLATELET # BLD AUTO: 134 THOUSANDS/UL (ref 149–390)
PMV BLD AUTO: 11.3 FL (ref 8.9–12.7)
RBC # BLD AUTO: 4.6 MILLION/UL (ref 3.81–5.12)
WBC # BLD AUTO: 11.51 THOUSAND/UL (ref 4.31–10.16)

## 2021-03-08 PROCEDURE — 85025 COMPLETE CBC W/AUTO DIFF WBC: CPT

## 2021-03-08 PROCEDURE — 36415 COLL VENOUS BLD VENIPUNCTURE: CPT

## 2021-03-10 ENCOUNTER — TELEPHONE (OUTPATIENT)
Dept: HEMATOLOGY ONCOLOGY | Facility: CLINIC | Age: 71
End: 2021-03-10

## 2021-03-10 NOTE — TELEPHONE ENCOUNTER
Reviewed plt count with patient 3/8/2021 134  Patient is on prednisone 10 mg alternating with 15 mg  And is due for N-plate on 6/25/0786  FYI to Dr Alana Euceda RNs

## 2021-03-15 ENCOUNTER — TELEPHONE (OUTPATIENT)
Dept: OTHER | Facility: OTHER | Age: 71
End: 2021-03-15

## 2021-03-15 ENCOUNTER — APPOINTMENT (OUTPATIENT)
Dept: LAB | Age: 71
End: 2021-03-15
Payer: MEDICARE

## 2021-03-15 ENCOUNTER — TELEPHONE (OUTPATIENT)
Dept: HEMATOLOGY ONCOLOGY | Facility: CLINIC | Age: 71
End: 2021-03-15

## 2021-03-15 DIAGNOSIS — D69.3 ACUTE ITP (HCC): ICD-10-CM

## 2021-03-15 LAB
ANISOCYTOSIS BLD QL SMEAR: PRESENT
BASOPHILS # BLD MANUAL: 0 THOUSAND/UL (ref 0–0.1)
BASOPHILS NFR MAR MANUAL: 0 % (ref 0–1)
EOSINOPHIL # BLD MANUAL: 0 THOUSAND/UL (ref 0–0.4)
EOSINOPHIL NFR BLD MANUAL: 0 % (ref 0–6)
ERYTHROCYTE [DISTWIDTH] IN BLOOD BY AUTOMATED COUNT: 15.9 % (ref 11.6–15.1)
HCT VFR BLD AUTO: 43.3 % (ref 34.8–46.1)
HGB BLD-MCNC: 13.2 G/DL (ref 11.5–15.4)
LYMPHOCYTES # BLD AUTO: 36 % (ref 14–44)
LYMPHOCYTES # BLD AUTO: 5.48 THOUSAND/UL (ref 0.6–4.47)
MCH RBC QN AUTO: 28.8 PG (ref 26.8–34.3)
MCHC RBC AUTO-ENTMCNC: 30.5 G/DL (ref 31.4–37.4)
MCV RBC AUTO: 95 FL (ref 82–98)
MONOCYTES # BLD AUTO: 0.61 THOUSAND/UL (ref 0–1.22)
MONOCYTES NFR BLD: 4 % (ref 4–12)
NEUTROPHILS # BLD MANUAL: 8.68 THOUSAND/UL (ref 1.85–7.62)
NEUTS SEG NFR BLD AUTO: 57 % (ref 43–75)
NRBC BLD AUTO-RTO: 0 /100 WBCS
PLATELET # BLD AUTO: 30 THOUSANDS/UL (ref 149–390)
PLATELET BLD QL SMEAR: ABNORMAL
PMV BLD AUTO: 12.5 FL (ref 8.9–12.7)
POIKILOCYTOSIS BLD QL SMEAR: PRESENT
RBC # BLD AUTO: 4.58 MILLION/UL (ref 3.81–5.12)
SMUDGE CELLS BLD QL SMEAR: PRESENT
TOTAL CELLS COUNTED SPEC: 100
VARIANT LYMPHS # BLD AUTO: 3 %
WBC # BLD AUTO: 15.22 THOUSAND/UL (ref 4.31–10.16)

## 2021-03-15 PROCEDURE — 85007 BL SMEAR W/DIFF WBC COUNT: CPT

## 2021-03-15 PROCEDURE — 85027 COMPLETE CBC AUTOMATED: CPT

## 2021-03-15 PROCEDURE — 36415 COLL VENOUS BLD VENIPUNCTURE: CPT

## 2021-03-15 NOTE — TELEPHONE ENCOUNTER
Appointment Confirmation     Appointment with  Dr Nely Granados   Appointment date & time 03/16 at 1:20pm    [facility]   Patient verbilized Understanding  Yes

## 2021-03-16 ENCOUNTER — OFFICE VISIT (OUTPATIENT)
Dept: HEMATOLOGY ONCOLOGY | Facility: CLINIC | Age: 71
End: 2021-03-16
Payer: MEDICARE

## 2021-03-16 ENCOUNTER — HOSPITAL ENCOUNTER (OUTPATIENT)
Dept: INFUSION CENTER | Facility: HOSPITAL | Age: 71
Discharge: HOME | End: 2021-03-16
Attending: INTERNAL MEDICINE
Payer: MEDICARE

## 2021-03-16 VITALS
OXYGEN SATURATION: 98 % | WEIGHT: 120.4 LBS | HEIGHT: 65 IN | SYSTOLIC BLOOD PRESSURE: 130 MMHG | TEMPERATURE: 94.4 F | DIASTOLIC BLOOD PRESSURE: 72 MMHG | BODY MASS INDEX: 20.06 KG/M2 | HEART RATE: 79 BPM | RESPIRATION RATE: 16 BRPM

## 2021-03-16 VITALS — HEART RATE: 75 BPM | RESPIRATION RATE: 18 BRPM | SYSTOLIC BLOOD PRESSURE: 139 MMHG | DIASTOLIC BLOOD PRESSURE: 79 MMHG

## 2021-03-16 DIAGNOSIS — C91.10 CLL (CHRONIC LYMPHOCYTIC LEUKEMIA) (HCC): Primary | ICD-10-CM

## 2021-03-16 DIAGNOSIS — D69.3 IDIOPATHIC THROMBOCYTOPENIC PURPURA (ITP) (HCC): Primary | ICD-10-CM

## 2021-03-16 DIAGNOSIS — D69.3 IDIOPATHIC THROMBOCYTOPENIC PURPURA (ITP) (HCC): ICD-10-CM

## 2021-03-16 DIAGNOSIS — C91.10 CLL (CHRONIC LYMPHOCYTIC LEUKEMIA) (HCC): ICD-10-CM

## 2021-03-16 PROCEDURE — 99214 OFFICE O/P EST MOD 30 MIN: CPT | Performed by: INTERNAL MEDICINE

## 2021-03-16 PROCEDURE — 96372 THER/PROPH/DIAG INJ SC/IM: CPT

## 2021-03-16 RX ADMIN — ROMIPLOSTIM 125 MCG: 125 INJECTION, POWDER, LYOPHILIZED, FOR SOLUTION SUBCUTANEOUS at 14:31

## 2021-03-16 NOTE — PROGRESS NOTES
Verified with Kim Mesa RN that nplate is to be given every 21 days  Called pt and left voicemail in regards to nplate frequency and provided her with next appt date and time  Also, reminded pt to get labs drawn prior to appt

## 2021-03-16 NOTE — PROGRESS NOTES
Hematology Outpatient Follow - Up Note  Lakia Daigle 78 y o  female MRN: @ Encounter: [id number]        Date:  3/16/2021        Assessment/ Plan:    Autoimmune hemolytic anemia, IgG subtype, acute immune thrombocytopenic purpura and splenomegaly, she had Sjogren syndrome, positive BALDO, rheumatoid factor and CCP     Initiated in the hospital on Decadron in June 2020 with short-lived response     Initiated on Promacta 75 milligram p o  Daily with improvement in platelet count initially but no sustained response despite increase to Promacta 75 mg alternating with 150 mg and therefore, it was discontinued          CT scan 6/19/20 identified diffuse mild retroperitoneal adenopathy and mildly enlarged retrocrural lymph node, hepatomegaly and splenomegaly 15cm     Flow cytometry 8/10/20 on the peripheral blood showed CLL pattern      Bone marrow biopsy 9/2020 showed CLL, initiated on acalabrutinib 100 mg p o  b i d  with good response 9/2020 and normalization of CBC and differential later on the dose was reduced to 100 mg p o  daily mid October because of fatigue      2   Recurrence of thrombocytopenia 27,000 11/2/20 once prednisone was discontinued  Prednisone re-introduced back at 5 mg p o  daily, platelet count increased to 50,000 range, fluctuated to 20,000 ranged, prednisone increased to 10mg daily 11/17/20, increased to 20mg 11/23/20 when platelets decreased to 12,000  Prednisone dose was 20 milligrams alternating with 15 milligrams every other day 12/2/2020  and N-plate ordered 25/76/13 when platelet count 48,275 12/15/20  Platelet count up to 307,000 12/28/20 and Nplate deferred 3/4/0269  Platelet count 90,208 1/11/21 and decreased to 28,000 1/18/21 and N-plate administered      Now I believe the best option for Nplate to be given at 125 micro g subcu every 3 weeks, hold if platelet above 935,135     Prednisone 10 mg alternate with 15 mg the next day hoping to decrease by 5 mg every 3 weeks    CBC every 3 weeks and follow-up in 6 weeks          Labs and imaging studies are reviewed by ordering provider once results are available  If there are findings that need immediate attention, you will be contacted when results available  Discussing results and the implication on your healthcare is best discussed in person at your follow-up visit  HPI:    Natasha Cortes is a 73-year-old  female who was admitted to [facility] 6/2020 regarding easy bruising and bleeding       She has history of polyclonal gammopathy, with no evidence of monoclonal protein, Sjogren syndrome, autoimmune connective tissue disease with highly elevated sed rate, CRP, BALDO, rheumatoid factor   She is followed by Rheumatology she was prescribed hydroxychloroquine 200 milligram p o  B i d         She was found to have grade 4 thrombocytopenia with platelet count of 2315, hemoglobin 10, WBC 7 2, neutrophils, 34 percent lymphocytes     Flow cytometry on the peripheral blood showed CLL pattern positive for CD 23, CD 20     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 with PATTI positive +for IgG, plus for C3, bilirubin 0 5     CT scan of the chest abdomen pelvis on 06/19/2020 showed mild confluent periaortic/retroperitoneal lymphadenopathy measuring up to 1 1 centimeter in short axis, bilateral external iliac lymphadenopathy up to 10 millimeters, retrocrural lymph node measuring 1 3 centimeters spleen 15 centimeter, liver with hepatomegaly no evidence of masses     She was treated with dexamethasone for 3 days with improvement of platelet count up to 62,000     On 06/25/2020 platelet count OT 14,229     Initiated on Promacta 75 milligram p o  Daily in July 2020, platelets count up gradually with platelet count of 54,047 on 08/17/2020, hemoglobin 11 4, WBC 5 0, 57 percent        8/28/20:  Patient had CBC as gums were bleeding that morning AM, no further bleeding   Platelet count was 12,800    She was advised to take 150 mg of promacta every other day and 75 mg the other days        Platelet count was 48,401 8/31/20 and she was  advised to initiate prednisone 40mg po daily, tapering by 10mg po weekly  [place] returned to 75mg po daily      Platelet count increased to 173,000 9/4/20           She had BMBX 9/9/20:  B-cell lymphoproliferative disorder, compatible with chronic lymphocytic leukemia   Virtually absent stainable storage iron  On flow cytometry, CLL phenotype accounted for 11%; CD5+, CD23+, CD20+ (dim) and CD 38-  9/14/20:  platelet count 954     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     Initiated on acalabrutinib 100 mg p o  b i d  since the beginning of October 2020   She was seen on 10/14/2020 with severe depression requiring admission to the hospital, she is on Zoloft 25 mg p o  daily, Synthroid 75 micro g p o  daily     Acalabrutinib was reduced to 100 mg p o  daily   Discontinued prednisone, however CBC on 11/02/2020 of 23,000, initiated on prednisone 20 mg p o  daily, platelet went up to 66,939, the dose was reduced slowly to 20 mg alternate with 15 mg the next days, platelet count [place] 72673 12/7/20 and currently she is on 15 mg p o  daily     She is on Nplate 786 micro g every 3 weeks hold if platelets above 007,036  Interval History:        Previous Treatment:         Test Results:    Imaging: No results found      Labs:   Lab Results   Component Value Date    WBC 15 22 (H) 03/15/2021    HGB 13 2 03/15/2021    HCT 43 3 03/15/2021    MCV 95 03/15/2021    PLT 30 (LL) 03/15/2021     Lab Results   Component Value Date    K 3 7 11/06/2020     (H) 11/06/2020    CO2 27 11/06/2020    BUN 9 11/06/2020    CREATININE 0 75 11/06/2020    GLUF 76 09/21/2020    CALCIUM 9 1 11/06/2020    CORRECTEDCA 9 8 11/06/2020    AST 13 11/06/2020    ALT 13 11/06/2020    ALKPHOS 56 11/06/2020    EGFR 82 11/06/2020       No results found for: IRON, TIBC, FERRITIN    No results found for: [id number]      ROS: Review of Systems   Constitutional: Negative for appetite change, chills, diaphoresis, fatigue and unexpected weight change  HENT:   Negative for mouth sores, nosebleeds, sore throat, trouble swallowing and voice change  Eyes: Negative for eye problems and icterus  Respiratory: Negative for chest tightness, cough, hemoptysis and wheezing  Cardiovascular: Negative for chest pain, leg swelling and palpitations  Gastrointestinal: Negative for abdominal distention, abdominal pain, blood in stool, constipation, diarrhea, nausea and vomiting  Endocrine: Negative for hot flashes  Genitourinary: Negative for bladder incontinence, difficulty urinating, dyspareunia, dysuria and frequency  Musculoskeletal: Negative for arthralgias, back pain, gait problem, neck pain and neck stiffness  Skin: Negative for itching and rash  Neurological: Negative for dizziness, gait problem, headaches, numbness, seizures and speech difficulty  Hematological: Negative for adenopathy  Does not bruise/bleed easily  Psychiatric/Behavioral: Negative for decreased concentration, depression, sleep disturbance and suicidal ideas  The patient is not nervous/anxious  Current Medications: Reviewed  Allergies: Reviewed  PMH/FH/SH:  Reviewed      Physical Exam:    Body surface area is 1 59 meters squared  Wt Readings from Last 3 Encounters:   03/16/21 54 6 kg (120 lb 6 4 oz)   01/20/21 50 9 kg (112 lb 3 2 oz)   12/09/20 48 3 kg (106 lb 6 4 oz)        Temp Readings from Last 3 Encounters:   03/16/21 (!) 94 4 °F (34 7 °C) (Tympanic)   02/15/21 98 2 °F (36 8 °C) (Temporal)   01/20/21 97 8 °F (36 6 °C)        BP Readings from Last 3 Encounters:   03/16/21 139/79   03/16/21 130/72   01/20/21 141/72         Pulse Readings from Last 3 Encounters:   03/16/21 75   03/16/21 79   01/20/21 61        Physical Exam  Vitals signs reviewed  Constitutional:       General: She is not in acute distress  Appearance: She is well-developed  She is not diaphoretic     HENT:      Head: Normocephalic and atraumatic  Eyes:      Conjunctiva/sclera: Conjunctivae normal    Neck:      Musculoskeletal: Normal range of motion and neck supple  Trachea: No tracheal deviation  Cardiovascular:      Rate and Rhythm: Normal rate and regular rhythm  Heart sounds: No murmur  No friction rub  No gallop  Pulmonary:      Effort: Pulmonary effort is normal  No respiratory distress  Breath sounds: Normal breath sounds  No wheezing or rales  Chest:      Chest wall: No tenderness  Abdominal:      General: There is no distension  Palpations: Abdomen is soft  Tenderness: There is no abdominal tenderness  Lymphadenopathy:      Cervical: No cervical adenopathy  Skin:     General: Skin is warm and dry  Coloration: Skin is not pale  Findings: Bruising present  No erythema  Neurological:      Mental Status: She is alert and oriented to person, place, and time  Psychiatric:         Behavior: Behavior normal          Thought Content: Thought content normal          Judgment: Judgment normal          ECO    Goals and Barriers:  Current Goal: Minimize effects of disease  Barriers: None  Patient's Capacity to Self Care:  Patient is able to self care      Code Status: @[facility]@

## 2021-03-16 NOTE — PLAN OF CARE
Problem: Potential for Falls  Goal: Patient will remain free of falls  Description: INTERVENTIONS:  - Assess patient frequently for physical needs  -  Identify cognitive and physical deficits and behaviors that affect risk of falls    -  [place] fall precautions as indicated by assessment   - Educate patient/family on patient safety including physical limitations  - Instruct patient to call for assistance with activity based on assessment  - Modify environment to reduce risk of injury  - Consider OT/PT consult to assist with strengthening/mobility  Outcome: Progressing

## 2021-03-25 ENCOUNTER — DOCUMENTATION (OUTPATIENT)
Dept: HEMATOLOGY ONCOLOGY | Facility: CLINIC | Age: 71
End: 2021-03-25

## 2021-03-25 ENCOUNTER — OFFICE VISIT (OUTPATIENT)
Dept: FAMILY MEDICINE CLINIC | Facility: CLINIC | Age: 71
End: 2021-03-25
Payer: MEDICARE

## 2021-03-25 VITALS
RESPIRATION RATE: 16 BRPM | SYSTOLIC BLOOD PRESSURE: 124 MMHG | BODY MASS INDEX: 19.89 KG/M2 | HEIGHT: 66 IN | OXYGEN SATURATION: 98 % | HEART RATE: 80 BPM | DIASTOLIC BLOOD PRESSURE: 72 MMHG | TEMPERATURE: 97.5 F | WEIGHT: 123.8 LBS

## 2021-03-25 DIAGNOSIS — D69.3 IDIOPATHIC THROMBOCYTOPENIC PURPURA (ITP) (HCC): ICD-10-CM

## 2021-03-25 DIAGNOSIS — Z00.00 MEDICARE ANNUAL WELLNESS VISIT, INITIAL: ICD-10-CM

## 2021-03-25 DIAGNOSIS — C91.10 CLL (CHRONIC LYMPHOCYTIC LEUKEMIA) (HCC): ICD-10-CM

## 2021-03-25 DIAGNOSIS — E55.9 VITAMIN D DEFICIENCY: ICD-10-CM

## 2021-03-25 DIAGNOSIS — F41.9 ANXIETY: ICD-10-CM

## 2021-03-25 DIAGNOSIS — F32.A DEPRESSION, UNSPECIFIED DEPRESSION TYPE: ICD-10-CM

## 2021-03-25 DIAGNOSIS — E03.4 HYPOTHYROIDISM DUE TO ACQUIRED ATROPHY OF THYROID: Primary | ICD-10-CM

## 2021-03-25 DIAGNOSIS — M35.00 SJOGREN'S SYNDROME, WITH UNSPECIFIED ORGAN INVOLVEMENT (HCC): ICD-10-CM

## 2021-03-25 PROCEDURE — 1123F ACP DISCUSS/DSCN MKR DOCD: CPT | Performed by: FAMILY MEDICINE

## 2021-03-25 PROCEDURE — 99214 OFFICE O/P EST MOD 30 MIN: CPT | Performed by: FAMILY MEDICINE

## 2021-03-25 PROCEDURE — G0438 PPPS, INITIAL VISIT: HCPCS | Performed by: FAMILY MEDICINE

## 2021-03-25 RX ORDER — LANOLIN ALCOHOL/MO/W.PET/CERES
3 CREAM (GRAM) TOPICAL
COMMUNITY
End: 2021-09-15

## 2021-03-25 NOTE — PATIENT INSTRUCTIONS
Medicare Preventive Visit Patient Instructions  Thank you for completing your Welcome to Medicare Visit or Medicare Annual Wellness Visit today  Your next wellness visit will be due in one year (3/26/2022)  The screening/preventive services that you may require over the next 5-10 years are detailed below  Some tests may not apply to you based off risk factors and/or age  Screening tests ordered at today's visit but not completed yet may show as past due  Also, please note that scanned in results may not display below  Preventive Screenings:  Service Recommendations Previous Testing/Comments   Colorectal Cancer Screening  * Colonoscopy    * Fecal Occult Blood Test (FOBT)/Fecal Immunochemical Test (FIT)  * Fecal DNA/Cologuard Test  * Flexible Sigmoidoscopy Age: 54-65 years old   Colonoscopy: every 10 years (may be performed more frequently if at higher risk)  OR  FOBT/FIT: every 1 year  OR  Cologuard: every 3 years  OR  Sigmoidoscopy: every 5 years  Screening may be recommended earlier than age 48 if at higher risk for colorectal cancer  Also, an individualized decision between you and your healthcare provider will decide whether screening between the ages of 74-80 would be appropriate  Colonoscopy: Not on file  FOBT/FIT: Not on file  Cologuard: Not on file  Sigmoidoscopy: Not on file          Breast Cancer Screening Age: 36 years old  Frequency: every 1-2 years  Not required if history of left and right mastectomy Mammogram: 06/02/2015        Cervical Cancer Screening Between the ages of 21-29, pap smear recommended once every 3 years  Between the ages of 33-67, can perform pap smear with HPV co-testing every 5 years     Recommendations may differ for women with a history of total hysterectomy, cervical cancer, or abnormal pap smears in past  Pap Smear: Not on file    Screening Not Indicated   Hepatitis C Screening Once for adults born between [facility]  More frequently in patients at high risk for Hepatitis C Hep C Antibody: Not on file        Diabetes Screening 1-2 times per year if you're at risk for diabetes or have pre-diabetes Fasting glucose: 76 mg/dL   A1C: No results in last 5 years    Screening Current   Cholesterol Screening Once every 5 years if you don't have a lipid disorder  May order more often based on risk factors  Lipid panel: 10/21/2020    Screening Current     Other Preventive Screenings Covered by Medicare:  1  Abdominal Aortic Aneurysm (AAA) Screening: covered once if your at risk  You're considered to be at risk if you have a family history of AAA  2  Lung Cancer Screening: covers low dose CT scan once per year if you meet all of the following conditions: (1) Age 50-69; (2) No signs or symptoms of lung cancer; (3) Current smoker or have quit smoking within the last 15 years; (4) You have a tobacco smoking history of at least 30 pack years (packs per day multiplied by number of years you smoked); (5) You get a written order from a healthcare provider  3  Glaucoma Screening: covered annually if you're considered high risk: (1) You have diabetes OR (2) Family history of glaucoma OR (3)  aged 48 and older OR (3)  American aged 72 and older  3  Osteoporosis Screening: covered every 2 years if you meet one of the following conditions: (1) You're estrogen deficient and at risk for osteoporosis based off medical history and other findings; (2) Have a vertebral abnormality; (3) On glucocorticoid therapy for more than 3 months; (4) Have primary hyperparathyroidism; (5) On osteoporosis medications and need to assess response to drug therapy  · Last bone density test (DXA Scan): Not on file  5  HIV Screening: covered annually if you're between the age of 12-76  Also covered annually if you are younger than 13 and older than 72 with risk factors for HIV infection  For pregnant patients, it is covered up to 3 times per pregnancy      Immunizations:  Immunization Recommendations   Influenza Vaccine Annual influenza vaccination during flu season is recommended for all persons aged >= 6 months who do not have contraindications   Pneumococcal Vaccine (Prevnar and Pneumovax)  * Prevnar = PCV13  * Pneumovax = PPSV23   Adults 25-60 years old: 1-3 doses may be recommended based on certain risk factors  Adults 72 years old: Prevnar (PCV13) vaccine recommended followed by Pneumovax (PPSV23) vaccine  If already received PPSV23 since turning 65, then PCV13 recommended at least one year after PPSV23 dose  Hepatitis B Vaccine 3 dose series if at intermediate or high risk (ex: diabetes, end stage renal disease, liver disease)   Tetanus (Td) Vaccine - COST NOT COVERED BY MEDICARE PART B Following completion of primary series, a booster dose should be given every 10 years to maintain immunity against tetanus  Td may also be given as tetanus wound prophylaxis  Tdap Vaccine - COST NOT COVERED BY MEDICARE PART B Recommended at least once for all adults  For pregnant patients, recommended with each pregnancy  Shingles Vaccine (Shingrix) - COST NOT COVERED BY MEDICARE PART B  2 shot series recommended in those aged 48 and above     Health Maintenance Due:      Topic Date Due    Hepatitis C Screening  Never done    Colorectal Cancer Screening  Never done    MAMMOGRAM  06/02/2016     Immunizations Due:      Topic Date Due    DTaP,Tdap,and Td Vaccines (1 - Tdap) Never done     Advance Directives   What are advance directives? Advance directives are legal documents that state your wishes and plans for medical care  These plans are made ahead of time in case you lose your ability to make decisions for yourself  Advance directives can apply to any medical decision, such as the treatments you want, and if you want to donate organs  What are the types of advance directives? There are many types of advance directives, and each state has rules about how to use them   You may choose a combination of any of the following:  · Living will: This is a written record of the treatment you want  You can also choose which treatments you do not want, which to limit, and which to stop at a certain time  This includes surgery, medicine, IV fluid, and tube feedings  · Durable power of  for healthcare [place] [facility]): This is a written record that states who you want to make healthcare choices for you when you are unable to make them for yourself  This person, called a proxy, is usually a family member or a friend  You may choose more than 1 proxy  · Do not resuscitate (DNR) order:  A DNR order is used in case your heart stops beating or you stop breathing  It is a request not to have certain forms of treatment, such as CPR  A DNR order may be included in other types of advance directives  · Medical directive: This covers the care that you want if you are in a coma, near death, or unable to make decisions for yourself  You can list the treatments you want for each condition  Treatment may include pain medicine, surgery, blood transfusions, dialysis, IV or tube feedings, and a ventilator (breathing machine)  · Values history: This document has questions about your views, beliefs, and how you feel and think about life  This information can help others choose the care that you would choose  Why are advance directives important? An advance directive helps you control your care  Although spoken wishes may be used, it is better to have your wishes written down  Spoken wishes can be misunderstood, or not followed  Treatments may be given even if you do not want them  An advance directive may make it easier for your family to make difficult choices about your care  Fall Prevention    Fall prevention  includes ways to make your home and other areas safer  It also includes ways you can move more carefully to prevent a fall   Health conditions that cause changes in your blood pressure, vision, or muscle strength and coordination may increase your risk for falls  Medicines may also increase your risk for falls if they make you dizzy, weak, or sleepy  Fall prevention tips:   · Stand or sit up slowly  · Use assistive devices as directed  · Wear shoes that fit well and have soles that   · Wear a personal alarm  · Stay active  · Manage your medical conditions  Home Safety Tips:  · Add items to prevent falls in the bathroom  · Keep paths clear  · Install bright lights in your home  · Keep items you use often on shelves within reach  · Paint or place reflective tape on the edges of your stairs  © Copyright [employer] 2018 Information is for End User's use only and may not be sold, redistributed or otherwise used for commercial purposes   All illustrations and images included in CareNotes® are the copyrighted property of A D A M , Inc  or [address] [place] [address] [employer]

## 2021-03-25 NOTE — PROGRESS NOTES
3-24-21  Received email request from [employer] regarding low funds for patients [place] covering [address]  1950    Patients funding for [address] has been renewed with PAN in the amount $[id number]  Member ID: [id number]  Group ID: [id number]  RxBin ID: [id number]  PCN: [id number]  Eligibility Start Date: 12/25/2020  Eligibility End Date: 03/24/2022  Assistance Amount: $8,[id number] 00    Epic Noted,

## 2021-03-25 NOTE — PROGRESS NOTES
Assessment and Plan:     Problem List Items Addressed This Visit     None           Preventive health issues were discussed with patient, and age appropriate screening tests were ordered as noted in patient's After Visit Summary  Personalized health advice and appropriate referrals for health education or preventive services given if needed, as noted in patient's After Visit Summary       History of Present Illness:     Patient presents for Medicare Annual Wellness visit    Patient Care Team:  Joe Singer DO as PCP - General     Problem List:     Patient Active Problem List   Diagnosis    Anemia    Cervical adenopathy    Depression    Esophageal reflux    Hypothyroidism    Sjogren's disease ([address] )    Hypergammaglobulinemia    Abnormal MRI    Ambulatory dysfunction    Blurry vision, right eye    Dizziness    Elevated sedimentation rate measurement    Other forms of systemic lupus erythematosus ([address] )    Small vessel disease ([address] )    Vitamin D deficiency    Acute ITP ([address] )    Hematoma of right lower leg    CLL (chronic lymphocytic leukemia) ([address] )    Autoimmune hemolytic anemia    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HCC)    Idiopathic thrombocytopenic purpura (ITP) (HCC)    Dehydration    Generalized weakness    Severe protein-calorie malnutrition (Leyla Frees: less than 60% of standard weight) (HCC)    Anxiety    Urinary retention    Moderate protein-calorie malnutrition (HCC)      Past Medical and Surgical History:     Past Medical History:   Diagnosis Date    Anxiety     Autoimmune hemolytic anemia     Cataract     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HCC)     GERD (gastroesophageal reflux disease)     Hypothyroidism     Hypothyroidism     Idiopathic thrombocytopenic purpura (ITP) (HCC)     Menopause     Sjogrens syndrome ([address] )      Past Surgical History:   Procedure Laterality Date    CATARACT EXTRACTION Left     COLONOSCOPY  2012    per pt    TOOTH EXTRACTION        Family History:     Family History   Problem Relation Age of Onset    Colon cancer Mother     Heart disease Mother         cardiac disorder     Cancer Mother     Liver cancer Father     Cancer Father     Heart disease Other         cardiac disorder      Social History:     E-Cigarette/Vaping    E-Cigarette Use Never User      E-Cigarette/Vaping Substances    Nicotine No     THC No     CBD No     Flavoring No     Other No     Unknown No      Social History     Socioeconomic History    Marital status: Single     Spouse name: None    Number of children: None    Years of education: None    Highest education level: None   Occupational History    None   Social Needs    Financial resource strain: None    Food insecurity     Worry: None     Inability: None    Transportation needs     Medical: None     Non-medical: None   Tobacco Use    Smoking status: Never Smoker    Smokeless tobacco: Never Used   Substance and Sexual Activity    Alcohol use: No    Drug use: No    Sexual activity: Not Currently   Lifestyle    Physical activity     Days per week: None     Minutes per session: None    Stress: None   Relationships    Social connections     Talks on phone: None     Gets together: None     Attends Islam service: None     Active member of club or organization: None     Attends meetings of clubs or organizations: None     Relationship status: None    Intimate partner violence     Fear of current or ex partner: None     Emotionally abused: None     Physically abused: None     Forced sexual activity: None   Other Topics Concern    None   Social History Narrative    Caffeine use      Medications and Allergies:     Current Outpatient Medications   Medication Sig Dispense Refill    ALPRAZolam (XANAX) 0 5 mg tablet Take 1 tablet (0 5 mg total) by mouth daily at bedtime as needed for anxiety 30 tablet 3    Calquence 100 MG CAPS Take 1 capsule (100 mg) by mouth 2 (two) times a day   (Patient taking differently: daily ) 60 capsule 11    levothyroxine 75 mcg tablet Take 1 tablet (75 mcg total) by mouth daily in the early morning 30 tablet 5    melatonin 3 mg Take 3 mg by mouth daily at bedtime      predniSONE 5 mg tablet Take 3 tablets (15 mg total) by mouth daily 90 tablet 3    sertraline (ZOLOFT) 25 mg tablet Take 1 tablet (25 mg total) by mouth daily 30 tablet 1    cyanocobalamin ([employer] VITAMIN B-12) 1000 MCG tablet Take 1,000 mcg by mouth daily       No current facility-administered medications for this visit  No Known Allergies   Immunizations:     Immunization History   Administered Date(s) Administered    INFLUENZA 08/03/2014, 08/05/2015, 08/28/2016, 10/01/2017, 10/24/2018    Influenza Quadrivalent, 6-35 Months IM 10/01/2017    Influenza, high dose seasonal 0 7 mL 10/14/2020    Influenza, seasonal, injectable 09/04/2012, 10/27/2013, 08/05/2015, 08/28/2016    Pneumococcal Conjugate 13-Valent 09/25/2020    Pneumococcal Polysaccharide PPV23 10/31/2018    SARS-CoV-2 / COVID-19 mRNA IM (Pfizer-BioNTech) 03/18/2021    Zoster 05/13/2015    influenza, trivalent, adjuvanted 10/04/2019      Health Maintenance:         Topic Date Due    Hepatitis C Screening  Never done    Colorectal Cancer Screening  Never done    MAMMOGRAM  06/02/2016         Topic Date Due    DTaP,Tdap,and Td Vaccines (1 - Tdap) Never done      Medicare Health Risk Assessment:     Pulse 88   Temp 97 5 °F (36 4 °C) (Temporal)   Ht 5' 6" (1 676 m)   Wt 56 2 kg (123 lb 12 8 oz)   SpO2 98%   BMI 19 98 kg/m²      Andrew Sheets is here for her Initial Wellness visit  Health Risk Assessment:   Patient rates overall health as fair  Patient feels that their physical health rating is slightly worse  Eyesight was rated as slightly worse  Hearing was rated as slightly worse  Patient feels that their emotional and mental health rating is slightly worse  Patient states they are always unusually tired/fatigued  Pain experienced in the last 7 days has been some   Patient's pain rating has been 4/10  Patient states that she has experienced no weight loss or gain in last 6 months  Depression Screening:   PHQ-2 Score: 6  PHQ-9 Score: 22      Fall Risk Screening: In the past year, patient has experienced: history of falling in past year    Number of falls: 2 or more  Injured during fall?: No    Feels unsteady when standing or walking?: Yes    Worried about falling?: Yes      Urinary Incontinence Screening:   Patient has not leaked urine accidently in the last six months  Home Safety:  Patient does not have trouble with stairs inside or outside of their home  Patient has working smoke alarms and has no working carbon monoxide detector  Home safety hazards include: loose rugs on the floor  Nutrition:   Current diet is Regular  Medications:   Patient is currently taking over-the-counter supplements  OTC medications include: see medication list  Patient is able to manage medications  Activities of Daily Living (ADLs)/Instrumental Activities of Daily Living (IADLs):   Walk and transfer into and out of bed and chair?: Yes  Dress and groom yourself?: Yes    Bathe or shower yourself?: Yes    Feed yourself?  Yes  Do your laundry/housekeeping?: Yes  Manage your money, pay your bills and track your expenses?: Yes  Make your own meals?: Yes    Do your own shopping?: Yes    Previous Hospitalizations:   Any hospitalizations or ED visits within the last 12 months?: Yes    How many hospitalizations have you had in the last year?: 1-2    Hospitalization Comments: [place]:   Living will: No    Durable POA for healthcare: No    Advanced directive: No    Advanced directive counseling given: Yes      Cognitive Screening:   Provider or family/friend/caregiver concerned regarding cognition?: No    PREVENTIVE SCREENINGS      Cardiovascular Screening:    General: Screening Current and Risks and Benefits Discussed      Diabetes Screening:     General: Screening Current and Risks and Benefits Discussed      Colorectal Cancer Screening:     General: Patient Declines and Risks and Benefits Discussed      Breast Cancer Screening:     General: Risks and Benefits Discussed and Patient Declines      Cervical Cancer Screening:    General: Screening Not Indicated and Risks and Benefits Discussed      Osteoporosis Screening:    General: Risks and Benefits Discussed and Patient Declines      Abdominal Aortic Aneurysm (AAA) Screening:        General: Risks and Benefits Discussed and Screening Not Indicated      Lung Cancer Screening:     General: Screening Not Indicated and Risks and Benefits Discussed      Hepatitis C Screening:    General: Risks and Benefits Discussed and Screening Current    Screening, Brief Intervention, and Referral to Treatment (SBIRT)    Screening  Typical number of drinks in a day: 0    Single Item Drug Screening:  How often have you used an illegal drug (including marijuana) or a prescription medication for non-medical reasons in the past year? never    Single Item Drug Screen Score: 0  Interpretation: Negative screen for possible drug use disorder    Brief Intervention  Alcohol & drug use screenings were reviewed  No concerns regarding substance use disorder identified  Other Counseling Topics:   Car/seat belt/driving safety, skin self-exam, sunscreen and calcium and vitamin D intake and regular weightbearing exercise         Ayo Capone, DO

## 2021-03-25 NOTE — PROGRESS NOTES
Assessment/Plan:    Patient to continue present treatment  Discussed proper nutrition and regular exercise walking with use of her cane as tolerated  Follow up with specialists as scheduled return to the office in 6 months  Patient declines mammogram or colon screening  Diagnoses and all orders for this visit:    Hypothyroidism due to acquired atrophy of thyroid    Anxiety    Depression, unspecified depression type    Medicare annual wellness visit, initial    CLL (chronic lymphocytic leukemia) ([facility] )    Idiopathic thrombocytopenic purpura (ITP) ([facility] )    Sjogren's syndrome, with unspecified organ involvement ([place] [id number] )    Vitamin D deficiency    Other orders  -     Cancel: Ambulatory referral for colonoscopy; Future  -     melatonin 3 mg; Take 3 mg by mouth daily at bedtime          Subjective:      Patient ID: Vernetta Kanner is a 78 y o  female  Patient is here for follow-up appoint for chronic conditions and annual Medicare wellness exam   She is not fasting today  Patient has been following with hematology/oncology regularly and gets labs every couple weeks  Patient has been feeling somewhat better overall and has been able to gain some weight back  She declines mammogram and colon screening  Thyroid Problem  Presents for follow-up visit  Symptoms include anxiety, cold intolerance, depressed mood, diaphoresis, dry skin, fatigue, tremors, visual change and weight gain  Patient reports no constipation, diarrhea, hair loss, heat intolerance, hoarse voice, leg swelling, palpitations or weight loss  The symptoms have been stable  Anxiety  Presents for follow-up visit  Symptoms include decreased concentration, depressed mood, dizziness, dry mouth, excessive worry, insomnia, nervous/anxious behavior, restlessness and shortness of breath  Patient reports no chest pain, confusion, hyperventilation, irritability, nausea, palpitations, panic or suicidal ideas  Symptoms occur most days   The quality of sleep is fair  Nighttime awakenings: occasional            The following portions of the patient's history were reviewed and updated as appropriate: allergies, current medications, past family history, past medical history, past social history, past surgical history and problem list     Review of Systems   Constitutional: Positive for diaphoresis, fatigue and weight gain  Negative for irritability and weight loss  HENT: Negative for hoarse voice  Respiratory: Positive for shortness of breath  Cardiovascular: Negative for chest pain and palpitations  Gastrointestinal: Negative for constipation, diarrhea and nausea  Endocrine: Positive for cold intolerance  Negative for heat intolerance  Neurological: Positive for dizziness and tremors  Psychiatric/Behavioral: Positive for decreased concentration  Negative for confusion and suicidal ideas  The patient is nervous/anxious and has insomnia  Objective:      /72   Pulse 80   Temp 97 5 °F (36 4 °C) (Temporal)   Resp 16   Ht 5' 6" (1 676 m)   Wt 56 2 kg (123 lb 12 8 oz)   SpO2 98%   BMI 19 98 kg/m²          Physical Exam  Constitutional:       General: She is not in acute distress  Appearance: Normal appearance  HENT:      Head: Normocephalic  Mouth/Throat:      Mouth: Mucous membranes are dry  Eyes:      General: No scleral icterus  Conjunctiva/sclera: Conjunctivae normal    Neck:      Musculoskeletal: Neck supple  Vascular: No carotid bruit  Cardiovascular:      Rate and Rhythm: Normal rate and regular rhythm  Pulmonary:      Effort: Pulmonary effort is normal       Breath sounds: Normal breath sounds  Abdominal:      Palpations: Abdomen is soft  Tenderness: There is no abdominal tenderness  Musculoskeletal:      Right lower leg: No edema  Left lower leg: No edema  Lymphadenopathy:      Cervical: No cervical adenopathy  Skin:     General: Skin is warm and dry     Neurological:      Mental Status: She is alert and oriented to person, place, and time     Psychiatric:         Mood and Affect: Mood normal

## 2021-04-05 ENCOUNTER — APPOINTMENT (OUTPATIENT)
Dept: LAB | Age: 71
End: 2021-04-05
Payer: MEDICARE

## 2021-04-05 LAB
BASOPHILS # BLD MANUAL: 0.12 THOUSAND/UL (ref 0–0.1)
BASOPHILS NFR MAR MANUAL: 1 % (ref 0–1)
EOSINOPHIL # BLD MANUAL: 0 THOUSAND/UL (ref 0–0.4)
EOSINOPHIL NFR BLD MANUAL: 0 % (ref 0–6)
ERYTHROCYTE [DISTWIDTH] IN BLOOD BY AUTOMATED COUNT: 16.1 % (ref 11.6–15.1)
HCT VFR BLD AUTO: 45.7 % (ref 34.8–46.1)
HGB BLD-MCNC: 13.9 G/DL (ref 11.5–15.4)
LYMPHOCYTES # BLD AUTO: 28 % (ref 14–44)
LYMPHOCYTES # BLD AUTO: 3.37 THOUSAND/UL (ref 0.6–4.47)
MCH RBC QN AUTO: 28.9 PG (ref 26.8–34.3)
MCHC RBC AUTO-ENTMCNC: 30.4 G/DL (ref 31.4–37.4)
MCV RBC AUTO: 95 FL (ref 82–98)
MONOCYTES # BLD AUTO: 0.48 THOUSAND/UL (ref 0–1.22)
MONOCYTES NFR BLD: 4 % (ref 4–12)
NEUTROPHILS # BLD MANUAL: 7.59 THOUSAND/UL (ref 1.85–7.62)
NEUTS SEG NFR BLD AUTO: 63 % (ref 43–75)
NRBC BLD AUTO-RTO: 0 /100 WBCS
PLATELET # BLD AUTO: 252 THOUSANDS/UL (ref 149–390)
PLATELET BLD QL SMEAR: ADEQUATE
PMV BLD AUTO: 11.2 FL (ref 8.9–12.7)
POIKILOCYTOSIS BLD QL SMEAR: PRESENT
RBC # BLD AUTO: 4.81 MILLION/UL (ref 3.81–5.12)
RBC MORPH BLD: PRESENT
VARIANT LYMPHS # BLD AUTO: 4 %
WBC # BLD AUTO: 12.05 THOUSAND/UL (ref 4.31–10.16)

## 2021-04-05 PROCEDURE — 85027 COMPLETE CBC AUTOMATED: CPT | Performed by: INTERNAL MEDICINE

## 2021-04-05 PROCEDURE — 36415 COLL VENOUS BLD VENIPUNCTURE: CPT | Performed by: INTERNAL MEDICINE

## 2021-04-05 PROCEDURE — 85007 BL SMEAR W/DIFF WBC COUNT: CPT | Performed by: INTERNAL MEDICINE

## 2021-04-06 ENCOUNTER — HOSPITAL ENCOUNTER (OUTPATIENT)
Dept: INFUSION CENTER | Facility: HOSPITAL | Age: 71
Discharge: HOME | End: 2021-04-06
Attending: INTERNAL MEDICINE

## 2021-04-06 DIAGNOSIS — C91.10 CLL (CHRONIC LYMPHOCYTIC LEUKEMIA) (HCC): ICD-10-CM

## 2021-04-06 DIAGNOSIS — D69.3 IDIOPATHIC THROMBOCYTOPENIC PURPURA (ITP) (HCC): ICD-10-CM

## 2021-04-14 ENCOUNTER — TELEPHONE (OUTPATIENT)
Dept: HEMATOLOGY ONCOLOGY | Facility: CLINIC | Age: 71
End: 2021-04-14

## 2021-04-14 DIAGNOSIS — D69.3 IDIOPATHIC THROMBOCYTOPENIC PURPURA (ITP) (HCC): Primary | ICD-10-CM

## 2021-04-14 NOTE — TELEPHONE ENCOUNTER
Called the patient and instructed her to have a repeat cbc drawn tomorrow  Patient verbalized understanding

## 2021-04-14 NOTE — TELEPHONE ENCOUNTER
Patient is calling questioning when her next tentative Nplate injection would be  No appointments scheduled in [facility]  Yelena has CBCD drawn every 3 weeks  Last CBCD was on 4/5/21  She did not need her Nplate at that time  Patient is questioning if her platelets are going down now  She reports mild gum bleeding without activity  Please review with Dr Sakshi Johnson if he would like patient to have a CBCD drawn now

## 2021-04-15 ENCOUNTER — TELEPHONE (OUTPATIENT)
Dept: HEMATOLOGY ONCOLOGY | Facility: CLINIC | Age: 71
End: 2021-04-15

## 2021-04-15 ENCOUNTER — HOSPITAL ENCOUNTER (OUTPATIENT)
Dept: INFUSION CENTER | Facility: HOSPITAL | Age: 71
Discharge: HOME | End: 2021-04-15
Attending: INTERNAL MEDICINE
Payer: MEDICARE

## 2021-04-15 ENCOUNTER — APPOINTMENT (OUTPATIENT)
Dept: LAB | Age: 71
End: 2021-04-15
Payer: MEDICARE

## 2021-04-15 VITALS — TEMPERATURE: 97.9 F

## 2021-04-15 DIAGNOSIS — C91.10 CLL (CHRONIC LYMPHOCYTIC LEUKEMIA) (HCC): Primary | ICD-10-CM

## 2021-04-15 DIAGNOSIS — D69.3 IDIOPATHIC THROMBOCYTOPENIC PURPURA (ITP) (HCC): ICD-10-CM

## 2021-04-15 LAB
BASOPHILS # BLD AUTO: 0.18 THOUSANDS/ΜL (ref 0–0.1)
BASOPHILS NFR BLD AUTO: 1 % (ref 0–1)
EOSINOPHIL # BLD AUTO: 0.07 THOUSAND/ΜL (ref 0–0.61)
EOSINOPHIL NFR BLD AUTO: 1 % (ref 0–6)
ERYTHROCYTE [DISTWIDTH] IN BLOOD BY AUTOMATED COUNT: 16.4 % (ref 11.6–15.1)
HCT VFR BLD AUTO: 42.1 % (ref 34.8–46.1)
HGB BLD-MCNC: 12.9 G/DL (ref 11.5–15.4)
IMM GRANULOCYTES # BLD AUTO: 0.16 THOUSAND/UL (ref 0–0.2)
IMM GRANULOCYTES NFR BLD AUTO: 1 % (ref 0–2)
LYMPHOCYTES # BLD AUTO: 3.43 THOUSANDS/ΜL (ref 0.6–4.47)
LYMPHOCYTES NFR BLD AUTO: 27 % (ref 14–44)
MCH RBC QN AUTO: 29.3 PG (ref 26.8–34.3)
MCHC RBC AUTO-ENTMCNC: 30.6 G/DL (ref 31.4–37.4)
MCV RBC AUTO: 96 FL (ref 82–98)
MONOCYTES # BLD AUTO: 0.61 THOUSAND/ΜL (ref 0.17–1.22)
MONOCYTES NFR BLD AUTO: 5 % (ref 4–12)
NEUTROPHILS # BLD AUTO: 8.38 THOUSANDS/ΜL (ref 1.85–7.62)
NEUTS SEG NFR BLD AUTO: 65 % (ref 43–75)
NRBC BLD AUTO-RTO: 0 /100 WBCS
PLATELET # BLD AUTO: 34 THOUSANDS/UL (ref 149–390)
PMV BLD AUTO: 12.6 FL (ref 8.9–12.7)
RBC # BLD AUTO: 4.4 MILLION/UL (ref 3.81–5.12)
WBC # BLD AUTO: 12.83 THOUSAND/UL (ref 4.31–10.16)

## 2021-04-15 PROCEDURE — 96372 THER/PROPH/DIAG INJ SC/IM: CPT

## 2021-04-15 PROCEDURE — 85025 COMPLETE CBC W/AUTO DIFF WBC: CPT

## 2021-04-15 PROCEDURE — 36415 COLL VENOUS BLD VENIPUNCTURE: CPT

## 2021-04-15 RX ADMIN — ROMIPLOSTIM 125 MCG: 125 INJECTION, POWDER, LYOPHILIZED, FOR SOLUTION SUBCUTANEOUS at 15:20

## 2021-04-15 NOTE — TELEPHONE ENCOUNTER
Patient returning missed call  Reviewed documentation in the chart  N-plate today at [facility] infusion center - platelet count = [phone]  Patient to continue on Prednisone 15mg alternating with 10mg     Patient verbalized understanding

## 2021-04-15 NOTE — TELEPHONE ENCOUNTER
Reviewed with Dr Mauro Macias  Pt did not get last Nplate because she did not meet parameters  [facility] able to see her today  Pt remains on Prednisone 15 mg alternating with 10 mg daily

## 2021-04-15 NOTE — TELEPHONE ENCOUNTER
Critical Results   Call Received From [address] [place] [address]   Lab Study Platelets   Date Blood Work was Done    Relevant Information Platelet count [id number]

## 2021-04-19 ENCOUNTER — APPOINTMENT (OUTPATIENT)
Dept: LAB | Age: 71
End: 2021-04-19
Payer: MEDICARE

## 2021-04-19 ENCOUNTER — TELEPHONE (OUTPATIENT)
Dept: HEMATOLOGY ONCOLOGY | Facility: CLINIC | Age: 71
End: 2021-04-19

## 2021-04-19 DIAGNOSIS — D69.3 IDIOPATHIC THROMBOCYTOPENIC PURPURA (ITP) (HCC): Primary | ICD-10-CM

## 2021-04-19 DIAGNOSIS — D69.3 IDIOPATHIC THROMBOCYTOPENIC PURPURA (ITP) (HCC): ICD-10-CM

## 2021-04-19 LAB
BASOPHILS # BLD AUTO: 0.15 THOUSANDS/ΜL (ref 0–0.1)
BASOPHILS NFR BLD AUTO: 1 % (ref 0–1)
EOSINOPHIL # BLD AUTO: 0.03 THOUSAND/ΜL (ref 0–0.61)
EOSINOPHIL NFR BLD AUTO: 0 % (ref 0–6)
ERYTHROCYTE [DISTWIDTH] IN BLOOD BY AUTOMATED COUNT: 16.8 % (ref 11.6–15.1)
HCT VFR BLD AUTO: 42.7 % (ref 34.8–46.1)
HGB BLD-MCNC: 13.2 G/DL (ref 11.5–15.4)
IMM GRANULOCYTES # BLD AUTO: 0.14 THOUSAND/UL (ref 0–0.2)
IMM GRANULOCYTES NFR BLD AUTO: 1 % (ref 0–2)
LYMPHOCYTES # BLD AUTO: 3.39 THOUSANDS/ΜL (ref 0.6–4.47)
LYMPHOCYTES NFR BLD AUTO: 31 % (ref 14–44)
MCH RBC QN AUTO: 29.2 PG (ref 26.8–34.3)
MCHC RBC AUTO-ENTMCNC: 30.9 G/DL (ref 31.4–37.4)
MCV RBC AUTO: 95 FL (ref 82–98)
MONOCYTES # BLD AUTO: 0.53 THOUSAND/ΜL (ref 0.17–1.22)
MONOCYTES NFR BLD AUTO: 5 % (ref 4–12)
NEUTROPHILS # BLD AUTO: 6.79 THOUSANDS/ΜL (ref 1.85–7.62)
NEUTS SEG NFR BLD AUTO: 62 % (ref 43–75)
NRBC BLD AUTO-RTO: 0 /100 WBCS
PLATELET # BLD AUTO: 93 THOUSANDS/UL (ref 149–390)
PMV BLD AUTO: 12.2 FL (ref 8.9–12.7)
RBC # BLD AUTO: 4.52 MILLION/UL (ref 3.81–5.12)
WBC # BLD AUTO: 11.03 THOUSAND/UL (ref 4.31–10.16)

## 2021-04-19 PROCEDURE — 36415 COLL VENOUS BLD VENIPUNCTURE: CPT

## 2021-04-19 PROCEDURE — 85025 COMPLETE CBC W/AUTO DIFF WBC: CPT

## 2021-04-19 NOTE — TELEPHONE ENCOUNTER
Patient is calling to report over the weekend she started with petechiae on her arms and legs  Reports bilateral elbow have large ecchymotic areas  Denies gums bleeding  Patient reports scant blood on the toilet tissue after straining to have a bowel movement  No active bleeding at present time  Patient is questioning going for a CBCD today  Order entered into [employer]  Patient will have lab drawn today  Will notify Dr Eliazar Gresham RN

## 2021-04-20 ENCOUNTER — TELEPHONE (OUTPATIENT)
Dept: HEMATOLOGY ONCOLOGY | Facility: CLINIC | Age: 71
End: 2021-04-20

## 2021-04-22 ENCOUNTER — TELEPHONE (OUTPATIENT)
Dept: HEMATOLOGY ONCOLOGY | Facility: CLINIC | Age: 71
End: 2021-04-22

## 2021-04-22 NOTE — TELEPHONE ENCOUNTER
Appointment Cancellation Or Reschedule     Person calling in Patient    Provider [facility] Visit Date and Time  04/30/2021 @130pm   Office Visit Location [facility]   Did patient want to reschedule their office appointment? If so, when was it scheduled to? Pt would like a call back to rs    Is this patient calling to reschedule an infusion appointment? no   Is this patient a Chemo patient? no   When is their next infusion visit? 05/06/2021   Reason for Cancellation or Reschedule Patient will not have transportation that day      If the patient is a treatment patient, please route this to the office nurse  If the patient is not on treatment, please route to the office MA

## 2021-04-22 NOTE — TELEPHONE ENCOUNTER
Spoke with pt - she no longer needs to change this appointment  She does however need her Nplate scheduled for 5/6

## 2021-04-26 ENCOUNTER — TELEPHONE (OUTPATIENT)
Dept: HEMATOLOGY ONCOLOGY | Facility: CLINIC | Age: 71
End: 2021-04-26

## 2021-04-26 NOTE — TELEPHONE ENCOUNTER
Patient called to confirm if blood work will need to be completed, confirmed blood work was complete 4/19

## 2021-04-29 ENCOUNTER — TELEPHONE (OUTPATIENT)
Dept: HEMATOLOGY ONCOLOGY | Facility: CLINIC | Age: 71
End: 2021-04-29

## 2021-04-29 ENCOUNTER — APPOINTMENT (OUTPATIENT)
Dept: LAB | Age: 71
End: 2021-04-29
Payer: MEDICARE

## 2021-04-30 ENCOUNTER — OFFICE VISIT (OUTPATIENT)
Dept: HEMATOLOGY ONCOLOGY | Facility: CLINIC | Age: 71
End: 2021-04-30
Payer: MEDICARE

## 2021-04-30 VITALS
HEART RATE: 79 BPM | WEIGHT: 124 LBS | BODY MASS INDEX: 19.93 KG/M2 | DIASTOLIC BLOOD PRESSURE: 81 MMHG | OXYGEN SATURATION: 98 % | TEMPERATURE: 97.3 F | RESPIRATION RATE: 17 BRPM | SYSTOLIC BLOOD PRESSURE: 126 MMHG | HEIGHT: 66 IN

## 2021-04-30 DIAGNOSIS — C91.10 CLL (CHRONIC LYMPHOCYTIC LEUKEMIA) (HCC): ICD-10-CM

## 2021-04-30 DIAGNOSIS — D69.3 ACUTE ITP (HCC): Primary | ICD-10-CM

## 2021-04-30 PROCEDURE — 99213 OFFICE O/P EST LOW 20 MIN: CPT | Performed by: PHYSICIAN ASSISTANT

## 2021-04-30 NOTE — PROGRESS NOTES
Hematology/Oncology Outpatient Follow- up Note  Rayshawn Grider 78 y o  female MRN: @ Encounter: [id number]        Date:  4/30/2021      Assessment / Plan:    Autoimmune hemolytic anemia, IgG subtype, acute immune thrombocytopenic purpura and splenomegaly, she had Sjogren syndrome, positive BALDO, rheumatoid factor and CCP     Initiated in the hospital on Decadron in June 2020 with short-lived response     Initiated on Promacta 75 milligram p o  Daily with improvement in platelet count initially but no sustained response despite increase to Promacta 75 mg alternating with 150 mg and therefore, it was discontinued          CT scan 6/19/20 identified diffuse mild retroperitoneal adenopathy and mildly enlarged retrocrural lymph node, hepatomegaly and splenomegaly 15cm     Flow cytometry 8/10/20 on the peripheral blood showed CLL pattern      Bone marrow biopsy 9/2020 showed CLL, initiated on acalabrutinib 100 mg p o  b i d  with good response 9/2020 and normalization of CBC and differential later on the dose was reduced to 100 mg p o  daily mid October because of fatigue      2   ITP   She is having a very good response to Nplate,  272 micro g subcu every 3 weeks, held if platelet above 335,959   Most recent dose 4/15/21  Platelet count up to 527 4/29/2021  Responsive to prednisone initially  Currently she is on a tapering dose of 10 mg alternate with 15 mg the next day 3/2021, decrease to 10mg po daily  Plan to decrease  Every 3-6 weeks if platelet count remains normal       CBC every 3 weeks and follow-up in 6 weeks      Mild left lateral rib pain,  Tender on examination  Will continue to monitor    If this worsens, further investigation could be considered                HPI:    Larry Robins is a 73-year-old  female who was admitted to [facility] 6/2020 regarding easy bruising and bleeding       She has history of polyclonal gammopathy, with no evidence of monoclonal protein, Sjogren syndrome, autoimmune connective tissue disease with highly elevated sed rate, CRP, BALDO, rheumatoid factor   She is followed by Rheumatology she was prescribed hydroxychloroquine 200 milligram p o  B i d         She was found to have grade 4 thrombocytopenia with platelet count of 0218, hemoglobin 10, WBC 7 2, neutrophils, 34 percent lymphocytes     Flow cytometry on the peripheral blood showed CLL pattern positive for CD 23, CD 20     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 with PATTI positive +for IgG, plus for C3, bilirubin 0 5     CT scan of the chest abdomen pelvis on 06/19/2020 showed mild confluent periaortic/retroperitoneal lymphadenopathy measuring up to 1 1 centimeter in short axis, bilateral external iliac lymphadenopathy up to 10 millimeters, retrocrural lymph node measuring 1 3 centimeters spleen 15 centimeter, liver with hepatomegaly no evidence of masses     She was treated with dexamethasone for 3 days with improvement of platelet count up to 62,000     On 06/25/2020 platelet count YF 75,476     Initiated on Promacta 75 milligram p o  Daily in July 2020, platelet count up gradually with platelet count of 30,932 on 08/17/2020, hemoglobin 11 4, WBC 5 0, 57 percent        8/28/20:  Patient had CBC as gums were bleeding that morning AM, no further bleeding   Platelet count was 22,068  She was advised to take 150 mg of promacta every other day and 75 mg the other days        Platelet count was 21,482 8/31/20 and she was  advised to initiate prednisone 40mg po daily, tapering by 10mg po weekly  Checo Vasquez returned to 75mg po daily      Platelet count increased to 173,000 9/4/20           She had BMBX 9/9/20:  B-cell lymphoproliferative disorder, compatible with chronic lymphocytic leukemia   Virtually absent stainable storage iron    On flow cytometry, CLL phenotype accounted for 11%; CD5+, CD23+, CD20+ (dim) and CD 38-  9/14/20:  platelet count 095     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     Initiated on acalabrutinib 100 mg p o  b i d  since the beginning of October 2020   She was seen on 10/14/2020 with severe depression requiring admission to the hospital, she is on Zoloft 25 mg p o  daily, Synthroid 75 micro g p o  daily     Acalabrutinib was reduced to 100 mg p o  daily       Recurrence of thrombocytopenia 27,000 11/2/20 once prednisone was discontinued     Prednisone re-introduced back at 5 mg p o  daily, platelet count increased to 50,000 range, fluctuated to 20,000 ranged, prednisone increased to 10mg daily 11/17/20, increased to 20mg 11/23/20 when platelets decreased to 12,000  Prednisone dose was 20 milligrams alternating with 15 milligrams every other day 12/2/2020  and N-plate ordered 69/92/12 when platelet count 82,028 12/15/20  Platelet count up to 307,000 12/28/20 and Nplate QOXSCWXY 0/3/6067  Platelet count 31,716 1/11/21 and decreased to 28,000 1/18/21 and N-plate administered      She is getting Nplate, to be given at 125 micro g subcu every 3 weeks, hold if platelet above 196,145     Interval History:     experiencing some pain over her left lateral ribs  No recent coughing  No trauma  No shortness of breath or chest pain  She also has longstanding pruritus increased in the evenings  She is advised that she can use half tab to tablets Benadryl in the evening    Test Results:        Labs:   Lab Results   Component Value Date    HGB 12 9 04/29/2021    HCT 42 9 04/29/2021    MCV 97 04/29/2021     (H) 04/29/2021    WBC 9 81 04/29/2021    NRBC 0 04/29/2021    BANDSPCT 1 03/03/2021    ATYLMPCT 4 (H) 04/05/2021     Lab Results   Component Value Date    K 3 7 11/06/2020     (H) 11/06/2020    CO2 27 11/06/2020    BUN 9 11/06/2020    CREATININE 0 75 11/06/2020    GLUF 76 09/21/2020    CALCIUM 9 1 11/06/2020    CORRECTEDCA 9 8 11/06/2020    AST 13 11/06/2020    ALT 13 11/06/2020    ALKPHOS 56 11/06/2020    EGFR 82 11/06/2020       Imaging: No results found          ROS:  As mentioned in HPI & Interval History otherwise 14 point ROS negative  Allergies: No Known Allergies  Current Medications: Reviewed  PMH/FH/SH:  Reviewed      Physical Exam:    There is no height or weight on file to calculate BSA  Ht Readings from Last 3 Encounters:   03/25/21 5' 6" (1 676 m)   03/16/21 5' 5" (1 651 m)   01/20/21 5' 5" (1 651 m)        Wt Readings from Last 3 Encounters:   03/25/21 56 2 kg (123 lb 12 8 oz)   03/16/21 54 6 kg (120 lb 6 4 oz)   01/20/21 50 9 kg (112 lb 3 2 oz)        Temp Readings from Last 3 Encounters:   04/15/21 97 9 °F (36 6 °C)   03/25/21 97 5 °F (36 4 °C) (Temporal)   03/16/21 (!) 94 4 °F (34 7 °C) (Tympanic)        BP Readings from Last 3 Encounters:   03/25/21 124/72   03/16/21 139/79   03/16/21 130/72           Physical Exam  Vitals signs reviewed  Constitutional:       General: She is not in acute distress  Appearance: She is well-developed  She is not diaphoretic  HENT:      Head: Normocephalic and atraumatic  Eyes:      Conjunctiva/sclera: Conjunctivae normal    Neck:      Musculoskeletal: Normal range of motion and neck supple  Trachea: No tracheal deviation  Cardiovascular:      Rate and Rhythm: Normal rate and regular rhythm  Heart sounds: No murmur  No friction rub  No gallop  Pulmonary:      Effort: Pulmonary effort is normal  No respiratory distress  Breath sounds: Normal breath sounds  No wheezing or rales  Chest:      Chest wall: No tenderness  Abdominal:      General: There is no distension  Palpations: Abdomen is soft  Tenderness: There is no abdominal tenderness  Musculoskeletal:      Comments: Left lateral rib pain   Lymphadenopathy:      Cervical: No cervical adenopathy  Skin:     General: Skin is warm and dry  Coloration: Skin is not pale  Findings: No erythema  Neurological:      Mental Status: She is alert and oriented to person, place, and time     Psychiatric:         Behavior: Behavior normal  Thought Content:  Thought content normal          Judgment: Judgment normal          ECO      Emergency Contacts:    Gracia Prather, [phone],

## 2021-05-10 DIAGNOSIS — C91.10 CLL (CHRONIC LYMPHOCYTIC LEUKEMIA) (HCC): Primary | ICD-10-CM

## 2021-05-10 DIAGNOSIS — D69.3 IDIOPATHIC THROMBOCYTOPENIC PURPURA (ITP) (HCC): ICD-10-CM

## 2021-05-12 ENCOUNTER — APPOINTMENT (OUTPATIENT)
Dept: LAB | Age: 71
End: 2021-05-12
Payer: MEDICARE

## 2021-05-12 DIAGNOSIS — C91.10 CLL (CHRONIC LYMPHOCYTIC LEUKEMIA) (HCC): ICD-10-CM

## 2021-05-12 DIAGNOSIS — D69.3 IDIOPATHIC THROMBOCYTOPENIC PURPURA (ITP) (HCC): ICD-10-CM

## 2021-05-12 LAB
BASOPHILS # BLD AUTO: 0.16 THOUSANDS/ΜL (ref 0–0.1)
BASOPHILS NFR BLD AUTO: 2 % (ref 0–1)
EOSINOPHIL # BLD AUTO: 0.02 THOUSAND/ΜL (ref 0–0.61)
EOSINOPHIL NFR BLD AUTO: 0 % (ref 0–6)
ERYTHROCYTE [DISTWIDTH] IN BLOOD BY AUTOMATED COUNT: 15.8 % (ref 11.6–15.1)
HCT VFR BLD AUTO: 43.7 % (ref 34.8–46.1)
HGB BLD-MCNC: 13.3 G/DL (ref 11.5–15.4)
IMM GRANULOCYTES # BLD AUTO: 0.07 THOUSAND/UL (ref 0–0.2)
IMM GRANULOCYTES NFR BLD AUTO: 1 % (ref 0–2)
LYMPHOCYTES # BLD AUTO: 3.42 THOUSANDS/ΜL (ref 0.6–4.47)
LYMPHOCYTES NFR BLD AUTO: 33 % (ref 14–44)
MCH RBC QN AUTO: 28.7 PG (ref 26.8–34.3)
MCHC RBC AUTO-ENTMCNC: 30.4 G/DL (ref 31.4–37.4)
MCV RBC AUTO: 94 FL (ref 82–98)
MONOCYTES # BLD AUTO: 0.42 THOUSAND/ΜL (ref 0.17–1.22)
MONOCYTES NFR BLD AUTO: 4 % (ref 4–12)
NEUTROPHILS # BLD AUTO: 6.26 THOUSANDS/ΜL (ref 1.85–7.62)
NEUTS SEG NFR BLD AUTO: 60 % (ref 43–75)
NRBC BLD AUTO-RTO: 0 /100 WBCS
PLATELET # BLD AUTO: 88 THOUSANDS/UL (ref 149–390)
PMV BLD AUTO: 12.2 FL (ref 8.9–12.7)
RBC # BLD AUTO: 4.63 MILLION/UL (ref 3.81–5.12)
WBC # BLD AUTO: 10.35 THOUSAND/UL (ref 4.31–10.16)

## 2021-05-12 PROCEDURE — 85025 COMPLETE CBC W/AUTO DIFF WBC: CPT

## 2021-05-12 PROCEDURE — 36415 COLL VENOUS BLD VENIPUNCTURE: CPT

## 2021-05-13 ENCOUNTER — TELEPHONE (OUTPATIENT)
Dept: HEMATOLOGY ONCOLOGY | Facility: CLINIC | Age: 71
End: 2021-05-13

## 2021-05-13 ENCOUNTER — HOSPITAL ENCOUNTER (OUTPATIENT)
Dept: INFUSION CENTER | Facility: HOSPITAL | Age: 71
Discharge: HOME | End: 2021-05-13

## 2021-05-13 NOTE — TELEPHONE ENCOUNTER
Returned call to patient  Platelet count from yesterday = 88,000  Patient is on Prednisone 10mg PO daily along with Acalabrutinib  Current CBC schedule every 3 weeks  Next N-plate due today    Will send to RN to review with Dr April Caban to see if any adjustments need to be made

## 2021-05-13 NOTE — TELEPHONE ENCOUNTER
Patient is calling to report that she will need to cancel her infusion appointment today for her Nplate due to her ride cancelled on her  She would like to reschedule to tomorrow  Call transferred to [facility] infusion center to reschedule to tomorrow, spoke with Dede Part  Will notify Dr Divina Alfred RN as an [facility]

## 2021-05-13 NOTE — TELEPHONE ENCOUNTER
Patient would like their lab results     Person calling in  Efren Held   Lab Draw Date 05/12/2021   Lab Draw Place [facility]    Call Back Number  [phone]

## 2021-05-14 ENCOUNTER — HOSPITAL ENCOUNTER (OUTPATIENT)
Dept: INFUSION CENTER | Facility: HOSPITAL | Age: 71
Discharge: HOME | End: 2021-05-14
Attending: INTERNAL MEDICINE
Payer: MEDICARE

## 2021-05-14 VITALS — TEMPERATURE: 97 F

## 2021-05-14 DIAGNOSIS — D69.3 IDIOPATHIC THROMBOCYTOPENIC PURPURA (ITP) (HCC): ICD-10-CM

## 2021-05-14 DIAGNOSIS — C91.10 CLL (CHRONIC LYMPHOCYTIC LEUKEMIA) (HCC): Primary | ICD-10-CM

## 2021-05-14 PROCEDURE — 96372 THER/PROPH/DIAG INJ SC/IM: CPT

## 2021-05-14 RX ADMIN — ROMIPLOSTIM 125 MCG: 250 INJECTION, POWDER, LYOPHILIZED, FOR SOLUTION SUBCUTANEOUS at 14:29

## 2021-05-14 NOTE — PLAN OF CARE
Problem: Potential for Falls  Goal: Patient will remain free of falls  Description: INTERVENTIONS:  - Assess patient frequently for physical needs  -  Identify cognitive and physical deficits and behaviors that affect risk of falls    -  [place] fall precautions as indicated by assessment   - Educate patient/family on patient safety including physical limitations  - Instruct patient to call for assistance with activity based on assessment  - Modify environment to reduce risk of injury  - Consider OT/PT consult to assist with strengthening/mobility  Outcome: Progressing

## 2021-06-02 ENCOUNTER — APPOINTMENT (OUTPATIENT)
Dept: LAB | Age: 71
End: 2021-06-02
Payer: MEDICARE

## 2021-06-02 ENCOUNTER — TELEPHONE (OUTPATIENT)
Dept: HEMATOLOGY ONCOLOGY | Facility: CLINIC | Age: 71
End: 2021-06-02

## 2021-06-02 DIAGNOSIS — D69.3 IDIOPATHIC THROMBOCYTOPENIC PURPURA (ITP) (HCC): ICD-10-CM

## 2021-06-02 DIAGNOSIS — C91.10 CLL (CHRONIC LYMPHOCYTIC LEUKEMIA) (HCC): ICD-10-CM

## 2021-06-02 LAB
BASOPHILS # BLD AUTO: 0.09 THOUSANDS/ΜL (ref 0–0.1)
BASOPHILS NFR BLD AUTO: 1 % (ref 0–1)
EOSINOPHIL # BLD AUTO: 0.02 THOUSAND/ΜL (ref 0–0.61)
EOSINOPHIL NFR BLD AUTO: 0 % (ref 0–6)
ERYTHROCYTE [DISTWIDTH] IN BLOOD BY AUTOMATED COUNT: 15.3 % (ref 11.6–15.1)
HCT VFR BLD AUTO: 43.4 % (ref 34.8–46.1)
HGB BLD-MCNC: 13 G/DL (ref 11.5–15.4)
IMM GRANULOCYTES # BLD AUTO: 0.08 THOUSAND/UL (ref 0–0.2)
IMM GRANULOCYTES NFR BLD AUTO: 1 % (ref 0–2)
LYMPHOCYTES # BLD AUTO: 2.97 THOUSANDS/ΜL (ref 0.6–4.47)
LYMPHOCYTES NFR BLD AUTO: 35 % (ref 14–44)
MCH RBC QN AUTO: 28.1 PG (ref 26.8–34.3)
MCHC RBC AUTO-ENTMCNC: 30 G/DL (ref 31.4–37.4)
MCV RBC AUTO: 94 FL (ref 82–98)
MONOCYTES # BLD AUTO: 0.5 THOUSAND/ΜL (ref 0.17–1.22)
MONOCYTES NFR BLD AUTO: 6 % (ref 4–12)
NEUTROPHILS # BLD AUTO: 4.76 THOUSANDS/ΜL (ref 1.85–7.62)
NEUTS SEG NFR BLD AUTO: 57 % (ref 43–75)
NRBC BLD AUTO-RTO: 0 /100 WBCS
PLATELET # BLD AUTO: 233 THOUSANDS/UL (ref 149–390)
PMV BLD AUTO: 11.4 FL (ref 8.9–12.7)
RBC # BLD AUTO: 4.62 MILLION/UL (ref 3.81–5.12)
WBC # BLD AUTO: 8.42 THOUSAND/UL (ref 4.31–10.16)

## 2021-06-02 PROCEDURE — 85025 COMPLETE CBC W/AUTO DIFF WBC: CPT

## 2021-06-02 PROCEDURE — 36415 COLL VENOUS BLD VENIPUNCTURE: CPT

## 2021-06-02 NOTE — TELEPHONE ENCOUNTER
Patient called stating that she received a call from infusion to inform her that she didn't need her Nplate injection tomorrow  Patient's platelets are 487 thousand on today's labs  Per [place] parameters Nplate is held for platelets greater than 200  Will notify Dr Divina Alfred RN as [facility]

## 2021-06-03 ENCOUNTER — HOSPITAL ENCOUNTER (OUTPATIENT)
Dept: INFUSION CENTER | Facility: HOSPITAL | Age: 71
Discharge: HOME | End: 2021-06-03
Attending: INTERNAL MEDICINE

## 2021-06-07 ENCOUNTER — TELEPHONE (OUTPATIENT)
Dept: HEMATOLOGY ONCOLOGY | Facility: CLINIC | Age: 71
End: 2021-06-07

## 2021-06-07 NOTE — TELEPHONE ENCOUNTER
Patient is calling in to confirm whether she needed labs for her upcoming appointment, I have confirmed that she does and she voiced understanding

## 2021-06-09 ENCOUNTER — APPOINTMENT (OUTPATIENT)
Dept: LAB | Age: 71
End: 2021-06-09
Payer: MEDICARE

## 2021-06-09 DIAGNOSIS — C91.10 CLL (CHRONIC LYMPHOCYTIC LEUKEMIA) (HCC): ICD-10-CM

## 2021-06-09 DIAGNOSIS — D69.3 IDIOPATHIC THROMBOCYTOPENIC PURPURA (ITP) (HCC): ICD-10-CM

## 2021-06-09 LAB
BASOPHILS # BLD AUTO: 0.12 THOUSANDS/ΜL (ref 0–0.1)
BASOPHILS NFR BLD AUTO: 1 % (ref 0–1)
EOSINOPHIL # BLD AUTO: 0.02 THOUSAND/ΜL (ref 0–0.61)
EOSINOPHIL NFR BLD AUTO: 0 % (ref 0–6)
ERYTHROCYTE [DISTWIDTH] IN BLOOD BY AUTOMATED COUNT: 15.4 % (ref 11.6–15.1)
HCT VFR BLD AUTO: 43.1 % (ref 34.8–46.1)
HGB BLD-MCNC: 13.1 G/DL (ref 11.5–15.4)
IMM GRANULOCYTES # BLD AUTO: 0.05 THOUSAND/UL (ref 0–0.2)
IMM GRANULOCYTES NFR BLD AUTO: 1 % (ref 0–2)
LYMPHOCYTES # BLD AUTO: 2.77 THOUSANDS/ΜL (ref 0.6–4.47)
LYMPHOCYTES NFR BLD AUTO: 30 % (ref 14–44)
MCH RBC QN AUTO: 28.7 PG (ref 26.8–34.3)
MCHC RBC AUTO-ENTMCNC: 30.4 G/DL (ref 31.4–37.4)
MCV RBC AUTO: 95 FL (ref 82–98)
MONOCYTES # BLD AUTO: 0.47 THOUSAND/ΜL (ref 0.17–1.22)
MONOCYTES NFR BLD AUTO: 5 % (ref 4–12)
NEUTROPHILS # BLD AUTO: 5.91 THOUSANDS/ΜL (ref 1.85–7.62)
NEUTS SEG NFR BLD AUTO: 63 % (ref 43–75)
NRBC BLD AUTO-RTO: 0 /100 WBCS
PLATELET # BLD AUTO: 132 THOUSANDS/UL (ref 149–390)
PMV BLD AUTO: 12.2 FL (ref 8.9–12.7)
RBC # BLD AUTO: 4.56 MILLION/UL (ref 3.81–5.12)
WBC # BLD AUTO: 9.34 THOUSAND/UL (ref 4.31–10.16)

## 2021-06-09 PROCEDURE — 85025 COMPLETE CBC W/AUTO DIFF WBC: CPT

## 2021-06-09 PROCEDURE — 36415 COLL VENOUS BLD VENIPUNCTURE: CPT

## 2021-06-11 ENCOUNTER — OFFICE VISIT (OUTPATIENT)
Dept: HEMATOLOGY ONCOLOGY | Facility: CLINIC | Age: 71
End: 2021-06-11
Payer: MEDICARE

## 2021-06-11 ENCOUNTER — HOSPITAL ENCOUNTER (OUTPATIENT)
Dept: INFUSION CENTER | Facility: HOSPITAL | Age: 71
Discharge: HOME | End: 2021-06-11

## 2021-06-11 ENCOUNTER — HOSPITAL ENCOUNTER (OUTPATIENT)
Dept: INFUSION CENTER | Facility: HOSPITAL | Age: 71
Discharge: HOME | End: 2021-06-11
Attending: INTERNAL MEDICINE
Payer: MEDICARE

## 2021-06-11 VITALS
RESPIRATION RATE: 16 BRPM | DIASTOLIC BLOOD PRESSURE: 73 MMHG | HEART RATE: 73 BPM | TEMPERATURE: 97.4 F | SYSTOLIC BLOOD PRESSURE: 115 MMHG

## 2021-06-11 VITALS
WEIGHT: 126.8 LBS | RESPIRATION RATE: 16 BRPM | TEMPERATURE: 97.5 F | OXYGEN SATURATION: 97 % | HEIGHT: 66 IN | SYSTOLIC BLOOD PRESSURE: 140 MMHG | DIASTOLIC BLOOD PRESSURE: 86 MMHG | HEART RATE: 70 BPM | BODY MASS INDEX: 20.38 KG/M2

## 2021-06-11 DIAGNOSIS — E43 UNSPECIFIED SEVERE PROTEIN-CALORIE MALNUTRITION (HCC): ICD-10-CM

## 2021-06-11 DIAGNOSIS — D69.3 IDIOPATHIC THROMBOCYTOPENIC PURPURA (ITP) (HCC): Primary | ICD-10-CM

## 2021-06-11 DIAGNOSIS — D69.3 ACUTE ITP (HCC): Primary | ICD-10-CM

## 2021-06-11 DIAGNOSIS — C91.10 CLL (CHRONIC LYMPHOCYTIC LEUKEMIA) (HCC): Primary | ICD-10-CM

## 2021-06-11 DIAGNOSIS — C91.10 CLL (CHRONIC LYMPHOCYTIC LEUKEMIA) (HCC): ICD-10-CM

## 2021-06-11 DIAGNOSIS — D69.3 IDIOPATHIC THROMBOCYTOPENIC PURPURA (ITP) (HCC): ICD-10-CM

## 2021-06-11 PROCEDURE — 99214 OFFICE O/P EST MOD 30 MIN: CPT | Performed by: INTERNAL MEDICINE

## 2021-06-11 PROCEDURE — 96372 THER/PROPH/DIAG INJ SC/IM: CPT

## 2021-06-11 RX ADMIN — ROMIPLOSTIM 125 MCG: 125 INJECTION, POWDER, LYOPHILIZED, FOR SOLUTION SUBCUTANEOUS at 16:29

## 2021-06-11 NOTE — PLAN OF CARE
Problem: Potential for Falls  Goal: Patient will remain free of falls  Description: INTERVENTIONS:  - Assess patient frequently for physical needs  -  Identify cognitive and physical deficits and behaviors that affect risk of falls    -  [place] fall precautions as indicated by assessment   - Educate patient/family on patient safety including physical limitations  - Instruct patient to call for assistance with activity based on assessment  - Modify environment to reduce risk of injury  - Consider OT/PT consult to assist with strengthening/mobility  Outcome: Progressing

## 2021-06-11 NOTE — PROGRESS NOTES
Hematology Outpatient Follow - Up Note  Ekaterina Barrera 73 y o  female MRN: @ Encounter: [id number]        Date:  6/11/2021        Assessment/ Plan:    Autoimmune hemolytic anemia, IgG subtype, acute immune thrombocytopenic purpura and splenomegaly, she had Sjogren syndrome, positive BALDO, rheumatoid factor and CCP     Initiated in the hospital on Decadron in June 2020 with short-lived response     Initiated on Promacta 75 milligram p o  Daily with improvement in platelet count initially but no sustained response despite increase to Promacta 75 mg alternating with 150 mg and therefore, it was discontinued          CT scan 6/19/20 identified diffuse mild retroperitoneal adenopathy and mildly enlarged retrocrural lymph node, hepatomegaly and splenomegaly 15cm     Flow cytometry 8/10/20 on the peripheral blood showed CLL pattern, positive for TP53      Bone marrow biopsy 9/2020 showed CLL, initiated on acalabrutinib 100 mg p o  b i d  with good response 9/2020 and normalization of CBC and differential later on the dose was reduced to 100 mg p o  daily mid October 2020 because of fatigue  N-plate 625 micro g every 4 weeks to keep platelets above 34,085 -400,000    Labs and imaging studies are reviewed by ordering provider once results are available  If there are findings that need immediate attention, you will be contacted when results available  Discussing results and the implication on your healthcare is best discussed in person at your follow-up visit         HPI:    Carlos Dhillon is a 73-year-old  female who was admitted to [facility] 6/2020 regarding easy bruising and bleeding       She has history of polyclonal gammopathy, with no evidence of monoclonal protein, Sjogren syndrome, autoimmune connective tissue disease with highly elevated sed rate, CRP, BALDO, rheumatoid factor   She is followed by Rheumatology she was prescribed hydroxychloroquine 200 milligram p o  B i d         She was found to have grade 4 thrombocytopenia with platelet count of 0559, hemoglobin 10, WBC 7 2, neutrophils, 34 percent lymphocytes     Flow cytometry on the peripheral blood showed CLL pattern positive for CD 23, CD 20     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 with PATTI positive +for IgG, plus for C3, bilirubin 0 5     CT scan of the chest abdomen pelvis on 06/19/2020 showed mild confluent periaortic/retroperitoneal lymphadenopathy measuring up to 1 1 centimeter in short axis, bilateral external iliac lymphadenopathy up to 10 millimeters, retrocrural lymph node measuring 1 3 centimeters spleen 15 centimeter, liver with hepatomegaly no evidence of masses     She was treated with dexamethasone for 3 days with improvement of platelet count up to 62,000     On 06/25/2020 platelet count AB 34,540     Initiated on Promacta 75 milligram p o  Daily in July 2020, platelet count up gradually with platelet count of 81,677 on 08/17/2020, hemoglobin 11 4, WBC 5 0, 57 percent        8/28/20:  Patient had CBC as gums were bleeding that morning AM, no further bleeding   Platelet count was 38,066  She was advised to take 150 mg of promacta every other day and 75 mg the other days        Platelet count was 94,084 8/31/20 and she was  advised to initiate prednisone 40mg po daily, tapering by 10mg po weekly  Merla Dears returned to 75mg po daily      Platelet count increased to 173,000 9/4/20           She had BMBX 9/9/20:  B-cell lymphoproliferative disorder, compatible with chronic lymphocytic leukemia   Virtually absent stainable storage iron  On flow cytometry, CLL phenotype accounted for 11%; CD5+, CD23+, CD20+ (dim) and CD 38-  9/14/20:  platelet count 579     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     Initiated on acalabrutinib 100 mg p o  b i d  since the beginning of October 2020   She was seen on 10/14/2020 with severe depression requiring admission to the hospital, she is on Zoloft 25 mg p o  daily, Synthroid 75 micro g p o  daily     Acalabrutinib was reduced to 100 mg p o  daily         Recurrence of thrombocytopenia 27,000 11/2/20 once prednisone was discontinued     Prednisone re-introduced back at 5 mg p o  daily, platelet count increased to 50,000 range, fluctuated to 20,000 ranged, prednisone increased to 10mg daily 11/17/20, increased to 20mg 11/23/20 when platelets decreased to 12,000  Prednisone dose was 20 milligrams alternating with 15 milligrams every other day 12/2/2020  and N-plate ordered 28/36/15 when platelet count 24,216 12/15/20       given every 3-4 weeks    Interval History:        Previous Treatment:         Test Results:    Imaging: No results found  Labs:   Lab Results   Component Value Date    WBC 9 34 06/09/2021    HGB 13 1 06/09/2021    HCT 43 1 06/09/2021    MCV 95 06/09/2021     (L) 06/09/2021     Lab Results   Component Value Date    K 3 7 11/06/2020     (H) 11/06/2020    CO2 27 11/06/2020    BUN 9 11/06/2020    CREATININE 0 75 11/06/2020    GLUF 76 09/21/2020    CALCIUM 9 1 11/06/2020    CORRECTEDCA 9 8 11/06/2020    AST 13 11/06/2020    ALT 13 11/06/2020    ALKPHOS 56 11/06/2020    EGFR 82 11/06/2020       No results found for: IRON, TIBC, FERRITIN    No results found for: [id number]      ROS: Review of Systems   Constitutional: Positive for fatigue  Negative for appetite change, chills, diaphoresis and unexpected weight change  HENT:   Negative for mouth sores, nosebleeds, sore throat, trouble swallowing and voice change  Eyes: Negative for eye problems and icterus  Respiratory: Negative for chest tightness, cough, hemoptysis and wheezing  Cardiovascular: Negative for chest pain, leg swelling and palpitations  Gastrointestinal: Negative for abdominal distention, abdominal pain, blood in stool, constipation, diarrhea, nausea and vomiting  Endocrine: Negative for hot flashes     Genitourinary: Negative for bladder incontinence, difficulty urinating, dyspareunia, dysuria and frequency  Musculoskeletal: Positive for arthralgias  Negative for back pain, gait problem, neck pain and neck stiffness  Skin: Negative for itching and rash  Neurological: Negative for dizziness, gait problem, headaches, numbness, seizures and speech difficulty  Hematological: Positive for adenopathy  Does not bruise/bleed easily  Psychiatric/Behavioral: Negative for decreased concentration, depression, sleep disturbance and suicidal ideas  The patient is not nervous/anxious  Current Medications: Reviewed  Allergies: Reviewed  PMH/FH/SH:  Reviewed      Physical Exam:    Body surface area is 1 65 meters squared  Wt Readings from Last 3 Encounters:   06/11/21 57 5 kg (126 lb 12 8 oz)   04/30/21 56 2 kg (124 lb)   03/25/21 56 2 kg (123 lb 12 8 oz)        Temp Readings from Last 3 Encounters:   06/11/21 (!) 97 4 °F (36 3 °C) (Temporal)   06/11/21 97 5 °F (36 4 °C) (Tympanic)   05/14/21 (!) 97 °F (36 1 °C)        BP Readings from Last 3 Encounters:   06/11/21 115/73   06/11/21 140/86   04/30/21 126/81         Pulse Readings from Last 3 Encounters:   06/11/21 73   06/11/21 70   04/30/21 79        Physical Exam  Vitals signs reviewed  Constitutional:       General: She is not in acute distress  Appearance: She is well-developed  She is not diaphoretic  HENT:      Head: Normocephalic and atraumatic  Eyes:      Conjunctiva/sclera: Conjunctivae normal    Neck:      Musculoskeletal: Normal range of motion and neck supple  Trachea: No tracheal deviation  Cardiovascular:      Rate and Rhythm: Normal rate and regular rhythm  Heart sounds: No murmur  No friction rub  No gallop  Pulmonary:      Effort: Pulmonary effort is normal  No respiratory distress  Breath sounds: Normal breath sounds  No wheezing or rales  Chest:      Chest wall: No tenderness  Abdominal:      General: There is no distension  Palpations: Abdomen is soft  Tenderness:  There is no abdominal tenderness  Musculoskeletal:      Right lower leg: No edema  Left lower leg: No edema  Lymphadenopathy:      Cervical: No cervical adenopathy  Skin:     General: Skin is warm and dry  Coloration: Skin is not pale  Findings: No erythema  Neurological:      Mental Status: She is alert and oriented to person, place, and time  Psychiatric:         Behavior: Behavior normal          Thought Content: Thought content normal          Judgment: Judgment normal          ECO    Goals and Barriers:  Current Goal: Minimize effects of disease  Barriers: None  Patient's Capacity to Self Care:  Patient is able to self care      Code Status: @[facility]@

## 2021-06-14 ENCOUNTER — TELEPHONE (OUTPATIENT)
Dept: HEMATOLOGY ONCOLOGY | Facility: CLINIC | Age: 71
End: 2021-06-14

## 2021-06-14 DIAGNOSIS — D69.3 ACUTE ITP (HCC): ICD-10-CM

## 2021-06-14 RX ORDER — PREDNISONE 1 MG/1
TABLET ORAL
Qty: 60 TABLET | Refills: 3 | Status: SHIPPED | OUTPATIENT
Start: 2021-06-14 | End: 2021-08-24 | Stop reason: SDUPTHER

## 2021-06-14 NOTE — TELEPHONE ENCOUNTER
Patient calling for a refill for her prednisone  Per patient she is taking 5mg tablets ( 2 PO daily - 10mg)  Will send Rx to Dr Vines Show for signature    Will send to RN as Nora Mora

## 2021-07-01 DIAGNOSIS — E03.9 HYPOTHYROIDISM, UNSPECIFIED TYPE: ICD-10-CM

## 2021-07-01 DIAGNOSIS — F32.A DEPRESSION, UNSPECIFIED DEPRESSION TYPE: ICD-10-CM

## 2021-07-01 DIAGNOSIS — F41.9 ANXIETY: ICD-10-CM

## 2021-07-01 RX ORDER — LEVOTHYROXINE SODIUM 0.07 MG/1
75 TABLET ORAL
Qty: 30 TABLET | Refills: 5 | Status: SHIPPED | OUTPATIENT
Start: 2021-07-01 | End: 2022-04-12 | Stop reason: SDUPTHER

## 2021-07-01 RX ORDER — ALPRAZOLAM 0.5 MG/1
0.5 TABLET ORAL
Qty: 30 TABLET | Refills: 2 | Status: SHIPPED | OUTPATIENT
Start: 2021-07-01 | End: 2021-12-07 | Stop reason: SDUPTHER

## 2021-07-01 RX ORDER — SERTRALINE HYDROCHLORIDE 25 MG/1
25 TABLET, FILM COATED ORAL DAILY
Qty: 30 TABLET | Refills: 5 | Status: SHIPPED | OUTPATIENT
Start: 2021-07-01 | End: 2022-06-07 | Stop reason: SDUPTHER

## 2021-07-06 ENCOUNTER — TELEPHONE (OUTPATIENT)
Dept: HEMATOLOGY ONCOLOGY | Facility: CLINIC | Age: 71
End: 2021-07-06

## 2021-07-06 NOTE — TELEPHONE ENCOUNTER
Raquel Shah from Dr Valentin Apple (Dentist) would like to get a clearance form from Dr Donaldo Felix for the patient who will be getting an invasive dental cleaning at his office  Raquel Shah could be reached at [facility]: [phone] and clearance form could be faxed over to: [phone]

## 2021-07-07 ENCOUNTER — APPOINTMENT (OUTPATIENT)
Dept: LAB | Age: 71
End: 2021-07-07
Payer: MEDICARE

## 2021-07-07 DIAGNOSIS — C91.10 CLL (CHRONIC LYMPHOCYTIC LEUKEMIA) (HCC): ICD-10-CM

## 2021-07-07 DIAGNOSIS — D69.3 IDIOPATHIC THROMBOCYTOPENIC PURPURA (ITP) (HCC): ICD-10-CM

## 2021-07-07 LAB
ALBUMIN SERPL BCP-MCNC: 3.2 G/DL (ref 3.5–5)
ALP SERPL-CCNC: 71 U/L (ref 46–116)
ALT SERPL W P-5'-P-CCNC: 13 U/L (ref 12–78)
ANION GAP SERPL CALCULATED.3IONS-SCNC: 3 MMOL/L (ref 4–13)
AST SERPL W P-5'-P-CCNC: 9 U/L (ref 5–45)
BASOPHILS # BLD AUTO: 0.09 THOUSANDS/ΜL (ref 0–0.1)
BASOPHILS NFR BLD AUTO: 1 % (ref 0–1)
BILIRUB SERPL-MCNC: 0.55 MG/DL (ref 0.2–1)
BUN SERPL-MCNC: 14 MG/DL (ref 5–25)
CALCIUM ALBUM COR SERPL-MCNC: 9.6 MG/DL (ref 8.3–10.1)
CALCIUM SERPL-MCNC: 9 MG/DL (ref 8.3–10.1)
CHLORIDE SERPL-SCNC: 110 MMOL/L (ref 100–108)
CO2 SERPL-SCNC: 27 MMOL/L (ref 21–32)
CREAT SERPL-MCNC: 0.77 MG/DL (ref 0.6–1.3)
EOSINOPHIL # BLD AUTO: 0.01 THOUSAND/ΜL (ref 0–0.61)
EOSINOPHIL NFR BLD AUTO: 0 % (ref 0–6)
ERYTHROCYTE [DISTWIDTH] IN BLOOD BY AUTOMATED COUNT: 15.9 % (ref 11.6–15.1)
GFR SERPL CREATININE-BSD FRML MDRD: 79 ML/MIN/1.73SQ M
GLUCOSE SERPL-MCNC: 106 MG/DL (ref 65–140)
HCT VFR BLD AUTO: 41.9 % (ref 34.8–46.1)
HGB BLD-MCNC: 12.9 G/DL (ref 11.5–15.4)
IMM GRANULOCYTES # BLD AUTO: 0.05 THOUSAND/UL (ref 0–0.2)
IMM GRANULOCYTES NFR BLD AUTO: 1 % (ref 0–2)
LDH SERPL-CCNC: 147 U/L (ref 81–234)
LYMPHOCYTES # BLD AUTO: 2.2 THOUSANDS/ΜL (ref 0.6–4.47)
LYMPHOCYTES NFR BLD AUTO: 30 % (ref 14–44)
MCH RBC QN AUTO: 28.7 PG (ref 26.8–34.3)
MCHC RBC AUTO-ENTMCNC: 30.8 G/DL (ref 31.4–37.4)
MCV RBC AUTO: 93 FL (ref 82–98)
MONOCYTES # BLD AUTO: 0.44 THOUSAND/ΜL (ref 0.17–1.22)
MONOCYTES NFR BLD AUTO: 6 % (ref 4–12)
NEUTROPHILS # BLD AUTO: 4.59 THOUSANDS/ΜL (ref 1.85–7.62)
NEUTS SEG NFR BLD AUTO: 62 % (ref 43–75)
NRBC BLD AUTO-RTO: 0 /100 WBCS
PLATELET # BLD AUTO: 163 THOUSANDS/UL (ref 149–390)
PMV BLD AUTO: 11.7 FL (ref 8.9–12.7)
POTASSIUM SERPL-SCNC: 3.7 MMOL/L (ref 3.5–5.3)
PROT SERPL-MCNC: 6.7 G/DL (ref 6.4–8.2)
RBC # BLD AUTO: 4.49 MILLION/UL (ref 3.81–5.12)
SODIUM SERPL-SCNC: 140 MMOL/L (ref 136–145)
WBC # BLD AUTO: 7.38 THOUSAND/UL (ref 4.31–10.16)

## 2021-07-07 PROCEDURE — 36415 COLL VENOUS BLD VENIPUNCTURE: CPT

## 2021-07-07 PROCEDURE — 85025 COMPLETE CBC W/AUTO DIFF WBC: CPT

## 2021-07-07 PROCEDURE — 83615 LACTATE (LD) (LDH) ENZYME: CPT

## 2021-07-07 PROCEDURE — 80053 COMPREHEN METABOLIC PANEL: CPT

## 2021-07-08 ENCOUNTER — TELEPHONE (OUTPATIENT)
Dept: HEMATOLOGY ONCOLOGY | Facility: CLINIC | Age: 71
End: 2021-07-08

## 2021-07-08 NOTE — TELEPHONE ENCOUNTER
Returned call to patient  Reviewed platelet count = 083,711    Per order patient is ok to proceed with N-plate as long as platelet count less than 200,000  Patient aware that she is ok for her injection tomorrow    Will send to RN as [employer]

## 2021-07-08 NOTE — TELEPHONE ENCOUNTER
Patient would like their lab results     Person calling in  Patient   Lab Draw Date  07/07/2021   Lab Draw Place  facility on [facility]    Call Back Number [phone]

## 2021-07-09 ENCOUNTER — HOSPITAL ENCOUNTER (OUTPATIENT)
Dept: INFUSION CENTER | Facility: HOSPITAL | Age: 71
Discharge: HOME | End: 2021-07-09
Payer: MEDICARE

## 2021-07-09 VITALS — TEMPERATURE: 97.3 F

## 2021-07-09 DIAGNOSIS — C91.10 CLL (CHRONIC LYMPHOCYTIC LEUKEMIA) (HCC): Primary | ICD-10-CM

## 2021-07-09 DIAGNOSIS — D69.3 IDIOPATHIC THROMBOCYTOPENIC PURPURA (ITP) (HCC): ICD-10-CM

## 2021-07-09 PROCEDURE — 96372 THER/PROPH/DIAG INJ SC/IM: CPT

## 2021-07-09 RX ADMIN — ROMIPLOSTIM 125 MCG: 125 INJECTION, POWDER, LYOPHILIZED, FOR SOLUTION SUBCUTANEOUS at 12:37

## 2021-07-09 NOTE — PLAN OF CARE
Problem: Potential for Falls  Goal: Patient will remain free of falls  Description: INTERVENTIONS:  - Educate patient/family on patient safety including physical limitations  - Instruct patient to call for assistance with activity   - Consult OT/PT to assist with strengthening/mobility   - Keep Call bell within reach  - Keep bed low and locked with side rails adjusted as appropriate  - Keep care items and personal belongings within reach  - Initiate and maintain comfort rounds  - Make Fall Risk Sign visible to staff  -- Apply yellow socks and bracelet for high fall risk patients  - Consider moving patient to room near nurses station  Outcome: Progressing

## 2021-07-22 ENCOUNTER — TELEPHONE (OUTPATIENT)
Dept: HEMATOLOGY ONCOLOGY | Facility: CLINIC | Age: 71
End: 2021-07-22

## 2021-07-22 NOTE — TELEPHONE ENCOUNTER
Navid Lund from [facility] called stating patient is going to need a full mouth extraction  [facility] is looking to do this in 2 phases unless a differrent recommendation from [address]   Please call Jazzy Mitchell at [phone]   They are closed on Friday

## 2021-07-23 NOTE — TELEPHONE ENCOUNTER
Reviewed this message with Dr Yady Rehman and he stated "I am fine with this as long as Nicolle's PLT count is 100 thousand "

## 2021-07-26 NOTE — TELEPHONE ENCOUNTER
Spoke to Ana Moya and informed her that Dr Ines Diane is ok with this procedure as long as Nicolle's PLT count is 100 thousand or higher  Ana Moya took this message down and stated that she will pass it along and if anyone should have any further questions , she will give them our number so they can give the office a call

## 2021-08-02 NOTE — TELEPHONE ENCOUNTER
Olive Castillo from [address] [place] [address] called asking if any pre med or special precautions need to be made  Also is patient ok to have a full mouth extraction done in 1 surgery    Please call Olive Castillo at [phone]

## 2021-08-03 NOTE — TELEPHONE ENCOUNTER
Spoke to Jairo Calle and informed her that I reviewed this message with Dr Ines Diane and he stated "No pre meds or special precautions  She can have the procedure completed in one day as long as her PLT count is 100 thousand or better " Hiwot explained that the date she will have the procedure completed in on August 30th and that she will get labs completed a week prior to the procedure  I expressed that I understood and Jairo Calle expressed understanding and gratitude

## 2021-08-04 ENCOUNTER — APPOINTMENT (OUTPATIENT)
Dept: LAB | Age: 71
End: 2021-08-04
Payer: MEDICARE

## 2021-08-05 ENCOUNTER — TELEPHONE (OUTPATIENT)
Dept: HEMATOLOGY ONCOLOGY | Facility: CLINIC | Age: 71
End: 2021-08-05

## 2021-08-05 NOTE — TELEPHONE ENCOUNTER
Patient would like their lab results     Person calling in PATIENT   Lab Draw Date 8-5-21   Lab Draw [facility] [address]   Call Back Number [phone]

## 2021-08-06 ENCOUNTER — HOSPITAL ENCOUNTER (OUTPATIENT)
Dept: INFUSION CENTER | Facility: HOSPITAL | Age: 71
Discharge: HOME | End: 2021-08-06
Payer: MEDICARE

## 2021-08-06 VITALS
TEMPERATURE: 97.6 F | RESPIRATION RATE: 18 BRPM | SYSTOLIC BLOOD PRESSURE: 112 MMHG | HEART RATE: 80 BPM | DIASTOLIC BLOOD PRESSURE: 78 MMHG

## 2021-08-06 DIAGNOSIS — D69.3 IDIOPATHIC THROMBOCYTOPENIC PURPURA (ITP) (HCC): ICD-10-CM

## 2021-08-06 DIAGNOSIS — C91.10 CLL (CHRONIC LYMPHOCYTIC LEUKEMIA) (HCC): Primary | ICD-10-CM

## 2021-08-06 PROCEDURE — 96372 THER/PROPH/DIAG INJ SC/IM: CPT

## 2021-08-06 RX ADMIN — ROMIPLOSTIM 125 MCG: 125 INJECTION, POWDER, LYOPHILIZED, FOR SOLUTION SUBCUTANEOUS at 11:20

## 2021-08-10 ENCOUNTER — TELEPHONE (OUTPATIENT)
Dept: HEMATOLOGY ONCOLOGY | Facility: CLINIC | Age: 71
End: 2021-08-10

## 2021-08-10 ENCOUNTER — APPOINTMENT (OUTPATIENT)
Dept: LAB | Age: 71
End: 2021-08-10
Payer: MEDICARE

## 2021-08-10 DIAGNOSIS — D69.3 IDIOPATHIC THROMBOCYTOPENIC PURPURA (ITP) (HCC): ICD-10-CM

## 2021-08-10 DIAGNOSIS — D69.3 IDIOPATHIC THROMBOCYTOPENIC PURPURA (ITP) (HCC): Primary | ICD-10-CM

## 2021-08-10 NOTE — TELEPHONE ENCOUNTER
Patient calling to report she has a bruise on her Left arm  It is about 2 inches in diameter  She also has "small spots of bruising" on her Right arm  Patient denies trauma or pain in the area  She first noticed this yesterday  Most recent CBC from 8/4/21 showed platelet count 168,713    I Instructed patient to go to the lab today to check her CBC - she will call this afternoon for results to confirm platelet count has not dropped     Will send to RN to update Dr Marshall Grace  Patient denies active bleeding  Will place order for CBC

## 2021-08-11 ENCOUNTER — TELEPHONE (OUTPATIENT)
Dept: OTHER | Facility: OTHER | Age: 71
End: 2021-08-11

## 2021-08-11 ENCOUNTER — TELEPHONE (OUTPATIENT)
Dept: HEMATOLOGY ONCOLOGY | Facility: CLINIC | Age: 71
End: 2021-08-11

## 2021-08-11 DIAGNOSIS — D69.3 ACUTE ITP (HCC): Primary | ICD-10-CM

## 2021-08-11 NOTE — TELEPHONE ENCOUNTER
Patient would like their lab results     Person calling in  Patient   Lab Draw Date 08/10   [facility] [address] [facility] [phone]

## 2021-08-11 NOTE — TELEPHONE ENCOUNTER
Patients platelets clumped - should she repeat CBC again or does Dr Jorden Ray want to order a different test for platelet clumping?

## 2021-08-11 NOTE — TELEPHONE ENCOUNTER
Lab Result: Critical Result - Platelet 73,722   Date/Time Drawn: 08/11/21 1539   Ordering Provider: Dr Nando Harrington Name: Geovanny Mota       The following critical/stat result was read back to the lab as stated above and [employer] messaged to the on-call provider

## 2021-08-12 ENCOUNTER — TELEPHONE (OUTPATIENT)
Dept: HEMATOLOGY ONCOLOGY | Facility: CLINIC | Age: 71
End: 2021-08-12

## 2021-08-12 DIAGNOSIS — C91.10 CLL (CHRONIC LYMPHOCYTIC LEUKEMIA) (HCC): ICD-10-CM

## 2021-08-12 DIAGNOSIS — D69.3 ACUTE ITP (HCC): Primary | ICD-10-CM

## 2021-08-12 NOTE — TELEPHONE ENCOUNTER
Received a call from patient  She is requesting platelet count from yesterday  Result shows 22,000  Patient denies active bleeding at this time  She reports one of the bruises on her arm did "open up" and was bleeding    Patient was able to get this to stop - no bleeding at this time    Will send to RN to review with Dr Nicolle Armenta was 1/0/72

## 2021-08-12 NOTE — TELEPHONE ENCOUNTER
Reviewed with Dr Bridget Ybarra and he wants the patient to increase prednisone to 20 mg daily and recheck cbc on Monday 8/16  Called patient to review and she verbalized understanding

## 2021-08-16 ENCOUNTER — APPOINTMENT (OUTPATIENT)
Dept: LAB | Age: 71
End: 2021-08-16
Payer: MEDICARE

## 2021-08-16 DIAGNOSIS — D69.3 ACUTE ITP (HCC): ICD-10-CM

## 2021-08-16 LAB
BASOPHILS # BLD AUTO: 0.11 THOUSANDS/ΜL (ref 0–0.1)
BASOPHILS NFR BLD AUTO: 1 % (ref 0–1)
EOSINOPHIL # BLD AUTO: 0 THOUSAND/ΜL (ref 0–0.61)
EOSINOPHIL NFR BLD AUTO: 0 % (ref 0–6)
ERYTHROCYTE [DISTWIDTH] IN BLOOD BY AUTOMATED COUNT: 17.2 % (ref 11.6–15.1)
HCT VFR BLD AUTO: 42.7 % (ref 34.8–46.1)
HGB BLD-MCNC: 12.6 G/DL (ref 11.5–15.4)
IMM GRANULOCYTES # BLD AUTO: 0.11 THOUSAND/UL (ref 0–0.2)
IMM GRANULOCYTES NFR BLD AUTO: 1 % (ref 0–2)
LYMPHOCYTES # BLD AUTO: 2.12 THOUSANDS/ΜL (ref 0.6–4.47)
LYMPHOCYTES NFR BLD AUTO: 19 % (ref 14–44)
MCH RBC QN AUTO: 28.9 PG (ref 26.8–34.3)
MCHC RBC AUTO-ENTMCNC: 29.5 G/DL (ref 31.4–37.4)
MCV RBC AUTO: 98 FL (ref 82–98)
MONOCYTES # BLD AUTO: 0.68 THOUSAND/ΜL (ref 0.17–1.22)
MONOCYTES NFR BLD AUTO: 6 % (ref 4–12)
NEUTROPHILS # BLD AUTO: 8.27 THOUSANDS/ΜL (ref 1.85–7.62)
NEUTS SEG NFR BLD AUTO: 73 % (ref 43–75)
NRBC BLD AUTO-RTO: 0 /100 WBCS
PLATELET # BLD AUTO: 406 THOUSANDS/UL (ref 149–390)
PMV BLD AUTO: 10.8 FL (ref 8.9–12.7)
RBC # BLD AUTO: 4.36 MILLION/UL (ref 3.81–5.12)
WBC # BLD AUTO: 11.29 THOUSAND/UL (ref 4.31–10.16)

## 2021-08-16 PROCEDURE — 36415 COLL VENOUS BLD VENIPUNCTURE: CPT

## 2021-08-16 PROCEDURE — 85025 COMPLETE CBC W/AUTO DIFF WBC: CPT

## 2021-08-17 ENCOUNTER — TELEPHONE (OUTPATIENT)
Dept: HEMATOLOGY ONCOLOGY | Facility: CLINIC | Age: 71
End: 2021-08-17

## 2021-08-17 DIAGNOSIS — D69.3 ACUTE ITP (HCC): Primary | ICD-10-CM

## 2021-08-17 NOTE — TELEPHONE ENCOUNTER
Reviewed with Dr Emelia Scott  The patient should continue on prednisone 20 mg daily and recheck a cbc next Monday  Called the patient to review and patient verbalized understanding

## 2021-08-17 NOTE — TELEPHONE ENCOUNTER
Received a call from patient  Reviewed that platelet count from yesterday was 406,000  Patient is on Prednisone 20mg PO daily  Will send to RN to review with Dr Marshall Grace  Patient is scheduled to have teeth extracted on 8/31/21 - she is asking what day she should have CBC repeated prior to extraction    Call back number for patient [phone]

## 2021-08-23 ENCOUNTER — APPOINTMENT (OUTPATIENT)
Dept: LAB | Age: 71
End: 2021-08-23
Payer: MEDICARE

## 2021-08-23 DIAGNOSIS — D69.3 ACUTE ITP (HCC): ICD-10-CM

## 2021-08-23 DIAGNOSIS — C91.10 CLL (CHRONIC LYMPHOCYTIC LEUKEMIA) (HCC): ICD-10-CM

## 2021-08-23 LAB
BASOPHILS # BLD AUTO: 0.13 THOUSANDS/ΜL (ref 0–0.1)
BASOPHILS NFR BLD AUTO: 1 % (ref 0–1)
EOSINOPHIL # BLD AUTO: 0 THOUSAND/ΜL (ref 0–0.61)
EOSINOPHIL NFR BLD AUTO: 0 % (ref 0–6)
ERYTHROCYTE [DISTWIDTH] IN BLOOD BY AUTOMATED COUNT: 17 % (ref 11.6–15.1)
HCT VFR BLD AUTO: 45.3 % (ref 34.8–46.1)
HGB BLD-MCNC: 13.4 G/DL (ref 11.5–15.4)
IMM GRANULOCYTES # BLD AUTO: 0.14 THOUSAND/UL (ref 0–0.2)
IMM GRANULOCYTES NFR BLD AUTO: 1 % (ref 0–2)
LYMPHOCYTES # BLD AUTO: 2.33 THOUSANDS/ΜL (ref 0.6–4.47)
LYMPHOCYTES NFR BLD AUTO: 19 % (ref 14–44)
MCH RBC QN AUTO: 29.3 PG (ref 26.8–34.3)
MCHC RBC AUTO-ENTMCNC: 29.6 G/DL (ref 31.4–37.4)
MCV RBC AUTO: 99 FL (ref 82–98)
MONOCYTES # BLD AUTO: 0.62 THOUSAND/ΜL (ref 0.17–1.22)
MONOCYTES NFR BLD AUTO: 5 % (ref 4–12)
NEUTROPHILS # BLD AUTO: 9.07 THOUSANDS/ΜL (ref 1.85–7.62)
NEUTS SEG NFR BLD AUTO: 74 % (ref 43–75)
NRBC BLD AUTO-RTO: 0 /100 WBCS
PLATELET # BLD AUTO: 286 THOUSANDS/UL (ref 149–390)
PLATELET # BLD AUTO: 328 THOUSANDS/UL (ref 149–390)
PMV BLD AUTO: 11.5 FL (ref 8.9–12.7)
RBC # BLD AUTO: 4.57 MILLION/UL (ref 3.81–5.12)
WBC # BLD AUTO: 12.29 THOUSAND/UL (ref 4.31–10.16)

## 2021-08-23 PROCEDURE — 36415 COLL VENOUS BLD VENIPUNCTURE: CPT

## 2021-08-23 PROCEDURE — 85025 COMPLETE CBC W/AUTO DIFF WBC: CPT

## 2021-08-24 ENCOUNTER — TELEPHONE (OUTPATIENT)
Dept: HEMATOLOGY ONCOLOGY | Facility: CLINIC | Age: 71
End: 2021-08-24

## 2021-08-24 DIAGNOSIS — C91.10 CLL (CHRONIC LYMPHOCYTIC LEUKEMIA) (HCC): ICD-10-CM

## 2021-08-24 DIAGNOSIS — D69.3 ACUTE ITP (HCC): ICD-10-CM

## 2021-08-24 DIAGNOSIS — D69.3 IDIOPATHIC THROMBOCYTOPENIC PURPURA (ITP) (HCC): Primary | ICD-10-CM

## 2021-08-24 RX ORDER — PREDNISONE 1 MG/1
TABLET ORAL
Qty: 120 TABLET | Refills: 3 | Status: SHIPPED | OUTPATIENT
Start: 2021-08-24 | End: 2022-01-12

## 2021-08-24 NOTE — TELEPHONE ENCOUNTER
Reviewed with Dr Estuardo Rodriguez  Patient will stay on prednisone 20 mg daily and recheck 8/30  Called patient to review and she verbalized understanding

## 2021-08-24 NOTE — TELEPHONE ENCOUNTER
Patient called for her platelet results from yesterdays labs  Platelets are 728  Patient reports that she continues to get very small bruises on bilateral arms  Denies active signs of bleeding  Patient is on Prednisone 20 mg daily  Please review with Dr Jeffrey Medrano if any changes are needed  Patient is also requesting a refill on her Prednisone prescription  Patient will be having all of her teeth extracted on 8/31/21  She would like to know when she should have her labs drawn again?     Will forward to Dr Henrique Holbrook RN to review above with MD Perla Bella ([facility]) [phone] (H)

## 2021-08-30 ENCOUNTER — APPOINTMENT (OUTPATIENT)
Dept: LAB | Age: 71
End: 2021-08-30
Payer: MEDICARE

## 2021-08-30 DIAGNOSIS — D69.3 IDIOPATHIC THROMBOCYTOPENIC PURPURA (ITP) (HCC): ICD-10-CM

## 2021-08-30 LAB
BASOPHILS # BLD AUTO: 0.08 THOUSANDS/ΜL (ref 0–0.1)
BASOPHILS NFR BLD AUTO: 1 % (ref 0–1)
EOSINOPHIL # BLD AUTO: 0 THOUSAND/ΜL (ref 0–0.61)
EOSINOPHIL NFR BLD AUTO: 0 % (ref 0–6)
ERYTHROCYTE [DISTWIDTH] IN BLOOD BY AUTOMATED COUNT: 16.3 % (ref 11.6–15.1)
HCT VFR BLD AUTO: 43.2 % (ref 34.8–46.1)
HGB BLD-MCNC: 13 G/DL (ref 11.5–15.4)
IMM GRANULOCYTES # BLD AUTO: 0.1 THOUSAND/UL (ref 0–0.2)
IMM GRANULOCYTES NFR BLD AUTO: 1 % (ref 0–2)
LYMPHOCYTES # BLD AUTO: 1.88 THOUSANDS/ΜL (ref 0.6–4.47)
LYMPHOCYTES NFR BLD AUTO: 19 % (ref 14–44)
MCH RBC QN AUTO: 29.2 PG (ref 26.8–34.3)
MCHC RBC AUTO-ENTMCNC: 30.1 G/DL (ref 31.4–37.4)
MCV RBC AUTO: 97 FL (ref 82–98)
MONOCYTES # BLD AUTO: 0.33 THOUSAND/ΜL (ref 0.17–1.22)
MONOCYTES NFR BLD AUTO: 3 % (ref 4–12)
NEUTROPHILS # BLD AUTO: 7.74 THOUSANDS/ΜL (ref 1.85–7.62)
NEUTS SEG NFR BLD AUTO: 76 % (ref 43–75)
NRBC BLD AUTO-RTO: 0 /100 WBCS
PLATELET # BLD AUTO: 174 THOUSANDS/UL (ref 149–390)
PMV BLD AUTO: 12.7 FL (ref 8.9–12.7)
RBC # BLD AUTO: 4.45 MILLION/UL (ref 3.81–5.12)
WBC # BLD AUTO: 10.13 THOUSAND/UL (ref 4.31–10.16)

## 2021-08-30 PROCEDURE — 85025 COMPLETE CBC W/AUTO DIFF WBC: CPT

## 2021-08-30 PROCEDURE — 36415 COLL VENOUS BLD VENIPUNCTURE: CPT

## 2021-09-01 ENCOUNTER — TELEPHONE (OUTPATIENT)
Dept: HEMATOLOGY ONCOLOGY | Facility: CLINIC | Age: 71
End: 2021-09-01

## 2021-09-01 NOTE — TELEPHONE ENCOUNTER
Reviewed with patient that her platelet count from 3/01/61 was 174,000  She will keep her appointment for N-plate as scheduled     Office note states:   N-plate 191 micro g every 4 weeks to keep platelets above 25,723 -400,000    Patient verbalized understanding

## 2021-09-03 ENCOUNTER — HOSPITAL ENCOUNTER (OUTPATIENT)
Dept: INFUSION CENTER | Facility: HOSPITAL | Age: 71
Discharge: HOME | End: 2021-09-03
Payer: MEDICARE

## 2021-09-03 VITALS — TEMPERATURE: 97.3 F

## 2021-09-03 DIAGNOSIS — C91.10 CLL (CHRONIC LYMPHOCYTIC LEUKEMIA) (HCC): Primary | ICD-10-CM

## 2021-09-03 DIAGNOSIS — D69.3 IDIOPATHIC THROMBOCYTOPENIC PURPURA (ITP) (HCC): ICD-10-CM

## 2021-09-03 PROCEDURE — 96372 THER/PROPH/DIAG INJ SC/IM: CPT

## 2021-09-03 RX ADMIN — ROMIPLOSTIM 125 MCG: 125 INJECTION, POWDER, LYOPHILIZED, FOR SOLUTION SUBCUTANEOUS at 10:48

## 2021-09-07 ENCOUNTER — OFFICE VISIT (OUTPATIENT)
Dept: URGENT CARE | Age: 71
End: 2021-09-07
Payer: MEDICARE

## 2021-09-07 ENCOUNTER — OFFICE VISIT (OUTPATIENT)
Dept: FAMILY MEDICINE CLINIC | Facility: CLINIC | Age: 71
End: 2021-09-07
Payer: MEDICARE

## 2021-09-07 VITALS
OXYGEN SATURATION: 98 % | TEMPERATURE: 98.6 F | WEIGHT: 126 LBS | BODY MASS INDEX: 20.34 KG/M2 | DIASTOLIC BLOOD PRESSURE: 81 MMHG | RESPIRATION RATE: 18 BRPM | HEART RATE: 101 BPM | SYSTOLIC BLOOD PRESSURE: 138 MMHG

## 2021-09-07 VITALS
HEART RATE: 88 BPM | DIASTOLIC BLOOD PRESSURE: 86 MMHG | WEIGHT: 125 LBS | HEIGHT: 66 IN | SYSTOLIC BLOOD PRESSURE: 134 MMHG | RESPIRATION RATE: 16 BRPM | TEMPERATURE: 97.1 F | OXYGEN SATURATION: 98 % | BODY MASS INDEX: 20.09 KG/M2

## 2021-09-07 DIAGNOSIS — R30.0 DYSURIA: ICD-10-CM

## 2021-09-07 DIAGNOSIS — C91.10 CLL (CHRONIC LYMPHOCYTIC LEUKEMIA) (HCC): ICD-10-CM

## 2021-09-07 DIAGNOSIS — N30.01 ACUTE CYSTITIS WITH HEMATURIA: Primary | ICD-10-CM

## 2021-09-07 LAB
SL AMB  POCT GLUCOSE, UA: ABNORMAL
SL AMB LEUKOCYTE ESTERASE,UA: ABNORMAL
SL AMB POCT BILIRUBIN,UA: ABNORMAL
SL AMB POCT BLOOD,UA: ABNORMAL
SL AMB POCT CLARITY,UA: ABNORMAL
SL AMB POCT COLOR,UA: ABNORMAL
SL AMB POCT KETONES,UA: 5
SL AMB POCT NITRITE,UA: ABNORMAL
SL AMB POCT PH,UA: 6.5
SL AMB POCT SPECIFIC GRAVITY,UA: 1.03
SL AMB POCT URINE PROTEIN: 2000
SL AMB POCT UROBILINOGEN: 1

## 2021-09-07 PROCEDURE — 87186 SC STD MICRODIL/AGAR DIL: CPT | Performed by: PHYSICIAN ASSISTANT

## 2021-09-07 PROCEDURE — 87077 CULTURE AEROBIC IDENTIFY: CPT | Performed by: PHYSICIAN ASSISTANT

## 2021-09-07 PROCEDURE — 99213 OFFICE O/P EST LOW 20 MIN: CPT | Performed by: FAMILY MEDICINE

## 2021-09-07 PROCEDURE — 99213 OFFICE O/P EST LOW 20 MIN: CPT | Performed by: PHYSICIAN ASSISTANT

## 2021-09-07 PROCEDURE — G0463 HOSPITAL OUTPT CLINIC VISIT: HCPCS | Performed by: PHYSICIAN ASSISTANT

## 2021-09-07 PROCEDURE — 87086 URINE CULTURE/COLONY COUNT: CPT | Performed by: PHYSICIAN ASSISTANT

## 2021-09-07 RX ORDER — CHLORHEXIDINE GLUCONATE 0.12 MG/ML
RINSE ORAL
COMMUNITY
Start: 2021-08-02 | End: 2022-01-06

## 2021-09-07 RX ORDER — CEPHALEXIN 500 MG/1
500 CAPSULE ORAL EVERY 12 HOURS SCHEDULED
Qty: 14 CAPSULE | Refills: 0 | Status: SHIPPED | OUTPATIENT
Start: 2021-09-07 | End: 2021-09-15

## 2021-09-07 RX ORDER — ACETAMINOPHEN AND CODEINE PHOSPHATE 300; 30 MG/1; MG/1
TABLET ORAL
COMMUNITY
Start: 2021-08-02 | End: 2021-09-15

## 2021-09-07 NOTE — PATIENT INSTRUCTIONS
Take antibiotics as prescribed  Complete the entire course  If you do not complete the entire course this may result in bacterial resistance making future infections very difficult or impossible to treat  You should never have leftover antibiotics unless your antibiotic is switched  We send all positive urine for culture, we will call you if your antibiotic needs to be changed based on culture results  If symptoms do not improve in 2-3 days, follow-up with your primary care provider  If you develop fever, chills, or worsening low back pain report to the emergency room  Urinary Tract Infection in Women   AMBULATORY CARE:   A urinary tract infection (UTI)  is caused by bacteria that get inside your urinary tract  Most bacteria that enter your urinary tract come out when you urinate  If the bacteria stay in your urinary tract, you may get an infection  Your urinary tract includes your kidneys, ureters, bladder, and urethra  Urine is made in your kidneys, and it flows from the ureters to the bladder  Urine leaves the bladder through the urethra  A UTI is more common in your lower urinary tract, which includes your bladder and urethra  Common symptoms include the following:   · Urinating more often or waking from sleep to urinate    · Pain or burning when you urinate    · Pain or pressure in your lower abdomen     · Urine that smells bad    · Blood in your urine    · Leaking urine    Seek care immediately if:   · You are urinating very little or not at all  · You have a high fever with shaking chills  · You have side or back pain that gets worse  Call your doctor if:   · You have a fever  · You do not feel better after 2 days of taking antibiotics  · You are vomiting  · You have questions or concerns about your condition or care  Treatment for a UTI  may include antibiotics to treat a bacterial infection   You may also need medicines to decrease pain and burning, or decrease the urge to urinate often  If you have UTIs often (called recurrent UTIs), you may be given antibiotics to take regularly  You will be given directions for when and how to use antibiotics  The goal is to prevent UTIs but not cause antibiotic resistance by using antibiotics too often  Prevent a UTI:   · Empty your bladder often  Urinate and empty your bladder as soon as you feel the need  Do not hold your urine for long periods of time  · Wipe from front to back after you urinate or have a bowel movement  This will help prevent germs from getting into your urinary tract through your urethra  · Drink liquids as directed  Ask how much liquid to drink each day and which liquids are best for you  You may need to drink more liquids than usual to help flush out the bacteria  Do not drink alcohol, caffeine, or citrus juices  These can irritate your bladder and increase your symptoms  Your healthcare provider may recommend cranberry juice to help prevent a UTI  · Urinate after you have sex  This can help flush out bacteria passed during sex  · Do not douche or use feminine deodorants  These can change the chemical balance in your vagina  · Change sanitary pads or tampons often  This will help prevent germs from getting into your urinary tract  · Talk to your healthcare provider about your birth control method  You may need to change your method if it is increasing your risk for UTIs  · Wear cotton underwear and clothes that are loose  Tight pants and nylon underwear can trap moisture and cause bacteria to grow  · Vaginal estrogen may be recommended  This medicine helps prevent UTIs in women who have gone through menopause or are in sofya-menopause  · Do pelvic muscle exercises often  Pelvic muscle exercises may help you start and stop urinating  Strong pelvic muscles may help you empty your bladder easier  Squeeze these muscles tightly for 5 seconds like you are trying to hold back urine   Then relax for 5 seconds  Gradually work up to squeezing for 10 seconds  Do 3 sets of 15 repetitions a day, or as directed  Follow up with your healthcare provider as directed:  Write down your questions so you remember to ask them during your visits  © Copyright [address] Information is for End User's use only and may not be sold, redistributed or otherwise used for commercial purposes  All illustrations and images included in CareNotes® are the copyrighted property of [employer]  or [facility] [place] Emma Ordonez   The above information is an  only  It is not intended as medical advice for individual conditions or treatments  Talk to your doctor, nurse or pharmacist before following any medical regimen to see if it is safe and effective for you

## 2021-09-07 NOTE — PROGRESS NOTES
[facility]        NAME: Asha Patricio is a 73 y o  female  : 1950    MRN: [id number]  DATE: 2021  TIME: 9:13 AM    Assessment and Plan   Acute cystitis with hematuria [N30 01]  1  Acute cystitis with hematuria     2  Dysuria  cephalexin (KEFLEX) 500 mg capsule    POCT urine dip auto non-scope    Urine culture   Pt presents with complaints of dysuria and hematuria when wiping  UA demonstrates large sascha esterase, blood, and protein  I will treat her empirically on Keflex  She is instructed to follow-up with her PCP today due to the degree of blood and protein in her urine  Pt did have difficulty leaving a sample which may explain some of the protein  She will report to the ED if symptoms worsen  Patient Instructions     Patient Instructions     Take antibiotics as prescribed  Complete the entire course  If you do not complete the entire course this may result in bacterial resistance making future infections very difficult or impossible to treat  You should never have leftover antibiotics unless your antibiotic is switched  We send all positive urine for culture, we will call you if your antibiotic needs to be changed based on culture results  If symptoms do not improve in 2-3 days, follow-up with your primary care provider  If you develop fever, chills, or worsening low back pain report to the emergency room  Urinary Tract Infection in Women   AMBULATORY CARE:   A urinary tract infection (UTI)  is caused by bacteria that get inside your urinary tract  Most bacteria that enter your urinary tract come out when you urinate  If the bacteria stay in your urinary tract, you may get an infection  Your urinary tract includes your kidneys, ureters, bladder, and urethra  Urine is made in your kidneys, and it flows from the ureters to the bladder  Urine leaves the bladder through the urethra  A UTI is more common in your lower urinary tract, which includes your bladder and urethra  Common symptoms include the following:   · Urinating more often or waking from sleep to urinate    · Pain or burning when you urinate    · Pain or pressure in your lower abdomen     · Urine that smells bad    · Blood in your urine    · Leaking urine    Seek care immediately if:   · You are urinating very little or not at all  · You have a high fever with shaking chills  · You have side or back pain that gets worse  Call your doctor if:   · You have a fever  · You do not feel better after 2 days of taking antibiotics  · You are vomiting  · You have questions or concerns about your condition or care  Treatment for a UTI  may include antibiotics to treat a bacterial infection  You may also need medicines to decrease pain and burning, or decrease the urge to urinate often  If you have UTIs often (called recurrent UTIs), you may be given antibiotics to take regularly  You will be given directions for when and how to use antibiotics  The goal is to prevent UTIs but not cause antibiotic resistance by using antibiotics too often  Prevent a UTI:   · Empty your bladder often  Urinate and empty your bladder as soon as you feel the need  Do not hold your urine for long periods of time  · Wipe from front to back after you urinate or have a bowel movement  This will help prevent germs from getting into your urinary tract through your urethra  · Drink liquids as directed  Ask how much liquid to drink each day and which liquids are best for you  You may need to drink more liquids than usual to help flush out the bacteria  Do not drink alcohol, caffeine, or citrus juices  These can irritate your bladder and increase your symptoms  Your healthcare provider may recommend cranberry juice to help prevent a UTI  · Urinate after you have sex  This can help flush out bacteria passed during sex  · Do not douche or use feminine deodorants    These can change the chemical balance in your vagina  · Change sanitary pads or tampons often  This will help prevent germs from getting into your urinary tract  · Talk to your healthcare provider about your birth control method  You may need to change your method if it is increasing your risk for UTIs  · Wear cotton underwear and clothes that are loose  Tight pants and nylon underwear can trap moisture and cause bacteria to grow  · Vaginal estrogen may be recommended  This medicine helps prevent UTIs in women who have gone through menopause or are in sofya-menopause  · Do pelvic muscle exercises often  Pelvic muscle exercises may help you start and stop urinating  Strong pelvic muscles may help you empty your bladder easier  Squeeze these muscles tightly for 5 seconds like you are trying to hold back urine  Then relax for 5 seconds  Gradually work up to squeezing for 10 seconds  Do 3 sets of 15 repetitions a day, or as directed  Follow up with your healthcare provider as directed:  Write down your questions so you remember to ask them during your visits  © Copyright [address] Information is for End User's use only and may not be sold, redistributed or otherwise used for commercial purposes  All illustrations and images included in CareNotes® are the copyrighted property of [employer]  or [address] [place] [address]  The above information is an  only  It is not intended as medical advice for individual conditions or treatments  Talk to your doctor, nurse or pharmacist before following any medical regimen to see if it is safe and effective for you  Follow up with PCP in 3-5 days  Proceed to  ER if symptoms worsen  Chief Complaint     Chief Complaint   Patient presents with    Possible UTI     Patient c/o blood in her urine this morning  History of Present Illness       73year old female presents with complaints of frequency and blood on the toilet paper when wiping this morning   She has also noted some urgency  Pt denies fever, chills, nausea, and low back pain  She has a history of CLL  No other concerns or complaints today  Review of Systems   Review of Systems   Constitutional: Negative for chills and fever  Genitourinary: Positive for frequency, hematuria (when wiping) and urgency  Musculoskeletal: Negative for back pain  Current Medications       Current Outpatient Medications:     ALPRAZolam (XANAX) 0 5 mg tablet, Take 1 tablet (0 5 mg total) by mouth daily at bedtime as needed for anxiety, Disp: 30 tablet, Rfl: 2    Calquence 100 MG CAPS, Take 1 capsule (100 mg) by mouth 2 (two) times a day  (Patient taking differently: daily ), Disp: 60 capsule, Rfl: 11    cephalexin (KEFLEX) 500 mg capsule, Take 1 capsule (500 mg total) by mouth every 12 (twelve) hours for 7 days, Disp: 14 capsule, Rfl: 0    cyanocobalamin ([employer] VITAMIN B-12) 1000 MCG tablet, Take 1,000 mcg by mouth daily, Disp: , Rfl:     levothyroxine 75 mcg tablet, Take 1 tablet (75 mcg total) by mouth daily in the early morning, Disp: 30 tablet, Rfl: 5    melatonin 3 mg, Take 3 mg by mouth daily at bedtime, Disp: , Rfl:     predniSONE 5 mg tablet, Take 4 tablets by mouth daily    Total dose 20 mg, Disp: 120 tablet, Rfl: 3    sertraline (ZOLOFT) 25 mg tablet, Take 1 tablet (25 mg total) by mouth daily, Disp: 30 tablet, Rfl: 5    Current Allergies     Allergies as of 09/07/2021    (No Known Allergies)            The following portions of the patient's history were reviewed and updated as appropriate: allergies, current medications, past family history, past medical history, past social history, past surgical history and problem list      Past Medical History:   Diagnosis Date    Anxiety     Autoimmune hemolytic anemia ([address] )     Cataract     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[address] )     GERD (gastroesophageal reflux disease)     Hypothyroidism     Hypothyroidism     Idiopathic thrombocytopenic purpura (ITP) ([address] )     Menopause     Sjogrens syndrome [facility])        Past Surgical History:   Procedure Laterality Date    CATARACT EXTRACTION Left     COLONOSCOPY  2012    per pt    TOOTH EXTRACTION         Family History   Problem Relation Age of Onset    Colon cancer Mother     Heart disease Mother         cardiac disorder     Cancer Mother     Liver cancer Father     Cancer Father     Heart disease Other         cardiac disorder         Medications have been verified  Objective   /81   Pulse 101   Temp 98 6 °F (37 °C)   Resp 18   Wt 57 2 kg (126 lb)   SpO2 98%   BMI 20 34 kg/m²   No LMP recorded  Patient is postmenopausal        Physical Exam     Physical Exam  Vitals and nursing note reviewed  Constitutional:       General: She is awake  She is not in acute distress  Appearance: Normal appearance  She is well-developed and well-groomed  She is not ill-appearing, toxic-appearing or diaphoretic  HENT:      Head: Normocephalic and atraumatic  Right Ear: Hearing and external ear normal       Left Ear: Hearing and external ear normal    Eyes:      General: Lids are normal  Vision grossly intact  Gaze aligned appropriately  Cardiovascular:      Rate and Rhythm: Normal rate  Pulmonary:      Effort: Pulmonary effort is normal       Comments: Patient is speaking in full sentences with no increased respiratory effort  No audible wheezing or stridor  Abdominal:      Tenderness: There is abdominal tenderness in the suprapubic area  There is no right CVA tenderness or left CVA tenderness  Musculoskeletal:      Cervical back: Normal range of motion  Skin:     General: Skin is warm and dry  Neurological:      Mental Status: She is alert and oriented to person, place, and time  Coordination: Coordination is intact  Gait: Gait is intact     Psychiatric:         Attention and Perception: Attention and perception normal          Mood and Affect: Mood and affect normal          Speech: Speech normal  Behavior: Behavior normal  Behavior is cooperative  Note: Portions of this record may have been created with voice recognition software  Occasional wrong word or "sound a like" substitutions may have occurred due to the inherent limitations of voice recognition software  Please read the chart carefully and recognize, using context, where substitutions have occurred  *

## 2021-09-07 NOTE — PROGRESS NOTES
Assessment/Plan:    Patient to continue present treatment with cephalexin as per urgent care  Recommend increase water intake  Recheck UA with reflex to microscopic and culture in 1 week after antibiotics completed  If UA remains abnormal with blood or protein discussed referral to Urology  Diagnoses and all orders for this visit:    Acute cystitis with hematuria  -     UA w Reflex to Microscopic w Reflex to Culture -Lab Collect; Future    Other orders  -     chlorhexidine (PERIDEX) 0 12 % solution; RINSE WITH ONE HALF OUNCE IN THE MORNING AND BEDTIME  -     acetaminophen-codeine (TYLENOL #3) 300-30 mg per tablet; TAKE ONE TABLET BY MOUTH EVERY 4-6 HOURS AS NEEDED FOR PAIN          Subjective:      Patient ID: Roman Camarena is a 73 y o  female  Patient is being seen in follow-up from an urgent care visit from this morning regarding UTI with gross hematuria  Patient states symptoms started yesterday with increased frequency and discomfort with urination  She admits to seeing blood this morning which is now improved throughout the day  Patient had a urine collected for UA with C&S, culture pending  Patient has taken 1 dose of cephalexin he is drinking plenty of water and her symptoms are improving  Blood in Urine  This is a new problem  The current episode started today  The problem has been gradually improving since onset  She describes the hematuria as gross hematuria  She reports no clotting in her urine stream  The pain is mild  She describes her urine color as yellow  Irritative symptoms include frequency, nocturia and urgency  Obstructive symptoms do not include dribbling, incomplete emptying, a slower stream, straining or a weak stream  Associated symptoms include abdominal pain, dysuria and hesitancy  Pertinent negatives include no chills, fever, flank pain, inability to urinate, nausea or vomiting  There is no history of kidney stones         The following portions of the patient's history were reviewed and updated as appropriate: allergies, current medications, past family history, past medical history, past social history, past surgical history and problem list     Review of Systems   Constitutional: Negative for chills and fever  Gastrointestinal: Positive for abdominal pain  Negative for nausea and vomiting  Genitourinary: Positive for dysuria, frequency, hematuria, hesitancy, nocturia and urgency  Negative for flank pain and incomplete emptying  Objective:      /86 (BP Location: Left arm, Patient Position: Sitting, Cuff Size: Standard)   Pulse 88   Temp (!) 97 1 °F (36 2 °C) (Tympanic)   Resp 16   Ht 5' 6" (1 676 m)   Wt 56 7 kg (125 lb)   SpO2 98%   BMI 20 18 kg/m²          Physical Exam  Constitutional:       General: She is not in acute distress  Appearance: Normal appearance  She is not ill-appearing, toxic-appearing or diaphoretic  HENT:      Head: Normocephalic  Mouth/Throat:      Mouth: Mucous membranes are moist    Eyes:      General: No scleral icterus  Conjunctiva/sclera: Conjunctivae normal    Cardiovascular:      Rate and Rhythm: Normal rate and regular rhythm  Pulmonary:      Effort: Pulmonary effort is normal       Breath sounds: Normal breath sounds  Abdominal:      Palpations: Abdomen is soft  Tenderness: There is no abdominal tenderness  There is no right CVA tenderness or left CVA tenderness  Comments: Mild suprapubic pressure  Musculoskeletal:      Cervical back: Neck supple  Right lower leg: No edema  Left lower leg: No edema  Lymphadenopathy:      Cervical: No cervical adenopathy  Skin:     General: Skin is warm and dry  Neurological:      General: No focal deficit present  Mental Status: She is alert and oriented to person, place, and time     Psychiatric:         Mood and Affect: Mood normal

## 2021-09-09 ENCOUNTER — TELEPHONE (OUTPATIENT)
Dept: HEMATOLOGY ONCOLOGY | Facility: CLINIC | Age: 71
End: 2021-09-09

## 2021-09-09 LAB — BACTERIA UR CULT: ABNORMAL

## 2021-09-09 NOTE — TELEPHONE ENCOUNTER
Patient state she received a letter from the [employer] stating that her funding is running low  Best call back [phone]

## 2021-09-10 ENCOUNTER — DOCUMENTATION (OUTPATIENT)
Dept: HEMATOLOGY ONCOLOGY | Facility: CLINIC | Age: 71
End: 2021-09-10

## 2021-09-10 NOTE — PROGRESS NOTES
9-10-21  Rcvd patient message from [facility] regarding funding letter she received from [address] [place] [address]  Reviewed account and funding  PAN Luis A doesn't  until 3-2022  Unable to apply for 2nd luis a at this time    Enrolled patient with [facility] ID#  [id number]  CARD#  [id number]  N#  [id number]  GRP#  [id number]  BIN#  [id number]  AMT $ 8000  EFF 21  THRU 22    Epic noted, Email to team/Pharmacy, Call to patient to advise of updated funding

## 2021-09-14 ENCOUNTER — APPOINTMENT (OUTPATIENT)
Dept: LAB | Age: 71
End: 2021-09-14
Payer: MEDICARE

## 2021-09-15 ENCOUNTER — OFFICE VISIT (OUTPATIENT)
Dept: HEMATOLOGY ONCOLOGY | Facility: CLINIC | Age: 71
End: 2021-09-15
Payer: MEDICARE

## 2021-09-15 VITALS
SYSTOLIC BLOOD PRESSURE: 136 MMHG | TEMPERATURE: 97 F | HEIGHT: 66 IN | RESPIRATION RATE: 16 BRPM | WEIGHT: 127 LBS | OXYGEN SATURATION: 98 % | DIASTOLIC BLOOD PRESSURE: 80 MMHG | BODY MASS INDEX: 20.41 KG/M2 | HEART RATE: 79 BPM

## 2021-09-15 DIAGNOSIS — D69.3 IDIOPATHIC THROMBOCYTOPENIC PURPURA (ITP) (HCC): Primary | ICD-10-CM

## 2021-09-15 DIAGNOSIS — D59.10 AUTOIMMUNE HEMOLYTIC ANEMIA (HCC): ICD-10-CM

## 2021-09-15 DIAGNOSIS — C91.10 CLL (CHRONIC LYMPHOCYTIC LEUKEMIA) (HCC): ICD-10-CM

## 2021-09-15 PROCEDURE — 99214 OFFICE O/P EST MOD 30 MIN: CPT | Performed by: INTERNAL MEDICINE

## 2021-09-15 NOTE — PROGRESS NOTES
Hematology Outpatient Follow - Up Note  Vivien Medina 73 y o  female MRN: @ Encounter: [id number]        Date:  9/15/2021        Assessment/ Plan:    Autoimmune hemolytic anemia, IgG subtype, acute immune thrombocytopenic purpura and splenomegaly, she had Sjogren syndrome, positive BALDO, rheumatoid factor and CCP     Initiated in the hospital on Decadron in June 2020 with short-lived response     Initiated on Promacta 75 milligram p o  Daily with improvement in platelet count initially but no sustained response despite increase to Promacta 75 mg alternating with 150 mg and therefore, it was discontinued          CT scan 6/19/20 identified diffuse mild retroperitoneal adenopathy and mildly enlarged retrocrural lymph node, hepatomegaly and splenomegaly 15cm     Flow cytometry 8/10/20 on the peripheral blood showed CLL pattern, positive for TP53      Bone marrow biopsy 9/2020 showed CLL, initiated on acalabrutinib 100 mg p o  b i d  with good response 9/2020 and normalization of CBC and differential later on the dose was reduced to 100 mg p o  daily mid October 2020 because of fatigue      N-plate 336 micro g every 4 weeks to keep platelets above 04,515 -400,000    Prednisone 20 mg p o  daily, reduced to 20 alternate with 15 mg the other day and follow-up in 1 month with CBC, LDH, CMP        Labs and imaging studies are reviewed by ordering provider once results are available  If there are findings that need immediate attention, you will be contacted when results available  Discussing results and the implication on your healthcare is best discussed in person at your follow-up visit         HPI:    Jimmye Closs is a 73-year-old  female who was admitted to [facility] 6/2020 regarding easy bruising and bleeding       She has history of polyclonal gammopathy, with no evidence of monoclonal protein, Sjogren syndrome, autoimmune connective tissue disease with highly elevated sed rate, CRP, BALDO, rheumatoid factor  Merle Lundy is followed by Rheumatology she was prescribed hydroxychloroquine 200 milligram p o  B i d         She was found to have grade 4 thrombocytopenia with platelet count of 1317, hemoglobin 10, WBC 7 2, neutrophils, 34 percent lymphocytes     Flow cytometry on the peripheral blood showed CLL pattern positive for CD 23, CD 20     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 with PATTI positive +for IgG, plus for C3, bilirubin 0 5     CT scan of the chest abdomen pelvis on 06/19/2020 showed mild confluent periaortic/retroperitoneal lymphadenopathy measuring up to 1 1 centimeter in short axis, bilateral external iliac lymphadenopathy up to 10 millimeters, retrocrural lymph node measuring 1 3 centimeters spleen 15 centimeter, liver with hepatomegaly no evidence of masses     She was treated with dexamethasone for 3 days with improvement of platelet count up to 62,000     On 06/25/2020 platelet count  93,862     Initiated on Promacta 75 milligram p o  Daily in July 2020, platelet count up gradually with platelet count of 01,929 on 08/17/2020, hemoglobin 11 4, WBC 5 0, 57 percent        8/28/20:  Patient had CBC as gums were bleeding that morning AM, no further bleeding   Platelet count was 57,103  She was advised to take 150 mg of promacta every other day and 75 mg the other days        Platelet count was 34,967 8/31/20 and she was  advised to initiate prednisone 40mg po daily, tapering by 10mg po weekly  Disha Curb returned to 75mg po daily      Platelet count increased to 173,000 9/4/20           She had BMBX 9/9/20:  B-cell lymphoproliferative disorder, compatible with chronic lymphocytic leukemia   Virtually absent stainable storage iron    On flow cytometry, CLL phenotype accounted for 11%; CD5+, CD23+, CD20+ (dim) and CD 38-  9/14/20:  platelet count 958     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     Initiated on acalabrutinib 100 mg p o  b i d  since the beginning of October 2020   She was seen on 10/14/2020 with severe depression requiring admission to the hospital, she is on Zoloft 25 mg p o  daily, Synthroid 75 micro g p o  daily     Acalabrutinib was reduced to 100 mg p o  daily         Recurrence of thrombocytopenia 27,000 11/2/20 once prednisone was discontinued     Prednisone re-introduced back at 5 mg p o  daily, platelet count increased to 50,000 range, fluctuated to 20,000 ranged, prednisone increased to 10mg daily 11/17/20, increased to 20mg 11/23/20 when platelets decreased to 12,000  Prednisone dose was 20 milligrams12/2/2020 we had to Lucio the dose many times however with subsequent grade 4 thrombocytopenia, currently on 20 mg p o  daily  and N-plate ordered 02/87/88 when platelet count 59,264 12/15/20      given every 3-4 weeks  Interval History:        Previous Treatment:         Test Results:    Imaging: No results found  Labs:   Lab Results   Component Value Date    WBC 11 85 (H) 09/14/2021    HGB 11 5 09/14/2021    HCT 38 8 09/14/2021    MCV 98 09/14/2021     (H) 09/14/2021     Lab Results   Component Value Date    K 3 7 07/07/2021     (H) 07/07/2021    CO2 27 07/07/2021    BUN 14 07/07/2021    CREATININE 0 77 07/07/2021    GLUF 76 09/21/2020    CALCIUM 9 0 07/07/2021    CORRECTEDCA 9 6 07/07/2021    AST 9 07/07/2021    ALT 13 07/07/2021    ALKPHOS 71 07/07/2021    EGFR 79 07/07/2021       No results found for: IRON, TIBC, FERRITIN    No results found for: [id number]      ROS: Review of Systems   Constitutional: Negative for appetite change, chills, diaphoresis, fatigue and unexpected weight change  HENT:   Negative for mouth sores, nosebleeds, sore throat, trouble swallowing and voice change  Eyes: Negative for eye problems and icterus  Respiratory: Negative for chest tightness, cough, hemoptysis and wheezing  Cardiovascular: Negative for chest pain, leg swelling and palpitations     Gastrointestinal: Negative for abdominal distention, abdominal pain, blood in stool, constipation, diarrhea, nausea and vomiting  Endocrine: Negative for hot flashes  Genitourinary: Negative for bladder incontinence, difficulty urinating, dyspareunia, dysuria and frequency  Musculoskeletal: Negative for arthralgias, back pain, gait problem, neck pain and neck stiffness  Skin: Negative for itching and rash  Neurological: Negative for dizziness, gait problem, headaches, numbness, seizures and speech difficulty  Hematological: Negative for adenopathy  Bruises/bleeds easily  Psychiatric/Behavioral: Negative for decreased concentration, depression, sleep disturbance and suicidal ideas  The patient is not nervous/anxious  Current Medications: Reviewed  Allergies: Reviewed  PMH/FH/SH:  Reviewed      Physical Exam:    Body surface area is 1 65 meters squared  Wt Readings from Last 3 Encounters:   09/15/21 57 6 kg (127 lb)   09/07/21 56 7 kg (125 lb)   09/07/21 57 2 kg (126 lb)        Temp Readings from Last 3 Encounters:   09/15/21 (!) 97 °F (36 1 °C) (Temporal)   09/07/21 (!) 97 1 °F (36 2 °C) (Tympanic)   09/07/21 98 6 °F (37 °C)        BP Readings from Last 3 Encounters:   09/15/21 136/80   09/07/21 134/86   09/07/21 138/81         Pulse Readings from Last 3 Encounters:   09/15/21 79   09/07/21 88   09/07/21 101        Physical Exam  Vitals reviewed  Constitutional:       General: She is not in acute distress  Appearance: She is well-developed  She is not diaphoretic  Comments: Using cane for ambulation   HENT:      Head: Normocephalic and atraumatic  Eyes:      Conjunctiva/sclera: Conjunctivae normal    Neck:      Trachea: No tracheal deviation  Cardiovascular:      Rate and Rhythm: Normal rate and regular rhythm  Heart sounds: Murmur heard  No friction rub  No gallop  Pulmonary:      Effort: Pulmonary effort is normal  No respiratory distress  Breath sounds: Normal breath sounds  No wheezing or rales  Chest:      Chest wall: No tenderness  Abdominal:      General: There is no distension  Palpations: Abdomen is soft  Tenderness: There is no abdominal tenderness  Musculoskeletal:      Cervical back: Normal range of motion and neck supple  Right lower leg: No edema  Left lower leg: No edema  Lymphadenopathy:      Cervical: No cervical adenopathy  Skin:     General: Skin is warm and dry  Coloration: Skin is not pale  Findings: No erythema  Neurological:      Mental Status: She is alert and oriented to person, place, and time  Psychiatric:         Behavior: Behavior normal          Thought Content: Thought content normal          Judgment: Judgment normal          ECO    Goals and Barriers:  Current Goal: Minimize effects of disease  Barriers: None  Patient's Capacity to Self Care:  Patient is able to self care      Code Status: [unfilled]

## 2021-09-16 ENCOUNTER — APPOINTMENT (OUTPATIENT)
Dept: LAB | Age: 71
End: 2021-09-16
Payer: MEDICARE

## 2021-09-16 DIAGNOSIS — N30.01 ACUTE CYSTITIS WITH HEMATURIA: ICD-10-CM

## 2021-09-16 LAB
BACTERIA UR QL AUTO: ABNORMAL /HPF
BILIRUB UR QL STRIP: NEGATIVE
CLARITY UR: ABNORMAL
COLOR UR: YELLOW
GLUCOSE UR STRIP-MCNC: NEGATIVE MG/DL
HGB UR QL STRIP.AUTO: NEGATIVE
HYALINE CASTS #/AREA URNS LPF: ABNORMAL /LPF
KETONES UR STRIP-MCNC: NEGATIVE MG/DL
LEUKOCYTE ESTERASE UR QL STRIP: ABNORMAL
NITRITE UR QL STRIP: NEGATIVE
NON-SQ EPI CELLS URNS QL MICRO: ABNORMAL /HPF
PH UR STRIP.AUTO: 7 [PH]
PROT UR STRIP-MCNC: NEGATIVE MG/DL
RBC #/AREA URNS AUTO: ABNORMAL /HPF
SP GR UR STRIP.AUTO: 1.01 (ref 1–1.03)
UROBILINOGEN UR QL STRIP.AUTO: 0.2 E.U./DL
WBC #/AREA URNS AUTO: ABNORMAL /HPF

## 2021-09-16 PROCEDURE — 81001 URINALYSIS AUTO W/SCOPE: CPT

## 2021-09-24 ENCOUNTER — DOCUMENTATION (OUTPATIENT)
Dept: HEMATOLOGY ONCOLOGY | Facility: CLINIC | Age: 71
End: 2021-09-24

## 2021-09-30 ENCOUNTER — APPOINTMENT (OUTPATIENT)
Dept: LAB | Age: 71
End: 2021-09-30
Payer: MEDICARE

## 2021-09-30 DIAGNOSIS — D59.10 AUTOIMMUNE HEMOLYTIC ANEMIA (HCC): ICD-10-CM

## 2021-09-30 DIAGNOSIS — D69.3 IDIOPATHIC THROMBOCYTOPENIC PURPURA (ITP) (HCC): ICD-10-CM

## 2021-09-30 DIAGNOSIS — C91.10 CLL (CHRONIC LYMPHOCYTIC LEUKEMIA) (HCC): ICD-10-CM

## 2021-09-30 LAB
ALBUMIN SERPL BCP-MCNC: 3.3 G/DL (ref 3.5–5)
ALP SERPL-CCNC: 58 U/L (ref 46–116)
ALT SERPL W P-5'-P-CCNC: 24 U/L (ref 12–78)
ANION GAP SERPL CALCULATED.3IONS-SCNC: 2 MMOL/L (ref 4–13)
AST SERPL W P-5'-P-CCNC: 19 U/L (ref 5–45)
BILIRUB SERPL-MCNC: 0.53 MG/DL (ref 0.2–1)
BUN SERPL-MCNC: 25 MG/DL (ref 5–25)
CALCIUM ALBUM COR SERPL-MCNC: 10 MG/DL (ref 8.3–10.1)
CALCIUM SERPL-MCNC: 9.4 MG/DL (ref 8.3–10.1)
CHLORIDE SERPL-SCNC: 108 MMOL/L (ref 100–108)
CO2 SERPL-SCNC: 27 MMOL/L (ref 21–32)
CREAT SERPL-MCNC: 0.79 MG/DL (ref 0.6–1.3)
GFR SERPL CREATININE-BSD FRML MDRD: 77 ML/MIN/1.73SQ M
GLUCOSE SERPL-MCNC: 84 MG/DL (ref 65–140)
LDH SERPL-CCNC: 259 U/L (ref 81–234)
POTASSIUM SERPL-SCNC: 4.2 MMOL/L (ref 3.5–5.3)
PROT SERPL-MCNC: 7.4 G/DL (ref 6.4–8.2)
SODIUM SERPL-SCNC: 137 MMOL/L (ref 136–145)

## 2021-09-30 PROCEDURE — 80053 COMPREHEN METABOLIC PANEL: CPT

## 2021-09-30 PROCEDURE — 83615 LACTATE (LD) (LDH) ENZYME: CPT

## 2021-10-01 ENCOUNTER — HOSPITAL ENCOUNTER (OUTPATIENT)
Dept: INFUSION CENTER | Facility: HOSPITAL | Age: 71
Discharge: HOME | End: 2021-10-01
Payer: MEDICARE

## 2021-10-01 VITALS — TEMPERATURE: 97 F

## 2021-10-01 DIAGNOSIS — D69.3 IDIOPATHIC THROMBOCYTOPENIC PURPURA (ITP) (HCC): ICD-10-CM

## 2021-10-01 DIAGNOSIS — C91.10 CLL (CHRONIC LYMPHOCYTIC LEUKEMIA) (HCC): Primary | ICD-10-CM

## 2021-10-01 PROCEDURE — 96372 THER/PROPH/DIAG INJ SC/IM: CPT

## 2021-10-01 RX ADMIN — ROMIPLOSTIM 125 MCG: 125 INJECTION, POWDER, LYOPHILIZED, FOR SOLUTION SUBCUTANEOUS at 10:38

## 2021-10-04 ENCOUNTER — APPOINTMENT (EMERGENCY)
Dept: CT IMAGING | Facility: HOSPITAL | Age: 71
End: 2021-10-04
Payer: MEDICARE

## 2021-10-04 ENCOUNTER — HOSPITAL ENCOUNTER (EMERGENCY)
Facility: HOSPITAL | Age: 71
Discharge: HOME | End: 2021-10-04
Attending: EMERGENCY MEDICINE | Admitting: EMERGENCY MEDICINE
Payer: MEDICARE

## 2021-10-04 VITALS
BODY MASS INDEX: 20.41 KG/M2 | TEMPERATURE: 97.5 F | RESPIRATION RATE: 16 BRPM | HEART RATE: 70 BPM | WEIGHT: 126.98 LBS | SYSTOLIC BLOOD PRESSURE: 132 MMHG | DIASTOLIC BLOOD PRESSURE: 64 MMHG | HEIGHT: 66 IN | OXYGEN SATURATION: 99 %

## 2021-10-04 DIAGNOSIS — M79.89 LEG SWELLING: ICD-10-CM

## 2021-10-04 DIAGNOSIS — M79.605 LEFT LEG PAIN: ICD-10-CM

## 2021-10-04 DIAGNOSIS — K62.5 RECTAL BLEEDING: Primary | ICD-10-CM

## 2021-10-04 DIAGNOSIS — K59.00 CONSTIPATION: ICD-10-CM

## 2021-10-04 LAB
ALBUMIN SERPL BCP-MCNC: 3.1 G/DL (ref 3.5–5)
ALP SERPL-CCNC: 51 U/L (ref 46–116)
ALT SERPL W P-5'-P-CCNC: 17 U/L (ref 12–78)
ANION GAP SERPL CALCULATED.3IONS-SCNC: 7 MMOL/L (ref 4–13)
APTT PPP: 26 SECONDS (ref 23–37)
AST SERPL W P-5'-P-CCNC: 17 U/L (ref 5–45)
BASOPHILS # BLD AUTO: 0.06 THOUSANDS/ΜL (ref 0–0.1)
BASOPHILS NFR BLD AUTO: 1 % (ref 0–1)
BILIRUB SERPL-MCNC: 0.32 MG/DL (ref 0.2–1)
BUN SERPL-MCNC: 17 MG/DL (ref 5–25)
CALCIUM ALBUM COR SERPL-MCNC: 9.9 MG/DL (ref 8.3–10.1)
CALCIUM SERPL-MCNC: 9.2 MG/DL (ref 8.3–10.1)
CHLORIDE SERPL-SCNC: 107 MMOL/L (ref 100–108)
CO2 SERPL-SCNC: 28 MMOL/L (ref 21–32)
CREAT SERPL-MCNC: 0.8 MG/DL (ref 0.6–1.3)
EOSINOPHIL # BLD AUTO: 0.01 THOUSAND/ΜL (ref 0–0.61)
EOSINOPHIL NFR BLD AUTO: 0 % (ref 0–6)
ERYTHROCYTE [DISTWIDTH] IN BLOOD BY AUTOMATED COUNT: 15.1 % (ref 11.6–15.1)
GFR SERPL CREATININE-BSD FRML MDRD: 75 ML/MIN/1.73SQ M
GLUCOSE SERPL-MCNC: 80 MG/DL (ref 65–140)
HCT VFR BLD AUTO: 40.9 % (ref 34.8–46.1)
HGB BLD-MCNC: 12.4 G/DL (ref 11.5–15.4)
IMM GRANULOCYTES # BLD AUTO: 0.12 THOUSAND/UL (ref 0–0.2)
IMM GRANULOCYTES NFR BLD AUTO: 1 % (ref 0–2)
INR PPP: 1.06 (ref 0.84–1.19)
LYMPHOCYTES # BLD AUTO: 1.39 THOUSANDS/ΜL (ref 0.6–4.47)
LYMPHOCYTES NFR BLD AUTO: 13 % (ref 14–44)
MCH RBC QN AUTO: 29.5 PG (ref 26.8–34.3)
MCHC RBC AUTO-ENTMCNC: 30.3 G/DL (ref 31.4–37.4)
MCV RBC AUTO: 97 FL (ref 82–98)
MONOCYTES # BLD AUTO: 0.73 THOUSAND/ΜL (ref 0.17–1.22)
MONOCYTES NFR BLD AUTO: 7 % (ref 4–12)
NEUTROPHILS # BLD AUTO: 8.4 THOUSANDS/ΜL (ref 1.85–7.62)
NEUTS SEG NFR BLD AUTO: 78 % (ref 43–75)
NRBC BLD AUTO-RTO: 0 /100 WBCS
NT-PROBNP SERPL-MCNC: 162 PG/ML
PLATELET # BLD AUTO: 108 THOUSANDS/UL (ref 149–390)
PMV BLD AUTO: 11.6 FL (ref 8.9–12.7)
POTASSIUM SERPL-SCNC: 3.9 MMOL/L (ref 3.5–5.3)
PROT SERPL-MCNC: 6.9 G/DL (ref 6.4–8.2)
PROTHROMBIN TIME: 13.5 SECONDS (ref 11.6–14.5)
RBC # BLD AUTO: 4.2 MILLION/UL (ref 3.81–5.12)
SODIUM SERPL-SCNC: 142 MMOL/L (ref 136–145)
TSH SERPL DL<=0.05 MIU/L-ACNC: 2.97 UIU/ML (ref 0.36–3.74)
WBC # BLD AUTO: 10.71 THOUSAND/UL (ref 4.31–10.16)

## 2021-10-04 PROCEDURE — 85730 THROMBOPLASTIN TIME PARTIAL: CPT | Performed by: PHYSICIAN ASSISTANT

## 2021-10-04 PROCEDURE — 74177 CT ABD & PELVIS W/CONTRAST: CPT

## 2021-10-04 PROCEDURE — 96361 HYDRATE IV INFUSION ADD-ON: CPT

## 2021-10-04 PROCEDURE — 83880 ASSAY OF NATRIURETIC PEPTIDE: CPT | Performed by: PHYSICIAN ASSISTANT

## 2021-10-04 PROCEDURE — 84443 ASSAY THYROID STIM HORMONE: CPT | Performed by: PHYSICIAN ASSISTANT

## 2021-10-04 PROCEDURE — 99285 EMERGENCY DEPT VISIT HI MDM: CPT | Performed by: PHYSICIAN ASSISTANT

## 2021-10-04 PROCEDURE — 36415 COLL VENOUS BLD VENIPUNCTURE: CPT | Performed by: PHYSICIAN ASSISTANT

## 2021-10-04 PROCEDURE — 85610 PROTHROMBIN TIME: CPT | Performed by: PHYSICIAN ASSISTANT

## 2021-10-04 PROCEDURE — 99284 EMERGENCY DEPT VISIT MOD MDM: CPT

## 2021-10-04 PROCEDURE — G1004 CDSM NDSC: HCPCS

## 2021-10-04 PROCEDURE — 80053 COMPREHEN METABOLIC PANEL: CPT | Performed by: PHYSICIAN ASSISTANT

## 2021-10-04 PROCEDURE — 96360 HYDRATION IV INFUSION INIT: CPT

## 2021-10-04 PROCEDURE — 85025 COMPLETE CBC W/AUTO DIFF WBC: CPT | Performed by: PHYSICIAN ASSISTANT

## 2021-10-04 RX ORDER — METHOCARBAMOL 500 MG/1
500 TABLET, FILM COATED ORAL 2 TIMES DAILY PRN
Qty: 20 TABLET | Refills: 0 | Status: SHIPPED | OUTPATIENT
Start: 2021-10-04 | End: 2022-01-06

## 2021-10-04 RX ORDER — POLYETHYLENE GLYCOL 3350 17 G/17G
17 POWDER, FOR SOLUTION ORAL DAILY
Qty: 10 EACH | Refills: 0 | Status: SHIPPED | OUTPATIENT
Start: 2021-10-04 | End: 2021-12-07

## 2021-10-04 RX ADMIN — SODIUM CHLORIDE 500 ML: 0.9 INJECTION, SOLUTION INTRAVENOUS at 12:43

## 2021-10-04 RX ADMIN — IOHEXOL 100 ML: 350 INJECTION, SOLUTION INTRAVENOUS at 14:54

## 2021-10-06 ENCOUNTER — TELEPHONE (OUTPATIENT)
Dept: HEMATOLOGY ONCOLOGY | Facility: CLINIC | Age: 71
End: 2021-10-06

## 2021-10-12 ENCOUNTER — APPOINTMENT (OUTPATIENT)
Dept: LAB | Age: 71
End: 2021-10-12
Payer: MEDICARE

## 2021-10-14 ENCOUNTER — HOSPITAL ENCOUNTER (OUTPATIENT)
Dept: RADIOLOGY | Facility: HOSPITAL | Age: 71
Discharge: HOME | End: 2021-10-14
Payer: MEDICARE

## 2021-10-14 ENCOUNTER — OFFICE VISIT (OUTPATIENT)
Dept: HEMATOLOGY ONCOLOGY | Facility: CLINIC | Age: 71
End: 2021-10-14
Payer: MEDICARE

## 2021-10-14 ENCOUNTER — TELEPHONE (OUTPATIENT)
Dept: HEMATOLOGY ONCOLOGY | Facility: CLINIC | Age: 71
End: 2021-10-14

## 2021-10-14 VITALS
HEART RATE: 76 BPM | WEIGHT: 125.5 LBS | HEIGHT: 66 IN | TEMPERATURE: 97.1 F | RESPIRATION RATE: 16 BRPM | SYSTOLIC BLOOD PRESSURE: 130 MMHG | BODY MASS INDEX: 20.17 KG/M2 | OXYGEN SATURATION: 98 % | DIASTOLIC BLOOD PRESSURE: 78 MMHG

## 2021-10-14 DIAGNOSIS — M25.552 LEFT HIP PAIN: ICD-10-CM

## 2021-10-14 DIAGNOSIS — E43 SEVERE PROTEIN-CALORIE MALNUTRITION (GOMEZ: LESS THAN 60% OF STANDARD WEIGHT) (HCC): Primary | ICD-10-CM

## 2021-10-14 DIAGNOSIS — C91.10 CLL (CHRONIC LYMPHOCYTIC LEUKEMIA) (HCC): ICD-10-CM

## 2021-10-14 DIAGNOSIS — D69.3 IDIOPATHIC THROMBOCYTOPENIC PURPURA (ITP) (HCC): ICD-10-CM

## 2021-10-14 PROCEDURE — 73502 X-RAY EXAM HIP UNI 2-3 VIEWS: CPT

## 2021-10-14 PROCEDURE — 99215 OFFICE O/P EST HI 40 MIN: CPT | Performed by: PHYSICIAN ASSISTANT

## 2021-10-27 ENCOUNTER — APPOINTMENT (OUTPATIENT)
Dept: LAB | Age: 71
End: 2021-10-27
Payer: MEDICARE

## 2021-10-27 DIAGNOSIS — D69.3 ACUTE ITP (HCC): ICD-10-CM

## 2021-10-27 LAB
BASOPHILS # BLD AUTO: 0.12 THOUSANDS/ΜL (ref 0–0.1)
BASOPHILS NFR BLD AUTO: 1 % (ref 0–1)
EOSINOPHIL # BLD AUTO: 0.01 THOUSAND/ΜL (ref 0–0.61)
EOSINOPHIL NFR BLD AUTO: 0 % (ref 0–6)
ERYTHROCYTE [DISTWIDTH] IN BLOOD BY AUTOMATED COUNT: 15 % (ref 11.6–15.1)
HCT VFR BLD AUTO: 41.2 % (ref 34.8–46.1)
HGB BLD-MCNC: 12.3 G/DL (ref 11.5–15.4)
IMM GRANULOCYTES # BLD AUTO: 0.15 THOUSAND/UL (ref 0–0.2)
IMM GRANULOCYTES NFR BLD AUTO: 1 % (ref 0–2)
LYMPHOCYTES # BLD AUTO: 2.1 THOUSANDS/ΜL (ref 0.6–4.47)
LYMPHOCYTES NFR BLD AUTO: 17 % (ref 14–44)
MCH RBC QN AUTO: 28.5 PG (ref 26.8–34.3)
MCHC RBC AUTO-ENTMCNC: 29.9 G/DL (ref 31.4–37.4)
MCV RBC AUTO: 95 FL (ref 82–98)
MONOCYTES # BLD AUTO: 0.59 THOUSAND/ΜL (ref 0.17–1.22)
MONOCYTES NFR BLD AUTO: 5 % (ref 4–12)
NEUTROPHILS # BLD AUTO: 9.63 THOUSANDS/ΜL (ref 1.85–7.62)
NEUTS SEG NFR BLD AUTO: 76 % (ref 43–75)
NRBC BLD AUTO-RTO: 0 /100 WBCS
PLATELET # BLD AUTO: 231 THOUSANDS/UL (ref 149–390)
PMV BLD AUTO: 11.6 FL (ref 8.9–12.7)
RBC # BLD AUTO: 4.32 MILLION/UL (ref 3.81–5.12)
WBC # BLD AUTO: 12.6 THOUSAND/UL (ref 4.31–10.16)

## 2021-10-27 PROCEDURE — 85025 COMPLETE CBC W/AUTO DIFF WBC: CPT

## 2021-10-27 PROCEDURE — 36415 COLL VENOUS BLD VENIPUNCTURE: CPT

## 2021-10-29 ENCOUNTER — HOSPITAL ENCOUNTER (OUTPATIENT)
Dept: INFUSION CENTER | Facility: HOSPITAL | Age: 71
Discharge: HOME | End: 2021-10-29
Attending: INTERNAL MEDICINE
Payer: MEDICARE

## 2021-10-29 VITALS
RESPIRATION RATE: 18 BRPM | SYSTOLIC BLOOD PRESSURE: 128 MMHG | HEART RATE: 64 BPM | TEMPERATURE: 96.4 F | DIASTOLIC BLOOD PRESSURE: 70 MMHG

## 2021-10-29 DIAGNOSIS — D69.3 IDIOPATHIC THROMBOCYTOPENIC PURPURA (ITP) (HCC): ICD-10-CM

## 2021-10-29 DIAGNOSIS — C91.10 CLL (CHRONIC LYMPHOCYTIC LEUKEMIA) (HCC): Primary | ICD-10-CM

## 2021-10-29 PROCEDURE — 96372 THER/PROPH/DIAG INJ SC/IM: CPT

## 2021-10-29 RX ADMIN — ROMIPLOSTIM 125 MCG: 125 INJECTION, POWDER, LYOPHILIZED, FOR SOLUTION SUBCUTANEOUS at 11:59

## 2021-11-10 ENCOUNTER — TELEPHONE (OUTPATIENT)
Dept: HEMATOLOGY ONCOLOGY | Facility: CLINIC | Age: 71
End: 2021-11-10

## 2021-11-23 ENCOUNTER — APPOINTMENT (OUTPATIENT)
Dept: LAB | Age: 71
End: 2021-11-23
Payer: MEDICARE

## 2021-11-23 DIAGNOSIS — D69.3 ACUTE ITP (HCC): ICD-10-CM

## 2021-11-23 LAB
BASOPHILS # BLD AUTO: 0.07 THOUSANDS/ΜL (ref 0–0.1)
BASOPHILS NFR BLD AUTO: 1 % (ref 0–1)
EOSINOPHIL # BLD AUTO: 0.03 THOUSAND/ΜL (ref 0–0.61)
EOSINOPHIL NFR BLD AUTO: 0 % (ref 0–6)
ERYTHROCYTE [DISTWIDTH] IN BLOOD BY AUTOMATED COUNT: 15.6 % (ref 11.6–15.1)
HCT VFR BLD AUTO: 43.1 % (ref 34.8–46.1)
HGB BLD-MCNC: 12.8 G/DL (ref 11.5–15.4)
IMM GRANULOCYTES # BLD AUTO: 0.08 THOUSAND/UL (ref 0–0.2)
IMM GRANULOCYTES NFR BLD AUTO: 1 % (ref 0–2)
LYMPHOCYTES # BLD AUTO: 1.79 THOUSANDS/ΜL (ref 0.6–4.47)
LYMPHOCYTES NFR BLD AUTO: 18 % (ref 14–44)
MCH RBC QN AUTO: 27.8 PG (ref 26.8–34.3)
MCHC RBC AUTO-ENTMCNC: 29.7 G/DL (ref 31.4–37.4)
MCV RBC AUTO: 94 FL (ref 82–98)
MONOCYTES # BLD AUTO: 0.73 THOUSAND/ΜL (ref 0.17–1.22)
MONOCYTES NFR BLD AUTO: 7 % (ref 4–12)
NEUTROPHILS # BLD AUTO: 7.39 THOUSANDS/ΜL (ref 1.85–7.62)
NEUTS SEG NFR BLD AUTO: 73 % (ref 43–75)
NRBC BLD AUTO-RTO: 0 /100 WBCS
PLATELET # BLD AUTO: 257 THOUSANDS/UL (ref 149–390)
PMV BLD AUTO: 11.2 FL (ref 8.9–12.7)
RBC # BLD AUTO: 4.61 MILLION/UL (ref 3.81–5.12)
WBC # BLD AUTO: 10.09 THOUSAND/UL (ref 4.31–10.16)

## 2021-11-23 PROCEDURE — 36415 COLL VENOUS BLD VENIPUNCTURE: CPT

## 2021-11-23 PROCEDURE — 85025 COMPLETE CBC W/AUTO DIFF WBC: CPT

## 2021-11-26 ENCOUNTER — HOSPITAL ENCOUNTER (OUTPATIENT)
Dept: INFUSION CENTER | Facility: HOSPITAL | Age: 71
Discharge: HOME | End: 2021-11-26
Attending: INTERNAL MEDICINE
Payer: MEDICARE

## 2021-11-26 DIAGNOSIS — D69.3 IDIOPATHIC THROMBOCYTOPENIC PURPURA (ITP) (HCC): ICD-10-CM

## 2021-11-26 DIAGNOSIS — C91.10 CLL (CHRONIC LYMPHOCYTIC LEUKEMIA) (HCC): Primary | ICD-10-CM

## 2021-11-26 PROCEDURE — 96372 THER/PROPH/DIAG INJ SC/IM: CPT

## 2021-11-26 RX ADMIN — ROMIPLOSTIM 125 MCG: 125 INJECTION, POWDER, LYOPHILIZED, FOR SOLUTION SUBCUTANEOUS at 11:13

## 2021-12-02 ENCOUNTER — OFFICE VISIT (OUTPATIENT)
Dept: HEMATOLOGY ONCOLOGY | Facility: CLINIC | Age: 71
End: 2021-12-02
Payer: MEDICARE

## 2021-12-02 VITALS
RESPIRATION RATE: 18 BRPM | WEIGHT: 128.2 LBS | HEIGHT: 66 IN | BODY MASS INDEX: 20.6 KG/M2 | OXYGEN SATURATION: 98 % | SYSTOLIC BLOOD PRESSURE: 140 MMHG | HEART RATE: 85 BPM | DIASTOLIC BLOOD PRESSURE: 80 MMHG | TEMPERATURE: 97.8 F

## 2021-12-02 DIAGNOSIS — C91.10 CLL (CHRONIC LYMPHOCYTIC LEUKEMIA) (HCC): Primary | ICD-10-CM

## 2021-12-02 DIAGNOSIS — D59.10 AUTOIMMUNE HEMOLYTIC ANEMIA (HCC): ICD-10-CM

## 2021-12-02 DIAGNOSIS — D69.3 IDIOPATHIC THROMBOCYTOPENIC PURPURA (ITP) (HCC): ICD-10-CM

## 2021-12-02 PROBLEM — R42 DIZZINESS: Status: RESOLVED | Noted: 2018-05-09 | Resolved: 2021-12-02

## 2021-12-02 PROCEDURE — 99214 OFFICE O/P EST MOD 30 MIN: CPT | Performed by: INTERNAL MEDICINE

## 2021-12-02 RX ORDER — CIPROFLOXACIN 250 MG/1
250 TABLET, FILM COATED ORAL EVERY 12 HOURS SCHEDULED
Qty: 10 TABLET | Refills: 0 | Status: SHIPPED | OUTPATIENT
Start: 2021-12-02 | End: 2021-12-07

## 2021-12-07 ENCOUNTER — OFFICE VISIT (OUTPATIENT)
Dept: FAMILY MEDICINE CLINIC | Facility: CLINIC | Age: 71
End: 2021-12-07
Payer: MEDICARE

## 2021-12-07 VITALS
WEIGHT: 131 LBS | TEMPERATURE: 97 F | HEART RATE: 76 BPM | BODY MASS INDEX: 21.05 KG/M2 | OXYGEN SATURATION: 96 % | HEIGHT: 66 IN | RESPIRATION RATE: 16 BRPM | SYSTOLIC BLOOD PRESSURE: 138 MMHG | DIASTOLIC BLOOD PRESSURE: 80 MMHG

## 2021-12-07 DIAGNOSIS — F41.9 ANXIETY: ICD-10-CM

## 2021-12-07 DIAGNOSIS — G25.81 RLS (RESTLESS LEGS SYNDROME): ICD-10-CM

## 2021-12-07 DIAGNOSIS — F32.A DEPRESSION, UNSPECIFIED DEPRESSION TYPE: ICD-10-CM

## 2021-12-07 DIAGNOSIS — E55.9 VITAMIN D DEFICIENCY: ICD-10-CM

## 2021-12-07 DIAGNOSIS — E03.4 HYPOTHYROIDISM DUE TO ACQUIRED ATROPHY OF THYROID: Primary | ICD-10-CM

## 2021-12-07 PROCEDURE — 99214 OFFICE O/P EST MOD 30 MIN: CPT | Performed by: FAMILY MEDICINE

## 2021-12-07 RX ORDER — GABAPENTIN 100 MG/1
100 CAPSULE ORAL
Qty: 30 CAPSULE | Refills: 3 | Status: SHIPPED | OUTPATIENT
Start: 2021-12-07 | End: 2022-06-07 | Stop reason: SDUPTHER

## 2021-12-07 RX ORDER — ALPRAZOLAM 0.5 MG/1
0.5 TABLET ORAL
Qty: 30 TABLET | Refills: 2 | Status: SHIPPED | OUTPATIENT
Start: 2021-12-07 | End: 2022-06-07 | Stop reason: SDUPTHER

## 2021-12-22 ENCOUNTER — TELEPHONE (OUTPATIENT)
Dept: HEMATOLOGY ONCOLOGY | Facility: CLINIC | Age: 71
End: 2021-12-22

## 2021-12-23 ENCOUNTER — APPOINTMENT (OUTPATIENT)
Dept: LAB | Age: 71
End: 2021-12-23
Payer: MEDICARE

## 2021-12-23 DIAGNOSIS — D69.3 IDIOPATHIC THROMBOCYTOPENIC PURPURA (ITP) (HCC): ICD-10-CM

## 2021-12-23 DIAGNOSIS — D59.10 AUTOIMMUNE HEMOLYTIC ANEMIA (HCC): ICD-10-CM

## 2021-12-23 DIAGNOSIS — C91.10 CLL (CHRONIC LYMPHOCYTIC LEUKEMIA) (HCC): ICD-10-CM

## 2021-12-23 LAB
ALBUMIN SERPL BCP-MCNC: 3.2 G/DL (ref 3.5–5)
ALP SERPL-CCNC: 56 U/L (ref 46–116)
ALT SERPL W P-5'-P-CCNC: 16 U/L (ref 12–78)
ANION GAP SERPL CALCULATED.3IONS-SCNC: 4 MMOL/L (ref 4–13)
AST SERPL W P-5'-P-CCNC: 12 U/L (ref 5–45)
BASOPHILS # BLD AUTO: 0.09 THOUSANDS/ΜL (ref 0–0.1)
BASOPHILS NFR BLD AUTO: 1 % (ref 0–1)
BILIRUB SERPL-MCNC: 0.48 MG/DL (ref 0.2–1)
BUN SERPL-MCNC: 15 MG/DL (ref 5–25)
CALCIUM ALBUM COR SERPL-MCNC: 9.9 MG/DL (ref 8.3–10.1)
CALCIUM SERPL-MCNC: 9.3 MG/DL (ref 8.3–10.1)
CHLORIDE SERPL-SCNC: 110 MMOL/L (ref 100–108)
CO2 SERPL-SCNC: 28 MMOL/L (ref 21–32)
CREAT SERPL-MCNC: 0.76 MG/DL (ref 0.6–1.3)
EOSINOPHIL # BLD AUTO: 0.02 THOUSAND/ΜL (ref 0–0.61)
EOSINOPHIL NFR BLD AUTO: 0 % (ref 0–6)
ERYTHROCYTE [DISTWIDTH] IN BLOOD BY AUTOMATED COUNT: 16.8 % (ref 11.6–15.1)
GFR SERPL CREATININE-BSD FRML MDRD: 80 ML/MIN/1.73SQ M
GLUCOSE P FAST SERPL-MCNC: 88 MG/DL (ref 65–99)
HCT VFR BLD AUTO: 42.4 % (ref 34.8–46.1)
HGB BLD-MCNC: 12.7 G/DL (ref 11.5–15.4)
IMM GRANULOCYTES # BLD AUTO: 0.09 THOUSAND/UL (ref 0–0.2)
IMM GRANULOCYTES NFR BLD AUTO: 1 % (ref 0–2)
LDH SERPL-CCNC: 163 U/L (ref 81–234)
LYMPHOCYTES # BLD AUTO: 2.06 THOUSANDS/ΜL (ref 0.6–4.47)
LYMPHOCYTES NFR BLD AUTO: 24 % (ref 14–44)
MCH RBC QN AUTO: 27.7 PG (ref 26.8–34.3)
MCHC RBC AUTO-ENTMCNC: 30 G/DL (ref 31.4–37.4)
MCV RBC AUTO: 92 FL (ref 82–98)
MONOCYTES # BLD AUTO: 0.62 THOUSAND/ΜL (ref 0.17–1.22)
MONOCYTES NFR BLD AUTO: 7 % (ref 4–12)
NEUTROPHILS # BLD AUTO: 5.84 THOUSANDS/ΜL (ref 1.85–7.62)
NEUTS SEG NFR BLD AUTO: 67 % (ref 43–75)
NRBC BLD AUTO-RTO: 0 /100 WBCS
PLATELET # BLD AUTO: 210 THOUSANDS/UL (ref 149–390)
PMV BLD AUTO: 11.8 FL (ref 8.9–12.7)
POTASSIUM SERPL-SCNC: 3.9 MMOL/L (ref 3.5–5.3)
PROT SERPL-MCNC: 6.6 G/DL (ref 6.4–8.2)
RBC # BLD AUTO: 4.59 MILLION/UL (ref 3.81–5.12)
SODIUM SERPL-SCNC: 142 MMOL/L (ref 136–145)
WBC # BLD AUTO: 8.72 THOUSAND/UL (ref 4.31–10.16)

## 2021-12-23 PROCEDURE — 83615 LACTATE (LD) (LDH) ENZYME: CPT

## 2021-12-23 PROCEDURE — 36415 COLL VENOUS BLD VENIPUNCTURE: CPT | Performed by: INTERNAL MEDICINE

## 2021-12-23 PROCEDURE — 80053 COMPREHEN METABOLIC PANEL: CPT

## 2021-12-23 PROCEDURE — 85025 COMPLETE CBC W/AUTO DIFF WBC: CPT | Performed by: INTERNAL MEDICINE

## 2021-12-28 ENCOUNTER — HOSPITAL ENCOUNTER (OUTPATIENT)
Dept: INFUSION CENTER | Facility: HOSPITAL | Age: 71
Discharge: HOME | End: 2021-12-28
Attending: INTERNAL MEDICINE
Payer: MEDICARE

## 2021-12-28 VITALS — TEMPERATURE: 97.2 F

## 2021-12-28 DIAGNOSIS — D69.3 IDIOPATHIC THROMBOCYTOPENIC PURPURA (ITP) (HCC): ICD-10-CM

## 2021-12-28 DIAGNOSIS — C91.10 CLL (CHRONIC LYMPHOCYTIC LEUKEMIA) (HCC): Primary | ICD-10-CM

## 2021-12-28 PROCEDURE — 96372 THER/PROPH/DIAG INJ SC/IM: CPT

## 2021-12-28 RX ADMIN — ROMIPLOSTIM 125 MCG: 125 INJECTION, POWDER, LYOPHILIZED, FOR SOLUTION SUBCUTANEOUS at 10:49

## 2021-12-28 NOTE — PROGRESS NOTES
Pt arrived for NPlate injection- received subQ in R arm without incident, covered in DSD  Pt aware of next appt, AVS provided

## 2022-01-06 ENCOUNTER — OFFICE VISIT (OUTPATIENT)
Dept: HEMATOLOGY ONCOLOGY | Facility: CLINIC | Age: 72
End: 2022-01-06
Payer: MEDICARE

## 2022-01-06 VITALS
WEIGHT: 131.5 LBS | BODY MASS INDEX: 21.13 KG/M2 | SYSTOLIC BLOOD PRESSURE: 130 MMHG | DIASTOLIC BLOOD PRESSURE: 64 MMHG | HEIGHT: 66 IN | RESPIRATION RATE: 16 BRPM

## 2022-01-06 DIAGNOSIS — D69.3 IDIOPATHIC THROMBOCYTOPENIC PURPURA (ITP) (HCC): Primary | ICD-10-CM

## 2022-01-06 DIAGNOSIS — D59.10 AUTOIMMUNE HEMOLYTIC ANEMIA (HCC): ICD-10-CM

## 2022-01-06 DIAGNOSIS — C91.10 CLL (CHRONIC LYMPHOCYTIC LEUKEMIA) (HCC): ICD-10-CM

## 2022-01-06 DIAGNOSIS — E44.0 MODERATE PROTEIN-CALORIE MALNUTRITION (HCC): ICD-10-CM

## 2022-01-06 DIAGNOSIS — D69.41 EVAN'S SYNDROME (HCC): ICD-10-CM

## 2022-01-06 PROCEDURE — 99215 OFFICE O/P EST HI 40 MIN: CPT | Performed by: PHYSICIAN ASSISTANT

## 2022-01-06 NOTE — PROGRESS NOTES
Hematology/Oncology Outpatient Follow- up Note  Del Michel 73 y o  female MRN: @ Encounter: [id number]        Date:  1/6/2022      Assessment / Plan:    1  Chronic ITP,  Presented 6/2020 with platelet count 8382  She responded inititially to steroids, counts dropped with taper  No prolonged response to Promacta  Prednisone 40 mg po daily restarted 9/1/2020, reduced by 10 mg po weekly  She was on prednisone 5mg po daily  11/2/20 platelet count 83,816  Platelet count 40935 11/12/20  Prednisone increased to 10mg daily 11/17/20, increased to 20mg 11/23/20 when platelets decreased to 12,000  N-plate 667AJH ordered 12/15/20 when platelet count 51,463 12/15/20  Currently on q 4 week dosing  She currently is on prednisone 15 mg p o  daily  She is advised to decrease prednisone to 15mg alternating with 10mg    Continue with CBC on monthly basis       2  Hemolytic anemia - resolved  Hemoglobin was 10g/dL  PATTI 4+ 6/2020  Hemoglobin 12 7 12/23/21  3   Chronic lymphocytic leukemia diagnosed via bone marrow biopsy on 09/2020  CT scan 6/2020 showed diffuse retroperitoneal, retrocrural, lymphadenopathy and hepatic and splenomegaly  CT A/P 10/4/21 -  The previous retroperitoneal and retrocrural adenopathy has resolved  No discrete lymphadenopathy seen on current exam     On acalabrutinib 100 mg p o  daily since October 2020, dose reduced after 1 month because of fatigue  She has been able to put on weight of 30 lbs over the past year  HPI:   Bairon Capellan is a 62-year-old  female who was admitted to [facility] 6/2020 regarding easy bruising and bleeding  She has history of polyclonal gammopathy, with no evidence of monoclonal protein, Sjogren syndrome, autoimmune connective tissue disease with highly elevated sed rate, CRP, BALDO, rheumatoid factor  PT and PTT normal         She is followed by Dr Alex Nino with Rheumatology   She was prescribed hydroxychloroquine 200 milligram p o  B i d      6/18/2020  She was found to have grade 4 thrombocytopenia with platelet count of 3394, hemoglobin 10, WBC 7 2, 63% neutrophils, 34 percent lymphocytes    Flow cytometry on the peripheral blood  showed CLL pattern positive for CD 23, CD 20       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 with PATTI positive 4+for IgG, plus for C3, bilirubin 0 5     CT scan of the chest abdomen pelvis on 06/19/2020 showed mild confluent periaortic/retroperitoneal lymphadenopathy measuring up to 1 1 centimeter in short axis, bilateral external iliac lymphadenopathy up to 10 millimeters, retrocrural lymph node measuring 1 3 centimeters spleen 15 centimeter, liver with hepatomegaly no evidence of masses     She was treated with dexamethasone for 3 days with improvement of platelet count up to 62,000 6/22/21    On 06/25/2020 platelet count of 19,730     Initiated on Promacta 75 milligram p o  Daily in July 2020, platelet count up gradually with platelet count of 09,356 on 08/17/2020, hemoglobin 11 4, WBC 5 0    8/28/20: Patient had CBC as gums were bleeding that morning AM, no further bleeding  Platelet count was 45,383  Promacta increased to 150mg every other day and 75 mg the other days  Platelet count was 77,494 8/31/20 and she was advised to initiate prednisone 40mg po daily, tapering by 10mg po weekly  Promacta returned to 75mg po daily  Platelet count increased to 173,000 9/4/20  She had BMBX 9/9/20: B-cell lymphoproliferative disorder, compatible with chronic lymphocytic leukemia  Virtually absent stainable storage iron  On flow cytometry, CLL phenotype accounted for 11%; CD5+, CD23+, CD20+ (dim) and CD 38-   9/14/20: platelet count 455     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     Initiated on acalabrutinib 100 mg p o  b i d  since the beginning of October 2020     She was seen on 10/14/2020 with severe depression requiring admission to the hospital, she is on Zoloft 25 mg p o  daily, Synthroid 75 micro g p o  daily     Acalabrutinib was reduced to 100 mg p o  daily 11/2020    Recurrence of thrombocytopenia 27,000 11/2/20 once prednisone was discontinued  Prednisone re-introduced back at 5 mg p o  daily, platelet count increased to 50,000 range, fluctuated to 20,000 range, prednisone increased to 10mg daily 11/17/20, increased to 20mg 11/23/20 when platelets decreased to 12,000  N-plate ordered 96/33/24 when platelet count 70,109 12/15/20  Given every 4 weeks      Interval History:    Hip pain resolved  She does have some neuropathy of her feet  Nonpitting edema of her feet  Denies any fevers, chills  Good appetite  Some achiness of her thighs bilaterally  No fevers, chills, chest pain or shortness of breath      Test Results:        Labs:   Lab Results   Component Value Date    HGB 12 7 12/23/2021    HCT 42 4 12/23/2021    MCV 92 12/23/2021     12/23/2021    WBC 8 72 12/23/2021    NRBC 0 12/23/2021    BANDSPCT 1 03/03/2021    ATYLMPCT 4 (H) 04/05/2021     Lab Results   Component Value Date    K 3 9 12/23/2021     (H) 12/23/2021    CO2 28 12/23/2021    BUN 15 12/23/2021    CREATININE 0 76 12/23/2021    GLUF 88 12/23/2021    CALCIUM 9 3 12/23/2021    CORRECTEDCA 9 9 12/23/2021    AST 12 12/23/2021    ALT 16 12/23/2021    ALKPHOS 56 12/23/2021    EGFR 80 12/23/2021       Imaging: No results found  ROS:  As mentioned in HPI & Interval History otherwise 14 point ROS negative          Allergies: No Known Allergies  Current Medications: Reviewed  PMH/FH/SH:  Reviewed      Physical Exam:    1 67 meters squared    Ht Readings from Last 3 Encounters:   01/06/22 5' 6" (1 676 m)   12/07/21 5' 6" (1 676 m)   12/02/21 5' 6" (1 676 m)        Wt Readings from Last 3 Encounters:   01/06/22 59 6 kg (131 lb 8 oz)   12/07/21 59 4 kg (131 lb)   12/02/21 58 2 kg (128 lb 3 2 oz)        Temp Readings from Last 3 Encounters:   12/28/21 (!) 97 2 °F (36 2 °C)   21 (!) 97 °F (36 1 °C) (Skin)   21 97 8 °F (36 6 °C) (Tympanic)        BP Readings from Last 3 Encounters:   22 130/64   21 138/80   21 140/80           Physical Exam  Vitals reviewed  Constitutional:       General: She is not in acute distress  Appearance: She is well-developed  She is not diaphoretic  HENT:      Head: Normocephalic and atraumatic  Eyes:      Conjunctiva/sclera: Conjunctivae normal    Neck:      Trachea: No tracheal deviation  Cardiovascular:      Rate and Rhythm: Normal rate and regular rhythm  Heart sounds: No murmur heard  No friction rub  No gallop  Pulmonary:      Effort: Pulmonary effort is normal  No respiratory distress  Breath sounds: Normal breath sounds  No wheezing or rales  Chest:      Chest wall: No tenderness  Abdominal:      General: There is no distension  Palpations: Abdomen is soft  Tenderness: There is no abdominal tenderness  Musculoskeletal:      Cervical back: Normal range of motion and neck supple  Comments: Nonpitting edema   Lymphadenopathy:      Cervical: No cervical adenopathy  Skin:     General: Skin is warm and dry  Coloration: Skin is not pale  Findings: No erythema  Neurological:      Mental Status: She is alert and oriented to person, place, and time  Psychiatric:         Behavior: Behavior normal          Thought Content:  Thought content normal          Judgment: Judgment normal          ECO      Emergency Contacts:    Extended Emergency Contact Information  Primary Emergency Contact: Tono Juarez [address] Phone: [phone]  Mobile Phone: [phone]  Relation: Relative

## 2022-01-12 DIAGNOSIS — D69.3 ACUTE ITP (HCC): ICD-10-CM

## 2022-01-12 RX ORDER — PREDNISONE 1 MG/1
TABLET ORAL
Qty: 120 TABLET | Refills: 3 | Status: SHIPPED | OUTPATIENT
Start: 2022-01-12

## 2022-01-19 ENCOUNTER — APPOINTMENT (OUTPATIENT)
Dept: LAB | Age: 72
End: 2022-01-19
Payer: MEDICARE

## 2022-01-25 ENCOUNTER — HOSPITAL ENCOUNTER (OUTPATIENT)
Dept: INFUSION CENTER | Facility: HOSPITAL | Age: 72
Discharge: HOME | End: 2022-01-25
Attending: INTERNAL MEDICINE
Payer: MEDICARE

## 2022-01-25 VITALS — TEMPERATURE: 98.4 F

## 2022-01-25 DIAGNOSIS — C91.10 CLL (CHRONIC LYMPHOCYTIC LEUKEMIA) (HCC): Primary | ICD-10-CM

## 2022-01-25 DIAGNOSIS — D69.3 IDIOPATHIC THROMBOCYTOPENIC PURPURA (ITP) (HCC): ICD-10-CM

## 2022-01-25 PROCEDURE — 96372 THER/PROPH/DIAG INJ SC/IM: CPT

## 2022-01-25 RX ADMIN — ROMIPLOSTIM 125 MCG: 125 INJECTION, POWDER, LYOPHILIZED, FOR SOLUTION SUBCUTANEOUS at 13:11

## 2022-01-25 NOTE — PROGRESS NOTES
Injection given without incident  Pt has no other appts at this time but confirmed follow up in office next month   Declined avs

## 2022-02-22 ENCOUNTER — APPOINTMENT (OUTPATIENT)
Dept: LAB | Age: 72
End: 2022-02-22
Payer: MEDICARE

## 2022-02-24 ENCOUNTER — OFFICE VISIT (OUTPATIENT)
Dept: HEMATOLOGY ONCOLOGY | Facility: CLINIC | Age: 72
End: 2022-02-24
Payer: MEDICARE

## 2022-02-24 ENCOUNTER — HOSPITAL ENCOUNTER (OUTPATIENT)
Dept: INFUSION CENTER | Facility: HOSPITAL | Age: 72
Discharge: HOME | End: 2022-02-24
Attending: INTERNAL MEDICINE
Payer: MEDICARE

## 2022-02-24 VITALS
RESPIRATION RATE: 16 BRPM | HEIGHT: 66 IN | BODY MASS INDEX: 22.18 KG/M2 | WEIGHT: 138 LBS | HEART RATE: 70 BPM | TEMPERATURE: 97.2 F | DIASTOLIC BLOOD PRESSURE: 90 MMHG | SYSTOLIC BLOOD PRESSURE: 142 MMHG | OXYGEN SATURATION: 97 %

## 2022-02-24 VITALS — TEMPERATURE: 97.6 F

## 2022-02-24 DIAGNOSIS — F32.A DEPRESSION, UNSPECIFIED DEPRESSION TYPE: ICD-10-CM

## 2022-02-24 DIAGNOSIS — C91.10 CLL (CHRONIC LYMPHOCYTIC LEUKEMIA) (HCC): Primary | ICD-10-CM

## 2022-02-24 DIAGNOSIS — D69.3 IDIOPATHIC THROMBOCYTOPENIC PURPURA (ITP) (HCC): ICD-10-CM

## 2022-02-24 PROCEDURE — 96372 THER/PROPH/DIAG INJ SC/IM: CPT

## 2022-02-24 PROCEDURE — 99214 OFFICE O/P EST MOD 30 MIN: CPT | Performed by: INTERNAL MEDICINE

## 2022-02-24 RX ADMIN — ROMIPLOSTIM 125 MCG: 125 INJECTION, POWDER, LYOPHILIZED, FOR SOLUTION SUBCUTANEOUS at 15:34

## 2022-02-24 NOTE — PROGRESS NOTES
Hematology Outpatient Follow - Up Note  Zenaida Machuca 73 y o  female MRN: @ Encounter: [id number]        Date:  2/24/2022        Assessment/ Plan:    1  Chronic ITP,  Presented 6/2020 with platelet count 8309  She responded inititially to steroids, counts dropped with taper  No prolonged response to Promacta            Prednisone 40 mg po daily restarted 9/1/2020, reduced by 10 mg po weekly  She was on prednisone 5mg po daily  11/2/20 platelet count 16,689  Platelet count 29933 11/12/20  Prednisone increased to 10mg daily 11/17/20, increased to 20mg 11/23/20 when platelets decreased to 12,000       N-plate [id number] ordered 12/15/20 when platelet count 59,731 12/15/20  Currently on q 4 week dosing  Reduce prednisone to 10 mg 5 days a week and 50 mg 2 days a week    Hemolytic anemia resolved    CLL she is on acalabrutinib 100 mg p o  daily no evidence of lymphocytosis, CT scan on 10/2021 showed resolution of retroperitoneal, retrocrural lymphadenopathy no evidence of hepatic splenomegaly             Labs and imaging studies are reviewed by ordering provider once results are available  If there are findings that need immediate attention, you will be contacted when results available  Discussing results and the implication on your healthcare is best discussed in person at your follow-up visit  HPI:    Carolin Ny is a 19-year-old  female who was admitted to [facility] 6/2020 regarding easy bruising and bleeding          She has history of polyclonal gammopathy, with no evidence of monoclonal protein, Sjogren syndrome, autoimmune connective tissue disease with highly elevated sed rate, CRP, BALDO, rheumatoid factor  PT and PTT normal           She is followed by Dr Chepe Perdue with Rheumatology   She was prescribed hydroxychloroquine 200 milligram p o  B i d       6/18/2020  She was found to have grade 4 thrombocytopenia with platelet count of 6599, hemoglobin 10, WBC 7 2, 63% neutrophils, 34 percent lymphocytes     Flow cytometry on the peripheral blood  showed CLL pattern positive for CD 23, CD 20         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 with PATTI positive 4+for IgG, plus for C3, bilirubin 0 5      CT scan of the chest abdomen pelvis on 06/19/2020 showed mild confluent periaortic/retroperitoneal lymphadenopathy measuring up to 1 1 centimeter in short axis, bilateral external iliac lymphadenopathy up to 10 millimeters, retrocrural lymph node measuring 1 3 centimeters spleen 15 centimeter, liver with hepatomegaly no evidence of masses      She was treated with dexamethasone for 3 days with improvement of platelet count up to 62,000 6/22/21     On 06/25/2020 platelet count of 31,313      Initiated on Promacta 75 milligram p o  Daily in July 2020, platelet count up gradually with platelet count of 29,522 on 08/17/2020, hemoglobin 11 4, WBC 5 0     8/28/20: Patient had CBC as gums were bleeding that morning AM, no further bleeding  Platelet count was 38,595  Promacta increased to 150mg every other day and 75 mg the other days       Platelet count was 66,867 8/31/20 and she was advised to initiate prednisone 40mg po daily, tapering by 10mg po weekly  Promacta returned to 75mg po daily       Platelet count increased to 173,000 9/4/20       She had BMBX 9/9/20: B-cell lymphoproliferative disorder, compatible with chronic lymphocytic leukemia  Virtually absent stainable storage iron       On flow cytometry, CLL phenotype accounted for 11%; CD5+, CD23+, CD20+ (dim) and CD 38-   9/14/20: platelet count 183     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      Initiated on acalabrutinib 100 mg p o  b i d  since the beginning of October 2020      She was seen on 10/14/2020 with severe depression requiring admission to the hospital, she is on Zoloft 25 mg p o  daily, Synthroid 75 micro g p o  daily      Acalabrutinib was reduced to 100 mg p o  daily 11/2020     Recurrence of thrombocytopenia 27,000 11/2/20 once prednisone was discontinued       Prednisone re-introduced back at 5 mg p o  daily, platelet count increased to 50,000 range, fluctuated to 20,000 range, prednisone increased to 10mg daily 11/17/20, increased to 20mg 11/23/20 when platelets decreased to 12,000       N-plate ordered 46/23/01 when platelet count 75,232 12/15/20       Given every 4 weeks     Interval History:  I am depressed       Previous Treatment:         Test Results:    Imaging: No results found  Labs:   Lab Results   Component Value Date    WBC 8 79 02/22/2022    HGB 12 4 02/22/2022    HCT 40 7 02/22/2022    MCV 93 02/22/2022     02/22/2022     Lab Results   Component Value Date    K 3 9 12/23/2021     (H) 12/23/2021    CO2 28 12/23/2021    BUN 15 12/23/2021    CREATININE 0 76 12/23/2021    GLUF 88 12/23/2021    CALCIUM 9 3 12/23/2021    CORRECTEDCA 9 9 12/23/2021    AST 12 12/23/2021    ALT 16 12/23/2021    ALKPHOS 56 12/23/2021    EGFR 80 12/23/2021       No results found for: IRON, TIBC, FERRITIN    No results found for: [id number]      ROS: Review of Systems   Constitutional: Negative for appetite change, chills, diaphoresis, fatigue and unexpected weight change  HENT:   Negative for mouth sores, nosebleeds, sore throat, trouble swallowing and voice change  Eyes: Negative for eye problems and icterus  Respiratory: Negative for chest tightness, cough, hemoptysis and wheezing  Cardiovascular: Negative for chest pain, leg swelling and palpitations  Gastrointestinal: Negative for abdominal distention, abdominal pain, blood in stool, constipation, diarrhea, nausea and vomiting  Endocrine: Negative for hot flashes  Genitourinary: Negative for bladder incontinence, difficulty urinating, dyspareunia, dysuria and frequency  Musculoskeletal: Positive for arthralgias  Negative for back pain, gait problem, neck pain and neck stiffness  Skin: Negative for itching and rash     Neurological: Negative for dizziness, gait problem, headaches, numbness, seizures and speech difficulty  Hematological: Negative for adenopathy  Does not bruise/bleed easily  Psychiatric/Behavioral: Negative for decreased concentration, depression, sleep disturbance and suicidal ideas  The patient is nervous/anxious  Current Medications: Reviewed  Allergies: Reviewed  PMH/FH/SH:  Reviewed      Physical Exam:    Body surface area is 1 71 meters squared  Wt Readings from Last 3 Encounters:   02/24/22 62 6 kg (138 lb)   01/06/22 59 6 kg (131 lb 8 oz)   12/07/21 59 4 kg (131 lb)        Temp Readings from Last 3 Encounters:   02/24/22 (!) 97 2 °F (36 2 °C)   01/25/22 98 4 °F (36 9 °C) (Temporal)   12/28/21 (!) 97 2 °F (36 2 °C)        BP Readings from Last 3 Encounters:   02/24/22 142/90   01/06/22 130/64   12/07/21 138/80         Pulse Readings from Last 3 Encounters:   02/24/22 70   12/07/21 76   12/02/21 85        Physical Exam  Vitals reviewed  Constitutional:       General: She is not in acute distress  Appearance: She is well-developed  She is not diaphoretic  HENT:      Head: Normocephalic and atraumatic  Eyes:      Conjunctiva/sclera: Conjunctivae normal    Neck:      Trachea: No tracheal deviation  Cardiovascular:      Rate and Rhythm: Normal rate and regular rhythm  Heart sounds: No murmur heard  No friction rub  No gallop  Pulmonary:      Effort: Pulmonary effort is normal  No respiratory distress  Breath sounds: Normal breath sounds  No wheezing or rales  Chest:      Chest wall: No tenderness  Abdominal:      General: There is no distension  Palpations: Abdomen is soft  Tenderness: There is no abdominal tenderness  Musculoskeletal:      Cervical back: Normal range of motion and neck supple  Lymphadenopathy:      Cervical: No cervical adenopathy  Skin:     General: Skin is warm and dry  Coloration: Skin is not pale  Findings: Bruising present  No erythema     Neurological: Mental Status: She is alert and oriented to person, place, and time  Psychiatric:         Mood and Affect: Mood is anxious  Affect is flat  Behavior: Behavior normal          Thought Content: Thought content normal          Judgment: Judgment normal          ECO    Goals and Barriers:  Current Goal: Minimize effects of disease  Barriers: None  Patient's Capacity to Self Care:  Patient is able to self care      Code Status: [unfilled]

## 2022-03-22 ENCOUNTER — APPOINTMENT (OUTPATIENT)
Dept: LAB | Age: 72
End: 2022-03-22
Payer: MEDICARE

## 2022-03-22 DIAGNOSIS — C91.10 CLL (CHRONIC LYMPHOCYTIC LEUKEMIA) (HCC): ICD-10-CM

## 2022-03-22 LAB
ALBUMIN SERPL BCP-MCNC: 3.2 G/DL (ref 3.5–5)
ALP SERPL-CCNC: 55 U/L (ref 46–116)
ALT SERPL W P-5'-P-CCNC: 18 U/L (ref 12–78)
ANION GAP SERPL CALCULATED.3IONS-SCNC: 2 MMOL/L (ref 4–13)
AST SERPL W P-5'-P-CCNC: 15 U/L (ref 5–45)
BILIRUB SERPL-MCNC: 0.51 MG/DL (ref 0.2–1)
BUN SERPL-MCNC: 13 MG/DL (ref 5–25)
CALCIUM ALBUM COR SERPL-MCNC: 9.6 MG/DL (ref 8.3–10.1)
CALCIUM SERPL-MCNC: 9 MG/DL (ref 8.3–10.1)
CHLORIDE SERPL-SCNC: 109 MMOL/L (ref 100–108)
CO2 SERPL-SCNC: 29 MMOL/L (ref 21–32)
CREAT SERPL-MCNC: 0.84 MG/DL (ref 0.6–1.3)
GFR SERPL CREATININE-BSD FRML MDRD: 71 ML/MIN/1.73SQ M
GLUCOSE SERPL-MCNC: 111 MG/DL (ref 65–140)
LDH SERPL-CCNC: 217 U/L (ref 81–234)
POTASSIUM SERPL-SCNC: 4.2 MMOL/L (ref 3.5–5.3)
PROT SERPL-MCNC: 6.4 G/DL (ref 6.4–8.2)
SODIUM SERPL-SCNC: 140 MMOL/L (ref 136–145)

## 2022-03-22 PROCEDURE — 80053 COMPREHEN METABOLIC PANEL: CPT

## 2022-03-22 PROCEDURE — 83615 LACTATE (LD) (LDH) ENZYME: CPT

## 2022-03-24 ENCOUNTER — HOSPITAL ENCOUNTER (OUTPATIENT)
Dept: INFUSION CENTER | Facility: HOSPITAL | Age: 72
Discharge: HOME | End: 2022-03-24
Attending: INTERNAL MEDICINE
Payer: MEDICARE

## 2022-03-24 VITALS — TEMPERATURE: 97.9 F

## 2022-03-24 DIAGNOSIS — C91.10 CLL (CHRONIC LYMPHOCYTIC LEUKEMIA) (HCC): Primary | ICD-10-CM

## 2022-03-24 DIAGNOSIS — D69.3 IDIOPATHIC THROMBOCYTOPENIC PURPURA (ITP) (HCC): ICD-10-CM

## 2022-03-24 PROCEDURE — 96372 THER/PROPH/DIAG INJ SC/IM: CPT

## 2022-03-24 RX ADMIN — ROMIPLOSTIM 125 MCG: 125 INJECTION, POWDER, LYOPHILIZED, FOR SOLUTION SUBCUTANEOUS at 16:09

## 2022-04-12 DIAGNOSIS — E03.9 HYPOTHYROIDISM, UNSPECIFIED TYPE: ICD-10-CM

## 2022-04-12 RX ORDER — LEVOTHYROXINE SODIUM 0.07 MG/1
75 TABLET ORAL
Qty: 30 TABLET | Refills: 5 | Status: SHIPPED | OUTPATIENT
Start: 2022-04-12

## 2022-04-19 ENCOUNTER — APPOINTMENT (OUTPATIENT)
Dept: LAB | Age: 72
End: 2022-04-19
Payer: MEDICARE

## 2022-04-19 DIAGNOSIS — F32.A DEPRESSION, UNSPECIFIED DEPRESSION TYPE: ICD-10-CM

## 2022-04-19 DIAGNOSIS — C91.10 CLL (CHRONIC LYMPHOCYTIC LEUKEMIA) (HCC): ICD-10-CM

## 2022-04-19 DIAGNOSIS — D69.3 IDIOPATHIC THROMBOCYTOPENIC PURPURA (ITP) (HCC): ICD-10-CM

## 2022-04-19 LAB
ALBUMIN SERPL BCP-MCNC: 3.3 G/DL (ref 3.5–5)
ALP SERPL-CCNC: 53 U/L (ref 46–116)
ALT SERPL W P-5'-P-CCNC: 17 U/L (ref 12–78)
ANION GAP SERPL CALCULATED.3IONS-SCNC: 2 MMOL/L (ref 4–13)
AST SERPL W P-5'-P-CCNC: 15 U/L (ref 5–45)
BILIRUB SERPL-MCNC: 0.44 MG/DL (ref 0.2–1)
BUN SERPL-MCNC: 13 MG/DL (ref 5–25)
CALCIUM ALBUM COR SERPL-MCNC: 9.8 MG/DL (ref 8.3–10.1)
CALCIUM SERPL-MCNC: 9.2 MG/DL (ref 8.3–10.1)
CHLORIDE SERPL-SCNC: 111 MMOL/L (ref 100–108)
CO2 SERPL-SCNC: 31 MMOL/L (ref 21–32)
CREAT SERPL-MCNC: 0.86 MG/DL (ref 0.6–1.3)
GFR SERPL CREATININE-BSD FRML MDRD: 69 ML/MIN/1.73SQ M
GLUCOSE P FAST SERPL-MCNC: 81 MG/DL (ref 65–99)
POTASSIUM SERPL-SCNC: 4.2 MMOL/L (ref 3.5–5.3)
PROT SERPL-MCNC: 6.6 G/DL (ref 6.4–8.2)
SODIUM SERPL-SCNC: 144 MMOL/L (ref 136–145)

## 2022-04-19 PROCEDURE — 80053 COMPREHEN METABOLIC PANEL: CPT

## 2022-04-21 ENCOUNTER — HOSPITAL ENCOUNTER (OUTPATIENT)
Dept: INFUSION CENTER | Facility: HOSPITAL | Age: 72
Discharge: HOME | End: 2022-04-21
Attending: INTERNAL MEDICINE
Payer: MEDICARE

## 2022-04-21 VITALS — TEMPERATURE: 97.8 F

## 2022-04-21 DIAGNOSIS — D69.3 IDIOPATHIC THROMBOCYTOPENIC PURPURA (ITP) (HCC): ICD-10-CM

## 2022-04-21 DIAGNOSIS — C91.10 CLL (CHRONIC LYMPHOCYTIC LEUKEMIA) (HCC): Primary | ICD-10-CM

## 2022-04-21 PROCEDURE — 96372 THER/PROPH/DIAG INJ SC/IM: CPT

## 2022-04-21 RX ADMIN — ROMIPLOSTIM 125 MCG: 125 INJECTION, POWDER, LYOPHILIZED, FOR SOLUTION SUBCUTANEOUS at 15:07

## 2022-04-28 ENCOUNTER — OFFICE VISIT (OUTPATIENT)
Dept: HEMATOLOGY ONCOLOGY | Facility: CLINIC | Age: 72
End: 2022-04-28
Payer: MEDICARE

## 2022-04-28 VITALS
RESPIRATION RATE: 17 BRPM | SYSTOLIC BLOOD PRESSURE: 130 MMHG | TEMPERATURE: 97.9 F | OXYGEN SATURATION: 99 % | HEART RATE: 94 BPM | BODY MASS INDEX: 22.11 KG/M2 | DIASTOLIC BLOOD PRESSURE: 80 MMHG | WEIGHT: 137 LBS

## 2022-04-28 DIAGNOSIS — C91.10 CLL (CHRONIC LYMPHOCYTIC LEUKEMIA) (HCC): Primary | ICD-10-CM

## 2022-04-28 DIAGNOSIS — E24.2 DRUG-INDUCED CUSHING'S SYNDROME (HCC): ICD-10-CM

## 2022-04-28 DIAGNOSIS — D59.10 AUTOIMMUNE HEMOLYTIC ANEMIA (HCC): ICD-10-CM

## 2022-04-28 PROBLEM — E86.0 DEHYDRATION: Status: RESOLVED | Noted: 2020-10-14 | Resolved: 2022-04-28

## 2022-04-28 PROCEDURE — 99214 OFFICE O/P EST MOD 30 MIN: CPT | Performed by: INTERNAL MEDICINE

## 2022-04-28 NOTE — PROGRESS NOTES
Hematology Outpatient Follow - Up Note  Sander Helm 73 y o  female MRN: @ Encounter: [id number]        Date:  4/28/2022        Assessment/ Plan:    1  Chronic ITP,  Presented 6/2020 with platelet count 0841  She responded inititially to steroids, counts dropped with taper   No prolonged response to Promacta            Prednisone 40 mg po daily restarted 9/1/2020, reduced by 10 mg po weekly  Wong Phan  She was on prednisone 5mg po daily  11/2/20 platelet count 39,418     Platelet count 33405 11/12/20  Prednisone increased to 10mg daily 11/17/20, increased to 20mg 11/23/20 when platelets decreased to 12,000       N-plate 125mcg ordered 12/15/20 when platelet count 57,157 12/15/20  Currently on q 4 week dosing       Reduce prednisone to 10 mg    Resolution of hemolytic anemia     Chronic lymphocytic leukemia she is on acalabrutinib 100 mg p o  daily no evidence of lymphocytosis, CT scan on 10/2021 showed resolution of retroperitoneal, retrocrural lymphadenopathy no evidence of hepatic splenomegaly        Labs and imaging studies are reviewed by ordering provider once results are available  If there are findings that need immediate attention, you will be contacted when results available  Discussing results and the implication on your healthcare is best discussed in person at your follow-up visit  HPI:    Danielito Townsend is a 77-year-old  female who was admitted to [facility] 6/2020 regarding easy bruising and bleeding          She has history of polyclonal gammopathy, with no evidence of monoclonal protein, Sjogren syndrome, autoimmune connective tissue disease with highly elevated sed rate, CRP, BALDO, rheumatoid factor   PT and PTT normal           She is followed by Dr Faisal Jean with Rheumatology   She was prescribed hydroxychloroquine 200 milligram p o  B i d       6/18/2020  She was found to have grade 4 thrombocytopenia with platelet count of 1389, hemoglobin 10, WBC 7 2, 63% neutrophils, 34 percent lymphocytes     Flow cytometry on the peripheral blood  showed CLL pattern positive for CD 23, CD 20         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 with PATTI positive 4+for IgG, plus for C3, bilirubin 0 5      CT scan of the chest abdomen pelvis on 06/19/2020 showed mild confluent periaortic/retroperitoneal lymphadenopathy measuring up to 1 1 centimeter in short axis, bilateral external iliac lymphadenopathy up to 10 millimeters, retrocrural lymph node measuring 1 3 centimeters spleen 15 centimeter, liver with hepatomegaly no evidence of masses      She was treated with dexamethasone for 3 days with improvement of platelet count up to 62,000 6/22/21     On 06/25/2020 platelet count of 46,497      Initiated on Promacta 75 milligram p o  Daily in July 2020, platelet count up gradually with platelet count of 68,331 on 08/17/2020, hemoglobin 11 4, WBC 5 0     8/28/20: Patient had CBC as gums were bleeding that morning AM, no further bleeding  Platelet count was 18,878    Promacta increased to 150mg every other day and 75 mg the other days       Platelet count was 03,170 8/31/20 and she was advised to initiate prednisone 40mg po daily, tapering by 10mg po weekly  Promacta returned to 75mg po daily       Platelet count increased to 173,000 9/4/20       She had BMBX 9/9/20: B-cell lymphoproliferative disorder, compatible with chronic lymphocytic leukemia  Virtually absent stainable storage iron       On flow cytometry, CLL phenotype accounted for 11%; CD5+, CD23+, CD20+ (dim) and CD 38-   9/14/20: platelet count 874     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      Initiated on acalabrutinib 100 mg p o  b i d  since the beginning of October 2020      She was seen on 10/14/2020 with severe depression requiring admission to the hospital, she is on Zoloft 25 mg p o  daily, Synthroid 75 micro g p o  daily      Acalabrutinib was reduced to 100 mg p o  daily 11/2020     Recurrence of thrombocytopenia 27,000 11/2/20 once prednisone was discontinued       Prednisone re-introduced back at 5 mg p o  daily, platelet count increased to 50,000 range, fluctuated to 20,000 range, prednisone increased to 10mg daily 11/17/20, increased to 20mg 11/23/20 when platelets decreased to 12,000       N-plate ordered 73/21/20 when platelet count 67,698 12/15/20  And given every 4 weeks        Interval History:    I feel very good    Previous Treatment:         Test Results:    Imaging: No results found  Labs:   Lab Results   Component Value Date    WBC 8 17 04/19/2022    HGB 13 4 04/19/2022    HCT 45 2 04/19/2022    MCV 94 04/19/2022     04/19/2022     Lab Results   Component Value Date    K 4 2 04/19/2022     (H) 04/19/2022    CO2 31 04/19/2022    BUN 13 04/19/2022    CREATININE 0 86 04/19/2022    GLUF 81 04/19/2022    CALCIUM 9 2 04/19/2022    CORRECTEDCA 9 8 04/19/2022    AST 15 04/19/2022    ALT 17 04/19/2022    ALKPHOS 53 04/19/2022    EGFR 69 04/19/2022       No results found for: IRON, TIBC, FERRITIN    No results found for: [id number]      ROS: Review of Systems   Constitutional: Negative for appetite change, chills, diaphoresis, fatigue and unexpected weight change  HENT:   Negative for mouth sores, nosebleeds, sore throat, trouble swallowing and voice change  Eyes: Negative for eye problems and icterus  Respiratory: Negative for chest tightness, cough, hemoptysis and wheezing  Cardiovascular: Negative for chest pain, leg swelling and palpitations  Gastrointestinal: Negative for abdominal distention, abdominal pain, blood in stool, constipation, diarrhea, nausea and vomiting  Endocrine: Negative for hot flashes  Genitourinary: Negative for bladder incontinence, difficulty urinating, dyspareunia, dysuria and frequency  Musculoskeletal: Negative for arthralgias, back pain, gait problem, neck pain and neck stiffness  Skin: Negative for itching and rash     Neurological: Negative for dizziness, gait problem, headaches, numbness, seizures and speech difficulty  Hematological: Negative for adenopathy  Does not bruise/bleed easily  Psychiatric/Behavioral: Negative for decreased concentration, depression, sleep disturbance and suicidal ideas  The patient is not nervous/anxious  Current Medications: Reviewed  Allergies: Reviewed  PMH/FH/SH:  Reviewed      Physical Exam:    Body surface area is 1 7 meters squared  Wt Readings from Last 3 Encounters:   04/28/22 62 1 kg (137 lb)   02/24/22 62 6 kg (138 lb)   01/06/22 59 6 kg (131 lb 8 oz)        Temp Readings from Last 3 Encounters:   04/28/22 97 9 °F (36 6 °C)   04/21/22 97 8 °F (36 6 °C) (Temporal)   03/24/22 97 9 °F (36 6 °C)        BP Readings from Last 3 Encounters:   04/28/22 130/80   02/24/22 142/90   01/06/22 130/64         Pulse Readings from Last 3 Encounters:   04/28/22 94   02/24/22 70   12/07/21 76        Physical Exam  Vitals reviewed  Constitutional:       General: She is not in acute distress  Appearance: She is well-developed and normal weight  She is not diaphoretic  Comments: Cushing face   HENT:      Head: Normocephalic and atraumatic  Eyes:      Conjunctiva/sclera: Conjunctivae normal    Neck:      Trachea: No tracheal deviation  Cardiovascular:      Rate and Rhythm: Normal rate and regular rhythm  Heart sounds: No murmur heard  No friction rub  No gallop  Pulmonary:      Effort: Pulmonary effort is normal  No respiratory distress  Breath sounds: Normal breath sounds  No wheezing or rales  Chest:      Chest wall: No tenderness  Abdominal:      General: There is no distension  Palpations: Abdomen is soft  Tenderness: There is no abdominal tenderness  Musculoskeletal:      Cervical back: Normal range of motion and neck supple  Lymphadenopathy:      Cervical: No cervical adenopathy  Skin:     General: Skin is warm and dry  Coloration: Skin is not pale  Findings: No erythema  Neurological:      Mental Status: She is alert and oriented to person, place, and time  Psychiatric:         Behavior: Behavior normal          Thought Content: Thought content normal          Judgment: Judgment normal          ECO  Goals and Barriers:  Current Goal: Minimize effects of disease  Barriers: None  Patient's Capacity to Self Care:  Patient is able to self care      Code Status: [unfilled]

## 2022-05-17 ENCOUNTER — APPOINTMENT (OUTPATIENT)
Dept: LAB | Age: 72
End: 2022-05-17
Payer: MEDICARE

## 2022-05-17 DIAGNOSIS — C91.10 CLL (CHRONIC LYMPHOCYTIC LEUKEMIA) (HCC): ICD-10-CM

## 2022-05-17 DIAGNOSIS — D59.10 AUTOIMMUNE HEMOLYTIC ANEMIA (HCC): ICD-10-CM

## 2022-05-19 ENCOUNTER — HOSPITAL ENCOUNTER (OUTPATIENT)
Dept: INFUSION CENTER | Facility: HOSPITAL | Age: 72
Discharge: HOME | End: 2022-05-19
Attending: INTERNAL MEDICINE
Payer: MEDICARE

## 2022-05-19 VITALS
TEMPERATURE: 96.8 F | SYSTOLIC BLOOD PRESSURE: 145 MMHG | DIASTOLIC BLOOD PRESSURE: 84 MMHG | RESPIRATION RATE: 18 BRPM | HEART RATE: 65 BPM

## 2022-05-19 DIAGNOSIS — C91.10 CLL (CHRONIC LYMPHOCYTIC LEUKEMIA) (HCC): Primary | ICD-10-CM

## 2022-05-19 DIAGNOSIS — D69.3 IDIOPATHIC THROMBOCYTOPENIC PURPURA (ITP) (HCC): ICD-10-CM

## 2022-05-19 PROCEDURE — 96372 THER/PROPH/DIAG INJ SC/IM: CPT

## 2022-05-19 RX ADMIN — ROMIPLOSTIM 125 MCG: 125 INJECTION, POWDER, LYOPHILIZED, FOR SOLUTION SUBCUTANEOUS at 12:08

## 2022-06-07 ENCOUNTER — OFFICE VISIT (OUTPATIENT)
Dept: FAMILY MEDICINE CLINIC | Facility: CLINIC | Age: 72
End: 2022-06-07
Payer: MEDICARE

## 2022-06-07 VITALS
BODY MASS INDEX: 21.95 KG/M2 | HEART RATE: 80 BPM | TEMPERATURE: 96.6 F | HEIGHT: 66 IN | DIASTOLIC BLOOD PRESSURE: 80 MMHG | WEIGHT: 136.6 LBS | SYSTOLIC BLOOD PRESSURE: 124 MMHG | OXYGEN SATURATION: 96 % | RESPIRATION RATE: 16 BRPM

## 2022-06-07 DIAGNOSIS — M35.00 SJOGREN'S SYNDROME, WITH UNSPECIFIED ORGAN INVOLVEMENT (HCC): ICD-10-CM

## 2022-06-07 DIAGNOSIS — F32.A DEPRESSION, UNSPECIFIED DEPRESSION TYPE: ICD-10-CM

## 2022-06-07 DIAGNOSIS — G25.81 RLS (RESTLESS LEGS SYNDROME): ICD-10-CM

## 2022-06-07 DIAGNOSIS — F41.9 ANXIETY: ICD-10-CM

## 2022-06-07 DIAGNOSIS — E03.4 HYPOTHYROIDISM DUE TO ACQUIRED ATROPHY OF THYROID: Primary | ICD-10-CM

## 2022-06-07 DIAGNOSIS — M79.10 MYALGIA: ICD-10-CM

## 2022-06-07 DIAGNOSIS — C91.10 CLL (CHRONIC LYMPHOCYTIC LEUKEMIA) (HCC): ICD-10-CM

## 2022-06-07 DIAGNOSIS — E55.9 VITAMIN D DEFICIENCY: ICD-10-CM

## 2022-06-07 PROCEDURE — 99214 OFFICE O/P EST MOD 30 MIN: CPT | Performed by: FAMILY MEDICINE

## 2022-06-07 RX ORDER — METHOCARBAMOL 500 MG/1
500 TABLET, FILM COATED ORAL 2 TIMES DAILY PRN
Qty: 30 TABLET | Refills: 2 | Status: SHIPPED | OUTPATIENT
Start: 2022-06-07

## 2022-06-07 RX ORDER — GABAPENTIN 300 MG/1
300 CAPSULE ORAL
Qty: 30 CAPSULE | Refills: 5 | Status: SHIPPED | OUTPATIENT
Start: 2022-06-07

## 2022-06-07 RX ORDER — ALPRAZOLAM 0.5 MG/1
0.5 TABLET ORAL
Qty: 30 TABLET | Refills: 2 | Status: SHIPPED | OUTPATIENT
Start: 2022-06-07

## 2022-06-07 NOTE — PROGRESS NOTES
Assessment/Plan:  Patient increase sertraline to 50 mg daily and gabapentin to 300 mg q h s   Continue other medications same  Discussed proper nutrition and regular exercise walking with use of her cane as tolerated  Follow up with specialists as scheduled return to the office in 6 months  Diagnoses and all orders for this visit:    Hypothyroidism due to acquired atrophy of thyroid    Anxiety  -     ALPRAZolam (XANAX) 0 5 mg tablet; Take 1 tablet (0 5 mg total) by mouth daily at bedtime as needed for anxiety    Depression, unspecified depression type  -     sertraline (ZOLOFT) 50 mg tablet; Take 0 5 tablets (25 mg total) by mouth daily    RLS (restless legs syndrome)  -     gabapentin (Neurontin) 300 mg capsule; Take 1 capsule (300 mg total) by mouth daily at bedtime    Sjogren's syndrome, with unspecified organ involvement (HCC)    Myalgia  -     methocarbamol (ROBAXIN) 500 mg tablet; Take 1 tablet (500 mg total) by mouth 2 (two) times a day as needed for muscle spasms    CLL (chronic lymphocytic leukemia) (HCC)    Vitamin D deficiency          Subjective:      Patient ID: Sandra Marie is a 81 y o  female  Patient is here for follow-up appoint for chronic conditions  Patient has been feeling fairly well overall  She requests increasing sertraline dose and complains of continued restless leg syndrome symptoms on gabapentin 100 mg daily  Patient requests refill on methocarbamol  Patient continues with monthly infusion treatments for CLL and monthly labs  Thyroid Problem  Presents for follow-up visit  Symptoms include anxiety, depressed mood, dry skin, fatigue, leg swelling, tremors and visual change  Patient reports no cold intolerance, constipation, diaphoresis, diarrhea, hair loss, heat intolerance, hoarse voice, palpitations, weight gain or weight loss  The symptoms have been stable  Anxiety  Presents for follow-up visit   Symptoms include depressed mood, excessive worry, irritability, nervous/anxious behavior and restlessness  Patient reports no chest pain, confusion, decreased concentration, hyperventilation, insomnia, palpitations, panic, shortness of breath or suicidal ideas  Symptoms occur most days  The quality of sleep is fair  Nighttime awakenings: occasional            The following portions of the patient's history were reviewed and updated as appropriate: allergies, current medications, past family history, past medical history, past social history, past surgical history and problem list     Review of Systems   Constitutional: Positive for fatigue and irritability  Negative for diaphoresis, weight gain and weight loss  HENT: Negative for hoarse voice  Respiratory: Negative for shortness of breath  Cardiovascular: Negative for chest pain and palpitations  Gastrointestinal: Negative for constipation and diarrhea  Endocrine: Negative for cold intolerance and heat intolerance  Neurological: Positive for tremors  Psychiatric/Behavioral: Negative for confusion, decreased concentration and suicidal ideas  The patient is nervous/anxious  The patient does not have insomnia  Objective:      /80   Pulse 80   Temp (!) 96 6 °F (35 9 °C)   Resp 16   Ht 5' 6" (1 676 m)   Wt 62 kg (136 lb 9 6 oz)   SpO2 96%   BMI 22 05 kg/m²          Physical Exam  Constitutional:       General: She is not in acute distress  Appearance: Normal appearance  HENT:      Head: Normocephalic  Mouth/Throat:      Mouth: Mucous membranes are dry  Eyes:      General: No scleral icterus  Conjunctiva/sclera: Conjunctivae normal    Neck:      Vascular: No carotid bruit  Cardiovascular:      Rate and Rhythm: Normal rate and regular rhythm  Pulmonary:      Effort: Pulmonary effort is normal       Breath sounds: Normal breath sounds  Abdominal:      Palpations: Abdomen is soft  Tenderness: There is no abdominal tenderness     Musculoskeletal:      Cervical back: Neck supple  Right lower leg: No edema  Left lower leg: No edema  Lymphadenopathy:      Cervical: No cervical adenopathy  Skin:     General: Skin is warm and dry  Neurological:      General: No focal deficit present  Mental Status: She is alert and oriented to person, place, and time  Psychiatric:         Mood and Affect: Mood normal          Behavior: Behavior normal          Thought Content:  Thought content normal          Judgment: Judgment normal

## 2022-06-14 ENCOUNTER — APPOINTMENT (OUTPATIENT)
Dept: LAB | Age: 72
End: 2022-06-14
Payer: MEDICARE

## 2022-06-16 ENCOUNTER — HOSPITAL ENCOUNTER (OUTPATIENT)
Dept: INFUSION CENTER | Facility: HOSPITAL | Age: 72
Discharge: HOME | End: 2022-06-16
Attending: INTERNAL MEDICINE
Payer: MEDICARE

## 2022-06-16 VITALS — DIASTOLIC BLOOD PRESSURE: 70 MMHG | SYSTOLIC BLOOD PRESSURE: 122 MMHG

## 2022-06-16 DIAGNOSIS — D69.3 IDIOPATHIC THROMBOCYTOPENIC PURPURA (ITP) (HCC): ICD-10-CM

## 2022-06-16 DIAGNOSIS — C91.10 CLL (CHRONIC LYMPHOCYTIC LEUKEMIA) (HCC): Primary | ICD-10-CM

## 2022-06-16 PROCEDURE — 96372 THER/PROPH/DIAG INJ SC/IM: CPT

## 2022-06-16 RX ADMIN — ROMIPLOSTIM 125 MCG: 125 INJECTION, POWDER, LYOPHILIZED, FOR SOLUTION SUBCUTANEOUS at 11:50

## 2022-07-12 ENCOUNTER — APPOINTMENT (OUTPATIENT)
Dept: LAB | Age: 72
End: 2022-07-12
Payer: MEDICARE

## 2022-07-14 ENCOUNTER — HOSPITAL ENCOUNTER (OUTPATIENT)
Dept: INFUSION CENTER | Facility: HOSPITAL | Age: 72
Discharge: HOME | End: 2022-07-14
Attending: INTERNAL MEDICINE
Payer: MEDICARE

## 2022-07-14 VITALS
SYSTOLIC BLOOD PRESSURE: 131 MMHG | TEMPERATURE: 97.3 F | HEART RATE: 91 BPM | DIASTOLIC BLOOD PRESSURE: 85 MMHG | RESPIRATION RATE: 18 BRPM

## 2022-07-14 DIAGNOSIS — D69.3 IDIOPATHIC THROMBOCYTOPENIC PURPURA (ITP) (HCC): ICD-10-CM

## 2022-07-14 DIAGNOSIS — C91.10 CLL (CHRONIC LYMPHOCYTIC LEUKEMIA) (HCC): Primary | ICD-10-CM

## 2022-07-14 PROCEDURE — 96372 THER/PROPH/DIAG INJ SC/IM: CPT

## 2022-07-14 RX ADMIN — ROMIPLOSTIM 125 MCG: 125 INJECTION, POWDER, LYOPHILIZED, FOR SOLUTION SUBCUTANEOUS at 15:16

## 2022-07-26 ENCOUNTER — APPOINTMENT (OUTPATIENT)
Dept: LAB | Age: 72
End: 2022-07-26
Payer: MEDICARE

## 2022-07-26 DIAGNOSIS — D59.10 AUTOIMMUNE HEMOLYTIC ANEMIA (HCC): ICD-10-CM

## 2022-07-26 DIAGNOSIS — C91.10 CLL (CHRONIC LYMPHOCYTIC LEUKEMIA) (HCC): ICD-10-CM

## 2022-07-26 LAB
ALBUMIN SERPL BCP-MCNC: 3.2 G/DL (ref 3.5–5)
ALP SERPL-CCNC: 58 U/L (ref 46–116)
ALT SERPL W P-5'-P-CCNC: 17 U/L (ref 12–78)
ANION GAP SERPL CALCULATED.3IONS-SCNC: 4 MMOL/L (ref 4–13)
AST SERPL W P-5'-P-CCNC: 15 U/L (ref 5–45)
BASOPHILS # BLD AUTO: 0.05 THOUSANDS/ΜL (ref 0–0.1)
BASOPHILS NFR BLD AUTO: 1 % (ref 0–1)
BILIRUB SERPL-MCNC: 0.78 MG/DL (ref 0.2–1)
BUN SERPL-MCNC: 13 MG/DL (ref 5–25)
CALCIUM ALBUM COR SERPL-MCNC: 10 MG/DL (ref 8.3–10.1)
CALCIUM SERPL-MCNC: 9.4 MG/DL (ref 8.3–10.1)
CHLORIDE SERPL-SCNC: 111 MMOL/L (ref 96–108)
CO2 SERPL-SCNC: 28 MMOL/L (ref 21–32)
CREAT SERPL-MCNC: 0.89 MG/DL (ref 0.6–1.3)
EOSINOPHIL # BLD AUTO: 0.02 THOUSAND/ΜL (ref 0–0.61)
EOSINOPHIL NFR BLD AUTO: 0 % (ref 0–6)
ERYTHROCYTE [DISTWIDTH] IN BLOOD BY AUTOMATED COUNT: 15.6 % (ref 11.6–15.1)
GFR SERPL CREATININE-BSD FRML MDRD: 65 ML/MIN/1.73SQ M
GLUCOSE P FAST SERPL-MCNC: 85 MG/DL (ref 65–99)
HCT VFR BLD AUTO: 42.9 % (ref 34.8–46.1)
HGB BLD-MCNC: 13 G/DL (ref 11.5–15.4)
IMM GRANULOCYTES # BLD AUTO: 0.13 THOUSAND/UL (ref 0–0.2)
IMM GRANULOCYTES NFR BLD AUTO: 2 % (ref 0–2)
LDH SERPL-CCNC: 204 U/L (ref 81–234)
LYMPHOCYTES # BLD AUTO: 0.92 THOUSANDS/ΜL (ref 0.6–4.47)
LYMPHOCYTES NFR BLD AUTO: 12 % (ref 14–44)
MCH RBC QN AUTO: 28.7 PG (ref 26.8–34.3)
MCHC RBC AUTO-ENTMCNC: 30.3 G/DL (ref 31.4–37.4)
MCV RBC AUTO: 95 FL (ref 82–98)
MONOCYTES # BLD AUTO: 0.5 THOUSAND/ΜL (ref 0.17–1.22)
MONOCYTES NFR BLD AUTO: 7 % (ref 4–12)
NEUTROPHILS # BLD AUTO: 6.01 THOUSANDS/ΜL (ref 1.85–7.62)
NEUTS SEG NFR BLD AUTO: 78 % (ref 43–75)
NRBC BLD AUTO-RTO: 0 /100 WBCS
PLATELET # BLD AUTO: 402 THOUSANDS/UL (ref 149–390)
PMV BLD AUTO: 11.1 FL (ref 8.9–12.7)
POTASSIUM SERPL-SCNC: 4 MMOL/L (ref 3.5–5.3)
PROT SERPL-MCNC: 7 G/DL (ref 6.4–8.4)
RBC # BLD AUTO: 4.53 MILLION/UL (ref 3.81–5.12)
SODIUM SERPL-SCNC: 143 MMOL/L (ref 135–147)
WBC # BLD AUTO: 7.63 THOUSAND/UL (ref 4.31–10.16)

## 2022-07-26 PROCEDURE — 83615 LACTATE (LD) (LDH) ENZYME: CPT

## 2022-07-26 PROCEDURE — 80053 COMPREHEN METABOLIC PANEL: CPT

## 2022-07-26 PROCEDURE — 85025 COMPLETE CBC W/AUTO DIFF WBC: CPT

## 2022-07-26 PROCEDURE — 36415 COLL VENOUS BLD VENIPUNCTURE: CPT

## 2022-07-28 ENCOUNTER — OFFICE VISIT (OUTPATIENT)
Dept: HEMATOLOGY ONCOLOGY | Facility: CLINIC | Age: 72
End: 2022-07-28
Payer: MEDICARE

## 2022-07-28 ENCOUNTER — TELEPHONE (OUTPATIENT)
Dept: HEMATOLOGY ONCOLOGY | Facility: CLINIC | Age: 72
End: 2022-07-28

## 2022-07-28 VITALS
WEIGHT: 138 LBS | RESPIRATION RATE: 17 BRPM | HEIGHT: 66 IN | TEMPERATURE: 96.9 F | HEART RATE: 77 BPM | SYSTOLIC BLOOD PRESSURE: 140 MMHG | BODY MASS INDEX: 22.18 KG/M2 | OXYGEN SATURATION: 97 % | DIASTOLIC BLOOD PRESSURE: 80 MMHG

## 2022-07-28 DIAGNOSIS — D69.3 IDIOPATHIC THROMBOCYTOPENIC PURPURA (ITP) (HCC): Primary | ICD-10-CM

## 2022-07-28 DIAGNOSIS — C91.10 CLL (CHRONIC LYMPHOCYTIC LEUKEMIA) (HCC): ICD-10-CM

## 2022-07-28 PROCEDURE — 99214 OFFICE O/P EST MOD 30 MIN: CPT | Performed by: PHYSICIAN ASSISTANT

## 2022-07-28 NOTE — PROGRESS NOTES
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 with platelet count 5452  She responded inititially to steroids, counts dropped with taper  No prolonged response to Promacta  Prednisone 40 mg po daily restarted 9/1/2020, reduced by 10 mg po weekly  She was on prednisone 5mg po daily  11/2/20 platelet count 72,537  Platelet count 20136 11/12/20 Prednisone increased to 10mg daily 11/17/20, increased to 20mg 11/23/20 when platelets decreased to 12,000  N-plate 530FZX ordered 12/15/20 when platelet count 77,825 12/15/20  Currently on q 4 week dosing  Currently on prednisone 10mg po daily  Advised to decrease to 7 5mg po daily or alternatively 10mg alt with 5mg  2   Resolution of hemolytic anemia     3  Chronic lymphocytic leukemia/ SLL  She is on acalabrutinib 100 mg p o  daily  CT scan on 10/2021 showed resolution of retroperitoneal, retrocrural lymphadenopathy no evidence of hepatic splenomegaly     4  Chronic steroid use  Small body habitus  Dexa scheduled for 8/5/22  HPI:   Eliseo Song is a 61-year-old  female who was admitted to [address] [place] [address] 6/2020 regarding easy bruising and bleeding  She has history of polyclonal gammopathy, with no evidence of monoclonal protein, Sjogren syndrome, autoimmune connective tissue disease with highly elevated sed rate, CRP, BALDO, rheumatoid factor  PT and PTT normal      She is followed by Dr Libertad Jean with Rheumatology  She was prescribed hydroxychloroquine 200 milligram p o  B i d        6/18/2020 She was found to have grade 4 thrombocytopenia with platelet count of 5413, hemoglobin 10, WBC 7 2, 63% neutrophils, 34 percent lymphocytes      Flow cytometry on the peripheral blood showed CLL pattern positive for CD 23, CD 20   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 with PATTI positive 4+for IgG, plus for C3, bilirubin 0 5     CT scan of the chest abdomen pelvis on 06/19/2020 showed mild confluent periaortic/retroperitoneal lymphadenopathy measuring up to 1 1 centimeter in short axis, bilateral external iliac lymphadenopathy up to 10 millimeters, retrocrural lymph node measuring 1 3 centimeters spleen 15 centimeter, liver with hepatomegaly no evidence of masses   She was treated with dexamethasone for 3 days with improvement of platelet count up to 62,000 6/22/21     On 06/25/2020 platelet count of 00,950     Initiated on Promacta 75 milligram p o  Daily in July 2020, platelet count up gradually with platelet count of 40,161 on 08/17/2020  hemoglobin 11 4, WBC 5 0   8/28/20: Patient had CBC as gums were bleeding that morning AM, no further bleeding  Platelet count was 76,151  Promacta increased to 150mg every other day and 75 mg the other days  Platelet count was 99,911 8/31/20 and she was advised to initiate prednisone 40mg po daily, tapering by 10mg po weekly  Promacta returned to 75mg po daily  Platelet count increased to 173,000 9/4/20  She had BMBX 9/9/20: B-cell lymphoproliferative disorder, compatible with chronic lymphocytic leukemia  Virtually absent stainable storage iron  On flow cytometry, CLL phenotype accounted for 11%; CD5+, CD23+, CD20+ (dim) and CD 38-     9/14/20: platelet count 005  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     Initiated on acalabrutinib 100 mg p o  b i d  since the beginning of October 2020  She was seen on 10/14/2020 with severe depression requiring admission to the hospital, she is on Zoloft 25 mg p o  daily, Synthroid 75 micro g p o  daily     Acalabrutinib was reduced to 100 mg p o  daily 11/2020   Recurrence of thrombocytopenia 27,000 11/2/20 once prednisone was discontinued     Prednisone re-introduced back at 5 mg p o  daily, platelet count increased to 50,000 range, fluctuated to 20,000 range, prednisone increased to 10mg daily 11/17/20, increased to 20mg 11/23/20 when platelets decreased to 12,000  N-plate ordered 69/72/32 when platelet count 43,491 12/15/20  And given every 4 weeks     Prednisone has been slowly tapered  Interval History:        Test Results:        Labs:   Lab Results   Component Value Date    HGB 13 0 07/26/2022    HCT 42 9 07/26/2022    MCV 95 07/26/2022     (H) 07/26/2022    WBC 7 63 07/26/2022    NRBC 0 07/26/2022    BANDSPCT 1 03/03/2021    ATYLMPCT 4 (H) 04/05/2021     Lab Results   Component Value Date    K 4 0 07/26/2022     (H) 07/26/2022    CO2 28 07/26/2022    BUN 13 07/26/2022    CREATININE 0 89 07/26/2022    GLUF 85 07/26/2022    CALCIUM 9 4 07/26/2022    CORRECTEDCA 10 0 07/26/2022    AST 15 07/26/2022    ALT 17 07/26/2022    ALKPHOS 58 07/26/2022    EGFR 65 07/26/2022       Imaging: No results found  ROS:  As mentioned in HPI & Interval History otherwise 14 point ROS negative  Allergies: No Known Allergies  Current Medications: Reviewed  PMH/FH/SH:  Reviewed      Physical Exam:    1 71 meters squared    Ht Readings from Last 3 Encounters:   07/28/22 5' 6" (1 676 m)   06/07/22 5' 6" (1 676 m)   02/24/22 5' 6" (1 676 m)        Wt Readings from Last 3 Encounters:   07/28/22 62 6 kg (138 lb)   06/07/22 62 kg (136 lb 9 6 oz)   04/28/22 62 1 kg (137 lb)        Temp Readings from Last 3 Encounters:   07/14/22 (!) 97 3 °F (36 3 °C) (Temporal)   06/07/22 (!) 96 6 °F (35 9 °C)   05/19/22 (!) 96 8 °F (36 °C) (Temporal)        BP Readings from Last 3 Encounters:   07/14/22 131/85   06/16/22 122/70   06/07/22 124/80           Physical Exam  Vitals reviewed  Constitutional:       General: She is not in acute distress  Appearance: She is well-developed  She is not diaphoretic  HENT:      Head: Normocephalic and atraumatic  Eyes:      Conjunctiva/sclera: Conjunctivae normal    Neck:      Trachea: No tracheal deviation  Cardiovascular:      Rate and Rhythm: Normal rate and regular rhythm  Heart sounds: No murmur heard  No friction rub  No gallop  Pulmonary:      Effort: Pulmonary effort is normal  No respiratory distress  Breath sounds: Normal breath sounds  No wheezing or rales  Chest:      Chest wall: No tenderness  Abdominal:      General: There is no distension  Palpations: Abdomen is soft  Tenderness: There is no abdominal tenderness  Musculoskeletal:      Cervical back: Normal range of motion and neck supple  Lymphadenopathy:      Cervical: No cervical adenopathy  Skin:     General: Skin is warm and dry  Coloration: Skin is not pale  Findings: No erythema  Neurological:      Mental Status: She is alert and oriented to person, place, and time  Psychiatric:         Behavior: Behavior normal          Thought Content:  Thought content normal          Judgment: Judgment normal          ECOG:       Emergency Contacts:    Extended Emergency Contact Information  Primary Emergency Contact: Jacqueline Bauman [address] Phone: [phone]  Mobile Phone: [phone]  Relation: Relative

## 2022-07-28 NOTE — TELEPHONE ENCOUNTER
While we try to accommodate patient requests, our priority is to schedule treatment according to Doctor's orders and site availability  [facility]    Return in about 3 months (around 10/28/2022) for Dr Al Lopez  1  Does the Provider use the intake sheet or checkout note? CHECKOUT  2  What would be a preferred day of the week that would work best for your infusion appointment? THURSDAY  3  Do you prefer mornings or afternoons for your appointments? AROUND 12-2  4  Are there any days or dates that do not work for your schedule, including any upcoming vacations? NONE  5  We are going to try our best to schedule you at the infusion center closest to your home  In the event that we are unable to what would be your next preferred infusion site or sites? [facility]    1  BE  2  BE  3  BE    6  Do you have transportation to take you to all of your appointments? YES  7  Would you like the infusion center to draw labs from your port?  NO (disregard if patient doesn't have a port or need labs for infusion appointment)

## 2022-07-29 NOTE — TELEPHONE ENCOUNTER
Left a detailed message making patient aware additional appointments have been scheduled  Patient was provided with date and time of next injection at the infusion center as well as follow up with Thi Burton

## 2022-08-01 NOTE — TELEPHONE ENCOUNTER
Patient was made aware of time and date of next injection, patient was okay with dates and times provided

## 2022-08-05 ENCOUNTER — HOSPITAL ENCOUNTER (OUTPATIENT)
Dept: RADIOLOGY | Facility: IMAGING CENTER | Age: 72
Discharge: HOME | End: 2022-08-05
Payer: MEDICARE

## 2022-08-05 DIAGNOSIS — C91.10 CLL (CHRONIC LYMPHOCYTIC LEUKEMIA) (HCC): ICD-10-CM

## 2022-08-05 DIAGNOSIS — D59.10 AUTOIMMUNE HEMOLYTIC ANEMIA (HCC): ICD-10-CM

## 2022-08-05 DIAGNOSIS — E24.2 DRUG-INDUCED CUSHING'S SYNDROME (HCC): ICD-10-CM

## 2022-08-05 PROCEDURE — 77080 DXA BONE DENSITY AXIAL: CPT

## 2022-08-09 ENCOUNTER — APPOINTMENT (OUTPATIENT)
Dept: LAB | Age: 72
End: 2022-08-09
Payer: MEDICARE

## 2022-08-11 ENCOUNTER — HOSPITAL ENCOUNTER (OUTPATIENT)
Dept: INFUSION CENTER | Facility: HOSPITAL | Age: 72
Discharge: HOME | End: 2022-08-11
Attending: INTERNAL MEDICINE
Payer: MEDICARE

## 2022-08-11 DIAGNOSIS — D69.3 IDIOPATHIC THROMBOCYTOPENIC PURPURA (ITP) (HCC): ICD-10-CM

## 2022-08-11 DIAGNOSIS — C91.10 CLL (CHRONIC LYMPHOCYTIC LEUKEMIA) (HCC): Primary | ICD-10-CM

## 2022-08-11 PROCEDURE — 96372 THER/PROPH/DIAG INJ SC/IM: CPT

## 2022-08-11 RX ADMIN — ROMIPLOSTIM 125 MCG: 125 INJECTION, POWDER, LYOPHILIZED, FOR SOLUTION SUBCUTANEOUS at 15:09

## 2022-08-15 ENCOUNTER — TELEPHONE (OUTPATIENT)
Dept: FAMILY MEDICINE CLINIC | Facility: CLINIC | Age: 72
End: 2022-08-15

## 2022-08-15 ENCOUNTER — TELEPHONE (OUTPATIENT)
Dept: HEMATOLOGY ONCOLOGY | Facility: CLINIC | Age: 72
End: 2022-08-15

## 2022-08-15 DIAGNOSIS — D69.3 ACUTE ITP (HCC): ICD-10-CM

## 2022-08-15 RX ORDER — PREDNISONE 1 MG/1
TABLET ORAL
Qty: 120 TABLET | Refills: 3 | Status: SHIPPED | OUTPATIENT
Start: 2022-08-15

## 2022-08-15 NOTE — TELEPHONE ENCOUNTER
Medication Refill     Who is Calling  Patient   Medication  predniSONE 5     How many pills left 7   Preferred Pharmacy / Address [address] [id number] [address], [address] [facility]    Who is your Physician? Jason Alfaro    Call back number  [phone]   Relevant Information

## 2022-08-25 ENCOUNTER — DOCUMENTATION (OUTPATIENT)
Dept: HEMATOLOGY ONCOLOGY | Facility: CLINIC | Age: 72
End: 2022-08-25

## 2022-08-25 NOTE — PROGRESS NOTES
8-25-22  Rcvd email from [facility] requesting additional funding for patients calquence    Patient has been re-enrolled with [facility]  ID# [id number]  CARD# [id number]  PCN#  [id number]  GRP# [id number]  BIN# [id number]  AMT $[id number]  EFF 8-22-22  THRU 8-21-23    Epic noted

## 2022-08-30 DIAGNOSIS — C91.10 CLL (CHRONIC LYMPHOCYTIC LEUKEMIA) (HCC): ICD-10-CM

## 2022-08-30 RX ORDER — ACALABRUTINIB 100 MG/1
CAPSULE, GELATIN COATED ORAL
Qty: 30 CAPSULE | Refills: 10 | Status: SHIPPED | OUTPATIENT
Start: 2022-08-30

## 2022-09-07 ENCOUNTER — APPOINTMENT (OUTPATIENT)
Dept: LAB | Age: 72
End: 2022-09-07
Payer: MEDICARE

## 2022-09-08 ENCOUNTER — HOSPITAL ENCOUNTER (OUTPATIENT)
Dept: INFUSION CENTER | Facility: HOSPITAL | Age: 72
Discharge: HOME | End: 2022-09-08
Attending: INTERNAL MEDICINE
Payer: MEDICARE

## 2022-09-08 DIAGNOSIS — D69.3 IDIOPATHIC THROMBOCYTOPENIC PURPURA (ITP) (HCC): ICD-10-CM

## 2022-09-08 DIAGNOSIS — C91.10 CLL (CHRONIC LYMPHOCYTIC LEUKEMIA) (HCC): Primary | ICD-10-CM

## 2022-09-08 PROCEDURE — 96372 THER/PROPH/DIAG INJ SC/IM: CPT

## 2022-09-08 RX ADMIN — ROMIPLOSTIM 125 MCG: 125 INJECTION, POWDER, LYOPHILIZED, FOR SOLUTION SUBCUTANEOUS at 13:46

## 2022-09-08 NOTE — PROGRESS NOTES
Pt tolerated NPlate inj without incident  Pt next due for inj week of 10/6, however, per pt she cannot get a ride that week  Next appt scheduled for 10/17 per pt request  AVS provided

## 2022-09-19 DIAGNOSIS — M79.10 MYALGIA: ICD-10-CM

## 2022-09-20 RX ORDER — METHOCARBAMOL 500 MG/1
TABLET, FILM COATED ORAL
Qty: 30 TABLET | Refills: 2 | Status: SHIPPED | OUTPATIENT
Start: 2022-09-20

## 2022-10-13 DIAGNOSIS — D69.3 IDIOPATHIC THROMBOCYTOPENIC PURPURA (ITP) (HCC): ICD-10-CM

## 2022-10-13 DIAGNOSIS — C91.10 CLL (CHRONIC LYMPHOCYTIC LEUKEMIA) (HCC): Primary | ICD-10-CM

## 2022-10-14 ENCOUNTER — APPOINTMENT (OUTPATIENT)
Dept: LAB | Age: 72
End: 2022-10-14
Payer: MEDICARE

## 2022-10-14 LAB
BASOPHILS # BLD AUTO: 0.02 THOUSANDS/ΜL (ref 0–0.1)
BASOPHILS NFR BLD AUTO: 0 % (ref 0–1)
EOSINOPHIL # BLD AUTO: 0.01 THOUSAND/ΜL (ref 0–0.61)
EOSINOPHIL NFR BLD AUTO: 0 % (ref 0–6)
ERYTHROCYTE [DISTWIDTH] IN BLOOD BY AUTOMATED COUNT: 14.7 % (ref 11.6–15.1)
HCT VFR BLD AUTO: 42.8 % (ref 34.8–46.1)
HGB BLD-MCNC: 12.9 G/DL (ref 11.5–15.4)
IMM GRANULOCYTES # BLD AUTO: 0.03 THOUSAND/UL (ref 0–0.2)
IMM GRANULOCYTES NFR BLD AUTO: 0 % (ref 0–2)
LYMPHOCYTES # BLD AUTO: 0.64 THOUSANDS/ΜL (ref 0.6–4.47)
LYMPHOCYTES NFR BLD AUTO: 10 % (ref 14–44)
MCH RBC QN AUTO: 28.7 PG (ref 26.8–34.3)
MCHC RBC AUTO-ENTMCNC: 30.1 G/DL (ref 31.4–37.4)
MCV RBC AUTO: 95 FL (ref 82–98)
MONOCYTES # BLD AUTO: 0.34 THOUSAND/ΜL (ref 0.17–1.22)
MONOCYTES NFR BLD AUTO: 5 % (ref 4–12)
NEUTROPHILS # BLD AUTO: 5.69 THOUSANDS/ΜL (ref 1.85–7.62)
NEUTS SEG NFR BLD AUTO: 85 % (ref 43–75)
NRBC BLD AUTO-RTO: 0 /100 WBCS
PLATELET # BLD AUTO: 105 THOUSANDS/UL (ref 149–390)
PMV BLD AUTO: 12.1 FL (ref 8.9–12.7)
RBC # BLD AUTO: 4.49 MILLION/UL (ref 3.81–5.12)
WBC # BLD AUTO: 6.73 THOUSAND/UL (ref 4.31–10.16)

## 2022-10-14 PROCEDURE — 36415 COLL VENOUS BLD VENIPUNCTURE: CPT

## 2022-10-14 PROCEDURE — 85025 COMPLETE CBC W/AUTO DIFF WBC: CPT

## 2022-10-17 ENCOUNTER — HOSPITAL ENCOUNTER (OUTPATIENT)
Dept: INFUSION CENTER | Facility: HOSPITAL | Age: 72
Discharge: HOME | End: 2022-10-17
Attending: INTERNAL MEDICINE
Payer: MEDICARE

## 2022-10-17 DIAGNOSIS — C91.10 CLL (CHRONIC LYMPHOCYTIC LEUKEMIA) (HCC): Primary | ICD-10-CM

## 2022-10-17 DIAGNOSIS — D69.3 IDIOPATHIC THROMBOCYTOPENIC PURPURA (ITP) (HCC): ICD-10-CM

## 2022-10-17 PROCEDURE — 96372 THER/PROPH/DIAG INJ SC/IM: CPT

## 2022-10-17 RX ADMIN — ROMIPLOSTIM 125 MCG: 125 INJECTION, POWDER, LYOPHILIZED, FOR SOLUTION SUBCUTANEOUS at 13:04

## 2022-11-03 ENCOUNTER — OFFICE VISIT (OUTPATIENT)
Dept: HEMATOLOGY ONCOLOGY | Facility: CLINIC | Age: 72
End: 2022-11-03

## 2022-11-03 ENCOUNTER — TELEPHONE (OUTPATIENT)
Dept: HEMATOLOGY ONCOLOGY | Facility: CLINIC | Age: 72
End: 2022-11-03

## 2022-11-03 VITALS
OXYGEN SATURATION: 97 % | HEIGHT: 60 IN | HEART RATE: 80 BPM | BODY MASS INDEX: 40.64 KG/M2 | WEIGHT: 207 LBS | SYSTOLIC BLOOD PRESSURE: 140 MMHG | TEMPERATURE: 97.1 F | RESPIRATION RATE: 18 BRPM | DIASTOLIC BLOOD PRESSURE: 82 MMHG

## 2022-11-03 DIAGNOSIS — M81.8 OSTEOPOROSIS OF FOREARM ASSOCIATED WITH ENDOCRINE DISORDER: ICD-10-CM

## 2022-11-03 DIAGNOSIS — C91.10 CLL (CHRONIC LYMPHOCYTIC LEUKEMIA) (HCC): ICD-10-CM

## 2022-11-03 DIAGNOSIS — E34.9 OSTEOPOROSIS OF FOREARM ASSOCIATED WITH ENDOCRINE DISORDER: ICD-10-CM

## 2022-11-03 DIAGNOSIS — D69.3 IDIOPATHIC THROMBOCYTOPENIC PURPURA (ITP) (HCC): Primary | ICD-10-CM

## 2022-11-03 DIAGNOSIS — D59.10 AUTOIMMUNE HEMOLYTIC ANEMIA (HCC): ICD-10-CM

## 2022-11-03 NOTE — TELEPHONE ENCOUNTER
Please schedule patient for treatment any day in the afternoon before 2pm due to patient using [facility]  Thanks!

## 2022-11-03 NOTE — TELEPHONE ENCOUNTER
While we try to accommodate patient requests, our priority is to schedule treatment according to Doctor's orders and site availability  1  Does the Provider use the intake sheet or checkout note? 2  What would be a preferred day of the week that would work best for your infusion appointment? Any day of the week  3  Do you prefer mornings or afternoons for your appointments? Afternoon  4  Are there any days or dates that do not work for your schedule, including any upcoming vacations? No  5  We are going to try our best to schedule you at the infusion center closest to your home  In the event that we are unable to what would be your next preferred infusion site or sites? 1  [place]  [place]  [place]  [place]  [place]    6  Do you have transportation to take you to all of your appointments? Yes-   Patient is filling out paperwork for [facility]  7  Would you like the infusion center to draw labs from your port? (disregard if patient doesn't have a port or need labs for infusion appointment)       Please make sure to reach out to [facility] after scheduling the infusion appointments

## 2022-11-03 NOTE — PROGRESS NOTES
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 with platelet count 8765  She responded inititially to steroids, counts dropped with taper  No prolonged response to Promacta  Prednisone 40 mg po daily restarted 9/1/2020, reduced by 10 mg po weekly  She was on prednisone 5mg po daily  11/2/20 platelet count 55,001  Platelet count 79637 11/12/20 Prednisone increased to 10mg daily 11/17/20, increased to 20mg 11/23/20 when platelets decreased to 12,000  One prednisone 5 mg alternating with 10 mg the next day, reduced to 5 mg PO daily     Continue N-plate 646 mcg every 4 weeks    Osteoporosis induced by steroids/age, Prolia 60 mg subQ every 6 months x4 doses     CLL on acalabrutinib 100 mg p o  daily, CT scan on 10/2021 showed resolution of retroperitoneal, retrocrural lymphadenopathy    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, resolved        Labs and imaging studies are reviewed by ordering provider once results are available  If there are findings that need immediate attention, you will be contacted when results available  Discussing results and the implication on your healthcare is best discussed in person at your follow-up visit  HPI:    Mihai Duke is a 72-year-old  female who was admitted to [facility] 6/2020 regarding easy bruising and bleeding       She has history of polyclonal gammopathy, with no evidence of monoclonal protein, Sjogren syndrome, autoimmune connective tissue disease with highly elevated sed rate, CRP, BALDO, rheumatoid factor  PT and PTT normal       She is followed by Dr Keara Mark with Rheumatology   She was prescribed hydroxychloroquine 200 milligram p o  B i d          6/18/2020 She was found to have grade 4 thrombocytopenia with platelet count of 2103, hemoglobin 10, WBC 7 2, 63% neutrophils, 34 percent lymphocytes      Flow cytometry on the peripheral blood showed CLL pattern positive for CD 23, CD 20   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 with PATTI positive 4+for IgG, plus for C3, bilirubin 0 5      CT scan of the chest abdomen pelvis on 06/19/2020 showed mild confluent periaortic/retroperitoneal lymphadenopathy measuring up to 1 1 centimeter in short axis, bilateral external iliac lymphadenopathy up to 10 millimeters, retrocrural lymph node measuring 1 3 centimeters spleen 15 centimeter, liver with hepatomegaly no evidence of masses       She was treated with dexamethasone for 3 days with improvement of platelet count up to 62,000 6/22/21      On 06/25/2020 platelet count of 96,370      Initiated on Promacta 75 milligram p o  Daily in July 2020, platelet count up gradually with platelet count of 10,576 on 08/17/2020  hemoglobin 11 4, WBC 5 0   8/28/20: Patient had CBC as gums were bleeding that morning AM, no further bleeding  Platelet count was 95,831       Promacta increased to 150mg every other day and 75 mg the other days  Platelet count was 55,677 8/31/20 and she was advised to initiate prednisone 40mg po daily, tapering by 10mg po weekly  Promacta returned to 75mg po daily  Platelet count increased to 173,000 9/4/20       She had BMBX 9/9/20: B-cell lymphoproliferative disorder, compatible with chronic lymphocytic leukemia  Virtually absent stainable storage iron  On flow cytometry, CLL phenotype accounted for 11%; CD5+, CD23+, CD20+ (dim) and CD 38-      9/14/20: platelet count 602  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      Initiated on acalabrutinib 100 mg p o  b i d  since the beginning of October 2020      She was seen on 10/14/2020 with severe depression requiring admission to the hospital, she is on Zoloft 25 mg p o  daily, Synthroid 75 micro g p o  daily      Acalabrutinib was reduced to 100 mg p o  daily 11/2020   Recurrence of thrombocytopenia 27,000 11/2/20 once prednisone was discontinued  Prednisone re-introduced back at 5 mg p o  daily, platelet count increased to 50,000 range, fluctuated to 20,000 range, prednisone increased to 10mg daily 11/17/20, increased to 20mg 11/23/20 when platelets decreased to 12,000       N-plate ordered 75/01/21 when platelet count 18,875 12/15/20  And given every 4 weeks      Prednisone has been slowly tapered  5 mg alternating with 10 mg the next day    Discussed scan showed osteoporosis in August 2022  Interval History:        Previous Treatment:         Test Results:    Imaging: No results found  Labs:   Lab Results   Component Value Date    WBC 6 73 10/14/2022    HGB 12 9 10/14/2022    HCT 42 8 10/14/2022    MCV 95 10/14/2022     (L) 10/14/2022     Lab Results   Component Value Date    K 4 0 07/26/2022     (H) 07/26/2022    CO2 28 07/26/2022    BUN 13 07/26/2022    CREATININE 0 89 07/26/2022    GLUF 85 07/26/2022    CALCIUM 9 4 07/26/2022    CORRECTEDCA 10 0 07/26/2022    AST 15 07/26/2022    ALT 17 07/26/2022    ALKPHOS 58 07/26/2022    EGFR 65 07/26/2022       No results found for: IRON, TIBC, FERRITIN    No results found for: [id number]      ROS: Review of Systems   Constitutional: Negative for appetite change, chills, diaphoresis, fatigue and unexpected weight change  HENT:   Negative for mouth sores, nosebleeds, sore throat, trouble swallowing and voice change  Eyes: Negative for eye problems and icterus  Respiratory: Negative for chest tightness, cough, hemoptysis and wheezing  Cardiovascular: Negative for chest pain, leg swelling and palpitations  Gastrointestinal: Negative for abdominal distention, abdominal pain, blood in stool, constipation, diarrhea, nausea and vomiting  Endocrine: Negative for hot flashes  Genitourinary: Negative for bladder incontinence, difficulty urinating, dyspareunia, dysuria and frequency  Musculoskeletal: Negative for arthralgias, back pain, gait problem, neck pain and neck stiffness     Skin: Negative for itching and rash  Neurological: Negative for dizziness, gait problem, headaches, numbness, seizures and speech difficulty  Hematological: Negative for adenopathy  Does not bruise/bleed easily  Psychiatric/Behavioral: Negative for decreased concentration, depression, sleep disturbance and suicidal ideas  The patient is not nervous/anxious  Current Medications: Reviewed  Allergies: Reviewed  PMH/FH/SH:  Reviewed      Physical Exam:    Body surface area is 1 89 meters squared  Wt Readings from Last 3 Encounters:   11/03/22 93 9 kg (207 lb)   07/28/22 62 6 kg (138 lb)   06/07/22 62 kg (136 lb 9 6 oz)        Temp Readings from Last 3 Encounters:   11/03/22 (!) 97 1 °F (36 2 °C)   07/28/22 (!) 96 9 °F (36 1 °C)   07/14/22 (!) 97 3 °F (36 3 °C) (Temporal)        BP Readings from Last 3 Encounters:   11/03/22 140/82   07/28/22 140/80   07/14/22 131/85         Pulse Readings from Last 3 Encounters:   11/03/22 80   07/28/22 77   07/14/22 91        Physical Exam  Vitals reviewed  Constitutional:       General: She is not in acute distress  Appearance: She is well-developed  She is not diaphoretic  HENT:      Head: Normocephalic and atraumatic  Eyes:      Conjunctiva/sclera: Conjunctivae normal    Neck:      Trachea: No tracheal deviation  Cardiovascular:      Rate and Rhythm: Normal rate and regular rhythm  Heart sounds: No murmur heard  No friction rub  No gallop  Pulmonary:      Effort: Pulmonary effort is normal  No respiratory distress  Breath sounds: Normal breath sounds  No wheezing or rales  Chest:      Chest wall: No tenderness  Abdominal:      General: There is no distension  Palpations: Abdomen is soft  Tenderness: There is no abdominal tenderness  Musculoskeletal:      Cervical back: Normal range of motion and neck supple  Right lower leg: No edema  Left lower leg: No edema  Lymphadenopathy:      Cervical: No cervical adenopathy  Skin:     General: Skin is warm and dry  Coloration: Skin is not pale  Findings: No erythema  Neurological:      Mental Status: She is alert and oriented to person, place, and time  Psychiatric:         Behavior: Behavior normal          Thought Content: Thought content normal          Judgment: Judgment normal          ECO  Goals and Barriers:  Current Goal: Minimize effects of disease  Barriers: None  Patient's Capacity to Self Care:  Patient is able to self care      Code Status: @[facility]@

## 2022-11-15 ENCOUNTER — APPOINTMENT (OUTPATIENT)
Dept: LAB | Age: 72
End: 2022-11-15

## 2022-11-17 ENCOUNTER — HOSPITAL ENCOUNTER (OUTPATIENT)
Dept: INFUSION CENTER | Facility: HOSPITAL | Age: 72
Discharge: HOME | End: 2022-11-17
Attending: INTERNAL MEDICINE

## 2022-11-17 VITALS
SYSTOLIC BLOOD PRESSURE: 139 MMHG | HEART RATE: 75 BPM | DIASTOLIC BLOOD PRESSURE: 80 MMHG | RESPIRATION RATE: 18 BRPM | TEMPERATURE: 97.2 F

## 2022-11-17 DIAGNOSIS — E34.9 OSTEOPOROSIS OF FOREARM ASSOCIATED WITH ENDOCRINE DISORDER: Primary | ICD-10-CM

## 2022-11-17 DIAGNOSIS — M79.10 MYALGIA: ICD-10-CM

## 2022-11-17 DIAGNOSIS — M81.8 OSTEOPOROSIS OF FOREARM ASSOCIATED WITH ENDOCRINE DISORDER: Primary | ICD-10-CM

## 2022-11-17 DIAGNOSIS — C91.10 CLL (CHRONIC LYMPHOCYTIC LEUKEMIA) (HCC): ICD-10-CM

## 2022-11-17 DIAGNOSIS — D69.3 IDIOPATHIC THROMBOCYTOPENIC PURPURA (ITP) (HCC): ICD-10-CM

## 2022-11-17 RX ORDER — METHOCARBAMOL 500 MG/1
TABLET, FILM COATED ORAL
Qty: 30 TABLET | Refills: 2 | Status: SHIPPED | OUTPATIENT
Start: 2022-11-17

## 2022-11-17 RX ADMIN — ROMIPLOSTIM 125 MCG: 125 INJECTION, POWDER, LYOPHILIZED, FOR SOLUTION SUBCUTANEOUS at 15:18

## 2022-11-17 NOTE — PROGRESS NOTES
NPlate given as ordered  No CMP drawn, so per Jen, pt can have cbc and cmp drawn prior to next appt and receive Nplate and Prolia next month  PT aware and agreeable  AVS given and necessary labs written down for patient to refer to with next lab visit

## 2022-11-29 DIAGNOSIS — F41.9 ANXIETY: ICD-10-CM

## 2022-11-29 RX ORDER — ALPRAZOLAM 0.5 MG/1
0.5 TABLET ORAL
Qty: 30 TABLET | Refills: 2 | Status: SHIPPED | OUTPATIENT
Start: 2022-11-29

## 2022-11-29 NOTE — TELEPHONE ENCOUNTER
PROTOCOL FAIL   ALPRAZolam (XANAX) 0 5 mg tablet         Sig: Take 1 tablet (0 5 mg total) by mouth daily at bedtime as needed for anxiety    Disp:  30 tablet    Refills:  2    Start: 11/29/2022    Class: Normal    For: Anxiety    Last ordered: 5 months ago by Cristina Barry DO     Psychiatry:  Anxiolytics/Hypnotics Failed 11/29/2022 12:00 PM   Protocol Details  This refill cannot be delegated    Valid encounter within last 6 months      To be filled at: [address] [place] [address], [address] [place] [address]

## 2022-12-08 ENCOUNTER — OFFICE VISIT (OUTPATIENT)
Dept: FAMILY MEDICINE CLINIC | Facility: CLINIC | Age: 72
End: 2022-12-08

## 2022-12-08 VITALS
RESPIRATION RATE: 16 BRPM | DIASTOLIC BLOOD PRESSURE: 80 MMHG | WEIGHT: 138.4 LBS | HEART RATE: 92 BPM | TEMPERATURE: 97.8 F | HEIGHT: 66 IN | SYSTOLIC BLOOD PRESSURE: 128 MMHG | OXYGEN SATURATION: 94 % | BODY MASS INDEX: 22.24 KG/M2

## 2022-12-08 DIAGNOSIS — F32.A DEPRESSION, UNSPECIFIED DEPRESSION TYPE: ICD-10-CM

## 2022-12-08 DIAGNOSIS — Z00.00 MEDICARE ANNUAL WELLNESS VISIT, SUBSEQUENT: Primary | ICD-10-CM

## 2022-12-08 DIAGNOSIS — I73.9 SMALL VESSEL DISEASE (HCC): ICD-10-CM

## 2022-12-08 DIAGNOSIS — E55.9 VITAMIN D DEFICIENCY: ICD-10-CM

## 2022-12-08 DIAGNOSIS — B35.1 ONYCHOMYCOSIS: ICD-10-CM

## 2022-12-08 DIAGNOSIS — E03.4 HYPOTHYROIDISM DUE TO ACQUIRED ATROPHY OF THYROID: ICD-10-CM

## 2022-12-08 DIAGNOSIS — H91.93 BILATERAL HEARING LOSS, UNSPECIFIED HEARING LOSS TYPE: ICD-10-CM

## 2022-12-08 RX ORDER — CYCLOSPORINE 0.5 MG/ML
EMULSION OPHTHALMIC
COMMUNITY
Start: 2022-11-20

## 2022-12-08 NOTE — PROGRESS NOTES
Name: Rema Hood      : 1950      MRN: [id number]  Encounter Provider: Liane Huff DO  Encounter Date: 2022   Encounter department: [address] [place] [address]   Patient given lab requisition for TSH, vitamin D and hepatitis C antibody to be drawn with next labs per hematology  Patient is being referred to ENT and podiatry  Patient to continue present treatment  Discussed proper nutrition and regular exercise walking as tolerated with use of her cane  Follow-up with specialist as scheduled and return to the office in 1 year at patient's request   1  Medicare annual wellness visit, subsequent  -     Hepatitis C antibody; Future    2  Hypothyroidism due to acquired atrophy of thyroid  -     TSH, 3rd generation with Free T4 reflex; Future    3  Vitamin D deficiency  -     Vitamin D 25 hydroxy; Future    4  Depression, unspecified depression type  -     sertraline (ZOLOFT) 50 mg tablet; Take 1 tablet (50 mg total) by mouth daily    5  Small vessel disease ([address] )    6  Bilateral hearing loss, unspecified hearing loss type  -     Ambulatory Referral to Otolaryngology; Future    7  Onychomycosis  -     Ambulatory Referral to Podiatry; Future           Subjective      Patient is here for annual Medicare wellness exam and follow-up of chronic conditions  Patient follows with Dr Gege Cesar at [place] [address] hematology/oncology every 3 months and gets labs regularly  Patient requests referral to ENT regarding hearing loss  Complains of painful right great toe and difficulty cutting her nail  Thyroid Problem  Presents for follow-up visit  Symptoms include anxiety, cold intolerance, depressed mood, fatigue, leg swelling, tremors and visual change  Patient reports no constipation, diaphoresis, diarrhea, dry skin, hair loss, heat intolerance, hoarse voice, palpitations, weight gain or weight loss  The symptoms have been stable       Review of Systems   Constitutional: Positive for fatigue  Negative for diaphoresis, weight gain and weight loss  HENT: Negative for hoarse voice  Cardiovascular: Negative for palpitations  Gastrointestinal: Negative for constipation and diarrhea  Endocrine: Positive for cold intolerance  Negative for heat intolerance  Neurological: Positive for tremors  Psychiatric/Behavioral: The patient is nervous/anxious  Current Outpatient Medications on File Prior to Visit   Medication Sig   • ALPRAZolam (XANAX) 0 5 mg tablet Take 1 tablet (0 5 mg total) by mouth daily at bedtime as needed for anxiety   • Calquence 100 MG CAPS Take 1 capsule by mouth daily   • cycloSPORINE (RESTASIS) 0 05 % ophthalmic emulsion INSTILL 1 DROP INTO BOTH EYES 2 TIMES A DAY   • gabapentin (Neurontin) 300 mg capsule Take 1 capsule (300 mg total) by mouth daily at bedtime   • levothyroxine 75 mcg tablet take 1 tablet by mouth once daily IN THE EARLY MORNING   • methocarbamol (ROBAXIN) 500 mg tablet TAKE ONE TABLET BY MOUTH TWO TIMES A DAY AS NEEDED FOR MUSCLE SPASMS   • polyethylene glycol (MIRALAX) 17 g packet Take 17 g by mouth daily for 10 days   • predniSONE 5 mg tablet take 4 tablets by mouth once daily   • [DISCONTINUED] sertraline (ZOLOFT) 50 mg tablet Take 0 5 tablets (25 mg total) by mouth daily       Objective     /80   Pulse 92   Temp 97 8 °F (36 6 °C) (Temporal)   Resp 16   Ht 5' 6" (1 676 m)   Wt 62 8 kg (138 lb 6 4 oz)   SpO2 94%   BMI 22 34 kg/m²     Physical Exam  Constitutional:       General: She is not in acute distress  Appearance: Normal appearance  She is not ill-appearing, toxic-appearing or diaphoretic  HENT:      Head: Normocephalic  Mouth/Throat:      Mouth: Mucous membranes are dry  Eyes:      General: No scleral icterus  Conjunctiva/sclera: Conjunctivae normal    Neck:      Vascular: No carotid bruit  Cardiovascular:      Rate and Rhythm: Normal rate and regular rhythm     Pulmonary:      Effort: Pulmonary effort is normal       Breath sounds: Normal breath sounds  Abdominal:      Palpations: Abdomen is soft  Tenderness: There is no abdominal tenderness  Musculoskeletal:      Cervical back: Neck supple  Right lower leg: No edema  Left lower leg: No edema  Lymphadenopathy:      Cervical: No cervical adenopathy  Skin:     General: Skin is warm and dry  Neurological:      General: No focal deficit present  Mental Status: She is alert and oriented to person, place, and time     Psychiatric:         Mood and Affect: Mood normal          Behavior: Behavior normal        Roberta Mai, DO

## 2022-12-08 NOTE — PATIENT INSTRUCTIONS
Medicare Preventive Visit Patient Instructions  Thank you for completing your Welcome to Medicare Visit or Medicare Annual Wellness Visit today  Your next wellness visit will be due in one year (12/9/2023)  The screening/preventive services that you may require over the next 5-10 years are detailed below  Some tests may not apply to you based off risk factors and/or age  Screening tests ordered at today's visit but not completed yet may show as past due  Also, please note that scanned in results may not display below  Preventive Screenings:  Service Recommendations Previous Testing/Comments   Colorectal Cancer Screening  * Colonoscopy    * Fecal Occult Blood Test (FOBT)/Fecal Immunochemical Test (FIT)  * Fecal DNA/Cologuard Test  * Flexible Sigmoidoscopy Age: 39-70 years old   Colonoscopy: every 10 years (may be performed more frequently if at higher risk)  OR  FOBT/FIT: every 1 year  OR  Cologuard: every 3 years  OR  Sigmoidoscopy: every 5 years  Screening may be recommended earlier than age 39 if at higher risk for colorectal cancer  Also, an individualized decision between you and your healthcare provider will decide whether screening between the ages of 74-80 would be appropriate  Colonoscopy: Not on file  FOBT/FIT: Not on file  Cologuard: Not on file  Sigmoidoscopy: Not on file          Breast Cancer Screening Age: 36 years old  Frequency: every 1-2 years  Not required if history of left and right mastectomy Mammogram: 06/02/2015        Cervical Cancer Screening Between the ages of 21-29, pap smear recommended once every 3 years  Between the ages of 33-67, can perform pap smear with HPV co-testing every 5 years     Recommendations may differ for women with a history of total hysterectomy, cervical cancer, or abnormal pap smears in past  Pap Smear: Not on file    Screening Not Indicated   Hepatitis C Screening Once for adults born between [facility]  More frequently in patients at high risk for Hepatitis C Hep C Antibody: Not on file        Diabetes Screening 1-2 times per year if you're at risk for diabetes or have pre-diabetes Fasting glucose: 85 mg/dL (7/26/2022)  A1C: No results in last 5 years (No results in last 5 years)  Screening Current   Cholesterol Screening Once every 5 years if you don't have a lipid disorder  May order more often based on risk factors  Lipid panel: 10/21/2020    Screening Current     Other Preventive Screenings Covered by Medicare:  1  Abdominal Aortic Aneurysm (AAA) Screening: covered once if your at risk  You're considered to be at risk if you have a family history of AAA  2  Lung Cancer Screening: covers low dose CT scan once per year if you meet all of the following conditions: (1) Age 50-69; (2) No signs or symptoms of lung cancer; (3) Current smoker or have quit smoking within the last 15 years; (4) You have a tobacco smoking history of at least 20 pack years (packs per day multiplied by number of years you smoked); (5) You get a written order from a healthcare provider  3  Glaucoma Screening: covered annually if you're considered high risk: (1) You have diabetes OR (2) Family history of glaucoma OR (3)  aged 48 and older OR (3)  American aged 72 and older  3  Osteoporosis Screening: covered every 2 years if you meet one of the following conditions: (1) You're estrogen deficient and at risk for osteoporosis based off medical history and other findings; (2) Have a vertebral abnormality; (3) On glucocorticoid therapy for more than 3 months; (4) Have primary hyperparathyroidism; (5) On osteoporosis medications and need to assess response to drug therapy  · Last bone density test (DXA Scan): 08/05/2022   5  HIV Screening: covered annually if you're between the age of 15-65  Also covered annually if you are younger than 13 and older than 72 with risk factors for HIV infection   For pregnant patients, it is covered up to 3 times per pregnancy  Immunizations:  Immunization Recommendations   Influenza Vaccine Annual influenza vaccination during flu season is recommended for all persons aged >= 6 months who do not have contraindications   Pneumococcal Vaccine   * Pneumococcal conjugate vaccine = PCV13 (Prevnar 13), PCV15 (Vaxneuvance), PCV20 (Prevnar 20)  * Pneumococcal polysaccharide vaccine = PPSV23 (Pneumovax) Adults 25-60 years old: 1-3 doses may be recommended based on certain risk factors  Adults 72 years old: 1-2 doses may be recommended based off what pneumonia vaccine you previously received   Hepatitis B Vaccine 3 dose series if at intermediate or high risk (ex: diabetes, end stage renal disease, liver disease)   Tetanus (Td) Vaccine - COST NOT COVERED BY MEDICARE PART B Following completion of primary series, a booster dose should be given every 10 years to maintain immunity against tetanus  Td may also be given as tetanus wound prophylaxis  Tdap Vaccine - COST NOT COVERED BY MEDICARE PART B Recommended at least once for all adults  For pregnant patients, recommended with each pregnancy  Shingles Vaccine (Shingrix) - COST NOT COVERED BY MEDICARE PART B  2 shot series recommended in those aged 48 and above     Health Maintenance Due:      Topic Date Due   • Hepatitis C Screening  Never done   • Colorectal Cancer Screening  Never done   • Breast Cancer Screening: Mammogram  06/02/2016     Immunizations Due:      Topic Date Due   • Hepatitis B Vaccine (1 of 3 - 3-dose series) Never done   • COVID-19 Vaccine (4 - Booster for Arevalo Peter series) 03/02/2022     Advance Directives   What are advance directives? Advance directives are legal documents that state your wishes and plans for medical care  These plans are made ahead of time in case you lose your ability to make decisions for yourself  Advance directives can apply to any medical decision, such as the treatments you want, and if you want to donate organs     What are the types of advance directives? There are many types of advance directives, and each state has rules about how to use them  You may choose a combination of any of the following:  · Living will: This is a written record of the treatment you want  You can also choose which treatments you do not want, which to limit, and which to stop at a certain time  This includes surgery, medicine, IV fluid, and tube feedings  · Durable power of  for healthcare [facility]): This is a written record that states who you want to make healthcare choices for you when you are unable to make them for yourself  This person, called a proxy, is usually a family member or a friend  You may choose more than 1 proxy  · Do not resuscitate (DNR) order:  A DNR order is used in case your heart stops beating or you stop breathing  It is a request not to have certain forms of treatment, such as CPR  A DNR order may be included in other types of advance directives  · Medical directive: This covers the care that you want if you are in a coma, near death, or unable to make decisions for yourself  You can list the treatments you want for each condition  Treatment may include pain medicine, surgery, blood transfusions, dialysis, IV or tube feedings, and a ventilator (breathing machine)  · Values history: This document has questions about your views, beliefs, and how you feel and think about life  This information can help others choose the care that you would choose  Why are advance directives important? An advance directive helps you control your care  Although spoken wishes may be used, it is better to have your wishes written down  Spoken wishes can be misunderstood, or not followed  Treatments may be given even if you do not want them  An advance directive may make it easier for your family to make difficult choices about your care         © Copyright [employer] 2018 Information is for End User's use only and may not be sold, redistributed or otherwise used for commercial purposes   All illustrations and images included in CareNotes® are the copyrighted property of A D A M , Inc  or [facility] Emma Sandhu

## 2022-12-14 ENCOUNTER — APPOINTMENT (OUTPATIENT)
Dept: LAB | Age: 72
End: 2022-12-14

## 2022-12-14 DIAGNOSIS — M81.8 OSTEOPOROSIS OF FOREARM ASSOCIATED WITH ENDOCRINE DISORDER: ICD-10-CM

## 2022-12-14 DIAGNOSIS — Z00.00 MEDICARE ANNUAL WELLNESS VISIT, SUBSEQUENT: ICD-10-CM

## 2022-12-14 DIAGNOSIS — E03.9 HYPOTHYROIDISM, UNSPECIFIED TYPE: ICD-10-CM

## 2022-12-14 DIAGNOSIS — E34.9 OSTEOPOROSIS OF FOREARM ASSOCIATED WITH ENDOCRINE DISORDER: ICD-10-CM

## 2022-12-14 DIAGNOSIS — E03.4 HYPOTHYROIDISM DUE TO ACQUIRED ATROPHY OF THYROID: ICD-10-CM

## 2022-12-14 DIAGNOSIS — E55.9 VITAMIN D DEFICIENCY: ICD-10-CM

## 2022-12-14 DIAGNOSIS — D69.3 IDIOPATHIC THROMBOCYTOPENIC PURPURA (ITP) (HCC): ICD-10-CM

## 2022-12-14 DIAGNOSIS — C91.10 CLL (CHRONIC LYMPHOCYTIC LEUKEMIA) (HCC): ICD-10-CM

## 2022-12-14 LAB
25(OH)D3 SERPL-MCNC: 41.2 NG/ML (ref 30–100)
ALBUMIN SERPL BCP-MCNC: 3.1 G/DL (ref 3.5–5)
ALP SERPL-CCNC: 67 U/L (ref 46–116)
ALT SERPL W P-5'-P-CCNC: 20 U/L (ref 12–78)
ANION GAP SERPL CALCULATED.3IONS-SCNC: 6 MMOL/L (ref 4–13)
AST SERPL W P-5'-P-CCNC: 17 U/L (ref 5–45)
BILIRUB SERPL-MCNC: 0.58 MG/DL (ref 0.2–1)
BUN SERPL-MCNC: 12 MG/DL (ref 5–25)
CALCIUM ALBUM COR SERPL-MCNC: 9.4 MG/DL (ref 8.3–10.1)
CALCIUM SERPL-MCNC: 8.7 MG/DL (ref 8.3–10.1)
CHLORIDE SERPL-SCNC: 109 MMOL/L (ref 96–108)
CO2 SERPL-SCNC: 27 MMOL/L (ref 21–32)
CREAT SERPL-MCNC: 0.77 MG/DL (ref 0.6–1.3)
GFR SERPL CREATININE-BSD FRML MDRD: 78 ML/MIN/1.73SQ M
GLUCOSE P FAST SERPL-MCNC: 90 MG/DL (ref 65–99)
HCV AB SER QL: NORMAL
POTASSIUM SERPL-SCNC: 3.7 MMOL/L (ref 3.5–5.3)
PROT SERPL-MCNC: 6.6 G/DL (ref 6.4–8.4)
SODIUM SERPL-SCNC: 142 MMOL/L (ref 135–147)
TSH SERPL DL<=0.05 MIU/L-ACNC: 4.18 UIU/ML (ref 0.45–4.5)

## 2022-12-14 RX ORDER — LEVOTHYROXINE SODIUM 0.07 MG/1
TABLET ORAL
Qty: 30 TABLET | Refills: 0 | Status: SHIPPED | OUTPATIENT
Start: 2022-12-14

## 2022-12-16 DIAGNOSIS — G25.81 RLS (RESTLESS LEGS SYNDROME): ICD-10-CM

## 2022-12-16 RX ORDER — GABAPENTIN 300 MG/1
CAPSULE ORAL
Qty: 30 CAPSULE | Refills: 5 | Status: SHIPPED | OUTPATIENT
Start: 2022-12-16

## 2022-12-19 ENCOUNTER — APPOINTMENT (OUTPATIENT)
Dept: LAB | Age: 72
End: 2022-12-19

## 2022-12-19 DIAGNOSIS — C91.10 LEUKEMIA, LYMPHOCYTIC, CHRONIC (HCC): ICD-10-CM

## 2022-12-19 LAB
BASOPHILS # BLD AUTO: 0.02 THOUSANDS/ÂΜL (ref 0–0.1)
BASOPHILS NFR BLD AUTO: 0 % (ref 0–1)
EOSINOPHIL # BLD AUTO: 0.03 THOUSAND/ÂΜL (ref 0–0.61)
EOSINOPHIL NFR BLD AUTO: 1 % (ref 0–6)
ERYTHROCYTE [DISTWIDTH] IN BLOOD BY AUTOMATED COUNT: 14.7 % (ref 11.6–15.1)
HCT VFR BLD AUTO: 41.9 % (ref 34.8–46.1)
HGB BLD-MCNC: 12.8 G/DL (ref 11.5–15.4)
IMM GRANULOCYTES # BLD AUTO: 0.04 THOUSAND/UL (ref 0–0.2)
IMM GRANULOCYTES NFR BLD AUTO: 1 % (ref 0–2)
LYMPHOCYTES # BLD AUTO: 0.89 THOUSANDS/ÂΜL (ref 0.6–4.47)
LYMPHOCYTES NFR BLD AUTO: 17 % (ref 14–44)
MCH RBC QN AUTO: 29.4 PG (ref 26.8–34.3)
MCHC RBC AUTO-ENTMCNC: 30.5 G/DL (ref 31.4–37.4)
MCV RBC AUTO: 96 FL (ref 82–98)
MONOCYTES # BLD AUTO: 0.41 THOUSAND/ÂΜL (ref 0.17–1.22)
MONOCYTES NFR BLD AUTO: 8 % (ref 4–12)
NEUTROPHILS # BLD AUTO: 3.87 THOUSANDS/ÂΜL (ref 1.85–7.62)
NEUTS SEG NFR BLD AUTO: 73 % (ref 43–75)
NRBC BLD AUTO-RTO: 0 /100 WBCS
PLATELET # BLD AUTO: 107 THOUSANDS/UL (ref 149–390)
PMV BLD AUTO: 12.3 FL (ref 8.9–12.7)
RBC # BLD AUTO: 4.36 MILLION/UL (ref 3.81–5.12)
WBC # BLD AUTO: 5.26 THOUSAND/UL (ref 4.31–10.16)

## 2022-12-23 DIAGNOSIS — C91.10 CLL (CHRONIC LYMPHOCYTIC LEUKEMIA) (HCC): Primary | ICD-10-CM

## 2022-12-23 DIAGNOSIS — D69.3 IDIOPATHIC THROMBOCYTOPENIC PURPURA (ITP) (HCC): ICD-10-CM

## 2022-12-27 ENCOUNTER — HOSPITAL ENCOUNTER (OUTPATIENT)
Dept: INFUSION CENTER | Facility: HOSPITAL | Age: 72
Discharge: HOME | End: 2022-12-27
Attending: INTERNAL MEDICINE

## 2022-12-27 VITALS — BODY MASS INDEX: 21.93 KG/M2 | WEIGHT: 136.47 LBS | TEMPERATURE: 97 F | HEIGHT: 66 IN

## 2022-12-27 DIAGNOSIS — C91.10 CLL (CHRONIC LYMPHOCYTIC LEUKEMIA) (HCC): ICD-10-CM

## 2022-12-27 DIAGNOSIS — D69.3 IDIOPATHIC THROMBOCYTOPENIC PURPURA (ITP) (HCC): ICD-10-CM

## 2022-12-27 DIAGNOSIS — M81.8 OSTEOPOROSIS OF FOREARM ASSOCIATED WITH ENDOCRINE DISORDER: Primary | ICD-10-CM

## 2022-12-27 DIAGNOSIS — E34.9 OSTEOPOROSIS OF FOREARM ASSOCIATED WITH ENDOCRINE DISORDER: Primary | ICD-10-CM

## 2022-12-27 RX ADMIN — DENOSUMAB 60 MG: 60 INJECTION SUBCUTANEOUS at 13:53

## 2022-12-27 RX ADMIN — ROMIPLOSTIM 125 MCG: 125 INJECTION, POWDER, LYOPHILIZED, FOR SOLUTION SUBCUTANEOUS at 13:51

## 2023-01-18 ENCOUNTER — APPOINTMENT (OUTPATIENT)
Dept: LAB | Age: 73
End: 2023-01-18

## 2023-01-18 DIAGNOSIS — D69.3 IDIOPATHIC THROMBOCYTOPENIC PURPURA (ITP) (HCC): ICD-10-CM

## 2023-01-18 DIAGNOSIS — C91.10 CLL (CHRONIC LYMPHOCYTIC LEUKEMIA) (HCC): ICD-10-CM

## 2023-01-18 DIAGNOSIS — E03.9 HYPOTHYROIDISM, UNSPECIFIED TYPE: ICD-10-CM

## 2023-01-18 LAB
BASOPHILS # BLD AUTO: 0.03 THOUSANDS/ÂΜL (ref 0–0.1)
BASOPHILS NFR BLD AUTO: 1 % (ref 0–1)
EOSINOPHIL # BLD AUTO: 0.04 THOUSAND/ÂΜL (ref 0–0.61)
EOSINOPHIL NFR BLD AUTO: 1 % (ref 0–6)
ERYTHROCYTE [DISTWIDTH] IN BLOOD BY AUTOMATED COUNT: 14.6 % (ref 11.6–15.1)
HCT VFR BLD AUTO: 42.3 % (ref 34.8–46.1)
HGB BLD-MCNC: 12.8 G/DL (ref 11.5–15.4)
IMM GRANULOCYTES # BLD AUTO: 0.05 THOUSAND/UL (ref 0–0.2)
IMM GRANULOCYTES NFR BLD AUTO: 1 % (ref 0–2)
LYMPHOCYTES # BLD AUTO: 1 THOUSANDS/ÂΜL (ref 0.6–4.47)
LYMPHOCYTES NFR BLD AUTO: 20 % (ref 14–44)
MCH RBC QN AUTO: 29.2 PG (ref 26.8–34.3)
MCHC RBC AUTO-ENTMCNC: 30.3 G/DL (ref 31.4–37.4)
MCV RBC AUTO: 96 FL (ref 82–98)
MONOCYTES # BLD AUTO: 0.35 THOUSAND/ÂΜL (ref 0.17–1.22)
MONOCYTES NFR BLD AUTO: 7 % (ref 4–12)
NEUTROPHILS # BLD AUTO: 3.63 THOUSANDS/ÂΜL (ref 1.85–7.62)
NEUTS SEG NFR BLD AUTO: 70 % (ref 43–75)
NRBC BLD AUTO-RTO: 0 /100 WBCS
PLATELET # BLD AUTO: 148 THOUSANDS/UL (ref 149–390)
PMV BLD AUTO: 12.4 FL (ref 8.9–12.7)
RBC # BLD AUTO: 4.39 MILLION/UL (ref 3.81–5.12)
WBC # BLD AUTO: 5.1 THOUSAND/UL (ref 4.31–10.16)

## 2023-01-18 RX ORDER — LEVOTHYROXINE SODIUM 0.07 MG/1
TABLET ORAL
Qty: 30 TABLET | Refills: 0 | Status: SHIPPED | OUTPATIENT
Start: 2023-01-18

## 2023-01-25 ENCOUNTER — HOSPITAL ENCOUNTER (OUTPATIENT)
Dept: INFUSION CENTER | Facility: HOSPITAL | Age: 73
Discharge: HOME | End: 2023-01-25
Attending: INTERNAL MEDICINE

## 2023-01-27 DIAGNOSIS — C91.10 CLL (CHRONIC LYMPHOCYTIC LEUKEMIA) (HCC): Primary | ICD-10-CM

## 2023-01-27 DIAGNOSIS — D69.3 IDIOPATHIC THROMBOCYTOPENIC PURPURA (ITP) (HCC): ICD-10-CM

## 2023-01-30 ENCOUNTER — HOSPITAL ENCOUNTER (OUTPATIENT)
Dept: INFUSION CENTER | Facility: HOSPITAL | Age: 73
Discharge: HOME | End: 2023-01-30
Attending: INTERNAL MEDICINE

## 2023-01-30 ENCOUNTER — HOSPITAL ENCOUNTER (EMERGENCY)
Facility: HOSPITAL | Age: 73
Discharge: HOME | End: 2023-01-30
Attending: EMERGENCY MEDICINE

## 2023-01-30 VITALS
RESPIRATION RATE: 16 BRPM | DIASTOLIC BLOOD PRESSURE: 73 MMHG | HEART RATE: 78 BPM | TEMPERATURE: 97.4 F | SYSTOLIC BLOOD PRESSURE: 137 MMHG | OXYGEN SATURATION: 100 %

## 2023-01-30 DIAGNOSIS — D69.3 IDIOPATHIC THROMBOCYTOPENIC PURPURA (ITP) (HCC): ICD-10-CM

## 2023-01-30 DIAGNOSIS — C91.10 CLL (CHRONIC LYMPHOCYTIC LEUKEMIA) (HCC): Primary | ICD-10-CM

## 2023-01-30 DIAGNOSIS — K62.5 RECTAL BLEEDING: ICD-10-CM

## 2023-01-30 DIAGNOSIS — K62.5 BRBPR (BRIGHT RED BLOOD PER RECTUM): Primary | ICD-10-CM

## 2023-01-30 DIAGNOSIS — R03.0 ELEVATED BLOOD PRESSURE READING: ICD-10-CM

## 2023-01-30 DIAGNOSIS — E87.6 HYPOKALEMIA: ICD-10-CM

## 2023-01-30 LAB
ALBUMIN SERPL BCP-MCNC: 3.8 G/DL (ref 3.5–5)
ALP SERPL-CCNC: 47 U/L (ref 34–104)
ALT SERPL W P-5'-P-CCNC: 10 U/L (ref 7–52)
ANION GAP SERPL CALCULATED.3IONS-SCNC: 7 MMOL/L (ref 4–13)
AST SERPL W P-5'-P-CCNC: 17 U/L (ref 13–39)
BASOPHILS # BLD AUTO: 0.02 THOUSANDS/ÂΜL (ref 0–0.1)
BASOPHILS # BLD AUTO: 0.03 THOUSANDS/ÂΜL (ref 0–0.1)
BASOPHILS NFR BLD AUTO: 0 % (ref 0–1)
BASOPHILS NFR BLD AUTO: 0 % (ref 0–1)
BILIRUB SERPL-MCNC: 0.47 MG/DL (ref 0.2–1)
BUN SERPL-MCNC: 8 MG/DL (ref 5–25)
CALCIUM SERPL-MCNC: 9 MG/DL (ref 8.4–10.2)
CHLORIDE SERPL-SCNC: 109 MMOL/L (ref 96–108)
CO2 SERPL-SCNC: 26 MMOL/L (ref 21–32)
CREAT SERPL-MCNC: 0.79 MG/DL (ref 0.6–1.3)
EOSINOPHIL # BLD AUTO: 0.01 THOUSAND/ÂΜL (ref 0–0.61)
EOSINOPHIL # BLD AUTO: 0.02 THOUSAND/ÂΜL (ref 0–0.61)
EOSINOPHIL NFR BLD AUTO: 0 % (ref 0–6)
EOSINOPHIL NFR BLD AUTO: 0 % (ref 0–6)
ERYTHROCYTE [DISTWIDTH] IN BLOOD BY AUTOMATED COUNT: 14.9 % (ref 11.6–15.1)
ERYTHROCYTE [DISTWIDTH] IN BLOOD BY AUTOMATED COUNT: 15 % (ref 11.6–15.1)
GFR SERPL CREATININE-BSD FRML MDRD: 76 ML/MIN/1.73SQ M
GLUCOSE SERPL-MCNC: 88 MG/DL (ref 65–140)
HCT VFR BLD AUTO: 42.2 % (ref 34.8–46.1)
HCT VFR BLD AUTO: 43 % (ref 34.8–46.1)
HGB BLD-MCNC: 13.2 G/DL (ref 11.5–15.4)
HGB BLD-MCNC: 13.3 G/DL (ref 11.5–15.4)
IMM GRANULOCYTES # BLD AUTO: 0.04 THOUSAND/UL (ref 0–0.2)
IMM GRANULOCYTES # BLD AUTO: 0.07 THOUSAND/UL (ref 0–0.2)
IMM GRANULOCYTES NFR BLD AUTO: 1 % (ref 0–2)
IMM GRANULOCYTES NFR BLD AUTO: 1 % (ref 0–2)
LYMPHOCYTES # BLD AUTO: 0.72 THOUSANDS/ÂΜL (ref 0.6–4.47)
LYMPHOCYTES # BLD AUTO: 1.02 THOUSANDS/ÂΜL (ref 0.6–4.47)
LYMPHOCYTES NFR BLD AUTO: 10 % (ref 14–44)
LYMPHOCYTES NFR BLD AUTO: 9 % (ref 14–44)
MCH RBC QN AUTO: 29.4 PG (ref 26.8–34.3)
MCH RBC QN AUTO: 29.5 PG (ref 26.8–34.3)
MCHC RBC AUTO-ENTMCNC: 30.9 G/DL (ref 31.4–37.4)
MCHC RBC AUTO-ENTMCNC: 31.3 G/DL (ref 31.4–37.4)
MCV RBC AUTO: 94 FL (ref 82–98)
MCV RBC AUTO: 95 FL (ref 82–98)
MONOCYTES # BLD AUTO: 0.37 THOUSAND/ÂΜL (ref 0.17–1.22)
MONOCYTES # BLD AUTO: 0.6 THOUSAND/ÂΜL (ref 0.17–1.22)
MONOCYTES NFR BLD AUTO: 5 % (ref 4–12)
MONOCYTES NFR BLD AUTO: 6 % (ref 4–12)
NEUTROPHILS # BLD AUTO: 6.46 THOUSANDS/ÂΜL (ref 1.85–7.62)
NEUTROPHILS # BLD AUTO: 8.18 THOUSANDS/ÂΜL (ref 1.85–7.62)
NEUTS SEG NFR BLD AUTO: 83 % (ref 43–75)
NEUTS SEG NFR BLD AUTO: 85 % (ref 43–75)
NRBC BLD AUTO-RTO: 0 /100 WBCS
NRBC BLD AUTO-RTO: 0 /100 WBCS
PLATELET # BLD AUTO: 101 THOUSANDS/UL (ref 149–390)
PLATELET # BLD AUTO: 119 THOUSANDS/UL (ref 149–390)
PMV BLD AUTO: 11.8 FL (ref 8.9–12.7)
PMV BLD AUTO: 12.3 FL (ref 8.9–12.7)
POTASSIUM SERPL-SCNC: 3.4 MMOL/L (ref 3.5–5.3)
PROT SERPL-MCNC: 6.7 G/DL (ref 6.4–8.4)
RBC # BLD AUTO: 4.47 MILLION/UL (ref 3.81–5.12)
RBC # BLD AUTO: 4.52 MILLION/UL (ref 3.81–5.12)
SODIUM SERPL-SCNC: 142 MMOL/L (ref 135–147)
WBC # BLD AUTO: 7.62 THOUSAND/UL (ref 4.31–10.16)
WBC # BLD AUTO: 9.92 THOUSAND/UL (ref 4.31–10.16)

## 2023-01-30 RX ORDER — POTASSIUM CHLORIDE 20 MEQ/1
20 TABLET, EXTENDED RELEASE ORAL ONCE
Status: COMPLETED | OUTPATIENT
Start: 2023-01-30 | End: 2023-01-30

## 2023-01-30 RX ADMIN — POTASSIUM CHLORIDE 20 MEQ: 1500 TABLET, EXTENDED RELEASE ORAL at 18:08

## 2023-01-30 RX ADMIN — ROMIPLOSTIM 125 MCG: 125 INJECTION, POWDER, LYOPHILIZED, FOR SOLUTION SUBCUTANEOUS at 13:20

## 2023-01-30 NOTE — ED ATTENDING ATTESTATION
1/30/2023  ILam DO, saw and evaluated the patient  I have discussed the patient with the resident/non-physician practitioner and agree with the resident's/non-physician practitioner's findings, Plan of Care, and MDM as documented in the resident's/non-physician practitioner's note, except where noted  All available labs and Radiology studies were reviewed  I was present for key portions of any procedure(s) performed by the resident/non-physician practitioner and I was immediately available to provide assistance  At this point I agree with the current assessment done in the Emergency Department  I have conducted an independent evaluation of this patient a history and physical is as follows:    Patient is a 82-year-old female with a history of Sjogren's, ITP, autoimmune hemolytic anemia, CLL coming in today with family at bedside with bright red blood per rectum  She states that this started today  Last episode was around 1 1:30 PM   He is not on anticoagulation  He has no fevers, chills, abdominal pain  He states this happened to her years ago and was told it was a hemorrhoid  Her last colonoscopy was greater than 8 years ago  She feels well otherwise  She has no complaints of chest pain, dizziness, nausea or vomiting  sHe has no melena or or hematemesis  On exam patient is well-appearing in no acute distress  EOMI  PERRLA  Patient maintaining airway and secretions  Uvula midline without edema  No pallor conjunctiva  Stridor  Regular rate and rhythm, cranial nerves II through XII grossly intact  Lungs clear to auscultation bilaterally  Abdomen soft nontender nondistended with bowel sounds x4  Nonperitoneal   Patient has no slurred speech  No facial asymmetry  No tongue deviation  Patient moves bilateral per extremities and lower extremities independently of each other and pain-free    Rectal exam completed by physician physician resident  Patient with bright red blood  This bleeding presumed to be hemorrhoid versus diverticular however no abdominal pain  History of diverticulitis in the past   Will check CBC and observe  He has no trauma to this rectal region  Disclosure: Voice to text software was used in the preparation of this document and could have resulted in translational errors  Occasional wrong word or "sound a like" substitutions may have occurred due to the inherent limitations of voice recognition software  Read the chart carefully and recognize, using context, where substitutions have occurred                 ED Course         Critical Care Time  Procedures

## 2023-01-30 NOTE — PROGRESS NOTES
Received call from pt reporting "a little bit of bleeding" with urination  Pt coming today for Nplate  Will request CBC and urinalysis  Pt aware

## 2023-01-30 NOTE — DISCHARGE INSTRUCTIONS
You were evaluated in the emergency department for: rectal bleeding  You can access your current and pending results through [facility]  A radiologist will take a second look at your X-Rays, if you had any, and you will be contacted with any new findings  You should follow-up with your primary care provider, as soon as possible, for re-evaluation  If you do not have a primary care provider, I have referred you to [address] [place] [address]  You will be contacted about scheduling an appointment  Their phone number is also included on this paperwork  You have also been referred to gastroenterology and you should follow up with them as well  Your workup revealed no emergent features at this time; however, many disease processes are dynamic:    Please, return to the emergency department if you experience new or worsening symptoms, fever, chest pain, shortness of breath, difficulty breathing, dizziness, abdominal pain, persistent nausea/vomiting, syncope or passing out, blood in your urine or stool, coughing up blood, leg swelling/pain, urinary retention, bowel or bladder incontinence, numbness between your legs  Additionally, your blood pressure was measured to be high  This is something that you should discuss with your primary care provider and have re-checked within one week

## 2023-01-30 NOTE — PROGRESS NOTES
Pt unable to provide urine specimen at this time  Pt states she's not bleeding in her urine, but bleeding from her rectum  Advised to pt to go to ED or call PCP

## 2023-01-30 NOTE — ED PROVIDER NOTES
History  Chief Complaint   Patient presents with   • Rectal Bleeding     Pt reports rectal bleeding that started this morning  States bright red blood is dripping out  Hx hemorrhoids  Pt had blood work done at [facility]  Patient is a 68-year-old female, with a history significant for Sjogren's, ITP, autoimmune hemolytic anemia, CLL, who presents to the ED today with painless bright red blood per rectum since this morning  Patient does not take antithrombotic medication  Patient states that has been no trauma to the anus or insertion of objects into the rectum  This has happened once before: Patient was told that she had internal hemorrhoids after that encounter and imaging also showed some diverticula the colon  Patient states that she was in her usual state of health last evening when she went to bed  Notably, prior to arrival, patient had labs drawn and her hemoglobin was improved from her prior  There are no apparent exacerbating or remitting factors  There is no associated fever, vomiting, abdominal pain, lightheadedness (worse than baseline), chest pain, shortness of breath  Patient is without other concerns at this time     - No language barrier    - History obtained from patient  - There are no limitations to the history obtained  - Previous charting was reviewed          Prior to Admission Medications   Prescriptions Last Dose Informant Patient Reported? Taking?    ALPRAZolam (XANAX) 0 5 mg tablet 1/29/2023  No Yes   Sig: Take 1 tablet (0 5 mg total) by mouth daily at bedtime as needed for anxiety   Calquence 100 MG CAPS 1/29/2023  No Yes   Sig: Take 1 capsule by mouth daily   cycloSPORINE (RESTASIS) 0 05 % ophthalmic emulsion 1/29/2023  Yes Yes   Sig: INSTILL 1 DROP INTO BOTH EYES 2 TIMES A DAY   gabapentin (NEURONTIN) 300 mg capsule 1/29/2023  No Yes   Sig: TAKE ONE CAPSULE BY MOUTH ONCE DAILY AT BEDTIME   levothyroxine 75 mcg tablet 1/29/2023  No Yes   Sig: take 1 tablet by mouth once daily IN THE EARLY MORNING   methocarbamol (ROBAXIN) 500 mg tablet 1/29/2023  No Yes   Sig: TAKE ONE TABLET BY MOUTH TWO TIMES A DAY AS NEEDED FOR MUSCLE SPASMS   polyethylene glycol (MIRALAX) 17 g packet   No No   Sig: Take 17 g by mouth daily for 10 days   predniSONE 5 mg tablet 1/29/2023  No Yes   Sig: take 4 tablets by mouth once daily   sertraline (ZOLOFT) 50 mg tablet 1/29/2023  No Yes   Sig: Take 1 tablet (50 mg total) by mouth daily      Facility-Administered Medications: None       Past Medical History:   Diagnosis Date   • Anxiety    • Autoimmune hemolytic anemia (HCC)    • Cataract    • 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HCC)    • GERD (gastroesophageal reflux disease)    • Hypothyroidism    • Hypothyroidism    • Idiopathic thrombocytopenic purpura (ITP) (HCC)    • Menopause    • Sjogrens syndrome (HCC)        Past Surgical History:   Procedure Laterality Date   • CATARACT EXTRACTION Left    • COLONOSCOPY  2012    per pt   • TOOTH EXTRACTION         Family History   Problem Relation Age of Onset   • Colon cancer Mother    • Heart disease Mother         cardiac disorder    • Cancer Mother    • Liver cancer Father    • Cancer Father    • Heart disease Other         cardiac disorder     I have reviewed and agree with the history as documented  E-Cigarette/Vaping   • E-Cigarette Use Never User      E-Cigarette/Vaping Substances   • Nicotine No    • THC No    • CBD No    • Flavoring No    • Other No    • Unknown No      Social History     Tobacco Use   • Smoking status: Never   • Smokeless tobacco: Never   Vaping Use   • Vaping Use: Never used   Substance Use Topics   • Alcohol use: No   • Drug use: No        Review of Systems   Constitutional: Negative for fever  HENT: Negative for trouble swallowing  Eyes: Negative for visual disturbance  Respiratory: Negative for shortness of breath  Cardiovascular: Negative for chest pain  Gastrointestinal: Positive for anal bleeding   Negative for abdominal pain and vomiting  Endocrine: Negative for polyuria  Genitourinary: Negative for dysuria  Musculoskeletal: Negative for gait problem  Skin: Negative for rash  Allergic/Immunologic: Negative for environmental allergies  Neurological: Negative for weakness, light-headedness (No new) and numbness  Hematological: Negative for adenopathy  Psychiatric/Behavioral: Negative for confusion  All other systems reviewed and are negative  Physical Exam  ED Triage Vitals [01/30/23 1423]   Temperature Pulse Respirations Blood Pressure SpO2   (!) 97 4 °F (36 3 °C) 92 18 164/92 100 %      Temp Source Heart Rate Source Patient Position - Orthostatic VS BP Location FiO2 (%)   Oral Monitor Sitting Right arm --      Pain Score       --             Orthostatic Vital Signs  Vitals:    01/30/23 1423 01/30/23 1700   BP: 164/92 137/73   Pulse: 92 78   Patient Position - Orthostatic VS: Sitting Lying       Physical Exam  Vitals and nursing note reviewed  Exam conducted with a chaperone present  Constitutional:       General: She is not in acute distress  Appearance: She is not toxic-appearing or diaphoretic  Comments: Patient appears comfortable during my evaluation    HENT:      Head: Normocephalic and atraumatic  Right Ear: External ear normal       Left Ear: External ear normal       Nose: Nose normal  No rhinorrhea  Mouth/Throat:      Mouth: Mucous membranes are moist       Pharynx: Oropharynx is clear  No oropharyngeal exudate or posterior oropharyngeal erythema  Comments: Uvula midline  No oropharyngeal or submandibular mass/swelling  Eyes:      General: No scleral icterus  Right eye: No discharge  Left eye: No discharge  Conjunctiva/sclera: Conjunctivae normal       Pupils: Pupils are equal, round, and reactive to light     Neck:      Comments: Patient is spontaneously rotating their neck to the left and right during the history and physical exam interaction without difficulty or apparent discomfort    Cardiovascular:      Rate and Rhythm: Normal rate and regular rhythm  Pulses: Normal pulses  Heart sounds: Normal heart sounds  No murmur heard  No friction rub  No gallop  Comments: 2+ Radial  Pulmonary:      Effort: Pulmonary effort is normal  No respiratory distress  Breath sounds: Normal breath sounds  No stridor  No wheezing, rhonchi or rales  Abdominal:      General: Abdomen is flat  There is no distension  Palpations: Abdomen is soft  Tenderness: There is no abdominal tenderness  There is no right CVA tenderness, left CVA tenderness, guarding or rebound  Genitourinary:     Comments: No external hemorrhoids  No anal fissure  Small amount of bright red blood on digital rectal exam   No pain with digital rectal exam   Small amount of blood in pad  Musculoskeletal:         General: No deformity  Cervical back: Neck supple  No tenderness  No muscular tenderness  Lymphadenopathy:      Cervical: No cervical adenopathy  Skin:     General: Skin is warm and dry  Capillary Refill: Capillary refill takes less than 2 seconds  Neurological:      Mental Status: She is alert  Comments: Patient is speaking clearly in complete sentences  Patient is answering appropriately and able follow commands  Patient is moving all four extremities spontaneously  No facial droop  Tongue midline        Psychiatric:         Mood and Affect: Mood normal          Behavior: Behavior normal          ED Medications  Medications   potassium chloride (K-DUR,KLOR-CON) CR tablet 20 mEq (20 mEq Oral Given 1/30/23 1808)       Diagnostic Studies  Results Reviewed     Procedure Component Value Units Date/Time    Comprehensive metabolic panel [[id number]]  (Abnormal) Collected: 01/30/23 1654    Lab Status: Final result Specimen: Blood from Arm, Left Updated: 01/30/23 1719     Sodium 142 mmol/L      Potassium 3 4 mmol/L      Chloride 109 mmol/L      CO2 26 mmol/L ANION GAP 7 mmol/L      BUN 8 mg/dL      Creatinine 0 79 mg/dL      Glucose 88 mg/dL      Calcium 9 0 mg/dL      AST 17 U/L      ALT 10 U/L      Alkaline Phosphatase 47 U/L      Total Protein 6 7 g/dL      Albumin 3 8 g/dL      Total Bilirubin 0 47 mg/dL      eGFR 76 ml/min/1 73sq m     Narrative:      [place] guidelines for Chronic Kidney Disease (CKD):   •  Stage 1 with normal or high GFR (GFR > 90 mL/min/1 73 square meters)  •  Stage 2 Mild CKD (GFR = 60-89 mL/min/1 73 square meters)  •  Stage 3A Moderate CKD (GFR = 45-59 mL/min/1 73 square meters)  •  Stage 3B Moderate CKD (GFR = 30-44 mL/min/1 73 square meters)  •  Stage 4 Severe CKD (GFR = 15-29 mL/min/1 73 square meters)  •  Stage 5 End Stage CKD (GFR <15 mL/min/1 73 square meters)  Note: GFR calculation is accurate only with a steady state creatinine    CBC and differential [[id number]]  (Abnormal) Collected: 01/30/23 1654    Lab Status: Final result Specimen: Blood from Arm, Left Updated: 01/30/23 1700     WBC 7 62 Thousand/uL      RBC 4 47 Million/uL      Hemoglobin 13 2 g/dL      Hematocrit 42 2 %      MCV 94 fL      MCH 29 5 pg      MCHC 31 3 g/dL      RDW 15 0 %      MPV 12 3 fL      Platelets 435 Thousands/uL      nRBC 0 /100 WBCs      Neutrophils Relative 85 %      Immat GRANS % 1 %      Lymphocytes Relative 9 %      Monocytes Relative 5 %      Eosinophils Relative 0 %      Basophils Relative 0 %      Neutrophils Absolute 6 46 Thousands/µL      Immature Grans Absolute 0 04 Thousand/uL      Lymphocytes Absolute 0 72 Thousands/µL      Monocytes Absolute 0 37 Thousand/µL      Eosinophils Absolute 0 01 Thousand/µL      Basophils Absolute 0 02 Thousands/µL                  No orders to display         Procedures  Procedures      ED Course  ED Course as of 01/30/23 1815 Mon Jan 30, 2023 1649 ECG: Normal rate, regular rhythm, no ectopy, normal axis, no ST elevations or depressions     1759 Hemoglobin: 13 2  Stable   1814 Patient has neither questions nor concerns after receiving discharge instructions and return precautions                              SBIRT 20yo+    Flowsheet Row Most Recent Value   SBIRT (25 yo +)    In order to provide better care to our patients, we are screening all of our patients for alcohol and drug use  Would it be okay to ask you these screening questions? No Filed at: 01/30/2023 1640                Medical Decision Making  Patient is a 31-year-old female, with a history significant for Sjogren's, ITP, autoimmune hemolytic anemia, CLL, who presents to the ED today with painless bright red blood per rectum since this morning  Patient does not take antithrombotic medication  Patient states that has been no trauma to the anus or insertion of objects into the rectum  This has happened once before: Patient was told that she had internal hemorrhoids after that encounter and imaging also showed some diverticula the colon  Patient states that she was in her usual state of health last evening when she went to bed  Notably, prior to arrival, patient had labs drawn and her hemoglobin was improved from her prior  Patient is currently afebrile and hemodynamically stable  Her physical exam is notable for a small amount of bright red blood per rectum on NICHOLAS  This presentation is concerning for: Lower GI bleed, diverticular bleeding, internal hemorrhoids  I also considered AVM, cancer, anemia, LEDY  No reason to suspect brisk upper GI bleed at this time based upon history and physical exam   Will investigate with CBC, CMP, referral to GI  We will manage based upon work-up as well as patient's clinical status after observation  BRBPR (bright red blood per rectum): acute illness or injury  Elevated blood pressure reading: acute illness or injury  Hypokalemia: acute illness or injury  Rectal bleeding: acute illness or injury  Amount and/or Complexity of Data Reviewed  Labs: ordered   Decision-making details documented in ED Course  Risk  Prescription drug management  Disposition  Final diagnoses:   Rectal bleeding   BRBPR (bright red blood per rectum)   Elevated blood pressure reading   Hypokalemia     Time reflects when diagnosis was documented in both MDM as applicable and the Disposition within this note     Time User Action Codes Description Comment    1/30/2023  4:58 PM Mike Smith Add [K62 5] Rectal bleeding     1/30/2023  4:58 PM Mike Smith A Add [K62 5] BRBPR (bright red blood per rectum)     1/30/2023  4:58 PM Capri Chow A Add [R03 0] Elevated blood pressure reading     1/30/2023  4:58 PM Mike Smith A Modify [K62 5] Rectal bleeding     1/30/2023  4:58 PM Mike Smith A Modify [K62 5] BRBPR (bright red blood per rectum)     1/30/2023  6:00 PM Mike Smith A Add [E87 6] Hypokalemia       ED Disposition     ED Disposition   Discharge    Condition   Stable    Date/Time   Mon Jan 30, 2023  6:11 PM    Comment   Kimmy Wood discharge to home/self care  Follow-up Information     Follow up With Specialties Details Why Contact Info Additional Information    Ilia Paulson DO Family Medicine Schedule an appointment as soon as possible for a visit   [address] [phone]       [facility] Schedule an appointment as soon as possible for a visit   [address]  [address] [facility] [address]  Thomas Bailey [address] [facility], [address], [address] [place] [address], [id number] [phone]          Patient's Medications   Discharge Prescriptions    No medications on file         PDMP Review       Value Time User    PDMP Reviewed  Yes 6/7/2022  1:09 PM Ilia Paulson DO           ED Provider  Attending physically available and evaluated Edy Carmona I managed the patient along with the ED Attending      Electronically Signed by         Zoya Ortega MD  01/30/23 3532

## 2023-02-03 ENCOUNTER — OFFICE VISIT (OUTPATIENT)
Dept: URGENT CARE | Age: 73
End: 2023-02-03

## 2023-02-03 VITALS
DIASTOLIC BLOOD PRESSURE: 67 MMHG | OXYGEN SATURATION: 97 % | TEMPERATURE: 96.5 F | RESPIRATION RATE: 18 BRPM | SYSTOLIC BLOOD PRESSURE: 137 MMHG | HEART RATE: 70 BPM

## 2023-02-03 DIAGNOSIS — N39.0 URINARY TRACT INFECTION WITH HEMATURIA, SITE UNSPECIFIED: Primary | ICD-10-CM

## 2023-02-03 DIAGNOSIS — R31.9 URINARY TRACT INFECTION WITH HEMATURIA, SITE UNSPECIFIED: Primary | ICD-10-CM

## 2023-02-03 LAB
SL AMB  POCT GLUCOSE, UA: NEGATIVE
SL AMB LEUKOCYTE ESTERASE,UA: ABNORMAL
SL AMB POCT BILIRUBIN,UA: NEGATIVE
SL AMB POCT BLOOD,UA: ABNORMAL
SL AMB POCT CLARITY,UA: ABNORMAL
SL AMB POCT COLOR,UA: ABNORMAL
SL AMB POCT KETONES,UA: NEGATIVE
SL AMB POCT NITRITE,UA: POSITIVE
SL AMB POCT PH,UA: 7
SL AMB POCT SPECIFIC GRAVITY,UA: 1.01
SL AMB POCT URINE PROTEIN: ABNORMAL
SL AMB POCT UROBILINOGEN: 4

## 2023-02-03 RX ORDER — CEPHALEXIN 500 MG/1
500 CAPSULE ORAL EVERY 12 HOURS SCHEDULED
Qty: 20 CAPSULE | Refills: 0 | Status: SHIPPED | OUTPATIENT
Start: 2023-02-03 | End: 2023-02-13

## 2023-02-03 NOTE — PROGRESS NOTES
[facility]        NAME: Shawna Rose is a 81 y o  female  : 1950    MRN: [id number]  DATE: February 3, 2023  TIME: 2:53 PM    /67   Pulse 70   Temp (!) 96 5 °F (35 8 °C) (Temporal)   Resp 18   SpO2 97%     Assessment and Plan   Urinary tract infection with hematuria, site unspecified [N39 0, R31 9]  1  Urinary tract infection with hematuria, site unspecified  POCT urine dip    cephalexin (KEFLEX) 500 mg capsule    Urine culture            Patient Instructions       Follow up with PCP in 3-5 days  Proceed to  ER if symptoms worsen  Chief Complaint     Chief Complaint   Patient presents with   • Possible UTI     Pt c/o urinary frequency, hematuria and lower abd discomfort for three days  Nothing OTC  History of Present Illness       Pt with 2 days burning and urine urgency , no other complaints       Review of Systems   Review of Systems   Constitutional: Negative  HENT: Negative  Eyes: Negative  Respiratory: Negative  Cardiovascular: Negative  Gastrointestinal: Negative  Endocrine: Negative  Genitourinary: Positive for dysuria  Musculoskeletal: Negative  Skin: Negative  Allergic/Immunologic: Negative  Neurological: Negative  Hematological: Negative  Psychiatric/Behavioral: Negative  All other systems reviewed and are negative          Current Medications       Current Outpatient Medications:   •  ALPRAZolam (XANAX) 0 5 mg tablet, Take 1 tablet (0 5 mg total) by mouth daily at bedtime as needed for anxiety, Disp: 30 tablet, Rfl: 2  •  Calquence 100 MG CAPS, Take 1 capsule by mouth daily, Disp: 30 capsule, Rfl: 10  •  cephalexin (KEFLEX) 500 mg capsule, Take 1 capsule (500 mg total) by mouth every 12 (twelve) hours for 10 days, Disp: 20 capsule, Rfl: 0  •  cycloSPORINE (RESTASIS) 0 05 % ophthalmic emulsion, INSTILL 1 DROP INTO BOTH EYES 2 TIMES A DAY, Disp: , Rfl:   •  gabapentin (NEURONTIN) 300 mg capsule, TAKE ONE CAPSULE BY MOUTH ONCE DAILY AT BEDTIME, Disp: 30 capsule, Rfl: 5  •  levothyroxine 75 mcg tablet, take 1 tablet by mouth once daily IN THE EARLY MORNING, Disp: 30 tablet, Rfl: 0  •  methocarbamol (ROBAXIN) 500 mg tablet, TAKE ONE TABLET BY MOUTH TWO TIMES A DAY AS NEEDED FOR MUSCLE SPASMS, Disp: 30 tablet, Rfl: 2  •  predniSONE 5 mg tablet, take 4 tablets by mouth once daily, Disp: 120 tablet, Rfl: 3  •  sertraline (ZOLOFT) 50 mg tablet, Take 1 tablet (50 mg total) by mouth daily, Disp: 30 tablet, Rfl: 5  •  polyethylene glycol (MIRALAX) 17 g packet, Take 17 g by mouth daily for 10 days, Disp: 10 each, Rfl: 0    Current Allergies     Allergies as of 02/03/2023   • (No Known Allergies)            The following portions of the patient's history were reviewed and updated as appropriate: allergies, current medications, past family history, past medical history, past social history, past surgical history and problem list      Past Medical History:   Diagnosis Date   • Anxiety    • Autoimmune hemolytic anemia ([facility] [address] )    • Cataract    • 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HCC)    • GERD (gastroesophageal reflux disease)    • Hypothyroidism    • Hypothyroidism    • Idiopathic thrombocytopenic purpura (ITP) (HCC)    • Menopause    • Sjogrens syndrome ([facility] [address] )        Past Surgical History:   Procedure Laterality Date   • CATARACT EXTRACTION Left    • COLONOSCOPY  2012    per pt   • TOOTH EXTRACTION         Family History   Problem Relation Age of Onset   • Colon cancer Mother    • Heart disease Mother         cardiac disorder    • Cancer Mother    • Liver cancer Father    • Cancer Father    • Heart disease Other         cardiac disorder         Medications have been verified  Objective   /67   Pulse 70   Temp (!) 96 5 °F (35 8 °C) (Temporal)   Resp 18   SpO2 97%        Physical Exam     Physical Exam  Vitals and nursing note reviewed  Constitutional:       Appearance: Normal appearance  She is normal weight     HENT:      Head: Normocephalic and atraumatic  Right Ear: Tympanic membrane, ear canal and external ear normal       Left Ear: Tympanic membrane, ear canal and external ear normal       Nose: Nose normal       Mouth/Throat:      Mouth: Mucous membranes are moist       Pharynx: Oropharynx is clear  Eyes:      Conjunctiva/sclera: Conjunctivae normal       Pupils: Pupils are equal, round, and reactive to light  Cardiovascular:      Rate and Rhythm: Normal rate and regular rhythm  Pulses: Normal pulses  Heart sounds: Normal heart sounds  Pulmonary:      Effort: Pulmonary effort is normal       Breath sounds: Normal breath sounds  Abdominal:      General: Abdomen is flat  Bowel sounds are normal       Palpations: Abdomen is soft  Musculoskeletal:         General: Normal range of motion  Cervical back: Neck supple  Skin:     General: Skin is warm  Capillary Refill: Capillary refill takes less than 2 seconds  Neurological:      General: No focal deficit present  Mental Status: She is alert and oriented to person, place, and time     Psychiatric:         Mood and Affect: Mood normal

## 2023-02-05 LAB
BACTERIA UR CULT: ABNORMAL
BACTERIA UR CULT: ABNORMAL

## 2023-02-07 ENCOUNTER — OFFICE VISIT (OUTPATIENT)
Dept: HEMATOLOGY ONCOLOGY | Facility: CLINIC | Age: 73
End: 2023-02-07

## 2023-02-07 ENCOUNTER — TELEPHONE (OUTPATIENT)
Dept: HEMATOLOGY ONCOLOGY | Facility: CLINIC | Age: 73
End: 2023-02-07

## 2023-02-07 ENCOUNTER — DOCUMENTATION (OUTPATIENT)
Dept: HEMATOLOGY ONCOLOGY | Facility: CLINIC | Age: 73
End: 2023-02-07

## 2023-02-07 VITALS
RESPIRATION RATE: 17 BRPM | OXYGEN SATURATION: 96 % | BODY MASS INDEX: 21.86 KG/M2 | DIASTOLIC BLOOD PRESSURE: 80 MMHG | WEIGHT: 136 LBS | HEART RATE: 75 BPM | HEIGHT: 66 IN | SYSTOLIC BLOOD PRESSURE: 136 MMHG

## 2023-02-07 DIAGNOSIS — K62.5 RECTAL BLEED: ICD-10-CM

## 2023-02-07 DIAGNOSIS — D59.10 AUTOIMMUNE HEMOLYTIC ANEMIA (HCC): ICD-10-CM

## 2023-02-07 DIAGNOSIS — C91.10 CLL (CHRONIC LYMPHOCYTIC LEUKEMIA) (HCC): ICD-10-CM

## 2023-02-07 DIAGNOSIS — D69.3 IDIOPATHIC THROMBOCYTOPENIC PURPURA (ITP) (HCC): Primary | ICD-10-CM

## 2023-02-07 DIAGNOSIS — E44.0 MODERATE PROTEIN-CALORIE MALNUTRITION (HCC): ICD-10-CM

## 2023-02-07 NOTE — TELEPHONE ENCOUNTER
While we try to accommodate patient requests, our priority is to schedule treatment according to Doctor's orders and site availability  1  Does the Provider use the intake sheet or checkout note? 2  What would be a preferred day of the week that would work best for your infusion appointment? Tuesday  3  Do you prefer mornings or afternoons for your appointments? Afternoon around 1:00   4  Are there any days or dates that do not work for your schedule, including any upcoming vacations? No  5  We are going to try our best to schedule you at the infusion center closest to your home  In the event that we are unable to what would be your next preferred infusion site or sites? 1  [place]  [place]    6  Do you have transportation to take you to all of your appointments? [facility]  7  Would you like the infusion center to draw labs from your port? (disregard if patient doesn't have a port or need labs for infusion appointment)         Patient needs a 4 month Follow-Up with Dr Robert Harley  or Ángel Glass and please make the follow-up on the same date for the injection

## 2023-02-07 NOTE — TELEPHONE ENCOUNTER
Spoke with patient  She is aware and okay with all appts  Her updated schedule has been sent to [facility]

## 2023-02-07 NOTE — TELEPHONE ENCOUNTER
Please cancel 2/27 injection with AL and keep [facility] appointment as schedule, please schedule patient on tuesdays at 1  [facility] uses [facility]

## 2023-02-07 NOTE — PROGRESS NOTES
Hematology/Oncology Outpatient Follow- up Note  Tiarra Munoz 81 y o  female MRN: @ Encounter: [id number]        Date:  2/7/2023      Assessment / Plan:      1  Chronic ITP, Presented 6/2020 with platelet count 6171  She responded inititially to steroids, counts dropped with taper  No prolonged response to Promacta  Prednisone 40 mg po daily restarted 9/1/2020, reduced by 10 mg po weekly  She was on prednisone 5mg po daily  11/2/20 platelet count 08,401  Platelet count 14286 11/12/20 Prednisone increased to 10mg daily 11/17/20, increased to 20mg 11/23/20 when platelets decreased to 12,000  One prednisone 5 mg alternating with 10 mg the next day, reduced to 5 mg PO daily 11/2022  Reduce to 2 5mg po daily  Continue N-plate 234 mcg every 4 weeks     2  Osteoporosis induced by steroids/age, Prolia 60 mg subQ every 6 months x4 doses initiated 6/2022  Continue     3  CLL/ SLL on acalabrutinib 100 mg p o  daily  CT scan on 10/2021 showed resolution of retroperitoneal, retrocrural lymphadenopathy  4 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, resolved     5  Recent rectal bleeding  Has appointment with GI 3/8/23  HPI:   Rahul Hobbs is a 29-year-old  female who was admitted to [facility] 6/2020 regarding easy bruising and bleeding  She has history of polyclonal gammopathy, with no evidence of monoclonal protein, Sjogren syndrome, autoimmune connective tissue disease with highly elevated sed rate, CRP, BALDO, rheumatoid factor  PT and PTT normal       She is followed by Dr Luzmaria Beavers with Rheumatology  She was prescribed hydroxychloroquine 200 milligram p o  B i d          6/18/2020 She was found to have grade 4 thrombocytopenia with platelet count of 0222, hemoglobin 10, WBC 7 2, 63% neutrophils, 34 percent lymphocytes       Flow cytometry on the peripheral blood showed CLL pattern positive for CD 23, CD 20   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 with PATTI positive 4+for IgG, plus for C3, bilirubin 0 5      CT scan of the chest abdomen pelvis on 06/19/2020 showed mild confluent periaortic/retroperitoneal lymphadenopathy measuring up to 1 1 centimeter in short axis, bilateral external iliac lymphadenopathy up to 10 millimeters, retrocrural lymph node measuring 1 3 centimeters spleen 15 centimeter, liver with hepatomegaly no evidence of masses   She was treated with dexamethasone for 3 days with improvement of platelet count up to 62,000 6/22/21      On 06/25/2020 platelet count of 60,095      Initiated on Promacta 75 milligram p o  Daily in July 2020, platelet count up gradually with platelet count of 29,934 on 08/17/2020  hemoglobin 11 4, WBC 5 0   8/28/20: Patient had CBC as gums were bleeding that morning AM, no further bleeding  Platelet count was 72,283  Promacta increased to 150mg every other day and 75 mg the other days  Platelet count was 76,128 8/31/20 and she was advised to initiate prednisone 40mg po daily, tapering by 10mg po weekly  Promacta returned to 75mg po daily  Platelet count increased to 173,000 9/4/20  She had BMBX 9/9/20: B-cell lymphoproliferative disorder, compatible with chronic lymphocytic leukemia  Virtually absent stainable storage iron  On flow cytometry, CLL phenotype accounted for 11%; CD5+, CD23+, CD20+ (dim) and CD 38-      9/14/20: platelet count 475  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      Initiated on acalabrutinib 100 mg p o  b i d  since the beginning of October 2020  She was seen on 10/14/2020 with severe depression requiring admission to the hospital, she is on Zoloft 25 mg p o  daily, Synthroid 75 micro g p o  daily      Acalabrutinib was reduced to 100 mg p o  daily 11/2020   Recurrence of thrombocytopenia 27,000 11/2/20 once prednisone was discontinued     Prednisone re-introduced back at 5 mg p o  daily, platelet count increased to 50,000 range, fluctuated to 20,000 range, prednisone increased to 10mg daily 11/17/20, increased to 20mg 11/23/20 when platelets decreased to 12,000  N-plate ordered 60/37/28 when platelet count 66,348 12/15/20  And given every 4 weeks      Prednisone has been slowly tapered  5 mg alternating with 10 mg the next day     Dwxa scan showed osteoporosis in August 2022    Interval History:  1/30/23 presented to ED for BRBPR  On examination - No external hemorrhoids  No anal fissure  Small amount of bright red blood on digital rectal exam  No pain with digital rectal exam  Small amount of blood in pad  Hemoglobin 13  2  referral to GI made  UTI 2/3/23, abx rx at urgent care          Test Results:        Labs:   Lab Results   Component Value Date    HGB 13 2 01/30/2023    HCT 42 2 01/30/2023    MCV 94 01/30/2023     (L) 01/30/2023    WBC 7 62 01/30/2023    NRBC 0 01/30/2023    BANDSPCT 1 03/03/2021    ATYLMPCT 4 (H) 04/05/2021     Lab Results   Component Value Date    K 3 4 (L) 01/30/2023     (H) 01/30/2023    CO2 26 01/30/2023    BUN 8 01/30/2023    CREATININE 0 79 01/30/2023    GLUF 90 12/14/2022    CALCIUM 9 0 01/30/2023    CORRECTEDCA 9 4 12/14/2022    AST 17 01/30/2023    ALT 10 01/30/2023    ALKPHOS 47 01/30/2023    EGFR 76 01/30/2023       Imaging: No results found  ROS:  As mentioned in HPI & Interval History otherwise 14 point ROS negative          Allergies: No Known Allergies  Current Medications: Reviewed  PMH/FH/SH:  Reviewed      Physical Exam:    1 7 meters squared    Ht Readings from Last 3 Encounters:   02/07/23 5' 5 75" (1 67 m)   12/27/22 5' 5 75" (1 67 m)   12/08/22 5' 6" (1 676 m)        Wt Readings from Last 3 Encounters:   12/27/22 61 9 kg (136 lb 7 4 oz)   12/08/22 62 8 kg (138 lb 6 4 oz)   11/03/22 93 9 kg (207 lb)        Temp Readings from Last 3 Encounters:   02/03/23 (!) 96 5 °F (35 8 °C) (Temporal)   01/30/23 (!) 97 4 °F (36 3 °C) (Oral)   12/27/22 (!) 97 °F (36 1 °C) (Temporal)        BP Readings from Last 3 Encounters:   02/03/23 137/67   23 137/73   22 128/80           Physical Exam  Vitals reviewed  Constitutional:       General: She is not in acute distress  Appearance: She is well-developed  She is not diaphoretic  HENT:      Head: Normocephalic and atraumatic  Eyes:      Conjunctiva/sclera: Conjunctivae normal    Neck:      Trachea: No tracheal deviation  Cardiovascular:      Rate and Rhythm: Normal rate and regular rhythm  Heart sounds: No murmur heard  No friction rub  No gallop  Pulmonary:      Effort: Pulmonary effort is normal  No respiratory distress  Breath sounds: Normal breath sounds  No wheezing or rales  Chest:      Chest wall: No tenderness  Abdominal:      General: There is no distension  Palpations: Abdomen is soft  Tenderness: There is no abdominal tenderness  Musculoskeletal:      Cervical back: Normal range of motion and neck supple  Lymphadenopathy:      Cervical: No cervical adenopathy  Skin:     General: Skin is warm and dry  Coloration: Skin is not pale  Findings: No erythema  Neurological:      Mental Status: She is alert and oriented to person, place, and time  Psychiatric:         Behavior: Behavior normal          Thought Content:  Thought content normal          Judgment: Judgment normal          ECO    Emergency Contacts:    Extended Emergency Contact Information  Primary Emergency Contact: Jason Jernigan [address] Phone: [phone]  Mobile Phone: [phone]  Relation: Nury

## 2023-02-07 NOTE — PROGRESS NOTES
Received email from Joshua mcmullen that pt wants to know if she can come to [place] instead of [facility] for treatment  [employer] & spoke to Nilson Russell call ref# [id number] she sd that there is no network & pt can go to any facility that accepts medicare    Called pt back & gave her that info she thanked me fr the call

## 2023-02-07 NOTE — TELEPHONE ENCOUNTER
Are you able to schedule patient for March/April/May? Looks like patient is already scheduled for June  Thanks!

## 2023-02-15 DIAGNOSIS — E03.9 HYPOTHYROIDISM, UNSPECIFIED TYPE: ICD-10-CM

## 2023-02-15 RX ORDER — LEVOTHYROXINE SODIUM 0.07 MG/1
TABLET ORAL
Qty: 30 TABLET | Refills: 0 | Status: SHIPPED | OUTPATIENT
Start: 2023-02-15

## 2023-02-22 DIAGNOSIS — D69.3 IDIOPATHIC THROMBOCYTOPENIC PURPURA (ITP) (HCC): Primary | ICD-10-CM

## 2023-02-22 DIAGNOSIS — C91.10 CLL (CHRONIC LYMPHOCYTIC LEUKEMIA) (HCC): ICD-10-CM

## 2023-02-22 RX ORDER — ACALABRUTINIB 100 MG/1
TABLET, FILM COATED ORAL
Qty: 30 TABLET | Refills: 11 | Status: SHIPPED | OUTPATIENT
Start: 2023-02-22

## 2023-02-23 ENCOUNTER — APPOINTMENT (OUTPATIENT)
Dept: LAB | Age: 73
End: 2023-02-23

## 2023-02-23 DIAGNOSIS — D69.3 IDIOPATHIC THROMBOCYTOPENIC PURPURA (ITP) (HCC): ICD-10-CM

## 2023-02-23 DIAGNOSIS — C91.10 CLL (CHRONIC LYMPHOCYTIC LEUKEMIA) (HCC): ICD-10-CM

## 2023-02-23 LAB
BASOPHILS # BLD AUTO: 0.02 THOUSANDS/ÂΜL (ref 0–0.1)
BASOPHILS NFR BLD AUTO: 0 % (ref 0–1)
EOSINOPHIL # BLD AUTO: 0.03 THOUSAND/ÂΜL (ref 0–0.61)
EOSINOPHIL NFR BLD AUTO: 1 % (ref 0–6)
ERYTHROCYTE [DISTWIDTH] IN BLOOD BY AUTOMATED COUNT: 14.6 % (ref 11.6–15.1)
HCT VFR BLD AUTO: 41.9 % (ref 34.8–46.1)
HGB BLD-MCNC: 12.7 G/DL (ref 11.5–15.4)
IMM GRANULOCYTES # BLD AUTO: 0.03 THOUSAND/UL (ref 0–0.2)
IMM GRANULOCYTES NFR BLD AUTO: 1 % (ref 0–2)
LYMPHOCYTES # BLD AUTO: 0.62 THOUSANDS/ÂΜL (ref 0.6–4.47)
LYMPHOCYTES NFR BLD AUTO: 13 % (ref 14–44)
MCH RBC QN AUTO: 29.4 PG (ref 26.8–34.3)
MCHC RBC AUTO-ENTMCNC: 30.3 G/DL (ref 31.4–37.4)
MCV RBC AUTO: 97 FL (ref 82–98)
MONOCYTES # BLD AUTO: 0.33 THOUSAND/ÂΜL (ref 0.17–1.22)
MONOCYTES NFR BLD AUTO: 7 % (ref 4–12)
NEUTROPHILS # BLD AUTO: 3.91 THOUSANDS/ÂΜL (ref 1.85–7.62)
NEUTS SEG NFR BLD AUTO: 78 % (ref 43–75)
NRBC BLD AUTO-RTO: 0 /100 WBCS
PLATELET # BLD AUTO: 129 THOUSANDS/UL (ref 149–390)
PMV BLD AUTO: 12.8 FL (ref 8.9–12.7)
RBC # BLD AUTO: 4.32 MILLION/UL (ref 3.81–5.12)
WBC # BLD AUTO: 4.94 THOUSAND/UL (ref 4.31–10.16)

## 2023-02-26 DIAGNOSIS — D69.3 ACUTE ITP (HCC): ICD-10-CM

## 2023-02-27 ENCOUNTER — HOSPITAL ENCOUNTER (OUTPATIENT)
Dept: INFUSION CENTER | Facility: HOSPITAL | Age: 73
Discharge: HOME | End: 2023-02-27
Attending: INTERNAL MEDICINE

## 2023-02-27 DIAGNOSIS — D69.3 IDIOPATHIC THROMBOCYTOPENIC PURPURA (ITP) (HCC): ICD-10-CM

## 2023-02-27 DIAGNOSIS — C91.10 CLL (CHRONIC LYMPHOCYTIC LEUKEMIA) (HCC): Primary | ICD-10-CM

## 2023-02-27 RX ORDER — PREDNISONE 1 MG/1
TABLET ORAL
Qty: 120 TABLET | Refills: 3 | Status: SHIPPED | OUTPATIENT
Start: 2023-02-27

## 2023-02-27 RX ADMIN — ROMIPLOSTIM 125 MCG: 125 INJECTION, POWDER, LYOPHILIZED, FOR SOLUTION SUBCUTANEOUS at 13:13

## 2023-03-02 ENCOUNTER — TELEPHONE (OUTPATIENT)
Dept: OTHER | Facility: OTHER | Age: 73
End: 2023-03-02

## 2023-03-02 NOTE — TELEPHONE ENCOUNTER
Patient has an upcoming appt in office on 3/8/23 and patient is calling to confirm that [facility] is going to be picking her up for appt  Please call patient to confirm

## 2023-03-02 NOTE — TELEPHONE ENCOUNTER
[facility] set up for patient  Notified patient she will be picked up @ 12:45 on 03/08/2023 from [facility] [employer]

## 2023-03-08 ENCOUNTER — TELEPHONE (OUTPATIENT)
Dept: GASTROENTEROLOGY | Facility: MEDICAL CENTER | Age: 73
End: 2023-03-08

## 2023-03-08 ENCOUNTER — CONSULT (OUTPATIENT)
Dept: GASTROENTEROLOGY | Facility: MEDICAL CENTER | Age: 73
End: 2023-03-08

## 2023-03-08 VITALS
SYSTOLIC BLOOD PRESSURE: 126 MMHG | OXYGEN SATURATION: 98 % | BODY MASS INDEX: 21.21 KG/M2 | HEART RATE: 96 BPM | WEIGHT: 132 LBS | DIASTOLIC BLOOD PRESSURE: 70 MMHG | HEIGHT: 66 IN

## 2023-03-08 DIAGNOSIS — Z12.11 COLON CANCER SCREENING: ICD-10-CM

## 2023-03-08 DIAGNOSIS — K62.5 BRBPR (BRIGHT RED BLOOD PER RECTUM): Primary | ICD-10-CM

## 2023-03-08 DIAGNOSIS — K52.9 CHRONIC DIARRHEA: ICD-10-CM

## 2023-03-08 NOTE — PATIENT INSTRUCTIONS
Scheduled date of colon (as of today) 3/30/23  Physician performing: Dr Marissa Mckeon  Location of procedure:  [address] [place] [address]  Instructions given to patient:  miralax/dulcolax  Clearances: stress steroids from Oncology

## 2023-03-08 NOTE — H&P (VIEW-ONLY)
[facility] - Outpatient Consultation  Tayla Ross 81 y o  female MRN: [id number]  Encounter: [id number]          ASSESSMENT AND PLAN:  72-year-old female with history of hypothyroidism, ITP, CLL Sjogren syndrome who presents for evaluation  1  BRBPR (bright red blood per rectum)  She was seen in the ER in January for bright red blood per rectum  She has history of chronic diarrhea and had no straining during that episode  She has chronic thrombocytopenia related to her ITP which is likely contributing to her presentation  We discussed etiologies of rectal bleeding include rectal fissure, hemorrhoid, less likely polyp or malignancy  Etiologies irregular blood per rectum will be investigated at the time of her colonoscopy  Thankfully her hemoglobin has been normal and she has had resolution of her bleeding since ER discharge    2  Colon cancer screening  She reports her last colonoscopy was greater than 10 years ago and was normal   She is overdue for repeat exam   I discussed the indication, risk and benefit of colonoscopy with her today and she is agreeable to proceed  We will reach out to her oncologist to see if she requires stress dose steroids for the procedure given her chronic steroid use  - Colonoscopy; Future    3  Chronic diarrhea  She has history of chronic diarrhea for many years  Etiologies: Infectious, inflammatory, malabsorptive or functional causes  Have ordered stool and serologic testing for evaluation  Random colon biopsies will be obtained at the time of her colonoscopy for evaluation of microscopic colitis as well  - Tissue transglutaminase, IgA; Future  - IgA; Future  - Calprotectin,Fecal; Future  - Giardia antigen; Future      ______________________________________________________________________    HPI: 72-year-old female with history of hypothyroidism, ITP, CLL Sjogren syndrome who presents for evaluation        She presented to the emergency room in January of this year for bright red blood per rectum  She reports having loose stools prior to presentation and then bright red blood mixed in the toilet bowl  She had no significant straining with that bowel movement  She chronically has diarrhea with 1-2 loose bowel movements per day which is usually nonbloody but she has intermittent bright red blood per rectum in the past   She denies abdominal pain, nausea or vomiting  She uses Imodium as needed for diarrhea with good relief of her symptoms  Since the ER discharge she has not had recurrent blood per rectum    She reports family history of her mother diagnosed with colon cancer in her [de-identified]  She takes no antiplatelet or anticoagulant medications      February 2023 labs show hemoglobin 12 7, platelets 78  Her labs during her ER visit showed similar findings with hemoglobin 13, platelets 839   2433 CT scan shows small hiatal hernia, diverticulosis    She reportedly underwent a colonoscopy in 2012 which was normal   Procedure report is not available for review        REVIEW OF SYSTEMS:    CONSTITUTIONAL: Denies any fever, chills, rigors, and weight loss  HEENT: No earache or tinnitus  Denies hearing loss or visual disturbances  CARDIOVASCULAR: No chest pain or palpitations  RESPIRATORY: Denies any cough, hemoptysis, shortness of breath or dyspnea on exertion  GASTROINTESTINAL: As noted in the History of Present Illness  GENITOURINARY: No problems with urination  Denies any hematuria or dysuria  NEUROLOGIC: No dizziness or vertigo, denies headaches  MUSCULOSKELETAL: Denies any muscle or joint pain  SKIN: Denies skin rashes or itching  ENDOCRINE: Denies excessive thirst  Denies intolerance to heat or cold  PSYCHOSOCIAL: Denies depression or anxiety  Denies any recent memory loss         Historical Information   Past Medical History:   Diagnosis Date   • Anxiety    • Autoimmune hemolytic anemia (HCC)    • Cataract    • 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HCC)    • "GERD (gastroesophageal reflux disease)    • Hypothyroidism    • Hypothyroidism    • Idiopathic thrombocytopenic purpura (ITP) (HCC)    • Menopause    • Sjogrens syndrome (HCC)      Past Surgical History:   Procedure Laterality Date   • CATARACT EXTRACTION Left    • COLONOSCOPY  2012    per pt   • TOOTH EXTRACTION       Social History   Social History     Substance and Sexual Activity   Alcohol Use No     Social History     Substance and Sexual Activity   Drug Use No     Social History     Tobacco Use   Smoking Status Never   Smokeless Tobacco Never     Family History   Problem Relation Age of Onset   • Colon cancer Mother    • Heart disease Mother         cardiac disorder    • Cancer Mother    • Liver cancer Father    • Cancer Father    • Heart disease Other         cardiac disorder       Meds/Allergies       Current Outpatient Medications:   •  Acalabrutinib Maleate (Calquence) 100 MG TABS  •  ALPRAZolam (XANAX) 0 5 mg tablet  •  cycloSPORINE (RESTASIS) 0 05 % ophthalmic emulsion  •  gabapentin (NEURONTIN) 300 mg capsule  •  levothyroxine 75 mcg tablet  •  methocarbamol (ROBAXIN) 500 mg tablet  •  predniSONE 5 mg tablet  •  sertraline (ZOLOFT) 50 mg tablet  •  polyethylene glycol (MIRALAX) 17 g packet    No Known Allergies        Objective     Blood pressure 126/70, pulse 96, height 5' 5 75\" (1 67 m), weight 59 9 kg (132 lb), SpO2 98 %  Body mass index is 21 47 kg/m²  PHYSICAL EXAM:      General Appearance:   Alert, cooperative, no distress   HEENT:   Normocephalic, atraumatic, anicteric  Neck:  Supple, symmetrical, trachea midline   Lungs:   Clear to auscultation bilaterally; no rales, rhonchi or wheezing; respirations unlabored    Heart[de-identified]   Regular rate and rhythm; no murmur, rub, or gallop     Abdomen:   Soft, non-tender, non-distended; normal bowel sounds; no masses, no organomegaly    Genitalia:   Deferred    Rectal:   Deferred    Extremities:  No cyanosis, clubbing or edema    Pulses:  2+ and " symmetric    Skin:  No jaundice, rashes, or lesions    Lymph nodes:  No palpable cervical lymphadenopathy        Lab Results:   No visits with results within 1 Day(s) from this visit  Latest known visit with results is:   Appointment on 02/23/2023   Component Date Value   • WBC 02/23/2023 4 94    • RBC 02/23/2023 4 32    • Hemoglobin 02/23/2023 12 7    • Hematocrit 02/23/2023 41 9    • MCV 02/23/2023 97    • MCH 02/23/2023 29 4    • MCHC 02/23/2023 30 3 (L)    • RDW 02/23/2023 14 6    • MPV 02/23/2023 12 8 (H)    • Platelets 91/57/0440 129 (L)    • nRBC 02/23/2023 0    • Neutrophils Relative 02/23/2023 78 (H)    • Immat GRANS % 02/23/2023 1    • Lymphocytes Relative 02/23/2023 13 (L)    • Monocytes Relative 02/23/2023 7    • Eosinophils Relative 02/23/2023 1    • Basophils Relative 02/23/2023 0    • Neutrophils Absolute 02/23/2023 3 91    • Immature Grans Absolute 02/23/2023 0 03    • Lymphocytes Absolute 02/23/2023 0 62    • Monocytes Absolute 02/23/2023 0 33    • Eosinophils Absolute 02/23/2023 0 03    • Basophils Absolute 02/23/2023 0 02          Radiology Results:   No results found

## 2023-03-08 NOTE — TELEPHONE ENCOUNTER
Our mutual patient is scheduled for procedure:  Colonoscopy     On: _3/30/23_      With: Dr Salazar Confer     Is the patient in need of stress steroids prior to the procedure     Physician Approving clearance: ________________________

## 2023-03-08 NOTE — PROGRESS NOTES
[facility] - Outpatient Consultation  Zoraida Jones 81 y o  female MRN: [id number]  Encounter: [id number]          ASSESSMENT AND PLAN:  68-year-old female with history of hypothyroidism, ITP, CLL Sjogren syndrome who presents for evaluation  1  BRBPR (bright red blood per rectum)  She was seen in the ER in January for bright red blood per rectum  She has history of chronic diarrhea and had no straining during that episode  She has chronic thrombocytopenia related to her ITP which is likely contributing to her presentation  We discussed etiologies of rectal bleeding include rectal fissure, hemorrhoid, less likely polyp or malignancy  Etiologies irregular blood per rectum will be investigated at the time of her colonoscopy  Thankfully her hemoglobin has been normal and she has had resolution of her bleeding since ER discharge    2  Colon cancer screening  She reports her last colonoscopy was greater than 10 years ago and was normal   She is overdue for repeat exam   I discussed the indication, risk and benefit of colonoscopy with her today and she is agreeable to proceed  We will reach out to her oncologist to see if she requires stress dose steroids for the procedure given her chronic steroid use  - Colonoscopy; Future    3  Chronic diarrhea  She has history of chronic diarrhea for many years  Etiologies: Infectious, inflammatory, malabsorptive or functional causes  Have ordered stool and serologic testing for evaluation  Random colon biopsies will be obtained at the time of her colonoscopy for evaluation of microscopic colitis as well  - Tissue transglutaminase, IgA; Future  - IgA; Future  - Calprotectin,Fecal; Future  - Giardia antigen; Future      ______________________________________________________________________    HPI: 68-year-old female with history of hypothyroidism, ITP, CLL Sjogren syndrome who presents for evaluation        She presented to the emergency room in January of this year for bright red blood per rectum  She reports having loose stools prior to presentation and then bright red blood mixed in the toilet bowl  She had no significant straining with that bowel movement  She chronically has diarrhea with 1-2 loose bowel movements per day which is usually nonbloody but she has intermittent bright red blood per rectum in the past   She denies abdominal pain, nausea or vomiting  She uses Imodium as needed for diarrhea with good relief of her symptoms  Since the ER discharge she has not had recurrent blood per rectum    She reports family history of her mother diagnosed with colon cancer in her [de-identified]  She takes no antiplatelet or anticoagulant medications      February 2023 labs show hemoglobin 12 7, platelets 78  Her labs during her ER visit showed similar findings with hemoglobin 13, platelets 278   5001 CT scan shows small hiatal hernia, diverticulosis    She reportedly underwent a colonoscopy in 2012 which was normal   Procedure report is not available for review        REVIEW OF SYSTEMS:    CONSTITUTIONAL: Denies any fever, chills, rigors, and weight loss  HEENT: No earache or tinnitus  Denies hearing loss or visual disturbances  CARDIOVASCULAR: No chest pain or palpitations  RESPIRATORY: Denies any cough, hemoptysis, shortness of breath or dyspnea on exertion  GASTROINTESTINAL: As noted in the History of Present Illness  GENITOURINARY: No problems with urination  Denies any hematuria or dysuria  NEUROLOGIC: No dizziness or vertigo, denies headaches  MUSCULOSKELETAL: Denies any muscle or joint pain  SKIN: Denies skin rashes or itching  ENDOCRINE: Denies excessive thirst  Denies intolerance to heat or cold  PSYCHOSOCIAL: Denies depression or anxiety  Denies any recent memory loss         Historical Information   Past Medical History:   Diagnosis Date   • Anxiety    • Autoimmune hemolytic anemia (HCC)    • Cataract    • 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HCC)    • GERD (gastroesophageal reflux disease)    • Hypothyroidism    • Hypothyroidism    • Idiopathic thrombocytopenic purpura (ITP) (HCC)    • Menopause    • Sjogrens syndrome (HCC)      Past Surgical History:   Procedure Laterality Date   • CATARACT EXTRACTION Left    • COLONOSCOPY  2012    per pt   • TOOTH EXTRACTION       Social History   Social History     Substance and Sexual Activity   Alcohol Use No     Social History     Substance and Sexual Activity   Drug Use No     Social History     Tobacco Use   Smoking Status Never   Smokeless Tobacco Never     Family History   Problem Relation Age of Onset   • Colon cancer Mother    • Heart disease Mother         cardiac disorder    • Cancer Mother    • Liver cancer Father    • Cancer Father    • Heart disease Other         cardiac disorder       Meds/Allergies       Current Outpatient Medications:   •  Acalabrutinib Maleate (Calquence) 100 MG TABS  •  ALPRAZolam (XANAX) 0 5 mg tablet  •  cycloSPORINE (RESTASIS) 0 05 % ophthalmic emulsion  •  gabapentin (NEURONTIN) 300 mg capsule  •  levothyroxine 75 mcg tablet  •  methocarbamol (ROBAXIN) 500 mg tablet  •  predniSONE 5 mg tablet  •  sertraline (ZOLOFT) 50 mg tablet  •  polyethylene glycol (MIRALAX) 17 g packet    No Known Allergies        Objective     Blood pressure 126/70, pulse 96, height 5' 5 75" (1 67 m), weight 59 9 kg (132 lb), SpO2 98 %  Body mass index is 21 47 kg/m²  PHYSICAL EXAM:      General Appearance:   Alert, cooperative, no distress   HEENT:   Normocephalic, atraumatic, anicteric  Neck:  Supple, symmetrical, trachea midline   Lungs:   Clear to auscultation bilaterally; no rales, rhonchi or wheezing; respirations unlabored    Heart[de-identified]   Regular rate and rhythm; no murmur, rub, or gallop     Abdomen:   Soft, non-tender, non-distended; normal bowel sounds; no masses, no organomegaly    Genitalia:   Deferred    Rectal:   Deferred    Extremities:  No cyanosis, clubbing or edema    Pulses:  2+ and symmetric    Skin:  No jaundice, rashes, or lesions    Lymph nodes:  No palpable cervical lymphadenopathy        Lab Results:   No visits with results within 1 Day(s) from this visit  Latest known visit with results is:   Appointment on 02/23/2023   Component Date Value   • WBC 02/23/2023 4 94    • RBC 02/23/2023 4 32    • Hemoglobin 02/23/2023 12 7    • Hematocrit 02/23/2023 41 9    • MCV 02/23/2023 97    • MCH 02/23/2023 29 4    • MCHC 02/23/2023 30 3 (L)    • RDW 02/23/2023 14 6    • MPV 02/23/2023 12 8 (H)    • Platelets 12/25/3018 129 (L)    • nRBC 02/23/2023 0    • Neutrophils Relative 02/23/2023 78 (H)    • Immat GRANS % 02/23/2023 1    • Lymphocytes Relative 02/23/2023 13 (L)    • Monocytes Relative 02/23/2023 7    • Eosinophils Relative 02/23/2023 1    • Basophils Relative 02/23/2023 0    • Neutrophils Absolute 02/23/2023 3 91    • Immature Grans Absolute 02/23/2023 0 03    • Lymphocytes Absolute 02/23/2023 0 62    • Monocytes Absolute 02/23/2023 0 33    • Eosinophils Absolute 02/23/2023 0 03    • Basophils Absolute 02/23/2023 0 02          Radiology Results:   No results found

## 2023-03-08 NOTE — TELEPHONE ENCOUNTER
Spoke with pt and advised it is acceptable to receive infusion and proceed with procedures as scheduled    Pt verbalized understanding of all information

## 2023-03-09 ENCOUNTER — TELEPHONE (OUTPATIENT)
Dept: GASTROENTEROLOGY | Facility: MEDICAL CENTER | Age: 73
End: 2023-03-09

## 2023-03-09 DIAGNOSIS — D69.3 ACUTE ITP (HCC): Primary | ICD-10-CM

## 2023-03-09 DIAGNOSIS — C91.10 CLL (CHRONIC LYMPHOCYTIC LEUKEMIA) (HCC): ICD-10-CM

## 2023-03-09 DIAGNOSIS — D59.10 AUTOIMMUNE HEMOLYTIC ANEMIA (HCC): ICD-10-CM

## 2023-03-09 RX ORDER — PREDNISONE 20 MG/1
TABLET ORAL
Qty: 3 TABLET | Refills: 0 | Status: SHIPPED | OUTPATIENT
Start: 2023-03-09

## 2023-03-09 NOTE — TELEPHONE ENCOUNTER
----- Message from Brittany Kline sent at 3/9/2023  7:40 AM EST -----    ----- Message -----  From: Sancho Quintana MD  Sent: 3/9/2023   7:17 AM EST  To: [facility]    I received a reply from this patient's oncologist about her need for stress dose steroids prior to the procedure  Their office will prescribe 60 mg of prednisone for the morning of the procedure  Can you please let her know? She should receive the prescription from their office  Thanks  ----- Message -----  From: Vazquez Guzman MD  Sent: 3/8/2023   4:53 PM EST  To: Sancho Quintana MD, Alva John RN      stress dose of 60 mg in the morning of the procedure  ----- Message -----  From: Sancho Quintana MD  Sent: 3/8/2023   1:57 PM EST  To: Vazquez Guzman MD    Hi Dr Tatum Lisa,    I am writing regarding a mutual patient  She will be scheduled for colonoscopy for rectal bleeding and colon cancer screening  She has been on prednisone chronically for some time and is currently on a low-dose 5 mg but had been on higher dose of prednisone in the past   She told me that you prescribe her steroids for her  Do you think that she needs to have stress dose steroids given for the colonoscopy? If so we would need your office order the dose and duration of the steroids and we can provide her with the instructions      Thank you,  Ash Thapa
Pt aware she will receive a call concerning her Stress dose of steroids from her Oncology office for her upcoming procedure with Dr Swanson Mins
Alert-The patient is alert, awake and responds to voice. The patient is oriented to time, place, and person. The triage nurse is able to obtain subjective information.

## 2023-03-20 DIAGNOSIS — E03.9 HYPOTHYROIDISM, UNSPECIFIED TYPE: ICD-10-CM

## 2023-03-20 RX ORDER — LEVOTHYROXINE SODIUM 0.07 MG/1
TABLET ORAL
Qty: 30 TABLET | Refills: 0 | Status: SHIPPED | OUTPATIENT
Start: 2023-03-20

## 2023-03-21 ENCOUNTER — APPOINTMENT (OUTPATIENT)
Dept: LAB | Age: 73
End: 2023-03-21

## 2023-03-21 DIAGNOSIS — C91.10 CLL (CHRONIC LYMPHOCYTIC LEUKEMIA) (HCC): ICD-10-CM

## 2023-03-21 DIAGNOSIS — D69.3 IDIOPATHIC THROMBOCYTOPENIC PURPURA (ITP) (HCC): ICD-10-CM

## 2023-03-21 LAB
BASOPHILS # BLD AUTO: 0.01 THOUSANDS/ÂΜL (ref 0–0.1)
BASOPHILS NFR BLD AUTO: 0 % (ref 0–1)
EOSINOPHIL # BLD AUTO: 0.01 THOUSAND/ÂΜL (ref 0–0.61)
EOSINOPHIL NFR BLD AUTO: 0 % (ref 0–6)
ERYTHROCYTE [DISTWIDTH] IN BLOOD BY AUTOMATED COUNT: 14.2 % (ref 11.6–15.1)
HCT VFR BLD AUTO: 43 % (ref 34.8–46.1)
HGB BLD-MCNC: 12.8 G/DL (ref 11.5–15.4)
IMM GRANULOCYTES # BLD AUTO: 0.03 THOUSAND/UL (ref 0–0.2)
IMM GRANULOCYTES NFR BLD AUTO: 1 % (ref 0–2)
LYMPHOCYTES # BLD AUTO: 0.72 THOUSANDS/ÂΜL (ref 0.6–4.47)
LYMPHOCYTES NFR BLD AUTO: 14 % (ref 14–44)
MCH RBC QN AUTO: 29 PG (ref 26.8–34.3)
MCHC RBC AUTO-ENTMCNC: 29.8 G/DL (ref 31.4–37.4)
MCV RBC AUTO: 97 FL (ref 82–98)
MONOCYTES # BLD AUTO: 0.37 THOUSAND/ÂΜL (ref 0.17–1.22)
MONOCYTES NFR BLD AUTO: 7 % (ref 4–12)
NEUTROPHILS # BLD AUTO: 3.87 THOUSANDS/ÂΜL (ref 1.85–7.62)
NEUTS SEG NFR BLD AUTO: 78 % (ref 43–75)
NRBC BLD AUTO-RTO: 0 /100 WBCS
PLATELET # BLD AUTO: 127 THOUSANDS/UL (ref 149–390)
PMV BLD AUTO: 12.9 FL (ref 8.9–12.7)
RBC # BLD AUTO: 4.42 MILLION/UL (ref 3.81–5.12)
WBC # BLD AUTO: 5.01 THOUSAND/UL (ref 4.31–10.16)

## 2023-03-27 ENCOUNTER — HOSPITAL ENCOUNTER (OUTPATIENT)
Dept: INFUSION CENTER | Facility: HOSPITAL | Age: 73
Discharge: HOME | End: 2023-03-27
Attending: INTERNAL MEDICINE

## 2023-03-27 VITALS
HEART RATE: 80 BPM | TEMPERATURE: 98 F | RESPIRATION RATE: 18 BRPM | SYSTOLIC BLOOD PRESSURE: 129 MMHG | DIASTOLIC BLOOD PRESSURE: 79 MMHG

## 2023-03-27 DIAGNOSIS — D69.3 IDIOPATHIC THROMBOCYTOPENIC PURPURA (ITP) (HCC): ICD-10-CM

## 2023-03-27 DIAGNOSIS — C91.10 CLL (CHRONIC LYMPHOCYTIC LEUKEMIA) (HCC): Primary | ICD-10-CM

## 2023-03-27 RX ADMIN — ROMIPLOSTIM 125 MCG: 125 INJECTION, POWDER, LYOPHILIZED, FOR SOLUTION SUBCUTANEOUS at 12:25

## 2023-03-28 RX ORDER — SODIUM CHLORIDE 9 MG/ML
125 INJECTION, SOLUTION INTRAVENOUS CONTINUOUS
Status: CANCELLED | OUTPATIENT
Start: 2023-03-28

## 2023-03-30 ENCOUNTER — ANESTHESIA EVENT (OUTPATIENT)
Dept: GASTROENTEROLOGY | Facility: MEDICAL CENTER | Age: 73
End: 2023-03-30

## 2023-03-30 ENCOUNTER — HOSPITAL ENCOUNTER (OUTPATIENT)
Dept: GASTROENTEROLOGY | Facility: MEDICAL CENTER | Age: 73
Setting detail: OUTPATIENT SURGERY
End: 2023-03-30

## 2023-03-30 ENCOUNTER — ANESTHESIA (OUTPATIENT)
Dept: GASTROENTEROLOGY | Facility: MEDICAL CENTER | Age: 73
End: 2023-03-30

## 2023-03-30 VITALS
RESPIRATION RATE: 20 BRPM | SYSTOLIC BLOOD PRESSURE: 102 MMHG | HEART RATE: 69 BPM | WEIGHT: 132 LBS | OXYGEN SATURATION: 100 % | DIASTOLIC BLOOD PRESSURE: 55 MMHG | TEMPERATURE: 97.9 F | HEIGHT: 66 IN | BODY MASS INDEX: 21.21 KG/M2

## 2023-03-30 DIAGNOSIS — Z12.11 COLON CANCER SCREENING: ICD-10-CM

## 2023-03-30 RX ORDER — LIDOCAINE HYDROCHLORIDE 20 MG/ML
INJECTION, SOLUTION EPIDURAL; INFILTRATION; INTRACAUDAL; PERINEURAL AS NEEDED
Status: DISCONTINUED | OUTPATIENT
Start: 2023-03-30 | End: 2023-03-30

## 2023-03-30 RX ORDER — PROPOFOL 10 MG/ML
INJECTION, EMULSION INTRAVENOUS AS NEEDED
Status: DISCONTINUED | OUTPATIENT
Start: 2023-03-30 | End: 2023-03-30

## 2023-03-30 RX ORDER — SODIUM CHLORIDE 9 MG/ML
INJECTION, SOLUTION INTRAVENOUS CONTINUOUS PRN
Status: DISCONTINUED | OUTPATIENT
Start: 2023-03-30 | End: 2023-03-30

## 2023-03-30 RX ADMIN — PROPOFOL 50 MG: 10 INJECTION, EMULSION INTRAVENOUS at 12:04

## 2023-03-30 RX ADMIN — PROPOFOL 50 MG: 10 INJECTION, EMULSION INTRAVENOUS at 12:03

## 2023-03-30 RX ADMIN — SODIUM CHLORIDE: 0.9 INJECTION, SOLUTION INTRAVENOUS at 11:58

## 2023-03-30 RX ADMIN — PROPOFOL 100 MG: 10 INJECTION, EMULSION INTRAVENOUS at 12:01

## 2023-03-30 RX ADMIN — Medication 40 MG: at 12:06

## 2023-03-30 RX ADMIN — PROPOFOL 50 MG: 10 INJECTION, EMULSION INTRAVENOUS at 12:15

## 2023-03-30 RX ADMIN — LIDOCAINE HYDROCHLORIDE 60 MG: 20 INJECTION, SOLUTION EPIDURAL; INFILTRATION; INTRACAUDAL; PERINEURAL at 12:01

## 2023-03-30 RX ADMIN — PROPOFOL 50 MG: 10 INJECTION, EMULSION INTRAVENOUS at 12:09

## 2023-03-30 NOTE — PERIOPERATIVE NURSING NOTE
Resolution clip to cecum post polypectomy site  Resolution clip to additional polypectomy sites in cecum for a total of 3 clips

## 2023-03-30 NOTE — ANESTHESIA PREPROCEDURE EVALUATION
Procedure:  COLONOSCOPY    Relevant Problems   ANESTHESIA (within normal limits)      ENDO   (+) Hypothyroidism      GI/HEPATIC   (+) Esophageal reflux   (+) Rectal bleeding      HEMATOLOGY   (+) Autoimmune hemolytic anemia (HCC)   (+) 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HCC)   (+) Idiopathic thrombocytopenic purpura (ITP) (HCC)      NEURO/PSYCH   (+) Anxiety   (+) Depression, recurrent (HCC)      Other   (+) CLL (chronic lymphocytic leukemia) (HCC)        Physical Exam    Airway    Mallampati score: III  TM Distance: >3 FB  Neck ROM: full     Dental       Cardiovascular  Rhythm: regular, Rate: normal,     Pulmonary  Breath sounds clear to auscultation,     Other Findings        Anesthesia Plan  ASA Score- 3     Anesthesia Type- IV sedation with anesthesia with ASA Monitors  Additional Monitors:   Airway Plan:           Plan Factors-Exercise tolerance (METS): <4 METS  Chart reviewed  Patient summary reviewed  Patient is not a current smoker  Induction- intravenous  Postoperative Plan-     Informed Consent- Anesthetic plan and risks discussed with patient

## 2023-03-30 NOTE — ANESTHESIA POSTPROCEDURE EVALUATION
Post-Op Assessment Note    CV Status:  Stable    Pain management: adequate     Mental Status:  Alert and awake   Hydration Status:  Euvolemic   PONV Controlled:  Controlled   Airway Patency:  Patent      Post Op Vitals Reviewed: Yes      Staff: Anesthesiologist         No notable events documented      /55 (03/30/23 1242)    Temp      Pulse 69 (03/30/23 1242)   Resp 20 (03/30/23 1242)    SpO2 100 % (03/30/23 1242)

## 2023-04-10 NOTE — RESULT ENCOUNTER NOTE
Please call the patient with the results    I sent the result via [employer] but received a notification that it was not viewed  The  colon polyp removed was called an adenoma  This is a pre-cancerous lesion and was completely removed   There was no evidence of cancer in the polyp       You should have the colonoscopy repeated in 3 years due to a history of colon polyps

## 2023-04-20 ENCOUNTER — APPOINTMENT (OUTPATIENT)
Dept: LAB | Age: 73
End: 2023-04-20

## 2023-04-20 DIAGNOSIS — D69.3 IDIOPATHIC THROMBOCYTOPENIC PURPURA (ITP) (HCC): ICD-10-CM

## 2023-04-20 DIAGNOSIS — C91.10 CLL (CHRONIC LYMPHOCYTIC LEUKEMIA) (HCC): ICD-10-CM

## 2023-04-20 LAB
BASOPHILS # BLD AUTO: 0.02 THOUSANDS/ΜL (ref 0–0.1)
BASOPHILS NFR BLD AUTO: 0 % (ref 0–1)
EOSINOPHIL # BLD AUTO: 0.04 THOUSAND/ΜL (ref 0–0.61)
EOSINOPHIL NFR BLD AUTO: 1 % (ref 0–6)
ERYTHROCYTE [DISTWIDTH] IN BLOOD BY AUTOMATED COUNT: 13.5 % (ref 11.6–15.1)
HCT VFR BLD AUTO: 41.5 % (ref 34.8–46.1)
HGB BLD-MCNC: 12.9 G/DL (ref 11.5–15.4)
IMM GRANULOCYTES # BLD AUTO: 0.04 THOUSAND/UL (ref 0–0.2)
IMM GRANULOCYTES NFR BLD AUTO: 1 % (ref 0–2)
LYMPHOCYTES # BLD AUTO: 0.75 THOUSANDS/ΜL (ref 0.6–4.47)
LYMPHOCYTES NFR BLD AUTO: 10 % (ref 14–44)
MCH RBC QN AUTO: 29.5 PG (ref 26.8–34.3)
MCHC RBC AUTO-ENTMCNC: 31.1 G/DL (ref 31.4–37.4)
MCV RBC AUTO: 95 FL (ref 82–98)
MONOCYTES # BLD AUTO: 0.46 THOUSAND/ΜL (ref 0.17–1.22)
MONOCYTES NFR BLD AUTO: 6 % (ref 4–12)
NEUTROPHILS # BLD AUTO: 6.28 THOUSANDS/ΜL (ref 1.85–7.62)
NEUTS SEG NFR BLD AUTO: 82 % (ref 43–75)
NRBC BLD AUTO-RTO: 0 /100 WBCS
PLATELET # BLD AUTO: 121 THOUSANDS/UL (ref 149–390)
PMV BLD AUTO: 13.3 FL (ref 8.9–12.7)
RBC # BLD AUTO: 4.38 MILLION/UL (ref 3.81–5.12)
WBC # BLD AUTO: 7.59 THOUSAND/UL (ref 4.31–10.16)

## 2023-04-24 NOTE — LETTER
04/24/23 1412   Medicare Message   Important Message from Medicare regarding Discharge Appeal Rights Given to patient/caregiver;Explained to patient/caregiver;Signed/date by patient/caregiver   Date IMM was signed 04/24/23   Time IMM was signed [id number]        March 26, 2018     Suman Hernandez, [facility]  [address] [place] [address]    Patient: Babs Corona   YOB: 1950   Date of Visit: 3/26/2018       Dear Dr Seth Castro: Thank you for referring Hafsa Sanz to me for evaluation  Below are my notes for this consultation  If you have questions, please do not hesitate to call me  I look forward to following your patient along with you  Sincerely,        Helena Guzman MD        CC: No Recipients  Helena Guzman MD  3/26/2018 10:07 AM  Sign at close encounter  Cardiology Consultation     Babs Corona  [id number]  1950  [address] [facility] [facility]      1  Dizziness  POCT ECG   2  Other fatigue     3  Abnormal EKG         Discussion/Summary:    71-year-old female without any cardiac history  She has prior testing with a stress echocardiogram in September for an abnormal EKG  EKG in the office today is unchanged from before  She is evaluated for symptoms of dizziness as well as weakness and fatigue overall  She says the symptoms are onset over the last week only  Dizziness I suspect in part is related to orthostasis  I checked orthostatic vitals in the office today, and these were normal   However, I have recommended increasing fluid intake overall which does seem to help her symptoms when she is at work  In addition to drinking water, I recommended electrolyte containing solutions such as Gatorade or sugar free options  Given prior stress echocardiogram without any clinical evidence of heart failure and unchanged EKG, I do not recommend any additional testing at this time  Weakness that she describes is nonspecific in constant  No clear cardiac etiology is identified  Abnormalities on recent blood work ordered by PCP are noted, and may be contributing  Follow-up blood work and further evaluation as per Dr Seth Castro for this        History of Present Illness:    74year-old female  She comes to the office today for evaluation of symptoms of dizziness and fatigue  She says she is very tired and has been dizzy which was onset over the last week  Prior to this, she did not experience any symptoms  Position will sometimes make things worse, standing will increase her dizziness  She got meclizine from PCP last week, this seemed to help as well  She tries to drink more fluid when she is at work  This makes her symptoms improved as well  She drinks really only coffee when she is at home, symptoms worse  Last September, she had an exercise stress echocardiogram   This was done for an abnormal EKG  She had a low exercise tolerance, but normal resting echo which I reviewed personally with the patient  Post exercise, normal augmentation  EKG in the office today unchanged from what it was before  Denies any chest discomfort  She snores, but denies any major daytime fatigue  She just feels tired all the time  Blood work was reviewed with the patient that she had done recently for her PCP  There was increase in her protein and globulin for which she has follow-up blood work schedule  Patient Active Problem List   Diagnosis    Acute colitis    Anemia    Cervical adenopathy    Depression    Diverticulitis of colon    Dyspnea    Esophageal reflux    Hypothyroidism    Rhinitis    Sjogren's syndrome ([facility] [address] )     No past medical history on file  Social History     Social History    Marital status: Single     Spouse name: N/A    Number of children: N/A    Years of education: N/A     Occupational History    Not on file  Social History Main Topics    Smoking status: Never Smoker    Smokeless tobacco: Never Used    Alcohol use No    Drug use: No    Sexual activity: Not on file     Other Topics Concern    Not on file     Social History Narrative    No narrative on file      No family history on file  No past surgical history on file      Current Outpatient Prescriptions:     ALPRAZolam (XANAX) 0 25 mg tablet, Take 1 tablet (0 25 mg total) by mouth 2 (two) times a day as needed for anxiety, Disp: 30 tablet, Rfl: 0    ferrous sulfate 325 (65 Fe) mg tablet, Take 325 mg by mouth daily with breakfast, Disp: , Rfl:     ibuprofen (ADVIL) 200 mg tablet, Take by mouth, Disp: , Rfl:     levothyroxine 75 mcg tablet, Take 1 tablet by mouth daily, Disp: 30 tablet, Rfl: 5    meclizine (ANTIVERT) 25 mg tablet, Take 1 tablet (25 mg total) by mouth 3 (three) times a day as needed for dizziness, Disp: 30 tablet, Rfl: 0    Multiple Vitamin tablet, Take by mouth, Disp: , Rfl:     omeprazole (PriLOSEC) 20 mg delayed release capsule, Take 1 capsule by mouth daily, Disp: , Rfl:   No Known Allergies    Vitals:    03/26/18 0848   BP: 130/78   BP Location: Right arm   Patient Position: Sitting   Cuff Size: Standard   Pulse: 80   Resp: 16   Weight: 58 2 kg (128 lb 6 4 oz)   Height: 5' 5 5" (1 664 m)     Vitals:    03/26/18 0848   Weight: 58 2 kg (128 lb 6 4 oz)      Height: 5' 5 5" (166 4 cm)   Body mass index is 21 04 kg/m²  Physical Exam:  GENERAL: Alert, well appearing, and in no distress  HEENT:  PERRL, EOMI, no scleral icterus, no conjunctival pallor  NECK:  Supple, No elevated JVP, no thyromegaly, no carotid bruits  HEART:  Regular rate and rhythm, normal S1/S2, no S3/S4, no murmur or rub  LUNGS:  Clear to auscultation bilaterally  ABDOMEN:  Soft, non-tender, positive bowel sounds, no rebound or guarding  EXTREMITIES:  No edema  VASCULAR:  Normal pedal pulses   NEURO: No focal deficits,  SKIN: Normal without suspicious lesions on exposed skin    ROS:  Positive for fatigue, weakness, snoring, dizziness  Except as noted in HPI, is otherwise reviewed in detail and a 12 point review of systems is negative      Labs:  Lab Results   Component Value Date     09/14/2017    K 4 5 09/14/2017     09/14/2017    CREATININE 0 73 03/20/2018    BUN 16 03/20/2018    CO2 27 09/14/2017 ALT 11 (L) 09/14/2017    AST 21 09/14/2017    GLUF 82 09/14/2017    WBC 5 42 09/14/2017    HGB 11 6 03/20/2018    HCT 34 3 03/20/2018     03/20/2018       Lab Results   Component Value Date    CHOL 151 09/14/2017     Lab Results   Component Value Date    HDL 35 (L) 09/14/2017     Lab Results   Component Value Date    LDLCALC 93 09/14/2017     Lab Results   Component Value Date    TRIG 115 09/14/2017       Imaging:  Stress Echo 9/25/17:  STRESS RESULTS: Duration of exercise was 4 min and 59 sec  The patient exercised to protocol stage 2  Maximal work rate was 6 METs  Maximal heart rate during stress was 142 bpm ( 91 % of maximal predicted heart rate)  Target heart rate was  achieved  The heart rate response to stress was exaggerated  Maximal systolic blood pressure during stress was 164 mmHg  There was normal resting blood pressure with an appropriate response to stress  The rate-pressure product for the peak  heart rate and blood pressure was 24978  There was no chest pain during stress  The stress test was terminated due to moderate dyspnea and moderate fatigue      ECG CONCLUSIONS: The stress ECG was negative for ischemia  Arrhythmia during stress: isolated premature ventricular beats and pairs of premature ventricular beats      STRESS 2D ECHO RESULTS:     BASELINE: Left ventricular size was normal  Mild TR with normal PA pressures  mild MAC noted Overall left ventricular systolic function was normal  Estimated left ventricular ejection fraction was 60 %       PEAK STRESS: There was an appropriate reduction in left ventricular size  There was an appropriate augmentation in LV function      EKG:  Sinus rhythm, 80 beats per minute  Nonspecific T wave abnormality inferior and anterolateral leads

## 2023-04-27 ENCOUNTER — HOSPITAL ENCOUNTER (OUTPATIENT)
Dept: INFUSION CENTER | Facility: HOSPITAL | Age: 73
Discharge: HOME | End: 2023-04-27

## 2023-04-27 DIAGNOSIS — D69.3 IDIOPATHIC THROMBOCYTOPENIC PURPURA (ITP) (HCC): ICD-10-CM

## 2023-04-27 DIAGNOSIS — C91.10 CLL (CHRONIC LYMPHOCYTIC LEUKEMIA) (HCC): Primary | ICD-10-CM

## 2023-04-27 RX ADMIN — ROMIPLOSTIM 125 MCG: 125 INJECTION, POWDER, LYOPHILIZED, FOR SOLUTION SUBCUTANEOUS at 14:00

## 2023-05-01 DIAGNOSIS — F32.A DEPRESSION, UNSPECIFIED DEPRESSION TYPE: ICD-10-CM

## 2023-05-23 ENCOUNTER — APPOINTMENT (OUTPATIENT)
Dept: LAB | Age: 73
End: 2023-05-23

## 2023-05-26 ENCOUNTER — HOSPITAL ENCOUNTER (OUTPATIENT)
Dept: INFUSION CENTER | Facility: HOSPITAL | Age: 73
End: 2023-05-26

## 2023-05-26 DIAGNOSIS — C91.10 CLL (CHRONIC LYMPHOCYTIC LEUKEMIA) (HCC): Primary | ICD-10-CM

## 2023-05-26 DIAGNOSIS — D69.3 IDIOPATHIC THROMBOCYTOPENIC PURPURA (ITP) (HCC): ICD-10-CM

## 2023-05-26 RX ADMIN — ROMIPLOSTIM 125 MCG: 125 INJECTION, POWDER, LYOPHILIZED, FOR SOLUTION SUBCUTANEOUS at 13:02

## 2023-05-26 NOTE — PLAN OF CARE
Problem: Potential for Falls  Goal: Patient will remain free of falls  Description: INTERVENTIONS:  - Educate patient/family on patient safety including physical limitations  - Instruct patient to call for assistance with activity   - Consult OT/PT to assist with strengthening/mobility   - Keep Call bell within reach  - Keep bed low and locked with side rails adjusted as appropriate  - Keep care items and personal belongings within reach  - Consider moving patient to room near nurses station  Outcome: Progressing

## 2023-06-05 ENCOUNTER — TELEPHONE (OUTPATIENT)
Dept: OTHER | Facility: OTHER | Age: 73
End: 2023-06-05

## 2023-06-05 NOTE — TELEPHONE ENCOUNTER
Call from patient requesting that [facility] be set up for her appointment on Wednesday  Please call her back to confirm

## 2023-06-07 ENCOUNTER — LAB (OUTPATIENT)
Dept: LAB | Facility: MEDICAL CENTER | Age: 73
End: 2023-06-07
Payer: MEDICARE

## 2023-06-07 ENCOUNTER — OFFICE VISIT (OUTPATIENT)
Dept: GASTROENTEROLOGY | Facility: MEDICAL CENTER | Age: 73
End: 2023-06-07
Payer: MEDICARE

## 2023-06-07 ENCOUNTER — TELEPHONE (OUTPATIENT)
Dept: GASTROENTEROLOGY | Facility: MEDICAL CENTER | Age: 73
End: 2023-06-07

## 2023-06-07 VITALS
DIASTOLIC BLOOD PRESSURE: 76 MMHG | WEIGHT: 124 LBS | BODY MASS INDEX: 19.93 KG/M2 | SYSTOLIC BLOOD PRESSURE: 114 MMHG | HEIGHT: 66 IN

## 2023-06-07 DIAGNOSIS — K52.9 CHRONIC DIARRHEA: ICD-10-CM

## 2023-06-07 DIAGNOSIS — K52.9 CHRONIC DIARRHEA: Primary | ICD-10-CM

## 2023-06-07 DIAGNOSIS — R63.4 WEIGHT LOSS, NON-INTENTIONAL: ICD-10-CM

## 2023-06-07 DIAGNOSIS — K62.5 BRBPR (BRIGHT RED BLOOD PER RECTUM): ICD-10-CM

## 2023-06-07 LAB — IGA SERPL-MCNC: 191 MG/DL (ref 70–400)

## 2023-06-07 PROCEDURE — 99214 OFFICE O/P EST MOD 30 MIN: CPT | Performed by: INTERNAL MEDICINE

## 2023-06-07 PROCEDURE — 82784 ASSAY IGA/IGD/IGG/IGM EACH: CPT

## 2023-06-07 PROCEDURE — 86364 TISS TRNSGLTMNASE EA IG CLAS: CPT

## 2023-06-07 PROCEDURE — 36415 COLL VENOUS BLD VENIPUNCTURE: CPT

## 2023-06-07 NOTE — PATIENT INSTRUCTIONS
Scheduled date of EGD (as of today) 8/11/2023  Physician performing: Fidelia Delarosa  Location of procedure:  [facility] [place]  Instructions given to patient:  EGD  Clearances: n/a

## 2023-06-07 NOTE — PROGRESS NOTES
[facility] - Outpatient Follow-up Note  Dony Potts 72 y o  female MRN: [id number]  Encounter: [id number]          ASSESSMENT AND PLAN:  46-year-old female with history of hypothyroidism, CLL, ITP Sjogren's syndrome who presents for follow-up evaluation      1  Chronic diarrhea  She was evaluated for chronic diarrhea underwent colonoscopy with random colon biopsies which were normal   She was previously ordered for celiac testing and Giardia antigen, fecal calprotectin which were not performed  I reprinted her orders today and recommended that she have these done to complete her evaluation  If testing is negative symptoms may be related to IBS and she can continue to use Imodium as needed    2  BRBPR (bright red blood per rectum)  She previously had bright red blood per rectum and was found to have small internal hemorrhoids on her recent colonoscopy, likely the etiology of her bright red blood per rectum  Continue high-fiber diet and avoiding straining      3  Weight loss, non-intentional  She reports an unintentional weight loss of 10 pounds in the last 3 months and low appetite  CT chest abdomen pelvis is ordered given her unintentional weight loss in addition to EGD and H  pylori stool antigen for further work-up  We discussed that if this testing is negative she should continue to follow with her primary care provider to discuss additional testing and nutrition supplements as needed  - CT chest abdomen pelvis w contrast; Future  - H  pylori antigen, stool; Future  - EGD; Future        ______________________________________________________________________    SUBJECTIVE: 46-year-old female with history of hypothyroidism, CLL, ITP Sjogren's syndrome who presents for follow-up evaluation    She was last seen in the GI office in March 2023  She had previously been seen in the ER in January for bright red blood per rectum    She also had intermittent chronic diarrhea for some time   She was ordered for serologic testing for celiac disease and fecal calprotectin which were not performed  Interval history: She underwent colonoscopy March 2023 showing 3 subcentimeter colon polyps, mild sigmoid diverticulosis and small internal hemorrhoids  Pathology showed tubular adenoma she was recommended repeat in 3 years  Random colon biopsies were unremarkable  She reports low appetite and unintentional weight loss of 10 pounds since her last visit  She reports eating 1 meal per day  She denies abdominal pain     She continues to have 1-2 loose stools per day without melena or hematochezia  She is using Imodium as needed with good relief of diarrhea symptoms    Prior EGD/colonoscopy     She reportedly underwent a colonoscopy in 2012 which was normal   Procedure report is not available for review    REVIEW OF SYSTEMS IS OTHERWISE NEGATIVE    10 point review of systems is negative other than stated as per HPI    Historical Information   Past Medical History:   Diagnosis Date   • Anxiety    • Autoimmune hemolytic anemia (HCC)    • Cataract    • Disease of thyroid gland    • 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HCC)    • GERD (gastroesophageal reflux disease)    • Hypothyroidism    • Hypothyroidism    • Idiopathic thrombocytopenic purpura (ITP) (HCC)    • Menopause    • Sjogrens syndrome (HCC)      Past Surgical History:   Procedure Laterality Date   • CATARACT EXTRACTION Left    • COLONOSCOPY  2012    per pt   • TOOTH EXTRACTION       Social History   Social History     Substance and Sexual Activity   Alcohol Use No     Social History     Substance and Sexual Activity   Drug Use No     Social History     Tobacco Use   Smoking Status Never   Smokeless Tobacco Never     Family History   Problem Relation Age of Onset   • Colon cancer Mother    • Heart disease Mother         cardiac disorder    • Cancer Mother    • Liver cancer Father    • Cancer Father    • Heart disease Other         cardiac disorder "      Meds/Allergies       Current Outpatient Medications:   •  Acalabrutinib Maleate (Calquence) 100 MG TABS  •  ALPRAZolam (XANAX) 0 5 mg tablet  •  cycloSPORINE (RESTASIS) 0 05 % ophthalmic emulsion  •  gabapentin (NEURONTIN) 300 mg capsule  •  levothyroxine 75 mcg tablet  •  methocarbamol (ROBAXIN) 500 mg tablet  •  predniSONE 5 mg tablet  •  sertraline (ZOLOFT) 50 mg tablet  •  polyethylene glycol (MIRALAX) 17 g packet    No Known Allergies        Objective     Blood pressure 114/76, height 5' 5 5\" (1 664 m), weight 56 2 kg (124 lb)  Body mass index is 20 32 kg/m²  PHYSICAL EXAM:      General Appearance:   Alert, cooperative, no distress   HEENT:   Normocephalic, atraumatic, anicteric  Neck:  Supple, symmetrical, trachea midline   Lungs:   Clear to auscultation bilaterally; no rales, rhonchi or wheezing; respirations unlabored    Heart[de-identified]   Regular rate and rhythm; no murmur, rub, or gallop  Abdomen:   Soft, non-tender, non-distended; normal bowel sounds; no masses, no organomegaly    Genitalia:   Deferred    Rectal:   Deferred    Extremities:  No cyanosis, clubbing or edema    Pulses:  2+ and symmetric    Skin:  No jaundice, rashes, or lesions    Lymph nodes:  No palpable cervical lymphadenopathy        Lab Results:   No visits with results within 1 Day(s) from this visit  Latest known visit with results is:    Ancillary Orders on 05/19/2023   Component Date Value   • WBC 05/23/2023 5 96    • RBC 05/23/2023 4 58    • Hemoglobin 05/23/2023 13 1    • Hematocrit 05/23/2023 43 2    • MCV 05/23/2023 94    • MCH 05/23/2023 28 6    • MCHC 05/23/2023 30 3 (L)    • RDW 05/23/2023 14 0    • MPV 05/23/2023 13 2 (H)    • Platelets 55/73/9857 101 (L)    • nRBC 05/23/2023 0    • Neutrophils Relative 05/23/2023 78 (H)    • Immat GRANS % 05/23/2023 1    • Lymphocytes Relative 05/23/2023 14    • Monocytes Relative 05/23/2023 6    • Eosinophils Relative 05/23/2023 1    • Basophils Relative 05/23/2023 0    • " Neutrophils Absolute 05/23/2023 4 68    • Immature Grans Absolute 05/23/2023 0 04    • Lymphocytes Absolute 05/23/2023 0 86    • Monocytes Absolute 05/23/2023 0 33    • Eosinophils Absolute 05/23/2023 0 04    • Basophils Absolute 05/23/2023 0 01          Radiology Results:   No results found

## 2023-06-08 ENCOUNTER — TELEPHONE (OUTPATIENT)
Dept: HEMATOLOGY ONCOLOGY | Facility: CLINIC | Age: 73
End: 2023-06-08

## 2023-06-08 LAB — TTG IGA SER-ACNC: <2 U/ML (ref 0–3)

## 2023-06-08 NOTE — TELEPHONE ENCOUNTER
I called [facility] regarding an appointment that they have scheduled with Breezy Pereyra PA-C scheduled on 6/27/23  I left a voicemail explaining to patient that the provider will be out of the office on this date and will need to reschedule the visit  I encouraged patient to call Myrtle at [phone] to reschedule  A [email or web address] message has been sent to patient relaying the above information and advising patient to call Myrtle and reschedule their appointment  The patient is able to be offered an appointment time in a new patient slot

## 2023-06-12 ENCOUNTER — APPOINTMENT (OUTPATIENT)
Dept: LAB | Age: 73
End: 2023-06-12
Payer: MEDICARE

## 2023-06-12 DIAGNOSIS — E34.9 OSTEOPOROSIS OF FOREARM ASSOCIATED WITH ENDOCRINE DISORDER: ICD-10-CM

## 2023-06-12 DIAGNOSIS — D59.10 AUTOIMMUNE HEMOLYTIC ANEMIA (HCC): ICD-10-CM

## 2023-06-12 DIAGNOSIS — D69.3 IDIOPATHIC THROMBOCYTOPENIC PURPURA (ITP) (HCC): ICD-10-CM

## 2023-06-12 DIAGNOSIS — C91.10 CLL (CHRONIC LYMPHOCYTIC LEUKEMIA) (HCC): ICD-10-CM

## 2023-06-12 DIAGNOSIS — K62.5 RECTAL BLEED: ICD-10-CM

## 2023-06-12 DIAGNOSIS — M81.8 OSTEOPOROSIS OF FOREARM ASSOCIATED WITH ENDOCRINE DISORDER: ICD-10-CM

## 2023-06-12 LAB
ALBUMIN SERPL BCP-MCNC: 2.9 G/DL (ref 3.5–5)
ALP SERPL-CCNC: 55 U/L (ref 46–116)
ALT SERPL W P-5'-P-CCNC: 14 U/L (ref 12–78)
ANION GAP SERPL CALCULATED.3IONS-SCNC: 5 MMOL/L (ref 4–13)
AST SERPL W P-5'-P-CCNC: 17 U/L (ref 5–45)
BASOPHILS # BLD AUTO: 0.03 THOUSANDS/ÂΜL (ref 0–0.1)
BASOPHILS NFR BLD AUTO: 1 % (ref 0–1)
BILIRUB SERPL-MCNC: 0.36 MG/DL (ref 0.2–1)
BUN SERPL-MCNC: 12 MG/DL (ref 5–25)
CALCIUM ALBUM COR SERPL-MCNC: 10.1 MG/DL (ref 8.3–10.1)
CALCIUM SERPL-MCNC: 9.2 MG/DL (ref 8.3–10.1)
CHLORIDE SERPL-SCNC: 113 MMOL/L (ref 96–108)
CO2 SERPL-SCNC: 26 MMOL/L (ref 21–32)
CREAT SERPL-MCNC: 0.8 MG/DL (ref 0.6–1.3)
EOSINOPHIL # BLD AUTO: 0.11 THOUSAND/ÂΜL (ref 0–0.61)
EOSINOPHIL NFR BLD AUTO: 2 % (ref 0–6)
ERYTHROCYTE [DISTWIDTH] IN BLOOD BY AUTOMATED COUNT: 14.6 % (ref 11.6–15.1)
FERRITIN SERPL-MCNC: 73 NG/ML (ref 11–307)
GFR SERPL CREATININE-BSD FRML MDRD: 74 ML/MIN/1.73SQ M
GLUCOSE SERPL-MCNC: 95 MG/DL (ref 65–140)
HCT VFR BLD AUTO: 42.1 % (ref 34.8–46.1)
HGB BLD-MCNC: 13.1 G/DL (ref 11.5–15.4)
IGA SERPL-MCNC: 171 MG/DL (ref 70–400)
IGG SERPL-MCNC: 986 MG/DL (ref 700–1600)
IGM SERPL-MCNC: 88 MG/DL (ref 40–230)
IMM GRANULOCYTES # BLD AUTO: 0.06 THOUSAND/UL (ref 0–0.2)
IMM GRANULOCYTES NFR BLD AUTO: 1 % (ref 0–2)
IRON SATN MFR SERPL: 22 % (ref 15–50)
IRON SERPL-MCNC: 68 UG/DL (ref 50–170)
LYMPHOCYTES # BLD AUTO: 0.7 THOUSANDS/ÂΜL (ref 0.6–4.47)
LYMPHOCYTES NFR BLD AUTO: 12 % (ref 14–44)
MCH RBC QN AUTO: 29.4 PG (ref 26.8–34.3)
MCHC RBC AUTO-ENTMCNC: 31.1 G/DL (ref 31.4–37.4)
MCV RBC AUTO: 95 FL (ref 82–98)
MONOCYTES # BLD AUTO: 0.39 THOUSAND/ÂΜL (ref 0.17–1.22)
MONOCYTES NFR BLD AUTO: 7 % (ref 4–12)
NEUTROPHILS # BLD AUTO: 4.49 THOUSANDS/ÂΜL (ref 1.85–7.62)
NEUTS SEG NFR BLD AUTO: 77 % (ref 43–75)
NRBC BLD AUTO-RTO: 0 /100 WBCS
PLATELET # BLD AUTO: 216 THOUSANDS/UL (ref 149–390)
PMV BLD AUTO: 12.1 FL (ref 8.9–12.7)
POTASSIUM SERPL-SCNC: 4.2 MMOL/L (ref 3.5–5.3)
PROT SERPL-MCNC: 6.4 G/DL (ref 6.4–8.4)
RBC # BLD AUTO: 4.45 MILLION/UL (ref 3.81–5.12)
SODIUM SERPL-SCNC: 144 MMOL/L (ref 135–147)
TIBC SERPL-MCNC: 305 UG/DL (ref 250–450)
WBC # BLD AUTO: 5.78 THOUSAND/UL (ref 4.31–10.16)

## 2023-06-12 PROCEDURE — 80053 COMPREHEN METABOLIC PANEL: CPT

## 2023-06-12 PROCEDURE — 85025 COMPLETE CBC W/AUTO DIFF WBC: CPT

## 2023-06-12 PROCEDURE — 82728 ASSAY OF FERRITIN: CPT

## 2023-06-12 PROCEDURE — 36415 COLL VENOUS BLD VENIPUNCTURE: CPT

## 2023-06-12 PROCEDURE — 83550 IRON BINDING TEST: CPT

## 2023-06-12 PROCEDURE — 82784 ASSAY IGA/IGD/IGG/IGM EACH: CPT

## 2023-06-12 PROCEDURE — 83540 ASSAY OF IRON: CPT

## 2023-06-16 ENCOUNTER — TELEPHONE (OUTPATIENT)
Dept: HEMATOLOGY ONCOLOGY | Facility: CLINIC | Age: 73
End: 2023-06-16

## 2023-06-16 NOTE — TELEPHONE ENCOUNTER
Appointment Change  Cancel, Reschedule, Change to Virtual      Who are you speaking with? Patient   If it is not the patient, are they listed on an active communication consent form? N/A   Which provider is the appointment scheduled with? Kenn Gillette PA-C   When is the appointment scheduled? Please list date and time 6/27/23 2:00PM   At which location is the appointment scheduled to take place? [facility]   Was the appointment rescheduled or changed from an in person visit to a virtual visit? If so, please list the details of the change  8/15/23 1:00PM   What is the reason for the appointment change? Provider   Was [facility] scheduled for this visit? Yes   Does [facility] need to be scheduled for the new visit (if applicable) Yes   Does the patient need an infusion appointment rescheduled? No   Does the patient have an infusion appointment scheduled? If so, when? Yes, 6/27/23  Is the patient undergoing chemotherapy? No   Was the no-show policy reviewed for appointments being changed with less then 24 hours of notice?  No

## 2023-06-23 NOTE — RESULT ENCOUNTER NOTE
Please call the patient with the results    I sent the result via [employer] but received a notification that it was not viewed      Bloodwork testing for celiac disease is negative

## 2023-06-27 ENCOUNTER — HOSPITAL ENCOUNTER (OUTPATIENT)
Dept: INFUSION CENTER | Facility: HOSPITAL | Age: 73
Discharge: HOME | End: 2023-06-27
Attending: INTERNAL MEDICINE
Payer: MEDICARE

## 2023-06-27 VITALS
TEMPERATURE: 97.8 F | RESPIRATION RATE: 18 BRPM | DIASTOLIC BLOOD PRESSURE: 72 MMHG | HEART RATE: 72 BPM | SYSTOLIC BLOOD PRESSURE: 116 MMHG

## 2023-06-27 DIAGNOSIS — D69.3 IDIOPATHIC THROMBOCYTOPENIC PURPURA (ITP) (HCC): ICD-10-CM

## 2023-06-27 DIAGNOSIS — M81.8 OSTEOPOROSIS OF FOREARM ASSOCIATED WITH ENDOCRINE DISORDER: Primary | ICD-10-CM

## 2023-06-27 DIAGNOSIS — E34.9 OSTEOPOROSIS OF FOREARM ASSOCIATED WITH ENDOCRINE DISORDER: Primary | ICD-10-CM

## 2023-06-27 DIAGNOSIS — C91.10 CLL (CHRONIC LYMPHOCYTIC LEUKEMIA) (HCC): ICD-10-CM

## 2023-06-27 RX ADMIN — DENOSUMAB 60 MG: 60 INJECTION SUBCUTANEOUS at 13:47

## 2023-06-27 RX ADMIN — ROMIPLOSTIM 125 MCG: 125 INJECTION, POWDER, LYOPHILIZED, FOR SOLUTION SUBCUTANEOUS at 13:48

## 2023-06-27 NOTE — PROGRESS NOTES
Prolia and Nplate injections given without incident  Patient provided printed AVS   Patient to return to department as scheduled

## 2023-06-29 DIAGNOSIS — E03.9 HYPOTHYROIDISM, UNSPECIFIED TYPE: ICD-10-CM

## 2023-06-29 RX ORDER — LEVOTHYROXINE SODIUM 0.07 MG/1
TABLET ORAL
Qty: 30 TABLET | Refills: 1 | Status: SHIPPED | OUTPATIENT
Start: 2023-06-29

## 2023-07-19 ENCOUNTER — APPOINTMENT (OUTPATIENT)
Dept: LAB | Age: 73
End: 2023-07-19
Payer: MEDICARE

## 2023-07-24 ENCOUNTER — TELEPHONE (OUTPATIENT)
Dept: GASTROENTEROLOGY | Facility: MEDICAL CENTER | Age: 73
End: 2023-07-24

## 2023-07-25 ENCOUNTER — HOSPITAL ENCOUNTER (OUTPATIENT)
Dept: INFUSION CENTER | Facility: HOSPITAL | Age: 73
Discharge: HOME | End: 2023-07-25
Attending: INTERNAL MEDICINE
Payer: MEDICARE

## 2023-07-25 VITALS
DIASTOLIC BLOOD PRESSURE: 71 MMHG | RESPIRATION RATE: 16 BRPM | SYSTOLIC BLOOD PRESSURE: 125 MMHG | TEMPERATURE: 97.2 F | HEART RATE: 56 BPM

## 2023-07-25 DIAGNOSIS — D69.3 IDIOPATHIC THROMBOCYTOPENIC PURPURA (ITP) (HCC): ICD-10-CM

## 2023-07-25 DIAGNOSIS — C91.10 CLL (CHRONIC LYMPHOCYTIC LEUKEMIA) (HCC): Primary | ICD-10-CM

## 2023-07-25 RX ADMIN — ROMIPLOSTIM 125 MCG: 125 INJECTION, POWDER, LYOPHILIZED, FOR SOLUTION SUBCUTANEOUS at 12:59

## 2023-07-26 NOTE — TELEPHONE ENCOUNTER
I called and spoke with patient attempting to confirm 8/11 procedure. Patient requested to cancel both the procedure and the follow up. Patient did not state why. I asked if patient wanted to reschedule and patient declined.

## 2023-07-28 DIAGNOSIS — G25.81 RLS (RESTLESS LEGS SYNDROME): ICD-10-CM

## 2023-07-28 RX ORDER — GABAPENTIN 300 MG/1
300 CAPSULE ORAL
Qty: 30 CAPSULE | Refills: 5 | Status: SHIPPED | OUTPATIENT
Start: 2023-07-28

## 2023-08-10 ENCOUNTER — TELEPHONE (OUTPATIENT)
Dept: HEMATOLOGY ONCOLOGY | Facility: CLINIC | Age: 73
End: 2023-08-10

## 2023-08-10 NOTE — TELEPHONE ENCOUNTER
Received from voice mail:    "My name, my name is [address] and I have an appointment with Doctor Anthony Alberts on the 15th and [facility] gonna pick [facility] up. I was just wanting to make sure that they were gonna come. Thank you."      Lilian Geiger, would you please confirm with [facility]  for patient's 8/15 office appt at 1 pm? Let patient know it's been confirmed. Thank you!

## 2023-08-15 ENCOUNTER — OFFICE VISIT (OUTPATIENT)
Dept: HEMATOLOGY ONCOLOGY | Facility: CLINIC | Age: 73
End: 2023-08-15
Payer: MEDICARE

## 2023-08-15 VITALS
HEIGHT: 66 IN | BODY MASS INDEX: 20.09 KG/M2 | HEART RATE: 78 BPM | TEMPERATURE: 98.5 F | WEIGHT: 125 LBS | SYSTOLIC BLOOD PRESSURE: 121 MMHG | RESPIRATION RATE: 16 BRPM | OXYGEN SATURATION: 98 % | DIASTOLIC BLOOD PRESSURE: 77 MMHG

## 2023-08-15 DIAGNOSIS — R90.89 ABNORMAL IMAGING OF CENTRAL NERVOUS SYSTEM: ICD-10-CM

## 2023-08-15 DIAGNOSIS — D69.3 IDIOPATHIC THROMBOCYTOPENIC PURPURA (ITP) (HCC): Primary | ICD-10-CM

## 2023-08-15 DIAGNOSIS — R25.1 TREMOR: ICD-10-CM

## 2023-08-15 DIAGNOSIS — G25.81 RLS (RESTLESS LEGS SYNDROME): ICD-10-CM

## 2023-08-15 DIAGNOSIS — R42 DIZZINESS: ICD-10-CM

## 2023-08-15 DIAGNOSIS — R26.89 BALANCE DISORDER: ICD-10-CM

## 2023-08-15 DIAGNOSIS — C91.10 CLL (CHRONIC LYMPHOCYTIC LEUKEMIA) (HCC): ICD-10-CM

## 2023-08-15 PROCEDURE — 99215 OFFICE O/P EST HI 40 MIN: CPT | Performed by: PHYSICIAN ASSISTANT

## 2023-08-15 NOTE — PROGRESS NOTES
Hematology/Oncology Outpatient Follow- up Note  Yokasta Schilling 72 y.o. female MRN: @ Encounter: [id number]        Date:  8/15/2023      Assessment / Plan:    Chronic ITP, Presented 6/2020 with platelet count 7481. She responded inititially to steroids, counts dropped with taper. No prolonged response to Promacta. Prednisone 40 mg po daily restarted 9/1/2020, reduced by 10 mg po weekly. She was on prednisone 5mg po daily. 11/2/20 platelet count 36,553. Platelet count 49696 11/12/20 Prednisone increased to 10mg daily 11/17/20, increased to 20mg 11/23/20 when platelets decreased to 12,000.    One prednisone 5 mg alternating with 10 mg the next day, reduced to 5 mg PO daily 11/2022. Reduced to 2.5mg po daily 2/7/23.     8/2023- d/c steroid     Continue N-plate 241 mcg every 4 weeks     2. 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 Continue     3. CLL/ SLL on acalabrutinib 100 mg p.o. daily. CT scan on 10/2021 showed resolution of retroperitoneal, retrocrural lymphadenopathy. Continue acalabrutinib.     4. 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 Recent rectal bleeding. colonoscopy 3/2023 unremarkable. EGD ordered by GI 6/2023. Patient cancelled. CT C/A/P ordered 2nd to unintentional weight loss  132 3/2023 -> 124 6/2023.         6.  Dizziness, balance issues. Tremors. Abnormal flair signal on prior MRI 2018. She is worried she may have MS. Neurology evaluation discussed and made. Decreased  appetite. May be secondary to tapering off steroids. Will see if it worsens off steroids. May need appetite stimulant. 7 Worsening hearing. Advised to f/u with PCP.       7     HPI:   Akbar Kolb is a 80-year-old  female who was admitted to [facility] 6/2020 regarding easy bruising and bleeding.      She has history of polyclonal gammopathy, with no evidence of monoclonal protein, Sjogren syndrome, autoimmune connective tissue disease with highly elevated sed rate, CRP, BALDO, rheumatoid factor. PT and PTT normal.      She is followed by Dr. Stevenson Cockayne with Rheumatology. She was prescribed hydroxychloroquine 200 milligram p.o. B.i.d.         6/18/2020 She was found to have grade 4 thrombocytopenia with platelet count of 2956, hemoglobin 10, WBC 7.2, 63% neutrophils, 34 percent lymphocytes.     Flow cytometry on the peripheral blood showed CLL pattern positive for CD 23, CD 20   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 with PATTI positive 4+for IgG, plus for C3, bilirubin 0.5      CT scan of the chest abdomen pelvis on 06/19/2020 showed mild confluent periaortic/retroperitoneal lymphadenopathy measuring up to 1.1 centimeter in short axis, bilateral external iliac lymphadenopathy up to 10 millimeters, retrocrural lymph node measuring 1.3 centimeters spleen 15 centimeter, liver with hepatomegaly no evidence of masses .     She was treated with dexamethasone for 3 days with improvement of platelet count up to 62,000 6/22/21      On 06/25/2020 platelet count of 77,214      Initiated on Promacta 75 milligram p.o. Daily in July 2020, platelet count up gradually with platelet count of 44,776 on 08/17/2020. hemoglobin 11.4, WBC 5.0   8/28/20: Patient had CBC as gums were bleeding that morning AM, no further bleeding. Platelet count was 31,312.      Promacta increased to 150mg every other day and 75 mg the other days. Platelet count was 33,564 8/31/20 and she was advised to initiate prednisone 40mg po daily, tapering by 10mg po weekly. Promacta returned to 75mg po daily. Platelet count increased to 173,000 9/4/20.      She had BMBX 9/9/20: B-cell lymphoproliferative disorder, compatible with chronic lymphocytic leukemia. Virtually absent stainable storage iron. On flow cytometry, CLL phenotype accounted for 11%; CD5+, CD23+, CD20+ (dim) and CD 38-      9/14/20: platelet count 448.    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    Initiated on acalabrutinib 100 mg p.o. b.i.d. since the beginning of October 2020.     She was seen on 10/14/2020 with severe depression requiring admission to the hospital, she is on Zoloft 25 mg p.o. daily, Synthroid 75 micro g p.o. daily      Acalabrutinib was reduced to 100 mg p.o. daily 11/2020   Recurrence of thrombocytopenia 27,000 11/2/20 once prednisone was discontinued. Prednisone re-introduced back at 5 mg p.o. daily, platelet count increased to 50,000 range, fluctuated to 20,000 range, prednisone increased to 10mg daily 11/17/20, increased to 20mg 11/23/20 when platelets decreased to 12,000.      N-plate ordered 10/86/34 when platelet count 26,491 12/15/20. And given every 4 weeks      Prednisone has been slowly tapered. 5 mg alternating with 10 mg the next day     Dexa scan showed osteoporosis in August 2022 1/30/23 presented to ED for BRBPR. On examination - No external hemorrhoids. No anal fissure. Small amount of bright red blood on digital rectal exam. No pain with digital rectal exam. Small amount of blood in pad. Hemoglobin 13. 2. referral to GI made. UTI 2/3/23, abx rx at urgent care.     Interval History:        3/30/23  Colonoscopy-sessile polyps removed from the cecum, small internal hemorrhoids, scattered diverticula. Random colon biopsies negative      At her June 7, 2023 f/u with GI reported unintentional weight loss of 10 pounds over 3 months and decreased appetite. CT scan chest abdomen pelvis was ordered    EGD ordered 6/7/23. Patient cancelled her procedure 8/11/23 and didn't want to r/s.    6/12/23  Albumin 2.9; Total protein 6.4, normal quantitative immunoglobulins, ferritin 73, iron saturation 22%    7/13/23  Normal cbcd    Review of Systems   Constitutional: Positive for appetite change. Negative for chills, diaphoresis, fatigue, fever and unexpected weight change. HENT:   Positive for hearing loss.  Negative for mouth sores, nosebleeds, sore throat, tinnitus and voice change. Eyes: Negative for eye problems. Respiratory: Negative for chest tightness, cough, shortness of breath and wheezing. Cardiovascular: Negative for chest pain, leg swelling and palpitations. Gastrointestinal: Negative for abdominal distention, abdominal pain, blood in stool, constipation, diarrhea, nausea, rectal pain and vomiting. Endocrine: Negative for hot flashes. Genitourinary: Negative. Musculoskeletal: Positive for gait problem. Negative for myalgias. Skin: Negative for itching and rash. Neurological: Positive for dizziness, gait problem and light-headedness. Negative for headaches and numbness. Hematological: Negative for adenopathy. Psychiatric/Behavioral: Negative for confusion and sleep disturbance. The patient is not nervous/anxious. Test Results:        Labs:   Lab Results   Component Value Date    HGB 12.5 07/19/2023    HCT 41.5 07/19/2023    MCV 95 07/19/2023     07/19/2023    WBC 4.35 07/19/2023    NRBC 0 07/19/2023    BANDSPCT 1 03/03/2021    ATYLMPCT 4 (H) 04/05/2021     Lab Results   Component Value Date    K 4.2 06/12/2023     (H) 06/12/2023    CO2 26 06/12/2023    BUN 12 06/12/2023    CREATININE 0.80 06/12/2023    GLUF 90 12/14/2022    CALCIUM 9.2 06/12/2023    CORRECTEDCA 10.1 06/12/2023    AST 17 06/12/2023    ALT 14 06/12/2023    ALKPHOS 55 06/12/2023    EGFR 74 06/12/2023           Imaging: No results found. Allergies: No Known Allergies  Current Medications: Reviewed  PMH/FH/SH:  Reviewed      Physical Exam:    Weight 125lbs 8/15/23    There is no height or weight on file to calculate BSA.     Ht Readings from Last 3 Encounters:   06/07/23 5' 5.5" (1.664 m)   03/30/23 5' 5.75" (1.67 m)   03/08/23 5' 5.75" (1.67 m)        Wt Readings from Last 3 Encounters:   06/07/23 56.2 kg (124 lb)   03/30/23 59.9 kg (132 lb)   03/08/23 59.9 kg (132 lb)        Temp Readings from Last 3 Encounters:   07/25/23 (!) 97.2 °F (36.2 °C) (Temporal)   23 97.8 °F (36.6 °C)   23 97.9 °F (36.6 °C) (Temporal)        BP Readings from Last 3 Encounters:   23 125/71   23 116/72   23 114/76             Physical Exam  Vitals reviewed. Constitutional:       General: She is not in acute distress. Appearance: She is well-developed. She is not diaphoretic. HENT:      Head: Normocephalic and atraumatic. Eyes:      Conjunctiva/sclera: Conjunctivae normal.   Neck:      Trachea: No tracheal deviation. Cardiovascular:      Rate and Rhythm: Normal rate and regular rhythm. Heart sounds: No murmur heard. No friction rub. No gallop. Comments: Trace at ankles  Pulmonary:      Effort: Pulmonary effort is normal. No respiratory distress. Breath sounds: Normal breath sounds. No wheezing or rales. Chest:      Chest wall: No tenderness. Abdominal:      General: There is no distension. Palpations: Abdomen is soft. Tenderness: There is no abdominal tenderness. Musculoskeletal:      Cervical back: Normal range of motion and neck supple. Comments: Cane for balance   Lymphadenopathy:      Cervical: No cervical adenopathy. Skin:     General: Skin is warm and dry. Coloration: Skin is not pale. Findings: No erythema. Neurological:      Mental Status: She is alert and oriented to person, place, and time. Psychiatric:         Behavior: Behavior normal.         Thought Content:  Thought content normal.         Judgment: Judgment normal.         ECO    Emergency Contacts:    Extended Emergency Contact Information  Primary Emergency Contact: Mick Cull of [address] Phone: [phone]  Mobile Phone: [phone]  Relation: Niece

## 2023-08-17 ENCOUNTER — APPOINTMENT (OUTPATIENT)
Dept: LAB | Age: 73
End: 2023-08-17
Payer: MEDICARE

## 2023-08-22 ENCOUNTER — HOSPITAL ENCOUNTER (OUTPATIENT)
Dept: INFUSION CENTER | Facility: HOSPITAL | Age: 73
Discharge: HOME | End: 2023-08-22
Attending: INTERNAL MEDICINE
Payer: MEDICARE

## 2023-08-22 DIAGNOSIS — D69.3 IDIOPATHIC THROMBOCYTOPENIC PURPURA (ITP) (HCC): ICD-10-CM

## 2023-08-22 DIAGNOSIS — C91.10 CLL (CHRONIC LYMPHOCYTIC LEUKEMIA) (HCC): Primary | ICD-10-CM

## 2023-08-22 DIAGNOSIS — F41.9 ANXIETY: ICD-10-CM

## 2023-08-22 PROCEDURE — 96372 THER/PROPH/DIAG INJ SC/IM: CPT

## 2023-08-22 RX ADMIN — ROMIPLOSTIM 125 MCG: 125 INJECTION, POWDER, LYOPHILIZED, FOR SOLUTION SUBCUTANEOUS at 13:06

## 2023-08-23 RX ORDER — ALPRAZOLAM 0.5 MG/1
0.5 TABLET ORAL
Qty: 30 TABLET | Refills: 2 | Status: SHIPPED | OUTPATIENT
Start: 2023-08-23

## 2023-08-23 NOTE — TELEPHONE ENCOUNTER
Requested medication(s) are due for refill today: Yes  Patient has already received a courtesy refill: No  Other reason request has been forwarded to provider: failed protocol    Psychiatry:  Anxiolytics/Hypnotics Failed 08/22/2023 05:04 PM   Protocol Details  This refill cannot be delegated    Valid encounter within last 6 months

## 2023-08-29 ENCOUNTER — DOCUMENTATION (OUTPATIENT)
Dept: HEMATOLOGY ONCOLOGY | Facility: CLINIC | Age: 73
End: 2023-08-29

## 2023-08-29 NOTE — PROGRESS NOTES
vd funding renewal request from pharmacy.    Patient has been re-enrolled with [facility] ID [id number]  CARD# [id number]  BIN# [id number]  PCN# [id number]  [facility]# [id number]  EFF 8-22-23  THRU 8-21-24  AMT [id number]

## 2023-09-14 ENCOUNTER — TELEPHONE (OUTPATIENT)
Dept: HEMATOLOGY ONCOLOGY | Facility: CLINIC | Age: 73
End: 2023-09-14

## 2023-09-14 ENCOUNTER — APPOINTMENT (OUTPATIENT)
Dept: LAB | Age: 73
End: 2023-09-14
Payer: MEDICARE

## 2023-09-14 DIAGNOSIS — C91.10 CLL (CHRONIC LYMPHOCYTIC LEUKEMIA) (HCC): ICD-10-CM

## 2023-09-14 DIAGNOSIS — D69.3 IDIOPATHIC THROMBOCYTOPENIC PURPURA (ITP) (HCC): ICD-10-CM

## 2023-09-14 DIAGNOSIS — C91.10 CLL (CHRONIC LYMPHOCYTIC LEUKEMIA) (HCC): Primary | ICD-10-CM

## 2023-09-14 LAB
ALBUMIN SERPL BCP-MCNC: 3.2 G/DL (ref 3.5–5)
ALP SERPL-CCNC: 53 U/L (ref 34–104)
ALT SERPL W P-5'-P-CCNC: 7 U/L (ref 7–52)
ANION GAP SERPL CALCULATED.3IONS-SCNC: 6 MMOL/L
AST SERPL W P-5'-P-CCNC: 16 U/L (ref 13–39)
BASOPHILS # BLD AUTO: 0.02 THOUSANDS/ÂΜL (ref 0–0.1)
BASOPHILS NFR BLD AUTO: 1 % (ref 0–1)
BILIRUB SERPL-MCNC: 0.57 MG/DL (ref 0.2–1)
BUN SERPL-MCNC: 9 MG/DL (ref 5–25)
CALCIUM ALBUM COR SERPL-MCNC: 8.9 MG/DL (ref 8.3–10.1)
CALCIUM SERPL-MCNC: 8.3 MG/DL (ref 8.4–10.2)
CHLORIDE SERPL-SCNC: 110 MMOL/L (ref 96–108)
CO2 SERPL-SCNC: 26 MMOL/L (ref 21–32)
CREAT SERPL-MCNC: 0.71 MG/DL (ref 0.6–1.3)
EOSINOPHIL # BLD AUTO: 0.02 THOUSAND/ÂΜL (ref 0–0.61)
EOSINOPHIL NFR BLD AUTO: 1 % (ref 0–6)
ERYTHROCYTE [DISTWIDTH] IN BLOOD BY AUTOMATED COUNT: 14.6 % (ref 11.6–15.1)
GFR SERPL CREATININE-BSD FRML MDRD: 85 ML/MIN/1.73SQ M
GLUCOSE P FAST SERPL-MCNC: 102 MG/DL (ref 65–99)
HCT VFR BLD AUTO: 37.9 % (ref 34.8–46.1)
HGB BLD-MCNC: 11.9 G/DL (ref 11.5–15.4)
IMM GRANULOCYTES # BLD AUTO: 0.02 THOUSAND/UL (ref 0–0.2)
IMM GRANULOCYTES NFR BLD AUTO: 1 % (ref 0–2)
LYMPHOCYTES # BLD AUTO: 0.71 THOUSANDS/ÂΜL (ref 0.6–4.47)
LYMPHOCYTES NFR BLD AUTO: 16 % (ref 14–44)
MCH RBC QN AUTO: 29.3 PG (ref 26.8–34.3)
MCHC RBC AUTO-ENTMCNC: 31.4 G/DL (ref 31.4–37.4)
MCV RBC AUTO: 93 FL (ref 82–98)
MONOCYTES # BLD AUTO: 0.34 THOUSAND/ÂΜL (ref 0.17–1.22)
MONOCYTES NFR BLD AUTO: 8 % (ref 4–12)
NEUTROPHILS # BLD AUTO: 3.26 THOUSANDS/ÂΜL (ref 1.85–7.62)
NEUTS SEG NFR BLD AUTO: 73 % (ref 43–75)
NRBC BLD AUTO-RTO: 0 /100 WBCS
PLATELET # BLD AUTO: 141 THOUSANDS/UL (ref 149–390)
PMV BLD AUTO: 12.8 FL (ref 8.9–12.7)
POTASSIUM SERPL-SCNC: 4.2 MMOL/L (ref 3.5–5.3)
PROT SERPL-MCNC: 5.7 G/DL (ref 6.4–8.4)
RBC # BLD AUTO: 4.06 MILLION/UL (ref 3.81–5.12)
SODIUM SERPL-SCNC: 142 MMOL/L (ref 135–147)
WBC # BLD AUTO: 4.37 THOUSAND/UL (ref 4.31–10.16)

## 2023-09-14 PROCEDURE — 36415 COLL VENOUS BLD VENIPUNCTURE: CPT

## 2023-09-14 PROCEDURE — 85025 COMPLETE CBC W/AUTO DIFF WBC: CPT

## 2023-09-14 PROCEDURE — 80053 COMPREHEN METABOLIC PANEL: CPT

## 2023-09-14 NOTE — TELEPHONE ENCOUNTER
Lab Inquiry   Who are you speaking with? [facility]     If it is not the patient, are they listed on an active communication consent form? N/A   Name of ordering provider Dr. Perla Snow   What is being requested?  Lab orders need to be entered   Lab draw location [facility]   What is the best call back number? n/a                                                 Labs entered by nurse

## 2023-09-19 ENCOUNTER — HOSPITAL ENCOUNTER (OUTPATIENT)
Dept: INFUSION CENTER | Facility: HOSPITAL | Age: 73
Discharge: HOME | End: 2023-09-19
Attending: INTERNAL MEDICINE
Payer: MEDICARE

## 2023-09-19 VITALS — HEART RATE: 76 BPM | SYSTOLIC BLOOD PRESSURE: 120 MMHG | DIASTOLIC BLOOD PRESSURE: 70 MMHG

## 2023-09-19 DIAGNOSIS — C91.10 CLL (CHRONIC LYMPHOCYTIC LEUKEMIA) (HCC): Primary | ICD-10-CM

## 2023-09-19 DIAGNOSIS — D69.3 IDIOPATHIC THROMBOCYTOPENIC PURPURA (ITP) (HCC): ICD-10-CM

## 2023-09-19 PROCEDURE — 96372 THER/PROPH/DIAG INJ SC/IM: CPT

## 2023-09-19 RX ADMIN — ROMIPLOSTIM 125 MCG: 125 INJECTION, POWDER, LYOPHILIZED, FOR SOLUTION SUBCUTANEOUS at 12:23

## 2023-09-20 ENCOUNTER — TELEPHONE (OUTPATIENT)
Dept: HEMATOLOGY ONCOLOGY | Facility: CLINIC | Age: 73
End: 2023-09-20

## 2023-09-20 NOTE — TELEPHONE ENCOUNTER
Call Transfer   Who are you speaking with? Patient   If it is not the patient, are they listed on an active communication consent form? N/A   Who is the patients HemOnc/SurgOnc provider? Dr. Yuan Cady   What is the reason for this call? lygeri for neurology   Person/Department that the call was transferred to? Time that call was transferred?    neurology   Your call will be transferred now. If you receive a voicemail, please leave a detailed message and a member of the team will return your call as soon as possible. Did you relay this information to the caller?   N/A

## 2023-09-25 ENCOUNTER — OFFICE VISIT (OUTPATIENT)
Dept: URGENT CARE | Age: 73
End: 2023-09-25
Payer: MEDICARE

## 2023-09-25 VITALS
SYSTOLIC BLOOD PRESSURE: 126 MMHG | HEART RATE: 90 BPM | OXYGEN SATURATION: 97 % | RESPIRATION RATE: 18 BRPM | DIASTOLIC BLOOD PRESSURE: 67 MMHG | TEMPERATURE: 96.3 F

## 2023-09-25 DIAGNOSIS — N30.01 ACUTE CYSTITIS WITH HEMATURIA: Primary | ICD-10-CM

## 2023-09-25 DIAGNOSIS — R35.0 URINE FREQUENCY: ICD-10-CM

## 2023-09-25 LAB
SL AMB  POCT GLUCOSE, UA: NEGATIVE
SL AMB LEUKOCYTE ESTERASE,UA: ABNORMAL
SL AMB POCT BILIRUBIN,UA: NEGATIVE
SL AMB POCT BLOOD,UA: ABNORMAL
SL AMB POCT CLARITY,UA: ABNORMAL
SL AMB POCT COLOR,UA: ABNORMAL
SL AMB POCT KETONES,UA: NEGATIVE
SL AMB POCT NITRITE,UA: POSITIVE
SL AMB POCT PH,UA: 7.5
SL AMB POCT SPECIFIC GRAVITY,UA: 1.01
SL AMB POCT URINE PROTEIN: 300
SL AMB POCT UROBILINOGEN: 1

## 2023-09-25 PROCEDURE — 87086 URINE CULTURE/COLONY COUNT: CPT

## 2023-09-25 PROCEDURE — 99213 OFFICE O/P EST LOW 20 MIN: CPT

## 2023-09-25 PROCEDURE — G0463 HOSPITAL OUTPT CLINIC VISIT: HCPCS

## 2023-09-25 PROCEDURE — 87077 CULTURE AEROBIC IDENTIFY: CPT

## 2023-09-25 PROCEDURE — 81002 URINALYSIS NONAUTO W/O SCOPE: CPT

## 2023-09-25 PROCEDURE — 87186 SC STD MICRODIL/AGAR DIL: CPT

## 2023-09-25 RX ORDER — CEPHALEXIN 500 MG/1
500 CAPSULE ORAL EVERY 12 HOURS SCHEDULED
Qty: 14 CAPSULE | Refills: 0 | Status: SHIPPED | OUTPATIENT
Start: 2023-09-25 | End: 2023-10-02

## 2023-09-25 NOTE — PROGRESS NOTES
[facility]        NAME: Joselyn Gonzalez is a 72 y.o. female  : 1950    MRN: [id number]  DATE: 2023  TIME: 12:34 PM    Assessment and Plan   Acute cystitis with hematuria [N30.01]  1. Acute cystitis with hematuria  cephalexin (KEFLEX) 500 mg capsule      2. Urine frequency  POCT urine dip    Urine culture            Patient Instructions     Take antibiotic as prescribed  Drink plenty of water   Cranberry supplements  Urinate within 5 minutes following intercourse  Follow up with OB/GYN  Follow up with PCP in 3-5 days. Proceed to ER if symptoms worsen. Eat yogurt with live and active cultures and/or take a probiotic at least 3 hours before or after antibiotic dose. Monitor stool for diarrhea and/or blood. If this occurs, contact primary care doctor ASAP. Chief Complaint     Chief Complaint   Patient presents with   • Possible UTI     Lower abd pain. Frequency. Sx since yesterday. History of Present Illness       Patient is a 71 yo female with no significant PMH presenting in the clinic today for UTI sx x 1 day. Admits suprapubic pain, urinary frequency  Denies dysuria, urinary urgency, hematuria, fever, chills, chest pain, SOB, flank pain, n/v/d. Denies OTC tx for symptom management. Admits h/o recurrent UTIs. Patient notes her current sx feel similar to her prior UTIs. Review of Systems   Review of Systems   Constitutional: Negative for chills and fever. Respiratory: Negative for shortness of breath. Cardiovascular: Negative for chest pain. Gastrointestinal: Positive for abdominal pain. Negative for diarrhea, nausea and vomiting. Genitourinary: Positive for frequency. Negative for dysuria, flank pain, hematuria and urgency.          Current Medications       Current Outpatient Medications:   •  Acalabrutinib Maleate (Calquence) 100 MG TABS, Take 1 tablet by mouth daily, Disp: 30 tablet, Rfl: 11  •  ALPRAZolam (XANAX) 0.5 mg tablet, Take 1 tablet (0.5 mg total) by mouth daily at bedtime as needed for anxiety, Disp: 30 tablet, Rfl: 2  •  cephalexin (KEFLEX) 500 mg capsule, Take 1 capsule (500 mg total) by mouth every 12 (twelve) hours for 7 days, Disp: 14 capsule, Rfl: 0  •  cycloSPORINE (RESTASIS) 0.05 % ophthalmic emulsion, INSTILL 1 DROP INTO BOTH EYES 2 TIMES A DAY, Disp: , Rfl:   •  gabapentin (NEURONTIN) 300 mg capsule, Take 1 capsule (300 mg total) by mouth daily at bedtime, Disp: 30 capsule, Rfl: 5  •  levothyroxine 75 mcg tablet, take 1 tablet by mouth once daily IN THE EARLY MORNING, Disp: 30 tablet, Rfl: 1  •  methocarbamol (ROBAXIN) 500 mg tablet, TAKE ONE TABLET BY MOUTH TWO TIMES A DAY AS NEEDED FOR MUSCLE SPASMS, Disp: 30 tablet, Rfl: 2  •  predniSONE 5 mg tablet, TAKE FOUR TABLETS BY MOUTH ONCE DAILY, Disp: 120 tablet, Rfl: 3  •  sertraline (ZOLOFT) 50 mg tablet, TAKE HALF A TABLET BY MOUTH ONCE DAILY, Disp: 30 tablet, Rfl: 5  •  polyethylene glycol (MIRALAX) 17 g packet, Take 17 g by mouth daily for 10 days, Disp: 10 each, Rfl: 0    Current Allergies     Allergies as of 09/25/2023   • (No Known Allergies)            The following portions of the patient's history were reviewed and updated as appropriate: allergies, current medications, past family history, past medical history, past social history, past surgical history and problem list.     Past Medical History:   Diagnosis Date   • Anxiety    • Autoimmune hemolytic anemia (HCC)    • Cataract    • Disease of thyroid gland    • 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HCC)    • GERD (gastroesophageal reflux disease)    • Hypothyroidism    • Hypothyroidism    • Idiopathic thrombocytopenic purpura (ITP) (HCC)    • Menopause    • Sjogrens syndrome ([address])        Past Surgical History:   Procedure Laterality Date   • CATARACT EXTRACTION Left    • COLONOSCOPY  2012    per pt   • TOOTH EXTRACTION         Family History   Problem Relation Age of Onset   • Colon cancer Mother    • Heart disease Mother         cardiac disorder    • Cancer Mother    • Liver cancer Father    • Cancer Father    • Heart disease Other         cardiac disorder         Medications have been verified. Objective   /67   Pulse 90   Temp (!) 96.3 °F (35.7 °C) (Tympanic)   Resp 18   SpO2 97%        Physical Exam     Physical Exam  Vitals reviewed. Constitutional:       General: She is not in acute distress. Appearance: Normal appearance. She is normal weight. She is not ill-appearing. HENT:      Head: Normocephalic. Nose: Nose normal.      Mouth/Throat:      Mouth: Mucous membranes are moist.   Eyes:      Conjunctiva/sclera: Conjunctivae normal.   Cardiovascular:      Rate and Rhythm: Normal rate and regular rhythm. Pulses: Normal pulses. Heart sounds: Normal heart sounds. No murmur heard. No friction rub. No gallop. Pulmonary:      Effort: Pulmonary effort is normal.      Breath sounds: Normal breath sounds. No wheezing, rhonchi or rales. Abdominal:      General: Abdomen is flat. Bowel sounds are normal. There is no distension. Palpations: Abdomen is soft. Tenderness: There is abdominal tenderness in the suprapubic area. There is no right CVA tenderness, left CVA tenderness or guarding. Skin:     General: Skin is warm. Capillary Refill: Capillary refill takes less than 2 seconds. Neurological:      Mental Status: She is alert.    Psychiatric:         Mood and Affect: Mood normal.         Behavior: Behavior normal.

## 2023-09-25 NOTE — PATIENT INSTRUCTIONS
Take antibiotic as prescribed  Drink plenty of water   Cranberry supplements  Urinate within 5 minutes following intercourse  Follow up with OB/GYN  Follow up with PCP in 3-5 days. Proceed to ER if symptoms worsen. Eat yogurt with live and active cultures and/or take a probiotic at least 3 hours before or after antibiotic dose. Monitor stool for diarrhea and/or blood. If this occurs, contact primary care doctor ASAP.

## 2023-09-28 LAB — BACTERIA UR CULT: ABNORMAL

## 2023-10-09 ENCOUNTER — CONSULT (OUTPATIENT)
Dept: NEUROLOGY | Facility: CLINIC | Age: 73
End: 2023-10-09
Payer: MEDICARE

## 2023-10-09 VITALS
OXYGEN SATURATION: 98 % | DIASTOLIC BLOOD PRESSURE: 67 MMHG | SYSTOLIC BLOOD PRESSURE: 125 MMHG | WEIGHT: 124.8 LBS | TEMPERATURE: 97.7 F | HEART RATE: 91 BPM | BODY MASS INDEX: 20.45 KG/M2

## 2023-10-09 DIAGNOSIS — G25.81 RLS (RESTLESS LEGS SYNDROME): ICD-10-CM

## 2023-10-09 DIAGNOSIS — R90.89 ABNORMAL IMAGING OF CENTRAL NERVOUS SYSTEM: ICD-10-CM

## 2023-10-09 DIAGNOSIS — R42 DIZZINESS: ICD-10-CM

## 2023-10-09 DIAGNOSIS — R26.89 BALANCE DISORDER: ICD-10-CM

## 2023-10-09 DIAGNOSIS — R25.1 TREMOR: ICD-10-CM

## 2023-10-09 PROCEDURE — 99204 OFFICE O/P NEW MOD 45 MIN: CPT | Performed by: PSYCHIATRY & NEUROLOGY

## 2023-10-09 NOTE — PROGRESS NOTES
1. RLS (restless legs syndrome)  Assessment & Plan:  Ms Siddharth Reeder carries the diagnosis of restless leg syndrome. This is a purely clinical diagnosis. Rheumatologic diseases on the differential diagnosis as well. She denies a need to get up and walk around but does say that she kicks her legs. A low serum ferritin is a risk factor for this condition; this was tested and was normal.  Gabapentin is certainly a reasonable first choice and is what I would have chosen as well. She did not respond to a 300 mg dose. This is the starter dose; as her symptoms are most prominent at night, my neck step would be to increase the dose. She does not think this is necessary at this time but would reconsider based on her clinical course. 2. Tremor      3. Abnormal imaging of central nervous system      4. Dizziness    -     MRI brain without contrast; Future; Expected date: 10/09/2023    5. Balance disorder  Assessment & Plan:  She has a history of diffuse weakness with imbalance. She uses a cane but has no localizing findings on examination. Episodes of orthostasis are a consideration as her symptoms occur while standing and she reports near syncope. Cranial MRI in 2018 showed nonspecific white matter changes. I think it is reasonable to repeat her MRI although I have a low index of suspicion for ischemic or demyelinating disease. Orders:    -     MRI brain without contrast; Future; Expected date: 10/09/2023      Problem List Items Addressed This Visit        Other    Dizziness    Relevant Orders    MRI brain without contrast    RLS (restless legs syndrome)     Ms Siddharth Reeder carries the diagnosis of restless leg syndrome. This is a purely clinical diagnosis. Rheumatologic diseases on the differential diagnosis as well. She denies a need to get up and walk around but does say that she kicks her legs.   A low serum ferritin is a risk factor for this condition; this was tested and was normal.  Gabapentin is certainly a reasonable first choice and is what I would have chosen as well. She did not respond to a 300 mg dose. This is the starter dose; as her symptoms are most prominent at night, my neck step would be to increase the dose. She does not think this is necessary at this time but would reconsider based on her clinical course. Tremor    Abnormal imaging of central nervous system    Balance disorder     She has a history of diffuse weakness with imbalance. She uses a cane but has no localizing findings on examination. Episodes of orthostasis are a consideration as her symptoms occur while standing and she reports near syncope. Cranial MRI in 2018 showed nonspecific white matter changes. I think it is reasonable to repeat her MRI although I have a low index of suspicion for ischemic or demyelinating disease. Relevant Orders    MRI brain without contrast         I had the pleasure of seeing Nathan Smiley, a 72 y.o. female, for neuromuscular consultation. The referral was for "restless legs". She has an underlying history of hematologic disease. 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 She also reports a history of hypothyroidism, lupus, and Sjogren syndrome. In the last 5 years she complains of "aching all over" in her legs. The right is affected somewhat worse than the left, especially in the anterior foreleg. There is no history of numbness or tingling. Her symptoms are more prominent at night but she does not feel a need to get up and walk around. She does feel better if she moves her legs around. It was suspected that she may have restless leg syndrome but she did not improve with gabapentin 300 mg at night. In the last several months she has complained of feeling dizzy. With further questioning she refers to lightheadedness without vertigo.   Sometimes she feels as if she might pass out but has not done so. The symptoms do not occur while seated or recumbent. Sometimes she feels like her legs could collapse but has not done so. Her arms and legs get diffusely heavy but she denies any symmetry or impairment in her activities of daily living. Past Medical History:   Diagnosis Date   • Anxiety    • Autoimmune hemolytic anemia (HCC)    • Cataract    • Disease of thyroid gland    • 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HCC)    • GERD (gastroesophageal reflux disease)    • Hypothyroidism    • Hypothyroidism    • Idiopathic thrombocytopenic purpura (ITP) (HCC)    • Menopause    • Sjogrens syndrome ([address])        Past Surgical History:   Procedure Laterality Date   • CATARACT EXTRACTION Left    • COLONOSCOPY  2012    per pt   • TOOTH EXTRACTION         Patient has no known allergies.     Social History     Tobacco Use   Smoking Status Never   Smokeless Tobacco Never       Social History     Substance and Sexual Activity   Alcohol Use No       Social History     Substance and Sexual Activity   Drug Use No       Family History   Problem Relation Age of Onset   • Colon cancer Mother    • Heart disease Mother         cardiac disorder    • Cancer Mother    • Liver cancer Father    • Cancer Father    • Heart disease Other         cardiac disorder         Current Outpatient Medications:   •  Acalabrutinib Maleate (Calquence) 100 MG TABS, Take 1 tablet by mouth daily, Disp: 30 tablet, Rfl: 11  •  ALPRAZolam (XANAX) 0.5 mg tablet, Take 1 tablet (0.5 mg total) by mouth daily at bedtime as needed for anxiety, Disp: 30 tablet, Rfl: 2  •  cycloSPORINE (RESTASIS) 0.05 % ophthalmic emulsion, INSTILL 1 DROP INTO BOTH EYES 2 TIMES A DAY, Disp: , Rfl:   •  gabapentin (NEURONTIN) 300 mg capsule, Take 1 capsule (300 mg total) by mouth daily at bedtime, Disp: 30 capsule, Rfl: 5  •  levothyroxine 75 mcg tablet, take 1 tablet by mouth once daily IN THE EARLY MORNING, Disp: 30 tablet, Rfl: 1  •  methocarbamol (ROBAXIN) 500 mg tablet, TAKE ONE TABLET BY MOUTH TWO TIMES A DAY AS NEEDED FOR MUSCLE SPASMS, Disp: 30 tablet, Rfl: 2  •  sertraline (ZOLOFT) 50 mg tablet, TAKE HALF A TABLET BY MOUTH ONCE DAILY, Disp: 30 tablet, Rfl: 5  •  polyethylene glycol (MIRALAX) 17 g packet, Take 17 g by mouth daily for 10 days, Disp: 10 each, Rfl: 0  •  predniSONE 5 mg tablet, TAKE FOUR TABLETS BY MOUTH ONCE DAILY (Patient not taking: Reported on 10/9/2023), Disp: 120 tablet, Rfl: 3    Office Visit on 09/25/2023   Component Date Value Ref Range Status   • LEUKOCYTE ESTERASE,UA 09/25/2023 large   Final   • NITRITE,UA 09/25/2023 positive   Final   • SL AMB POCT UROBILINOGEN 09/25/2023 1   Final   • POCT URINE PROTEIN 09/25/2023 300   Final   •  PH,UA 09/25/2023 7.5   Final   • BLOOD,UA 09/25/2023 large   Final   • SPECIFIC GRAVITY,UA 09/25/2023 1.010   Final   • KETONES,UA 09/25/2023 negative   Final   • BILIRUBIN,UA 09/25/2023 negative   Final   • GLUCOSE, UA 09/25/2023 negative   Final   •  COLOR,UA 09/25/2023 ashwin   Final   • CLARITY,UA 09/25/2023 cloudy   Final   • Urine Culture 09/25/2023 >100,000 cfu/ml Escherichia coli (A)   Final    This organism has been edited. The previous result was Gram Negative Yusef Enteric Like on 9/26/2023 at 1334 EDT.    Appointment on 09/14/2023   Component Date Value Ref Range Status   • WBC 09/14/2023 4.37  4.31 - 10.16 Thousand/uL Final   • RBC 09/14/2023 4.06  3.81 - 5.12 Million/uL Final   • Hemoglobin 09/14/2023 11.9  11.5 - 15.4 g/dL Final   • Hematocrit 09/14/2023 37.9  34.8 - 46.1 % Final   • MCV 09/14/2023 93  82 - 98 fL Final   • MCH 09/14/2023 29.3  26.8 - 34.3 pg Final   • MCHC 09/14/2023 31.4  31.4 - 37.4 g/dL Final   • RDW 09/14/2023 14.6  11.6 - 15.1 % Final   • MPV 09/14/2023 12.8 (H)  8.9 - 12.7 fL Final   • Platelets 01/22/9975 141 (L)  149 - 390 Thousands/uL Final   • nRBC 09/14/2023 0  /100 WBCs Final   • Neutrophils Relative 09/14/2023 73  43 - 75 % Final   • Immat GRANS % 09/14/2023 1  0 - 2 % Final   • Lymphocytes Relative 09/14/2023 16  14 - 44 % Final   • Monocytes Relative 09/14/2023 8  4 - 12 % Final   • Eosinophils Relative 09/14/2023 1  0 - 6 % Final   • Basophils Relative 09/14/2023 1  0 - 1 % Final   • Neutrophils Absolute 09/14/2023 3.26  1.85 - 7.62 Thousands/µL Final   • Immature Grans Absolute 09/14/2023 0.02  0.00 - 0.20 Thousand/uL Final   • Lymphocytes Absolute 09/14/2023 0.71  0.60 - 4.47 Thousands/µL Final   • Monocytes Absolute 09/14/2023 0.34  0.17 - 1.22 Thousand/µL Final   • Eosinophils Absolute 09/14/2023 0.02  0.00 - 0.61 Thousand/µL Final   • Basophils Absolute 09/14/2023 0.02  0.00 - 0.10 Thousands/µL Final   • Sodium 09/14/2023 142  135 - 147 mmol/L Final   • Potassium 09/14/2023 4.2  3.5 - 5.3 mmol/L Final   • Chloride 09/14/2023 110 (H)  96 - 108 mmol/L Final   • CO2 09/14/2023 26  21 - 32 mmol/L Final   • ANION GAP 09/14/2023 6  mmol/L Final   • BUN 09/14/2023 9  5 - 25 mg/dL Final   • Creatinine 09/14/2023 0.71  0.60 - 1.30 mg/dL Final    Standardized to IDMS reference method   • Glucose, Fasting 09/14/2023 102 (H)  65 - 99 mg/dL Final   • Calcium 09/14/2023 8.3 (L)  8.4 - 10.2 mg/dL Final   • Corrected Calcium 09/14/2023 8.9  8.3 - 10.1 mg/dL Final   • AST 09/14/2023 16  13 - 39 U/L Final   • ALT 09/14/2023 7  7 - 52 U/L Final    Specimen collection should occur prior to Sulfasalazine administration due to the potential for falsely depressed results. • Alkaline Phosphatase 09/14/2023 53  34 - 104 U/L Final   • Total Protein 09/14/2023 5.7 (L)  6.4 - 8.4 g/dL Final   • Albumin 09/14/2023 3.2 (L)  3.5 - 5.0 g/dL Final   • Total Bilirubin 09/14/2023 0.57  0.20 - 1.00 mg/dL Final    Use of this assay is not recommended for patients undergoing treatment with eltrombopag due to the potential for falsely elevated results. N-acetyl-p-benzoquinone imine (metabolite of Acetaminophen) will generate erroneously low results in samples for patients that have taken an overdose of Acetaminophen.    • eGFR 09/14/2023 85  ml/min/1.73sq m Final   Ancillary Orders on 08/11/2023   Component Date Value Ref Range Status   • WBC 08/17/2023 5.68  4.31 - 10.16 Thousand/uL Final   • RBC 08/17/2023 4.09  3.81 - 5.12 Million/uL Final   • Hemoglobin 08/17/2023 12.1  11.5 - 15.4 g/dL Final   • Hematocrit 08/17/2023 39.6  34.8 - 46.1 % Final   • MCV 08/17/2023 97  82 - 98 fL Final   • MCH 08/17/2023 29.6  26.8 - 34.3 pg Final   • MCHC 08/17/2023 30.6 (L)  31.4 - 37.4 g/dL Final   • RDW 08/17/2023 15.4 (H)  11.6 - 15.1 % Final   • MPV 08/17/2023 12.5  8.9 - 12.7 fL Final   • Platelets 16/79/2081 161  149 - 390 Thousands/uL Final   • nRBC 08/17/2023 0  /100 WBCs Final   • Neutrophils Relative 08/17/2023 81 (H)  43 - 75 % Final   • Immat GRANS % 08/17/2023 1  0 - 2 % Final   • Lymphocytes Relative 08/17/2023 11 (L)  14 - 44 % Final   • Monocytes Relative 08/17/2023 6  4 - 12 % Final   • Eosinophils Relative 08/17/2023 0  0 - 6 % Final   • Basophils Relative 08/17/2023 1  0 - 1 % Final   • Neutrophils Absolute 08/17/2023 4.63  1.85 - 7.62 Thousands/µL Final   • Immature Grans Absolute 08/17/2023 0.06  0.00 - 0.20 Thousand/uL Final   • Lymphocytes Absolute 08/17/2023 0.62  0.60 - 4.47 Thousands/µL Final   • Monocytes Absolute 08/17/2023 0.31  0.17 - 1.22 Thousand/µL Final   • Eosinophils Absolute 08/17/2023 0.02  0.00 - 0.61 Thousand/µL Final   • Basophils Absolute 08/17/2023 0.04  0.00 - 0.10 Thousands/µL Final   Ancillary Orders on 07/14/2023   Component Date Value Ref Range Status   • WBC 07/19/2023 4.35  4.31 - 10.16 Thousand/uL Final   • RBC 07/19/2023 4.35  3.81 - 5.12 Million/uL Final   • Hemoglobin 07/19/2023 12.5  11.5 - 15.4 g/dL Final   • Hematocrit 07/19/2023 41.5  34.8 - 46.1 % Final   • MCV 07/19/2023 95  82 - 98 fL Final   • MCH 07/19/2023 28.7  26.8 - 34.3 pg Final   • MCHC 07/19/2023 30.1 (L)  31.4 - 37.4 g/dL Final   • RDW 07/19/2023 15.0  11.6 - 15.1 % Final   • MPV 07/19/2023 12.5  8.9 - 12.7 fL Final   • Platelets 64/39/5585 160  149 - 390 Thousands/uL Final   • nRBC 07/19/2023 0  /100 WBCs Final   • Neutrophils Relative 07/19/2023 74  43 - 75 % Final   • Immat GRANS % 07/19/2023 1  0 - 2 % Final   • Lymphocytes Relative 07/19/2023 18  14 - 44 % Final   • Monocytes Relative 07/19/2023 6  4 - 12 % Final   • Eosinophils Relative 07/19/2023 0  0 - 6 % Final   • Basophils Relative 07/19/2023 1  0 - 1 % Final   • Neutrophils Absolute 07/19/2023 3.27  1.85 - 7.62 Thousands/µL Final   • Immature Grans Absolute 07/19/2023 0.02  0.00 - 0.20 Thousand/uL Final   • Lymphocytes Absolute 07/19/2023 0.77  0.60 - 4.47 Thousands/µL Final   • Monocytes Absolute 07/19/2023 0.26  0.17 - 1.22 Thousand/µL Final   • Eosinophils Absolute 07/19/2023 0.01  0.00 - 0.61 Thousand/µL Final   • Basophils Absolute 07/19/2023 0.02  0.00 - 0.10 Thousands/µL Final   Appointment on 06/12/2023   Component Date Value Ref Range Status   • WBC 06/12/2023 5.78  4.31 - 10.16 Thousand/uL Final   • RBC 06/12/2023 4.45  3.81 - 5.12 Million/uL Final   • Hemoglobin 06/12/2023 13.1  11.5 - 15.4 g/dL Final   • Hematocrit 06/12/2023 42.1  34.8 - 46.1 % Final   • MCV 06/12/2023 95  82 - 98 fL Final   • MCH 06/12/2023 29.4  26.8 - 34.3 pg Final   • MCHC 06/12/2023 31.1 (L)  31.4 - 37.4 g/dL Final   • RDW 06/12/2023 14.6  11.6 - 15.1 % Final   • MPV 06/12/2023 12.1  8.9 - 12.7 fL Final   • Platelets 37/48/5000 216  149 - 390 Thousands/uL Final   • nRBC 06/12/2023 0  /100 WBCs Final   • Neutrophils Relative 06/12/2023 77 (H)  43 - 75 % Final   • Immat GRANS % 06/12/2023 1  0 - 2 % Final   • Lymphocytes Relative 06/12/2023 12 (L)  14 - 44 % Final   • Monocytes Relative 06/12/2023 7  4 - 12 % Final   • Eosinophils Relative 06/12/2023 2  0 - 6 % Final   • Basophils Relative 06/12/2023 1  0 - 1 % Final   • Neutrophils Absolute 06/12/2023 4.49  1.85 - 7.62 Thousands/µL Final   • Immature Grans Absolute 06/12/2023 0.06  0.00 - 0.20 Thousand/uL Final   • Lymphocytes Absolute 06/12/2023 0.70  0.60 - 4.47 Thousands/µL Final   • Monocytes Absolute 06/12/2023 0.39  0.17 - 1.22 Thousand/µL Final   • Eosinophils Absolute 06/12/2023 0.11  0.00 - 0.61 Thousand/µL Final   • Basophils Absolute 06/12/2023 0.03  0.00 - 0.10 Thousands/µL Final   • Sodium 06/12/2023 144  135 - 147 mmol/L Final   • Potassium 06/12/2023 4.2  3.5 - 5.3 mmol/L Final   • Chloride 06/12/2023 113 (H)  96 - 108 mmol/L Final   • CO2 06/12/2023 26  21 - 32 mmol/L Final   • ANION GAP 06/12/2023 5  4 - 13 mmol/L Final   • BUN 06/12/2023 12  5 - 25 mg/dL Final   • Creatinine 06/12/2023 0.80  0.60 - 1.30 mg/dL Final    Standardized to IDMS reference method   • Glucose 06/12/2023 95  65 - 140 mg/dL Final    If the patient is fasting, the ADA then defines impaired fasting glucose as > 100 mg/dL and diabetes as > or equal to 123 mg/dL. Specimen collection should occur prior to Sulfasalazine administration due to the potential for falsely depressed results. Specimen collection should occur prior to Sulfapyridine administration due to the potential for falsely elevated results. • Calcium 06/12/2023 9.2  8.3 - 10.1 mg/dL Final   • Corrected Calcium 06/12/2023 10.1  8.3 - 10.1 mg/dL Final   • AST 06/12/2023 17  5 - 45 U/L Final    Specimen collection should occur prior to Sulfasalazine administration due to the potential for falsely depressed results. • ALT 06/12/2023 14  12 - 78 U/L Final    Specimen collection should occur prior to Sulfasalazine and/or Sulfapyridine administration due to the potential for falsely depressed results. • Alkaline Phosphatase 06/12/2023 55  46 - 116 U/L Final   • Total Protein 06/12/2023 6.4  6.4 - 8.4 g/dL Final   • Albumin 06/12/2023 2.9 (L)  3.5 - 5.0 g/dL Final   • Total Bilirubin 06/12/2023 0.36  0.20 - 1.00 mg/dL Final    Use of this assay is not recommended for patients undergoing treatment with eltrombopag due to the potential for falsely elevated results.    • eGFR 06/12/2023 74  ml/min/1.73sq m Final   • IGA 06/12/2023 171.0  70.0 - 400.0 mg/dL Final   • IGG 06/12/2023 986.0  700.0 - 1,600.0 mg/dL Final   • IGM 06/12/2023 88.0  40.0 - 230.0 mg/dL Final   • Iron Saturation 06/12/2023 22  15 - 50 % Final   • TIBC 06/12/2023 305  250 - 450 ug/dL Final   • Iron 06/12/2023 68  50 - 170 ug/dL Final    Slightly Hemolyzed; Results May be Affected  Patients treated with metal-binding drugs (ie. Deferoxamine) may have depressed iron values. • Ferritin 06/12/2023 73  11 - 307 ng/mL Final   Lab on 06/07/2023   Component Date Value Ref Range Status   • TISSUE TRANSGLUTAMINASE IGA 06/07/2023 <2  0 - 3 U/mL Final                                  Negative        0 -  3                                Weak Positive   4 - 10                                Positive           >10   Tissue Transglutaminase (tTG) has been identified   as the endomysial antigen. Studies have demonstr-   ated that endomysial IgA antibodies have over 99%   specificity for gluten sensitive enteropathy.    • IGA 06/07/2023 191.0  70.0 - 400.0 mg/dL Final   Ancillary Orders on 05/19/2023   Component Date Value Ref Range Status   • WBC 05/23/2023 5.96  4.31 - 10.16 Thousand/uL Final   • RBC 05/23/2023 4.58  3.81 - 5.12 Million/uL Final   • Hemoglobin 05/23/2023 13.1  11.5 - 15.4 g/dL Final   • Hematocrit 05/23/2023 43.2  34.8 - 46.1 % Final   • MCV 05/23/2023 94  82 - 98 fL Final   • MCH 05/23/2023 28.6  26.8 - 34.3 pg Final   • MCHC 05/23/2023 30.3 (L)  31.4 - 37.4 g/dL Final   • RDW 05/23/2023 14.0  11.6 - 15.1 % Final   • MPV 05/23/2023 13.2 (H)  8.9 - 12.7 fL Final   • Platelets 85/82/8922 101 (L)  149 - 390 Thousands/uL Final   • nRBC 05/23/2023 0  /100 WBCs Final   • Neutrophils Relative 05/23/2023 78 (H)  43 - 75 % Final   • Immat GRANS % 05/23/2023 1  0 - 2 % Final   • Lymphocytes Relative 05/23/2023 14  14 - 44 % Final   • Monocytes Relative 05/23/2023 6  4 - 12 % Final   • Eosinophils Relative 05/23/2023 1  0 - 6 % Final   • Basophils Relative 05/23/2023 0  0 - 1 % Final   • Neutrophils Absolute 05/23/2023 4.68  1.85 - 7.62 Thousands/µL Final   • Immature Grans Absolute 05/23/2023 0.04  0.00 - 0.20 Thousand/uL Final   • Lymphocytes Absolute 05/23/2023 0.86  0.60 - 4.47 Thousands/µL Final   • Monocytes Absolute 05/23/2023 0.33  0.17 - 1.22 Thousand/µL Final   • Eosinophils Absolute 05/23/2023 0.04  0.00 - 0.61 Thousand/µL Final   • Basophils Absolute 05/23/2023 0.01  0.00 - 0.10 Thousands/µL Final   Appointment on 04/20/2023   Component Date Value Ref Range Status   • WBC 04/20/2023 7.59  4.31 - 10.16 Thousand/uL Final   • RBC 04/20/2023 4.38  3.81 - 5.12 Million/uL Final   • Hemoglobin 04/20/2023 12.9  11.5 - 15.4 g/dL Final   • Hematocrit 04/20/2023 41.5  34.8 - 46.1 % Final   • MCV 04/20/2023 95  82 - 98 fL Final   • MCH 04/20/2023 29.5  26.8 - 34.3 pg Final   • MCHC 04/20/2023 31.1 (L)  31.4 - 37.4 g/dL Final   • RDW 04/20/2023 13.5  11.6 - 15.1 % Final   • MPV 04/20/2023 13.3 (H)  8.9 - 12.7 fL Final   • Platelets 60/23/8581 121 (L)  149 - 390 Thousands/uL Final   • nRBC 04/20/2023 0  /100 WBCs Final   • Neutrophils Relative 04/20/2023 82 (H)  43 - 75 % Final   • Immat GRANS % 04/20/2023 1  0 - 2 % Final   • Lymphocytes Relative 04/20/2023 10 (L)  14 - 44 % Final   • Monocytes Relative 04/20/2023 6  4 - 12 % Final   • Eosinophils Relative 04/20/2023 1  0 - 6 % Final   • Basophils Relative 04/20/2023 0  0 - 1 % Final   • Neutrophils Absolute 04/20/2023 6.28  1.85 - 7.62 Thousands/µL Final   • Immature Grans Absolute 04/20/2023 0.04  0.00 - 0.20 Thousand/uL Final   • Lymphocytes Absolute 04/20/2023 0.75  0.60 - 4.47 Thousands/µL Final   • Monocytes Absolute 04/20/2023 0.46  0.17 - 1.22 Thousand/µL Final   • Eosinophils Absolute 04/20/2023 0.04  0.00 - 0.61 Thousand/µL Final   • Basophils Absolute 04/20/2023 0.02  0.00 - 0.10 Thousands/µL Final        No results found for this or any previous visit. No results found for this or any previous visit.     No results found for this or any previous visit. No results found for this or any previous visit. No results found for this or any previous visit. No results found for this or any previous visit. No results found for this or any previous visit. No results found for this or any previous visit. No results found for this or any previous visit. No results found for this or any previous visit. No results found for this or any previous visit. No results found for this or any previous visit. Review of Systems   Constitutional: Positive for fatigue. Negative for appetite change and fever. HENT: Positive for tinnitus. Negative for hearing loss, trouble swallowing and voice change. Eyes: Positive for visual disturbance (blurry). Negative for photophobia and pain. Respiratory: Negative. Negative for shortness of breath. Cardiovascular: Negative. Negative for palpitations. Gastrointestinal: Negative. Negative for nausea and vomiting. Endocrine: Negative. Negative for cold intolerance. Genitourinary: Negative. Negative for dysuria, frequency and urgency. Musculoskeletal: Positive for gait problem. Negative for back pain, myalgias and neck pain. Skin: Negative. Negative for rash. Allergic/Immunologic: Negative. Neurological: Positive for dizziness, tremors and light-headedness. Negative for seizures, syncope, facial asymmetry, speech difficulty, weakness, numbness and headaches. Balance disorder   Hematological: Negative. Does not bruise/bleed easily. Psychiatric/Behavioral: Negative. Negative for confusion, hallucinations and sleep disturbance. On examination,     Blood pressure 125/67, pulse 91, temperature 97.7 °F (36.5 °C), temperature source Temporal, weight 56.6 kg (124 lb 12.8 oz), SpO2 98 %.     Well developed, well nourished, in no acute distress    Normocephalic, atraumatic    Heart: regular rate and rhythm    Extremities: no clubbing, cyanosis, or edema    Speech and cognition appeared normal    Cranial nerves:  II: Pupils equal, round, and reactive to light. No gross visual field defect. I did not appreciate optic disc edema. III, IV, VI: Extraocular movements intact  V: Normal facial sensation in all three divisions of the trigeminal nerve bilaterally  VII: Normal facial strength  VIII: Hearing intact to finger rub bilaterally  IX, X: Palate elevated symmetrically  XI: Sternocleidomastoid strength normal bilaterally  XII: Tongue protruded in midline without atrophy or fibrillations    Motor:  Normal tone and bulk throughout. Muscle strength testing by the MRC scale was 5/5 in the deltoid, biceps, triceps, wrist extensors, wrist flexors, finger extensors, finger flexors,. Hip flexors, quadriceps, ankle dorsiflexors, ankle plantar flexors, and EHL bilaterally    Deep tendon reflexes:   2+ and symmetrical in the biceps, triceps, brachioradialis, patellas, and ankles  Toes downgoing (no Babinski sign)  No Aranda's sign    Sensation: Normal pinprick and light touch throughout    Cerebellar: normal finger to nose and heel to shin testing    Gait: Normal gait; carries a cane with her but does not always use it. There are no Patient Instructions on file for this visit. Thank you very much for allowing me to participate in your patient's care. Please feel free to contact me for any questions or concerns. Please be aware of the inherent limitations of voice recognition software, which may result in transcriptional errors.     Cedric Caraballo MD

## 2023-10-09 NOTE — ASSESSMENT & PLAN NOTE
She has a history of diffuse weakness with imbalance. She uses a cane but has no localizing findings on examination. Episodes of orthostasis are a consideration as her symptoms occur while standing and she reports near syncope. Cranial MRI in 2018 showed nonspecific white matter changes. I think it is reasonable to repeat her MRI although I have a low index of suspicion for ischemic or demyelinating disease.

## 2023-10-09 NOTE — ASSESSMENT & PLAN NOTE
Ms Mirna Josue carries the diagnosis of restless leg syndrome. This is a purely clinical diagnosis. Rheumatologic diseases on the differential diagnosis as well. She denies a need to get up and walk around but does say that she kicks her legs. A low serum ferritin is a risk factor for this condition; this was tested and was normal.  Gabapentin is certainly a reasonable first choice and is what I would have chosen as well. She did not respond to a 300 mg dose. This is the starter dose; as her symptoms are most prominent at night, my neck step would be to increase the dose. She does not think this is necessary at this time but would reconsider based on her clinical course.

## 2023-10-12 ENCOUNTER — TELEPHONE (OUTPATIENT)
Dept: HEMATOLOGY ONCOLOGY | Facility: CLINIC | Age: 73
End: 2023-10-12

## 2023-10-12 DIAGNOSIS — D69.3 IDIOPATHIC THROMBOCYTOPENIC PURPURA (ITP) (HCC): ICD-10-CM

## 2023-10-12 DIAGNOSIS — C91.10 CLL (CHRONIC LYMPHOCYTIC LEUKEMIA) (HCC): Primary | ICD-10-CM

## 2023-10-12 NOTE — TELEPHONE ENCOUNTER
Patient Call    Who are you speaking with? Patient    If it is not the patient, are they listed on an active communication consent form? N/A   What is the reason for this call? Patient is at care now trying to get labs done, she states she gets them monthly and its recurring but the lab states they only have an order due for February   Does this require a call back? Yes   If a call back is required, please list best call back number [phone]   If a call back is required, advise that a message will be forwarded to their care team and someone will return their call as soon as possible. Did you relay this information to the patient?  Yes

## 2023-10-16 ENCOUNTER — APPOINTMENT (OUTPATIENT)
Dept: LAB | Age: 73
End: 2023-10-16
Payer: MEDICARE

## 2023-10-16 DIAGNOSIS — D69.3 IDIOPATHIC THROMBOCYTOPENIC PURPURA (ITP) (HCC): ICD-10-CM

## 2023-10-16 DIAGNOSIS — C91.10 CLL (CHRONIC LYMPHOCYTIC LEUKEMIA) (HCC): ICD-10-CM

## 2023-10-16 LAB
ALBUMIN SERPL BCP-MCNC: 3.3 G/DL (ref 3.5–5)
ALP SERPL-CCNC: 42 U/L (ref 34–104)
ALT SERPL W P-5'-P-CCNC: 11 U/L (ref 7–52)
ANION GAP SERPL CALCULATED.3IONS-SCNC: 6 MMOL/L
AST SERPL W P-5'-P-CCNC: 22 U/L (ref 13–39)
BASOPHILS # BLD AUTO: 0.03 THOUSANDS/ÂΜL (ref 0–0.1)
BASOPHILS NFR BLD AUTO: 1 % (ref 0–1)
BILIRUB SERPL-MCNC: 0.56 MG/DL (ref 0.2–1)
BUN SERPL-MCNC: 10 MG/DL (ref 5–25)
CALCIUM ALBUM COR SERPL-MCNC: 9.4 MG/DL (ref 8.3–10.1)
CALCIUM SERPL-MCNC: 8.8 MG/DL (ref 8.4–10.2)
CHLORIDE SERPL-SCNC: 111 MMOL/L (ref 96–108)
CO2 SERPL-SCNC: 26 MMOL/L (ref 21–32)
CREAT SERPL-MCNC: 0.76 MG/DL (ref 0.6–1.3)
EOSINOPHIL # BLD AUTO: 0.02 THOUSAND/ÂΜL (ref 0–0.61)
EOSINOPHIL NFR BLD AUTO: 1 % (ref 0–6)
ERYTHROCYTE [DISTWIDTH] IN BLOOD BY AUTOMATED COUNT: 14.2 % (ref 11.6–15.1)
GFR SERPL CREATININE-BSD FRML MDRD: 79 ML/MIN/1.73SQ M
GLUCOSE P FAST SERPL-MCNC: 103 MG/DL (ref 65–99)
HCT VFR BLD AUTO: 39.5 % (ref 34.8–46.1)
HGB BLD-MCNC: 12.5 G/DL (ref 11.5–15.4)
IMM GRANULOCYTES # BLD AUTO: 0.03 THOUSAND/UL (ref 0–0.2)
IMM GRANULOCYTES NFR BLD AUTO: 1 % (ref 0–2)
LYMPHOCYTES # BLD AUTO: 0.91 THOUSANDS/ÂΜL (ref 0.6–4.47)
LYMPHOCYTES NFR BLD AUTO: 21 % (ref 14–44)
MCH RBC QN AUTO: 30.4 PG (ref 26.8–34.3)
MCHC RBC AUTO-ENTMCNC: 31.6 G/DL (ref 31.4–37.4)
MCV RBC AUTO: 96 FL (ref 82–98)
MONOCYTES # BLD AUTO: 0.41 THOUSAND/ÂΜL (ref 0.17–1.22)
MONOCYTES NFR BLD AUTO: 9 % (ref 4–12)
NEUTROPHILS # BLD AUTO: 3 THOUSANDS/ÂΜL (ref 1.85–7.62)
NEUTS SEG NFR BLD AUTO: 67 % (ref 43–75)
NRBC BLD AUTO-RTO: 0 /100 WBCS
PLATELET # BLD AUTO: 115 THOUSANDS/UL (ref 149–390)
PMV BLD AUTO: 13 FL (ref 8.9–12.7)
POTASSIUM SERPL-SCNC: 4.7 MMOL/L (ref 3.5–5.3)
PROT SERPL-MCNC: 6.1 G/DL (ref 6.4–8.4)
RBC # BLD AUTO: 4.11 MILLION/UL (ref 3.81–5.12)
SODIUM SERPL-SCNC: 143 MMOL/L (ref 135–147)
WBC # BLD AUTO: 4.4 THOUSAND/UL (ref 4.31–10.16)

## 2023-10-16 PROCEDURE — 85025 COMPLETE CBC W/AUTO DIFF WBC: CPT

## 2023-10-16 PROCEDURE — 36415 COLL VENOUS BLD VENIPUNCTURE: CPT

## 2023-10-16 PROCEDURE — 80053 COMPREHEN METABOLIC PANEL: CPT

## 2023-10-17 ENCOUNTER — HOSPITAL ENCOUNTER (OUTPATIENT)
Dept: INFUSION CENTER | Facility: HOSPITAL | Age: 73
Discharge: HOME | End: 2023-10-17
Attending: INTERNAL MEDICINE
Payer: MEDICARE

## 2023-10-17 VITALS — TEMPERATURE: 96.5 F

## 2023-10-17 DIAGNOSIS — D69.3 IDIOPATHIC THROMBOCYTOPENIC PURPURA (ITP) (HCC): Primary | ICD-10-CM

## 2023-10-17 DIAGNOSIS — C91.10 CLL (CHRONIC LYMPHOCYTIC LEUKEMIA) (HCC): ICD-10-CM

## 2023-10-17 RX ADMIN — ROMIPLOSTIM 125 MCG: 125 INJECTION, POWDER, LYOPHILIZED, FOR SOLUTION SUBCUTANEOUS at 12:30

## 2023-10-30 ENCOUNTER — HOSPITAL ENCOUNTER (OUTPATIENT)
Dept: RADIOLOGY | Facility: IMAGING CENTER | Age: 73
Discharge: HOME | End: 2023-10-30
Payer: MEDICARE

## 2023-10-30 DIAGNOSIS — R26.89 BALANCE DISORDER: ICD-10-CM

## 2023-10-30 DIAGNOSIS — R42 DIZZINESS: ICD-10-CM

## 2023-10-30 PROCEDURE — 70551 MRI BRAIN STEM W/O DYE: CPT

## 2023-10-30 PROCEDURE — G1004 CDSM NDSC: HCPCS

## 2023-11-03 ENCOUNTER — TELEPHONE (OUTPATIENT)
Dept: NEUROLOGY | Facility: CLINIC | Age: 73
End: 2023-11-03

## 2023-11-03 NOTE — TELEPHONE ENCOUNTER
"Lily Maharaj MD  11/2/2023  8:57 AM EDT       Films reviewed. I don't see an obvious abnormality but I want to take another look at her. Routine follow-up"       Jing - please contact patient with above information regarding MRI and offer follow up appointment. Thank you!

## 2023-11-09 ENCOUNTER — APPOINTMENT (OUTPATIENT)
Dept: LAB | Age: 73
End: 2023-11-09
Payer: MEDICARE

## 2023-11-09 DIAGNOSIS — M81.8 OSTEOPOROSIS OF FOREARM ASSOCIATED WITH ENDOCRINE DISORDER: ICD-10-CM

## 2023-11-09 DIAGNOSIS — E34.9 OSTEOPOROSIS OF FOREARM ASSOCIATED WITH ENDOCRINE DISORDER: ICD-10-CM

## 2023-11-09 LAB
ALBUMIN SERPL BCP-MCNC: 3.5 G/DL (ref 3.5–5)
ALP SERPL-CCNC: 41 U/L (ref 34–104)
ALT SERPL W P-5'-P-CCNC: 4 U/L (ref 7–52)
ANION GAP SERPL CALCULATED.3IONS-SCNC: 8 MMOL/L
AST SERPL W P-5'-P-CCNC: 17 U/L (ref 13–39)
BILIRUB SERPL-MCNC: 0.57 MG/DL (ref 0.2–1)
BUN SERPL-MCNC: 12 MG/DL (ref 5–25)
CALCIUM SERPL-MCNC: 8.8 MG/DL (ref 8.4–10.2)
CHLORIDE SERPL-SCNC: 111 MMOL/L (ref 96–108)
CO2 SERPL-SCNC: 26 MMOL/L (ref 21–32)
CREAT SERPL-MCNC: 0.84 MG/DL (ref 0.6–1.3)
GFR SERPL CREATININE-BSD FRML MDRD: 70 ML/MIN/1.73SQ M
GLUCOSE P FAST SERPL-MCNC: 104 MG/DL (ref 65–99)
POTASSIUM SERPL-SCNC: 4.2 MMOL/L (ref 3.5–5.3)
PROT SERPL-MCNC: 6.5 G/DL (ref 6.4–8.4)
SODIUM SERPL-SCNC: 145 MMOL/L (ref 135–147)

## 2023-11-09 PROCEDURE — 36415 COLL VENOUS BLD VENIPUNCTURE: CPT

## 2023-11-09 PROCEDURE — 80053 COMPREHEN METABOLIC PANEL: CPT

## 2023-11-14 ENCOUNTER — HOSPITAL ENCOUNTER (OUTPATIENT)
Dept: INFUSION CENTER | Facility: HOSPITAL | Age: 73
Discharge: HOME | End: 2023-11-14
Attending: INTERNAL MEDICINE
Payer: MEDICARE

## 2023-11-14 DIAGNOSIS — D69.3 IDIOPATHIC THROMBOCYTOPENIC PURPURA (ITP) (HCC): Primary | ICD-10-CM

## 2023-11-14 DIAGNOSIS — C91.10 CLL (CHRONIC LYMPHOCYTIC LEUKEMIA) (HCC): ICD-10-CM

## 2023-11-14 PROCEDURE — 96372 THER/PROPH/DIAG INJ SC/IM: CPT

## 2023-11-14 RX ADMIN — ROMIPLOSTIM 125 MCG: 125 INJECTION, POWDER, LYOPHILIZED, FOR SOLUTION SUBCUTANEOUS at 12:25

## 2023-12-07 ENCOUNTER — APPOINTMENT (OUTPATIENT)
Dept: LAB | Age: 73
End: 2023-12-07
Payer: MEDICARE

## 2023-12-07 LAB
BASOPHILS # BLD AUTO: 0.02 THOUSANDS/ÂΜL (ref 0–0.1)
BASOPHILS NFR BLD AUTO: 0 % (ref 0–1)
EOSINOPHIL # BLD AUTO: 0.06 THOUSAND/ÂΜL (ref 0–0.61)
EOSINOPHIL NFR BLD AUTO: 1 % (ref 0–6)
ERYTHROCYTE [DISTWIDTH] IN BLOOD BY AUTOMATED COUNT: 13.9 % (ref 11.6–15.1)
HCT VFR BLD AUTO: 40.2 % (ref 34.8–46.1)
HGB BLD-MCNC: 12.6 G/DL (ref 11.5–15.4)
IMM GRANULOCYTES # BLD AUTO: 0.02 THOUSAND/UL (ref 0–0.2)
IMM GRANULOCYTES NFR BLD AUTO: 0 % (ref 0–2)
LYMPHOCYTES # BLD AUTO: 0.8 THOUSANDS/ÂΜL (ref 0.6–4.47)
LYMPHOCYTES NFR BLD AUTO: 17 % (ref 14–44)
MCH RBC QN AUTO: 30.1 PG (ref 26.8–34.3)
MCHC RBC AUTO-ENTMCNC: 31.3 G/DL (ref 31.4–37.4)
MCV RBC AUTO: 96 FL (ref 82–98)
MONOCYTES # BLD AUTO: 0.43 THOUSAND/ÂΜL (ref 0.17–1.22)
MONOCYTES NFR BLD AUTO: 9 % (ref 4–12)
NEUTROPHILS # BLD AUTO: 3.47 THOUSANDS/ÂΜL (ref 1.85–7.62)
NEUTS SEG NFR BLD AUTO: 73 % (ref 43–75)
NRBC BLD AUTO-RTO: 0 /100 WBCS
PLATELET # BLD AUTO: 136 THOUSANDS/UL (ref 149–390)
PMV BLD AUTO: 13.2 FL (ref 8.9–12.7)
RBC # BLD AUTO: 4.19 MILLION/UL (ref 3.81–5.12)
WBC # BLD AUTO: 4.8 THOUSAND/UL (ref 4.31–10.16)

## 2023-12-12 ENCOUNTER — HOSPITAL ENCOUNTER (OUTPATIENT)
Dept: INFUSION CENTER | Facility: HOSPITAL | Age: 73
Discharge: HOME | End: 2023-12-12
Attending: INTERNAL MEDICINE
Payer: MEDICARE

## 2023-12-12 DIAGNOSIS — C91.10 CLL (CHRONIC LYMPHOCYTIC LEUKEMIA) (HCC): ICD-10-CM

## 2023-12-12 DIAGNOSIS — D69.3 IDIOPATHIC THROMBOCYTOPENIC PURPURA (ITP) (HCC): Primary | ICD-10-CM

## 2023-12-12 PROCEDURE — 96372 THER/PROPH/DIAG INJ SC/IM: CPT

## 2023-12-12 RX ADMIN — ROMIPLOSTIM 125 MCG: 125 INJECTION, POWDER, LYOPHILIZED, FOR SOLUTION SUBCUTANEOUS at 12:20

## 2023-12-12 NOTE — PROGRESS NOTES
Nicolle Wood  tolerated treatment well with no complications. Jesu Guzman is aware of future appt on 12/22 at 1:00pm.     AVS printed and given to Jesu Guzman:  Yes, AVS provided. Also confirmed that CMP from 11/9 is okay to use for Prolia injection on 12/22.

## 2023-12-19 ENCOUNTER — OFFICE VISIT (OUTPATIENT)
Dept: FAMILY MEDICINE CLINIC | Facility: CLINIC | Age: 73
End: 2023-12-19
Payer: MEDICARE

## 2023-12-19 VITALS
DIASTOLIC BLOOD PRESSURE: 64 MMHG | BODY MASS INDEX: 18.77 KG/M2 | WEIGHT: 116.8 LBS | SYSTOLIC BLOOD PRESSURE: 120 MMHG | OXYGEN SATURATION: 98 % | HEART RATE: 76 BPM | RESPIRATION RATE: 16 BRPM | TEMPERATURE: 97.6 F | HEIGHT: 66 IN

## 2023-12-19 DIAGNOSIS — M35.00 SJOGREN'S SYNDROME, WITH UNSPECIFIED ORGAN INVOLVEMENT (HCC): ICD-10-CM

## 2023-12-19 DIAGNOSIS — E78.5 DYSLIPIDEMIA: ICD-10-CM

## 2023-12-19 DIAGNOSIS — E24.2 DRUG-INDUCED CUSHING'S SYNDROME (HCC): ICD-10-CM

## 2023-12-19 DIAGNOSIS — E55.9 VITAMIN D DEFICIENCY: ICD-10-CM

## 2023-12-19 DIAGNOSIS — M79.10 MYALGIA: ICD-10-CM

## 2023-12-19 DIAGNOSIS — Z00.00 MEDICARE ANNUAL WELLNESS VISIT, SUBSEQUENT: Primary | ICD-10-CM

## 2023-12-19 DIAGNOSIS — E03.9 HYPOTHYROIDISM, UNSPECIFIED TYPE: ICD-10-CM

## 2023-12-19 DIAGNOSIS — F32.A DEPRESSION, UNSPECIFIED DEPRESSION TYPE: ICD-10-CM

## 2023-12-19 PROCEDURE — 99214 OFFICE O/P EST MOD 30 MIN: CPT | Performed by: FAMILY MEDICINE

## 2023-12-19 PROCEDURE — G0439 PPPS, SUBSEQ VISIT: HCPCS | Performed by: FAMILY MEDICINE

## 2023-12-19 RX ORDER — LEVOTHYROXINE SODIUM 0.07 MG/1
75 TABLET ORAL
Qty: 30 TABLET | Refills: 1 | Status: CANCELLED | OUTPATIENT
Start: 2023-12-19

## 2023-12-19 RX ORDER — METHOCARBAMOL 500 MG/1
500 TABLET, FILM COATED ORAL EVERY 8 HOURS PRN
Qty: 30 TABLET | Refills: 2 | Status: SHIPPED | OUTPATIENT
Start: 2023-12-19

## 2023-12-19 RX ORDER — SERTRALINE HYDROCHLORIDE 100 MG/1
100 TABLET, FILM COATED ORAL DAILY
Qty: 30 TABLET | Refills: 5 | Status: SHIPPED | OUTPATIENT
Start: 2023-12-19

## 2023-12-19 NOTE — PATIENT INSTRUCTIONS
Medicare Preventive Visit Patient Instructions  Thank you for completing your Welcome to Medicare Visit or Medicare Annual Wellness Visit today. Your next wellness visit will be due in one year (12/19/2024).  The screening/preventive services that you may require over the next 5-10 years are detailed below. Some tests may not apply to you based off risk factors and/or age. Screening tests ordered at today's visit but not completed yet may show as past due. Also, please note that scanned in results may not display below.  Preventive Screenings:  Service Recommendations Previous Testing/Comments   Colorectal Cancer Screening  * Colonoscopy    * Fecal Occult Blood Test (FOBT)/Fecal Immunochemical Test (FIT)  * Fecal DNA/Cologuard Test  * Flexible Sigmoidoscopy Age: 45-75 years old   Colonoscopy: every 10 years (may be performed more frequently if at higher risk)  OR  FOBT/FIT: every 1 year  OR  Cologuard: every 3 years  OR  Sigmoidoscopy: every 5 years  Screening may be recommended earlier than age 45 if at higher risk for colorectal cancer. Also, an individualized decision between you and your healthcare provider will decide whether screening between the ages of 76-85 would be appropriate. Colonoscopy: 03/30/2023  FOBT/FIT: Not on file  Cologuard: Not on file  Sigmoidoscopy: Not on file    Screening Current     Breast Cancer Screening Age: 40+ years old  Frequency: every 1-2 years  Not required if history of left and right mastectomy Mammogram: 06/02/2015        Cervical Cancer Screening Between the ages of 21-29, pap smear recommended once every 3 years.   Between the ages of 30-65, can perform pap smear with HPV co-testing every 5 years.   Recommendations may differ for women with a history of total hysterectomy, cervical cancer, or abnormal pap smears in past. Pap Smear: Not on file    Screening Not Indicated   Hepatitis C Screening Once for adults born between 1945 and 1965  More frequently in patients at high  risk for Hepatitis C Hep C Antibody: 12/14/2022    Screening Current   Diabetes Screening 1-2 times per year if you're at risk for diabetes or have pre-diabetes Fasting glucose: 104 mg/dL (11/9/2023)  A1C: No results in last 5 years (No results in last 5 years)  Screening Current   Cholesterol Screening Once every 5 years if you don't have a lipid disorder. May order more often based on risk factors. Lipid panel: 10/21/2020    Screening Current     Other Preventive Screenings Covered by Medicare:  Abdominal Aortic Aneurysm (AAA) Screening: covered once if your at risk. You're considered to be at risk if you have a family history of AAA.  Lung Cancer Screening: covers low dose CT scan once per year if you meet all of the following conditions: (1) Age 55-77; (2) No signs or symptoms of lung cancer; (3) Current smoker or have quit smoking within the last 15 years; (4) You have a tobacco smoking history of at least 20 pack years (packs per day multiplied by number of years you smoked); (5) You get a written order from a healthcare provider.  Glaucoma Screening: covered annually if you're considered high risk: (1) You have diabetes OR (2) Family history of glaucoma OR (3)  aged 50 and older OR (4)  American aged 65 and older  Osteoporosis Screening: covered every 2 years if you meet one of the following conditions: (1) You're estrogen deficient and at risk for osteoporosis based off medical history and other findings; (2) Have a vertebral abnormality; (3) On glucocorticoid therapy for more than 3 months; (4) Have primary hyperparathyroidism; (5) On osteoporosis medications and need to assess response to drug therapy.   Last bone density test (DXA Scan): 08/05/2022.  HIV Screening: covered annually if you're between the age of 15-65. Also covered annually if you are younger than 15 and older than 65 with risk factors for HIV infection. For pregnant patients, it is covered up to 3 times per  pregnancy.    Immunizations:  Immunization Recommendations   Influenza Vaccine Annual influenza vaccination during flu season is recommended for all persons aged >= 6 months who do not have contraindications   Pneumococcal Vaccine   * Pneumococcal conjugate vaccine = PCV13 (Prevnar 13), PCV15 (Vaxneuvance), PCV20 (Prevnar 20)  * Pneumococcal polysaccharide vaccine = PPSV23 (Pneumovax) Adults 19-63 yo with certain risk factors or if 65+ yo  If never received any pneumonia vaccine: recommend Prevnar 20 (PCV20)  Give PCV20 if previously received 1 dose of PCV13 or PPSV23   Hepatitis B Vaccine 3 dose series if at intermediate or high risk (ex: diabetes, end stage renal disease, liver disease)   Respiratory syncytial virus (RSV) Vaccine - COVERED BY MEDICARE PART D  * RSVPreF3 (Arexvy) CDC recommends that adults 60 years of age and older may receive a single dose of RSV vaccine using shared clinical decision-making (SCDM)   Tetanus (Td) Vaccine - COST NOT COVERED BY MEDICARE PART B Following completion of primary series, a booster dose should be given every 10 years to maintain immunity against tetanus. Td may also be given as tetanus wound prophylaxis.   Tdap Vaccine - COST NOT COVERED BY MEDICARE PART B Recommended at least once for all adults. For pregnant patients, recommended with each pregnancy.   Shingles Vaccine (Shingrix) - COST NOT COVERED BY MEDICARE PART B  2 shot series recommended in those 19 years and older who have or will have weakened immune systems or those 50 years and older     Health Maintenance Due:      Topic Date Due   • Breast Cancer Screening: Mammogram  06/02/2016   • Colorectal Cancer Screening  03/29/2026   • Hepatitis C Screening  Completed     Immunizations Due:      Topic Date Due   • COVID-19 Vaccine (5 - 2023-24 season) 11/30/2023     Advance Directives   What are advance directives?  Advance directives are legal documents that state your wishes and plans for medical care. These plans  are made ahead of time in case you lose your ability to make decisions for yourself. Advance directives can apply to any medical decision, such as the treatments you want, and if you want to donate organs.   What are the types of advance directives?  There are many types of advance directives, and each state has rules about how to use them. You may choose a combination of any of the following:  Living will:  This is a written record of the treatment you want. You can also choose which treatments you do not want, which to limit, and which to stop at a certain time. This includes surgery, medicine, IV fluid, and tube feedings.   Durable power of  for healthcare (DPAHC):  This is a written record that states who you want to make healthcare choices for you when you are unable to make them for yourself. This person, called a proxy, is usually a family member or a friend. You may choose more than 1 proxy.  Do not resuscitate (DNR) order:  A DNR order is used in case your heart stops beating or you stop breathing. It is a request not to have certain forms of treatment, such as CPR. A DNR order may be included in other types of advance directives.  Medical directive:  This covers the care that you want if you are in a coma, near death, or unable to make decisions for yourself. You can list the treatments you want for each condition. Treatment may include pain medicine, surgery, blood transfusions, dialysis, IV or tube feedings, and a ventilator (breathing machine).  Values history:  This document has questions about your views, beliefs, and how you feel and think about life. This information can help others choose the care that you would choose.  Why are advance directives important?  An advance directive helps you control your care. Although spoken wishes may be used, it is better to have your wishes written down. Spoken wishes can be misunderstood, or not followed. Treatments may be given even if you do not want  them. An advance directive may make it easier for your family to make difficult choices about your care.       © Copyright [employer] 2018 Information is for End User's use only and may not be sold, redistributed or otherwise used for commercial purposes. All illustrations and images included in CareNotes® are the copyrighted property of A.D.A.M., Inc. or "[employer]

## 2023-12-19 NOTE — PROGRESS NOTES
Assessment/Plan:  Patient given lab requisition for fasting lipid panel, TSH and vitamin D to be drawn with next set of labs per hematology oncology.  Patient to continue present treatment.  Recommend she add Ensure twice daily.  Recommend patient follow-up with gastroenterology regarding continued weight loss.  Follow-up with specialist as scheduled.  Return to the office in 1 year.  No problem-specific Assessment & Plan notes found for this encounter.       Diagnoses and all orders for this visit:    Medicare annual wellness visit, subsequent    Hypothyroidism, unspecified type  -     TSH, 3rd generation with Free T4 reflex; Future    Depression, unspecified depression type  -     sertraline (ZOLOFT) 100 mg tablet; Take 1 tablet (100 mg total) by mouth daily    Sjogren's syndrome, with unspecified organ involvement (HCC)    Myalgia  -     methocarbamol (ROBAXIN) 500 mg tablet; Take 1 tablet (500 mg total) by mouth every 8 (eight) hours as needed for muscle spasms    Drug-induced Cushing's syndrome (HCC)    Dyslipidemia  -     Lipid Panel with Direct LDL reflex; Future    Vitamin D deficiency  -     Vitamin D 25 hydroxy; Future          Subjective:      Patient ID: Nicolle Wood is a 73 y.o. female.    Patient is here for annual Medicare wellness exam and follow-up of chronic conditions.  Patient gets labs routinely with hematology/oncology reviewed.  Patient recently saw neurology and GI evaluation.  She admits to decreased appetite and continued weight loss.  Patient requests increasing sertraline dose.    Thyroid Problem  Presents for follow-up visit. Symptoms include anxiety, depressed mood, diaphoresis, diarrhea, dry skin, fatigue, leg swelling, tremors and weight loss. Patient reports no cold intolerance, constipation, hair loss, heat intolerance, hoarse voice, palpitations, visual change or weight gain.       The following portions of the patient's history were reviewed and updated as appropriate:  "allergies, current medications, past family history, past medical history, past social history, past surgical history, and problem list.    Review of Systems   Constitutional:  Positive for diaphoresis, fatigue and weight loss. Negative for weight gain.   HENT:  Negative for hoarse voice.    Cardiovascular:  Negative for palpitations.   Gastrointestinal:  Positive for diarrhea. Negative for constipation.   Endocrine: Negative for cold intolerance and heat intolerance.   Neurological:  Positive for tremors.   Psychiatric/Behavioral:  The patient is nervous/anxious.          Objective:      /64   Pulse 76   Temp 97.6 °F (36.4 °C)   Resp 16   Ht 5' 5.5\" (1.664 m)   Wt 53 kg (116 lb 12.8 oz)   SpO2 98%   BMI 19.14 kg/m²          Physical Exam  Constitutional:       General: She is not in acute distress.     Appearance: Normal appearance.   HENT:      Head: Normocephalic.      Mouth/Throat:      Mouth: Mucous membranes are dry.   Eyes:      General: No scleral icterus.     Conjunctiva/sclera: Conjunctivae normal.   Neck:      Vascular: No carotid bruit.   Cardiovascular:      Rate and Rhythm: Normal rate and regular rhythm.   Pulmonary:      Effort: Pulmonary effort is normal.      Breath sounds: Normal breath sounds.   Abdominal:      Palpations: Abdomen is soft.      Tenderness: There is no abdominal tenderness.   Musculoskeletal:      Cervical back: Neck supple.      Right lower leg: No edema.      Left lower leg: No edema.   Lymphadenopathy:      Cervical: No cervical adenopathy.   Skin:     General: Skin is warm and dry.   Neurological:      General: No focal deficit present.      Mental Status: She is alert and oriented to person, place, and time.   Psychiatric:         Mood and Affect: Mood normal.         Behavior: Behavior normal.         Thought Content: Thought content normal.         Judgment: Judgment normal.           "

## 2023-12-19 NOTE — PROGRESS NOTES
Assessment and Plan:     Problem List Items Addressed This Visit          Endocrine    Hypothyroidism        Preventive health issues were discussed with patient, and age appropriate screening tests were ordered as noted in patient's After Visit Summary.  Personalized health advice and appropriate referrals for health education or preventive services given if needed, as noted in patient's After Visit Summary.     History of Present Illness:     Patient presents for a Medicare Wellness Visit    HPI   Patient Care Team:  Kwame Ovalles DO as PCP - General     Review of Systems:     Review of Systems     Problem List:     Patient Active Problem List   Diagnosis    Depression, recurrent (HCC)    Esophageal reflux    Hypothyroidism    Sjogren's disease (HCC)    Hypergammaglobulinemia    Abnormal MRI    Ambulatory dysfunction    Blurry vision, right eye    Dizziness    Elevated sedimentation rate measurement    Other forms of systemic lupus erythematosus (HCC)    Small vessel disease (HCC)    Vitamin D deficiency    Hematoma of right lower leg    CLL (chronic lymphocytic leukemia) (HCC)    Autoimmune hemolytic anemia (HCC)    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HCC)    Idiopathic thrombocytopenic purpura (ITP) (HCC)    Generalized weakness    Severe protein-calorie malnutrition (Emanuel: less than 60% of standard weight) (HCC)    Anxiety    Urinary retention    Moderate protein-calorie malnutrition (HCC)    Left hip pain    RLS (restless legs syndrome)    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    Rectal bleeding    Tremor    Abnormal imaging of central nervous system    Balance disorder      Past Medical and Surgical History:     Past Medical History:   Diagnosis Date    Anxiety     Autoimmune hemolytic anemia (HCC)     Cataract     Disease of thyroid gland     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HCC)     GERD (gastroesophageal reflux disease)     Hypothyroidism     Hypothyroidism     Idiopathic thrombocytopenic purpura (ITP) (HCC)      Menopause     Sjogrens syndrome (HCC)      Past Surgical History:   Procedure Laterality Date    CATARACT EXTRACTION Left     COLONOSCOPY  2012    per pt    TOOTH EXTRACTION        Family History:     Family History   Problem Relation Age of Onset    Colon cancer Mother     Heart disease Mother         cardiac disorder     Cancer Mother     Liver cancer Father     Cancer Father     Heart disease Other         cardiac disorder      Social History:     Social History     Socioeconomic History    Marital status: Single     Spouse name: None    Number of children: None    Years of education: None    Highest education level: None   Occupational History    None   Tobacco Use    Smoking status: Never    Smokeless tobacco: Never   Vaping Use    Vaping status: Never Used   Substance and Sexual Activity    Alcohol use: No    Drug use: No    Sexual activity: Not Currently   Other Topics Concern    None   Social History Narrative    Caffeine use     Social Determinants of Health     Financial Resource Strain: Low Risk  (12/8/2022)    Overall Financial Resource Strain (CARDIA)     Difficulty of Paying Living Expenses: Not hard at all   Food Insecurity: Not on file   Transportation Needs: No Transportation Needs (12/8/2022)    PRAPARE - Transportation     Lack of Transportation (Medical): No     Lack of Transportation (Non-Medical): No   Physical Activity: Not on file   Stress: Not on file   Social Connections: Not on file   Intimate Partner Violence: Not on file   Housing Stability: Not on file      Medications and Allergies:     Current Outpatient Medications   Medication Sig Dispense Refill    Acalabrutinib Maleate (Calquence) 100 MG TABS Take 1 tablet by mouth daily 30 tablet 11    ALPRAZolam (XANAX) 0.5 mg tablet Take 1 tablet (0.5 mg total) by mouth daily at bedtime as needed for anxiety 30 tablet 2    cycloSPORINE (RESTASIS) 0.05 % ophthalmic emulsion INSTILL 1 DROP INTO BOTH EYES 2 TIMES A DAY      levothyroxine 75 mcg  tablet take 1 tablet by mouth once daily IN THE EARLY MORNING 30 tablet 1    methocarbamol (ROBAXIN) 500 mg tablet TAKE ONE TABLET BY MOUTH TWO TIMES A DAY AS NEEDED FOR MUSCLE SPASMS 30 tablet 2    polyethylene glycol (MIRALAX) 17 g packet Take 17 g by mouth daily for 10 days 10 each 0    sertraline (ZOLOFT) 50 mg tablet TAKE HALF A TABLET BY MOUTH ONCE DAILY 30 tablet 5    gabapentin (NEURONTIN) 300 mg capsule Take 1 capsule (300 mg total) by mouth daily at bedtime (Patient not taking: Reported on 12/19/2023) 30 capsule 5    predniSONE 5 mg tablet TAKE FOUR TABLETS BY MOUTH ONCE DAILY (Patient not taking: Reported on 10/9/2023) 120 tablet 3     No current facility-administered medications for this visit.     No Known Allergies   Immunizations:     Immunization History   Administered Date(s) Administered    COVID-19 PFIZER VACCINE 0.3 ML IM 03/18/2021, 04/08/2021, 12/08/2021    COVID-19 Pfizer Vac BIVALENT Yordy-sucrose 12 Yr+ IM 10/26/2022    COVID-19 Pfizer mRNA vacc PF yordy-sucrose 12 yr and older (Comirnaty) 10/05/2023    INFLUENZA 08/03/2014, 08/05/2015, 08/28/2016, 10/01/2017, 10/24/2018, 10/07/2021, 09/21/2022, 10/05/2023    Influenza Quadrivalent, 6-35 Months IM 10/01/2017    Influenza, high dose seasonal 0.7 mL 10/14/2020    Influenza, seasonal, injectable 09/04/2012, 10/27/2013, 08/05/2015, 08/28/2016    Pneumococcal Conjugate 13-Valent 09/25/2020    Pneumococcal Polysaccharide PPV23 10/31/2018    Zoster 05/13/2015    influenza, trivalent, adjuvanted 10/04/2019      Health Maintenance:         Topic Date Due    Breast Cancer Screening: Mammogram  06/02/2016    Colorectal Cancer Screening  03/29/2026    Hepatitis C Screening  Completed         Topic Date Due    COVID-19 Vaccine (5 - 2023-24 season) 11/30/2023      Medicare Screening Tests and Risk Assessments:     Nicolle is here for her Subsequent Wellness visit. Last Medicare Wellness visit information reviewed, patient interviewed, no change since last  AWV.     Health Risk Assessment:   Patient rates overall health as good. Patient feels that their physical health rating is slightly worse. Patient is satisfied with their life. Eyesight was rated as slightly worse. Hearing was rated as slightly worse. Patient feels that their emotional and mental health rating is slightly worse. Patients states they are sometimes angry. Patient states they are always unusually tired/fatigued. Pain experienced in the last 7 days has been none. Patient states that she has experienced weight loss or gain in last 6 months.     Depression Screening:   PHQ-9 Score: 9      Fall Risk Screening:   In the past year, patient has experienced: no history of falling in past year      Urinary Incontinence Screening:   Patient has not leaked urine accidently in the last six months.     Home Safety:  Patient does not have trouble with stairs inside or outside of their home. Patient has working smoke alarms and has working carbon monoxide detector. Home safety hazards include: none.     Nutrition:   Current diet is Regular.     Medications:   Patient is currently taking over-the-counter supplements. OTC medications include: see medication list. Patient is able to manage medications.     Activities of Daily Living (ADLs)/Instrumental Activities of Daily Living (IADLs):   Walk and transfer into and out of bed and chair?: Yes  Dress and groom yourself?: Yes    Bathe or shower yourself?: Yes    Feed yourself? Yes  Do your laundry/housekeeping?: Yes  Manage your money, pay your bills and track your expenses?: Yes  Make your own meals?: Yes    Do your own shopping?: Yes    Previous Hospitalizations:   Any hospitalizations or ED visits within the last 12 months?: No      Advance Care Planning:   Living will: No    Durable POA for healthcare: No    Advanced directive: No    Advanced directive counseling given: Yes      Cognitive Screening:   Provider or family/friend/caregiver concerned regarding  "cognition?: No    PREVENTIVE SCREENINGS      Cardiovascular Screening:    General: Screening Current and Risks and Benefits Discussed    Due for: Lipid Panel      Diabetes Screening:     General: Screening Current and Risks and Benefits Discussed      Colorectal Cancer Screening:     General: Screening Current      Breast Cancer Screening:     General: Risks and Benefits Discussed and Patient Declines      Cervical Cancer Screening:    General: Screening Not Indicated and Risks and Benefits Discussed      Osteoporosis Screening:    General: Screening Not Indicated, History Osteoporosis and Risks and Benefits Discussed      Abdominal Aortic Aneurysm (AAA) Screening:        General: Risks and Benefits Discussed and Screening Not Indicated      Lung Cancer Screening:     General: Screening Not Indicated and Risks and Benefits Discussed      Hepatitis C Screening:    General: Screening Current and Risks and Benefits Discussed    Screening, Brief Intervention, and Referral to Treatment (SBIRT)    Screening  Typical number of drinks in a day: 0  Typical number of drinks in a week: 0  Interpretation: Low risk drinking behavior.    Brief Intervention  Alcohol & drug use screenings were reviewed. No concerns regarding substance use disorder identified.     Other Counseling Topics:   Car/seat belt/driving safety, skin self-exam, sunscreen and calcium and vitamin D intake and regular weightbearing exercise.     No results found.     Physical Exam:     /64   Pulse 76   Temp 97.6 °F (36.4 °C)   Ht 5' 5.5\" (1.664 m)   Wt 53 kg (116 lb 12.8 oz)   SpO2 98%   BMI 19.14 kg/m²     Physical Exam     Kwame Ovalles, DO  "

## 2023-12-21 ENCOUNTER — TELEPHONE (OUTPATIENT)
Dept: INFUSION CENTER | Facility: HOSPITAL | Age: 73
End: 2023-12-21

## 2023-12-21 ENCOUNTER — APPOINTMENT (OUTPATIENT)
Dept: LAB | Age: 73
End: 2023-12-21
Payer: MEDICARE

## 2023-12-21 ENCOUNTER — TELEPHONE (OUTPATIENT)
Dept: HEMATOLOGY ONCOLOGY | Facility: CLINIC | Age: 73
End: 2023-12-21

## 2023-12-21 DIAGNOSIS — M81.8 OSTEOPOROSIS OF FOREARM ASSOCIATED WITH ENDOCRINE DISORDER: ICD-10-CM

## 2023-12-21 DIAGNOSIS — E34.9 OSTEOPOROSIS OF FOREARM ASSOCIATED WITH ENDOCRINE DISORDER: ICD-10-CM

## 2023-12-21 DIAGNOSIS — E34.9 OSTEOPOROSIS OF FOREARM ASSOCIATED WITH ENDOCRINE DISORDER: Primary | ICD-10-CM

## 2023-12-21 DIAGNOSIS — M81.8 OSTEOPOROSIS OF FOREARM ASSOCIATED WITH ENDOCRINE DISORDER: Primary | ICD-10-CM

## 2023-12-21 LAB
ALBUMIN SERPL BCP-MCNC: 3.5 G/DL (ref 3.5–5)
ALP SERPL-CCNC: 39 U/L (ref 34–104)
ALT SERPL W P-5'-P-CCNC: 6 U/L (ref 7–52)
ANION GAP SERPL CALCULATED.3IONS-SCNC: 3 MMOL/L
AST SERPL W P-5'-P-CCNC: 17 U/L (ref 13–39)
BILIRUB SERPL-MCNC: 0.51 MG/DL (ref 0.2–1)
BUN SERPL-MCNC: 10 MG/DL (ref 5–25)
CALCIUM SERPL-MCNC: 9 MG/DL (ref 8.4–10.2)
CHLORIDE SERPL-SCNC: 111 MMOL/L (ref 96–108)
CO2 SERPL-SCNC: 27 MMOL/L (ref 21–32)
CREAT SERPL-MCNC: 0.88 MG/DL (ref 0.6–1.3)
GFR SERPL CREATININE-BSD FRML MDRD: 66 ML/MIN/1.73SQ M
GLUCOSE P FAST SERPL-MCNC: 88 MG/DL (ref 65–99)
POTASSIUM SERPL-SCNC: 4.7 MMOL/L (ref 3.5–5.3)
PROT SERPL-MCNC: 6.5 G/DL (ref 6.4–8.4)
SODIUM SERPL-SCNC: 141 MMOL/L (ref 135–147)

## 2023-12-21 PROCEDURE — 80053 COMPREHEN METABOLIC PANEL: CPT

## 2023-12-21 PROCEDURE — 36415 COLL VENOUS BLD VENIPUNCTURE: CPT

## 2023-12-21 NOTE — TELEPHONE ENCOUNTER
Lab Inquiry   Who are you speaking with? [facility]     If it is not the patient, are they listed on an active communication consent form? N/A   Name of ordering provider Dr. Connell   What is being requested? Lab orders need to be entered   Lab draw location [facility]   What is the best call back number? [phone]  [phone]                                                     Patient needs to have labs done for infusion tomorrow. Patient states that she was advise to have CMP done.  Patient would like a call back when the orders are entered.

## 2023-12-21 NOTE — TELEPHONE ENCOUNTER
Patient contacted to advise needs labs done for appointment tomorrow. Patient gave verbal confirmation of information given.

## 2023-12-22 ENCOUNTER — HOSPITAL ENCOUNTER (OUTPATIENT)
Dept: INFUSION CENTER | Facility: HOSPITAL | Age: 73
End: 2023-12-22
Attending: INTERNAL MEDICINE
Payer: MEDICARE

## 2023-12-22 ENCOUNTER — TELEPHONE (OUTPATIENT)
Dept: HEMATOLOGY ONCOLOGY | Facility: CLINIC | Age: 73
End: 2023-12-22

## 2023-12-22 DIAGNOSIS — M81.8 OSTEOPOROSIS OF FOREARM ASSOCIATED WITH ENDOCRINE DISORDER: Primary | ICD-10-CM

## 2023-12-22 DIAGNOSIS — E34.9 OSTEOPOROSIS OF FOREARM ASSOCIATED WITH ENDOCRINE DISORDER: Primary | ICD-10-CM

## 2023-12-22 RX ADMIN — DENOSUMAB 60 MG: 60 INJECTION SUBCUTANEOUS at 12:43

## 2023-12-22 NOTE — TELEPHONE ENCOUNTER
Appointment Change  Cancel, Reschedule, Change to Virtual      Who are you speaking with? Patient   If it is not the patient, is the caller listed on the communication consent form? Yes   Which provider is the appointment scheduled with? Dr. Connell and Aisha Holly PA-C   When was the original appointment scheduled?    Please list date and time 2/20/24   1:40pm   At which location is the appointment scheduled to take place? [place]   Was the appointment rescheduled?     Was the appointment changed from an in person visit to a virtual visit?    If so, please list the details of the change. 2/20/24  12:30   What is the reason for the appointment change? provider       Was STAR transport scheduled? Yes   Does STAR transport need to be scheduled for the new visit (if applicable) Yes   Does the patient need an infusion appointment rescheduled? No   Does the patient have an upcoming infusion appointment scheduled? If so, when? No   Is the patient undergoing chemotherapy? No   For appointments cancelled with less than 24 hours:  Was the no-show policy reviewed? Yes

## 2023-12-22 NOTE — PROGRESS NOTES
Nicolle Wood  tolerated treatment well with no complications.      Nicolle Wood is aware of future appt on 1/9/24 at 1330.     AVS Declined by Nicolle Wood

## 2024-01-04 ENCOUNTER — APPOINTMENT (OUTPATIENT)
Dept: LAB | Age: 74
End: 2024-01-04
Payer: MEDICARE

## 2024-01-04 DIAGNOSIS — E03.9 HYPOTHYROIDISM, UNSPECIFIED TYPE: ICD-10-CM

## 2024-01-04 DIAGNOSIS — E78.5 DYSLIPIDEMIA: ICD-10-CM

## 2024-01-04 DIAGNOSIS — E55.9 VITAMIN D DEFICIENCY: ICD-10-CM

## 2024-01-04 DIAGNOSIS — D69.3 IDIOPATHIC THROMBOCYTOPENIC PURPURA (ITP) (HCC): ICD-10-CM

## 2024-01-04 LAB
25(OH)D3 SERPL-MCNC: 49.5 NG/ML (ref 30–100)
BASOPHILS # BLD AUTO: 0.03 THOUSANDS/ÂΜL (ref 0–0.1)
BASOPHILS NFR BLD AUTO: 1 % (ref 0–1)
CHOLEST SERPL-MCNC: 154 MG/DL
EOSINOPHIL # BLD AUTO: 0.06 THOUSAND/ÂΜL (ref 0–0.61)
EOSINOPHIL NFR BLD AUTO: 1 % (ref 0–6)
ERYTHROCYTE [DISTWIDTH] IN BLOOD BY AUTOMATED COUNT: 13.9 % (ref 11.6–15.1)
HCT VFR BLD AUTO: 38.3 % (ref 34.8–46.1)
HDLC SERPL-MCNC: 33 MG/DL
HGB BLD-MCNC: 11.9 G/DL (ref 11.5–15.4)
IMM GRANULOCYTES # BLD AUTO: 0.02 THOUSAND/UL (ref 0–0.2)
IMM GRANULOCYTES NFR BLD AUTO: 1 % (ref 0–2)
LDLC SERPL CALC-MCNC: 87 MG/DL (ref 0–100)
LYMPHOCYTES # BLD AUTO: 0.69 THOUSANDS/ÂΜL (ref 0.6–4.47)
LYMPHOCYTES NFR BLD AUTO: 16 % (ref 14–44)
MCH RBC QN AUTO: 29.5 PG (ref 26.8–34.3)
MCHC RBC AUTO-ENTMCNC: 31.1 G/DL (ref 31.4–37.4)
MCV RBC AUTO: 95 FL (ref 82–98)
MONOCYTES # BLD AUTO: 0.36 THOUSAND/ÂΜL (ref 0.17–1.22)
MONOCYTES NFR BLD AUTO: 8 % (ref 4–12)
NEUTROPHILS # BLD AUTO: 3.24 THOUSANDS/ÂΜL (ref 1.85–7.62)
NEUTS SEG NFR BLD AUTO: 73 % (ref 43–75)
NRBC BLD AUTO-RTO: 0 /100 WBCS
PLATELET # BLD AUTO: 100 THOUSANDS/UL (ref 149–390)
PMV BLD AUTO: 12.2 FL (ref 8.9–12.7)
RBC # BLD AUTO: 4.03 MILLION/UL (ref 3.81–5.12)
TRIGL SERPL-MCNC: 170 MG/DL
TSH SERPL DL<=0.05 MIU/L-ACNC: 2.88 UIU/ML (ref 0.45–4.5)
WBC # BLD AUTO: 4.4 THOUSAND/UL (ref 4.31–10.16)

## 2024-01-04 PROCEDURE — 36415 COLL VENOUS BLD VENIPUNCTURE: CPT

## 2024-01-04 PROCEDURE — 85025 COMPLETE CBC W/AUTO DIFF WBC: CPT

## 2024-01-04 PROCEDURE — 82306 VITAMIN D 25 HYDROXY: CPT

## 2024-01-04 PROCEDURE — 84443 ASSAY THYROID STIM HORMONE: CPT

## 2024-01-04 PROCEDURE — 80061 LIPID PANEL: CPT

## 2024-01-05 ENCOUNTER — TELEPHONE (OUTPATIENT)
Dept: FAMILY MEDICINE CLINIC | Facility: CLINIC | Age: 74
End: 2024-01-05

## 2024-01-05 NOTE — TELEPHONE ENCOUNTER
Lmom to call office back     Results below   ----- Message from Kwame Ovalles DO sent at 1/5/2024  8:42 AM EST -----  Labs ok, cont present Tx.

## 2024-01-09 ENCOUNTER — HOSPITAL ENCOUNTER (OUTPATIENT)
Dept: INFUSION CENTER | Facility: HOSPITAL | Age: 74
Discharge: HOME | End: 2024-01-09
Attending: INTERNAL MEDICINE
Payer: MEDICARE

## 2024-01-09 VITALS — TEMPERATURE: 96.1 F

## 2024-01-09 DIAGNOSIS — D69.3 IDIOPATHIC THROMBOCYTOPENIC PURPURA (ITP) (HCC): Primary | ICD-10-CM

## 2024-01-09 DIAGNOSIS — C91.10 CLL (CHRONIC LYMPHOCYTIC LEUKEMIA) (HCC): ICD-10-CM

## 2024-01-09 RX ADMIN — ROMIPLOSTIM 125 MCG: 125 INJECTION, POWDER, LYOPHILIZED, FOR SOLUTION SUBCUTANEOUS at 13:48

## 2024-01-09 NOTE — PROGRESS NOTES
Nicolle Wood  tolerated treatment well with no complications.      Nicolle Wood is aware of future appt on 2/6/24 at 1200.     AVS printed and given to Nicolle Wood.

## 2024-01-09 NOTE — PLAN OF CARE
Problem: Potential for Falls  Goal: Patient will remain free of falls  Description: INTERVENTIONS:  - Educate patient/family on patient safety including physical limitations  - Instruct patient to call for assistance with activity   - Consult OT/PT to assist with strengthening/mobility   - Keep Call bell within reach  - Keep bed low and locked with side rails adjusted as appropriate  - Keep care items and personal belongings within reach  - Apply yellow socks and bracelet for high fall risk patients  - Consider moving patient to room near nurses station  Outcome: Progressing

## 2024-01-25 ENCOUNTER — TELEPHONE (OUTPATIENT)
Dept: HEMATOLOGY ONCOLOGY | Facility: CLINIC | Age: 74
End: 2024-01-25

## 2024-01-25 ENCOUNTER — DOCUMENTATION (OUTPATIENT)
Dept: HEMATOLOGY ONCOLOGY | Facility: CLINIC | Age: 74
End: 2024-01-25

## 2024-01-25 NOTE — TELEPHONE ENCOUNTER
This is being addressed via email chain. Per Deven at [facility]:  Her new Part-D plan will be active next week on 2/1/24, might need a prior auth too, but have her on our calendar to update her pharmacy benefits then and will keep you all posted when we can rerun the claim on her new plan.    Samples are also available

## 2024-01-25 NOTE — PROGRESS NOTES
Recvd email from patsy that the pts rx plan termd.. I called the pt & she sd that her [facility] rx plan did terminate 12/31/23 but she recvd a letter that told her that info back in dec but she opened that letter today. I told her about the samples but she sd that she has no way to get them.. she then told me that she has [employer] scripts that will start 02/01/24 the only# she has is [id number] no oterh info she did say that she just enrolled today & she was told that she should get her card in the mail w/in 10 days

## 2024-01-25 NOTE — TELEPHONE ENCOUNTER
Received VM from patient stating she had to change prescription drug plans and is concerned this will interfere with getting her Calquence. Can someone please assist.

## 2024-01-29 NOTE — TELEPHONE ENCOUNTER
"Received attached VM via teams:  \"My name is Nicolle Alston, [id number]. It's [phone]. I'm supposed to  some medicine tomorrow and I just was wondering where I was supposed to go. OK. Thank you.\"    I can call patient back if you need. I don't see a time or anything.   "

## 2024-02-01 ENCOUNTER — APPOINTMENT (OUTPATIENT)
Dept: LAB | Age: 74
End: 2024-02-01
Payer: MEDICARE

## 2024-02-01 DIAGNOSIS — D69.3 IDIOPATHIC THROMBOCYTOPENIC PURPURA (ITP) (HCC): ICD-10-CM

## 2024-02-01 LAB
BASOPHILS # BLD AUTO: 0.02 THOUSANDS/ÂΜL (ref 0–0.1)
BASOPHILS NFR BLD AUTO: 1 % (ref 0–1)
EOSINOPHIL # BLD AUTO: 0.04 THOUSAND/ÂΜL (ref 0–0.61)
EOSINOPHIL NFR BLD AUTO: 1 % (ref 0–6)
ERYTHROCYTE [DISTWIDTH] IN BLOOD BY AUTOMATED COUNT: 14.4 % (ref 11.6–15.1)
HCT VFR BLD AUTO: 40.4 % (ref 34.8–46.1)
HGB BLD-MCNC: 12.6 G/DL (ref 11.5–15.4)
IMM GRANULOCYTES # BLD AUTO: 0.03 THOUSAND/UL (ref 0–0.2)
IMM GRANULOCYTES NFR BLD AUTO: 1 % (ref 0–2)
LYMPHOCYTES # BLD AUTO: 0.75 THOUSANDS/ÂΜL (ref 0.6–4.47)
LYMPHOCYTES NFR BLD AUTO: 18 % (ref 14–44)
MCH RBC QN AUTO: 30.1 PG (ref 26.8–34.3)
MCHC RBC AUTO-ENTMCNC: 31.2 G/DL (ref 31.4–37.4)
MCV RBC AUTO: 97 FL (ref 82–98)
MONOCYTES # BLD AUTO: 0.36 THOUSAND/ÂΜL (ref 0.17–1.22)
MONOCYTES NFR BLD AUTO: 9 % (ref 4–12)
NEUTROPHILS # BLD AUTO: 3.04 THOUSANDS/ÂΜL (ref 1.85–7.62)
NEUTS SEG NFR BLD AUTO: 70 % (ref 43–75)
NRBC BLD AUTO-RTO: 0 /100 WBCS
PLATELET # BLD AUTO: 119 THOUSANDS/UL (ref 149–390)
PMV BLD AUTO: 13.2 FL (ref 8.9–12.7)
RBC # BLD AUTO: 4.18 MILLION/UL (ref 3.81–5.12)
WBC # BLD AUTO: 4.24 THOUSAND/UL (ref 4.31–10.16)

## 2024-02-01 PROCEDURE — 85025 COMPLETE CBC W/AUTO DIFF WBC: CPT

## 2024-02-01 PROCEDURE — 36415 COLL VENOUS BLD VENIPUNCTURE: CPT

## 2024-02-05 DIAGNOSIS — C91.10 CLL (CHRONIC LYMPHOCYTIC LEUKEMIA) (HCC): ICD-10-CM

## 2024-02-05 RX ORDER — ACALABRUTINIB 100 MG/1
TABLET, FILM COATED ORAL
Qty: 30 TABLET | Refills: 5 | Status: SHIPPED | OUTPATIENT
Start: 2024-02-05

## 2024-02-06 ENCOUNTER — DOCUMENTATION (OUTPATIENT)
Dept: HEMATOLOGY ONCOLOGY | Facility: CLINIC | Age: 74
End: 2024-02-06

## 2024-02-06 ENCOUNTER — HOSPITAL ENCOUNTER (OUTPATIENT)
Dept: INFUSION CENTER | Facility: HOSPITAL | Age: 74
Discharge: HOME | End: 2024-02-06
Attending: INTERNAL MEDICINE
Payer: MEDICARE

## 2024-02-06 VITALS
DIASTOLIC BLOOD PRESSURE: 67 MMHG | HEART RATE: 76 BPM | SYSTOLIC BLOOD PRESSURE: 113 MMHG | RESPIRATION RATE: 18 BRPM | TEMPERATURE: 97.3 F

## 2024-02-06 DIAGNOSIS — D69.3 IDIOPATHIC THROMBOCYTOPENIC PURPURA (ITP) (HCC): Primary | ICD-10-CM

## 2024-02-06 DIAGNOSIS — C91.10 CLL (CHRONIC LYMPHOCYTIC LEUKEMIA) (HCC): ICD-10-CM

## 2024-02-06 PROCEDURE — 96372 THER/PROPH/DIAG INJ SC/IM: CPT

## 2024-02-06 RX ADMIN — ROMIPLOSTIM 125 MCG: 125 INJECTION, POWDER, LYOPHILIZED, FOR SOLUTION SUBCUTANEOUS at 12:21

## 2024-02-06 NOTE — PROGRESS NOTES
Enrolled pt into the LLS for claquence  Effective 11/08/23-02/05/25  Id#[id number]  Card id# [id number]  [id number]  PCN=[id number]  [id number]  $[id number]  Called the pt & gave her that  info & she thanked me for the call & the help  Emailed the team

## 2024-02-06 NOTE — PROGRESS NOTES
Nicolle Wood  tolerated treatment well with no complications.      Nicolle Wood is aware of future appt on 3/5 at 12:30.     AVS printed and given to Nicolle Wood:  No (Declined by Nicolle Wood)      Calendar of appts printed.

## 2024-02-20 ENCOUNTER — OFFICE VISIT (OUTPATIENT)
Dept: HEMATOLOGY ONCOLOGY | Facility: CLINIC | Age: 74
End: 2024-02-20
Payer: MEDICARE

## 2024-02-20 ENCOUNTER — TELEPHONE (OUTPATIENT)
Dept: HEMATOLOGY ONCOLOGY | Facility: CLINIC | Age: 74
End: 2024-02-20

## 2024-02-20 VITALS
DIASTOLIC BLOOD PRESSURE: 70 MMHG | HEIGHT: 66 IN | WEIGHT: 112.6 LBS | SYSTOLIC BLOOD PRESSURE: 112 MMHG | RESPIRATION RATE: 18 BRPM | TEMPERATURE: 97.6 F | HEART RATE: 86 BPM | OXYGEN SATURATION: 96 % | BODY MASS INDEX: 18.09 KG/M2

## 2024-02-20 DIAGNOSIS — E43 SEVERE PROTEIN-CALORIE MALNUTRITION (GOMEZ: LESS THAN 60% OF STANDARD WEIGHT) (HCC): ICD-10-CM

## 2024-02-20 DIAGNOSIS — E34.9 OSTEOPOROSIS OF FOREARM ASSOCIATED WITH ENDOCRINE DISORDER: Primary | ICD-10-CM

## 2024-02-20 DIAGNOSIS — R63.4 WEIGHT LOSS: ICD-10-CM

## 2024-02-20 DIAGNOSIS — D59.10 AUTOIMMUNE HEMOLYTIC ANEMIA (HCC): ICD-10-CM

## 2024-02-20 DIAGNOSIS — C91.10 CLL (CHRONIC LYMPHOCYTIC LEUKEMIA) (HCC): ICD-10-CM

## 2024-02-20 DIAGNOSIS — M81.8 OSTEOPOROSIS OF FOREARM ASSOCIATED WITH ENDOCRINE DISORDER: Primary | ICD-10-CM

## 2024-02-20 DIAGNOSIS — D69.3 IDIOPATHIC THROMBOCYTOPENIC PURPURA (ITP) (HCC): ICD-10-CM

## 2024-02-20 PROCEDURE — 99215 OFFICE O/P EST HI 40 MIN: CPT | Performed by: PHYSICIAN ASSISTANT

## 2024-02-20 RX ORDER — ASCORBIC ACID
CRYSTALS ORAL
COMMUNITY

## 2024-02-20 NOTE — PROGRESS NOTES
Hematology/Oncology Outpatient Follow- up Note  Nicolle Wood 73 y.o. female MRN: @ Encounter: [id number]        Date:  2/20/2024      Assessment / Plan:      CLL/ SLL dx 6/2020 when she presented with AIHA, ITP.  On acalabrutinib 100 mg p.o. daily since 10/2020 with excellent tolerance. CT scan on 10/2021 showed resolution of retroperitoneal, retrocrural lymphadenopathy.  Continue acalabrutinib.    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 She responded inititially to steroids, counts dropped with taper. No prolonged response to Promacta.   Prednisone 40 mg po daily restarted 9/1/2020, reduced by 10 mg po weekly.   She was on prednisone 5mg po daily.   11/2/20 platelet count 27,000.   Platelet count [id number] 11/12/20 Prednisone increased to 10mg daily 11/17/20, increased to 20mg 11/23/20 when platelets decreased to 12,000.    Onprednisone 5 mg alternating with 10 mg the next day, reduced to 5 mg PO daily 11/2022. Reduced to 2.5mg po daily 2/7/23.   8/2023 steroid discontinued.       Continue N-plate 250 mcg every 4 weeks    3.  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 Continue.  Last Dexa 8/2022.      4.  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  History of rectal bleeding.  colonoscopy 3/2023 unremarkable.  EGD ordered by GI 6/2023.  Patient cancelled.  CT C/A/P ordered 6/2023 per GI 2nd to unintentional weight loss  132 3/2023 -> 124 6/2023.  Patient did not have done.  Weight 112 2/20/24.  Patient states she has not appetite.  Steroids she was on for her ITP likely helped with her appetite.      Revisited obtaining CT scan chest abdomen and pelvis due to the weight loss, she is agreeable.    On breast exam, tan discoloration right inframammary fold, patient states aware 'for awhile' appears to be 2nd to friction, history of yeast dermatitis.  No palpable mass.  Mammogram recommended.   Patient declines  mammogram.    Discussed palliative care referral for her anorexia.  She is agreeable.      Patient wishes to remain in [place], seeing me.      F/U in 6 months      HPI:   Nicolle Wood is a 73-year-old  female who was admitted to [facility] 6/2020 regarding easy bruising and bleeding.      She has history of polyclonal gammopathy, with no evidence of monoclonal protein, Sjogren syndrome, autoimmune connective tissue disease with highly elevated sed rate, CRP, BALDO, rheumatoid factor. PT and PTT normal.      She is followed by Dr. Alexandra with Rheumatology. She was prescribed hydroxychloroquine 200 milligram p.o. B.i.d.         6/18/2020 She was found to have grade 4 thrombocytopenia with platelet count of 1000, hemoglobin 10, WBC 7.2, 63% neutrophils, 34 percent lymphocytes.     Flow cytometry on the peripheral blood showed CLL pattern positive for CD 23, CD 20     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 with PATTI positive 4+for IgG, plus for C3, bilirubin 0.5      CT scan of the chest abdomen pelvis on 06/19/2020 showed mild confluent periaortic/retroperitoneal lymphadenopathy measuring up to 1.1 centimeter in short axis, bilateral external iliac lymphadenopathy up to 10 millimeters, retrocrural lymph node measuring 1.3 centimeters spleen 15 centimeter, liver with hepatomegaly no evidence of masses .     She was treated with dexamethasone for 3 days with improvement of platelet count up to 62,000 6/22/21      On 06/25/2020 platelet count of 10,000      Initiated on Promacta 75 milligram p.o. Daily in July 2020, platelet count up gradually with platelet count of 73,000 on 08/17/2020. hemoglobin 11.4, WBC 5.0   8/28/20: Patient had CBC as gums were bleeding that morning AM, no further bleeding. Platelet count was 19,000.      Promacta increased to 150mg every other day and 75 mg the other days.   Platelet count was 12,000 8/31/20 and she was advised to initiate prednisone 40mg po daily, tapering by 10mg  po weekly. Promacta returned to 75mg po daily.   Platelet count increased to 173,000 9/4/20.      She had BMBX 9/9/20: B-cell lymphoproliferative disorder, compatible with chronic lymphocytic leukemia. Virtually absent stainable storage iron.   On flow cytometry, CLL phenotype accounted for 11%; CD5+, CD23+, CD20+ (dim) and CD 38-      9/14/20: platelet count 283.   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 b.i.d. since the beginning of October 2020.     She was seen on 10/14/2020 with severe depression requiring admission to the hospital, she is on Zoloft 25 mg p.o. daily, Synthroid 75 micro g p.o. daily      Acalabrutinib was reduced to 100 mg p.o. daily 11/2020   Recurrence of thrombocytopenia 27,000 11/2/20 once prednisone was discontinued.   Prednisone re-introduced back at 5 mg p.o. daily, platelet count increased to 50,000 range, fluctuated to 20,000 range, prednisone increased to 10mg daily 11/17/20, increased to 20mg 11/23/20 when platelets decreased to 12,000.      N-plate ordered 12/15/20 when platelet count 29,000 12/15/20. And given every 4 weeks      Prednisone has been slowly tapered. 5 mg alternating with 10 mg the next day     Dexa scan showed osteoporosis in August 2022 1/30/23 presented to ED for BRBPR.  On examination - No external hemorrhoids. No anal fissure. Small amount of bright red blood on digital rectal exam. No pain with digital rectal exam. Small amount of blood in pad.  Hemoglobin 13.2.  referral to GI made.  UTI 2/3/23, abx rx at urgent care.      3/30/23  Colonoscopy-sessile polyps removed from the cecum, small internal hemorrhoids, scattered diverticula.  Random colon biopsies negative      At her June 7, 2023 f/u with GI reported unintentional weight loss of 10 pounds over 3 months and decreased appetite.  CT scan chest abdomen pelvis was ordered     EGD ordered 6/7/23.  Patient cancelled her procedure 8/11/23 and didn't want to r/s.      6/12/23  Albumin 2.9;  Total protein 6.4, normal quantitative immunoglobulins, ferritin 73, iron saturation 22%     7/13/23  Normal cbcd       Interval History:    10/2023 consult with neurology re: balance, tremor- MRI 10/30/23    February first 2024 hemoglobin 12.6, MCV 97, white blood cell count 4.24, 70% neutrophils, 1% immature granulocytes, 18% lymphocytes, 9% monocytes, 1% eosinophils, platelet count 119  CMP stable      Review of Systems   Constitutional:  Positive for appetite change and unexpected weight change. Negative for chills, diaphoresis, fatigue and fever.   HENT:   Negative for mouth sores, nosebleeds, sore throat, tinnitus and voice change.    Eyes:  Negative for eye problems.   Respiratory:  Negative for chest tightness, cough, shortness of breath and wheezing.    Cardiovascular:  Negative for chest pain, leg swelling and palpitations.   Gastrointestinal:  Negative for abdominal distention, abdominal pain, blood in stool, constipation, diarrhea, nausea, rectal pain and vomiting.   Endocrine: Negative for hot flashes.   Genitourinary: Negative.     Musculoskeletal:  Negative for gait problem and myalgias.   Skin:  Negative for itching and rash.   Neurological:  Negative for dizziness, gait problem, headaches, light-headedness and numbness.   Hematological:  Negative for adenopathy.   Psychiatric/Behavioral:  Negative for confusion and sleep disturbance. The patient is not nervous/anxious.         Test Results:        Labs:   Lab Results   Component Value Date    HGB 12.6 02/01/2024    HCT 40.4 02/01/2024    MCV 97 02/01/2024     (L) 02/01/2024    WBC 4.24 (L) 02/01/2024    NRBC 0 02/01/2024    BANDSPCT 1 03/03/2021    ATYLMPCT 4 (H) 04/05/2021     Lab Results   Component Value Date    K 4.7 12/21/2023     (H) 12/21/2023    CO2 27 12/21/2023    BUN 10 12/21/2023    CREATININE 0.88 12/21/2023    GLUF 88 12/21/2023    CALCIUM 9.0 12/21/2023    CORRECTEDCA 9.4 10/16/2023    AST 17  "12/21/2023    ALT 6 (L) 12/21/2023    ALKPHOS 39 12/21/2023    EGFR 66 12/21/2023           Imaging: No results found.          Allergies: No Known Allergies  Current Medications: Reviewed  PMH/FH/SH:  Reviewed      Physical Exam:    There is no height or weight on file to calculate BSA.    Ht Readings from Last 3 Encounters:   12/19/23 5' 5.5\" (1.664 m)   08/15/23 5' 5.5\" (1.664 m)   06/07/23 5' 5.5\" (1.664 m)        Wt Readings from Last 3 Encounters:   12/19/23 53 kg (116 lb 12.8 oz)   10/09/23 56.6 kg (124 lb 12.8 oz)   08/15/23 56.7 kg (125 lb)        Temp Readings from Last 3 Encounters:   02/06/24 (!) 97.3 °F (36.3 °C) (Temporal)   01/09/24 (!) 96.1 °F (35.6 °C) (Temporal)   12/19/23 97.6 °F (36.4 °C)        BP Readings from Last 3 Encounters:   02/06/24 113/67   12/19/23 120/64   10/09/23 125/67             Physical Exam  Vitals reviewed.   Constitutional:       General: She is not in acute distress.     Appearance: She is well-developed. She is not diaphoretic.   HENT:      Head: Normocephalic and atraumatic.   Eyes:      Conjunctiva/sclera: Conjunctivae normal.   Neck:      Trachea: No tracheal deviation.   Cardiovascular:      Rate and Rhythm: Normal rate and regular rhythm.      Heart sounds: No murmur heard.     No friction rub. No gallop.   Pulmonary:      Effort: Pulmonary effort is normal. No respiratory distress.      Breath sounds: Normal breath sounds. No wheezing or rales.   Chest:      Chest wall: No tenderness.          Comments: No palpable skin mass.    Right inframammary fold, tan skin discoloration 2.5cm, well demarcated, no skin breakdown, no palpable mass.  Appears to be 2nd to friction.    Abdominal:      General: There is no distension.      Palpations: Abdomen is soft.      Tenderness: There is no abdominal tenderness.   Musculoskeletal:      Cervical back: Normal range of motion and neck supple.   Lymphadenopathy:      Cervical: No cervical adenopathy.   Skin:     General: Skin is " warm and dry.      Coloration: Skin is not pale.      Findings: No erythema.   Neurological:      Mental Status: She is alert and oriented to person, place, and time.   Psychiatric:         Behavior: Behavior normal.         Thought Content: Thought content normal.         Judgment: Judgment normal.         ECO      Emergency Contacts:    Extended Emergency Contact Information  Primary Emergency Contact: Brenda Min   [facility]  Home Phone: [phone]  Mobile Phone: [phone]  Relation: Niece

## 2024-02-23 ENCOUNTER — TELEPHONE (OUTPATIENT)
Dept: HEMATOLOGY ONCOLOGY | Facility: CLINIC | Age: 74
End: 2024-02-23

## 2024-02-23 NOTE — TELEPHONE ENCOUNTER
"Received attached VM via teams:  \"My name is Nicolle Jeff SH EE TZ 5652. I was referred to the [facility] Palliative care, but I don't have their phone number. I was just wondering, is that covered by my insurance? That's my question. OK, linda Wagner. My number is [phone].\"    "

## 2024-03-01 ENCOUNTER — APPOINTMENT (OUTPATIENT)
Dept: LAB | Age: 74
End: 2024-03-01
Payer: MEDICARE

## 2024-03-01 DIAGNOSIS — D69.3 IDIOPATHIC THROMBOCYTOPENIC PURPURA (ITP) (HCC): ICD-10-CM

## 2024-03-01 DIAGNOSIS — C91.10 CLL (CHRONIC LYMPHOCYTIC LEUKEMIA) (HCC): ICD-10-CM

## 2024-03-01 DIAGNOSIS — R63.4 WEIGHT LOSS: ICD-10-CM

## 2024-03-01 DIAGNOSIS — D59.10 AUTOIMMUNE HEMOLYTIC ANEMIA (HCC): ICD-10-CM

## 2024-03-01 LAB
ALBUMIN SERPL BCP-MCNC: 3.6 G/DL (ref 3.5–5)
ALP SERPL-CCNC: 38 U/L (ref 34–104)
ALT SERPL W P-5'-P-CCNC: 4 U/L (ref 7–52)
ANION GAP SERPL CALCULATED.3IONS-SCNC: 5 MMOL/L
AST SERPL W P-5'-P-CCNC: 17 U/L (ref 13–39)
BILIRUB SERPL-MCNC: 0.48 MG/DL (ref 0.2–1)
BUN SERPL-MCNC: 10 MG/DL (ref 5–25)
CALCIUM SERPL-MCNC: 8.7 MG/DL (ref 8.4–10.2)
CHLORIDE SERPL-SCNC: 108 MMOL/L (ref 96–108)
CO2 SERPL-SCNC: 28 MMOL/L (ref 21–32)
CREAT SERPL-MCNC: 0.8 MG/DL (ref 0.6–1.3)
GFR SERPL CREATININE-BSD FRML MDRD: 74 ML/MIN/1.73SQ M
GLUCOSE P FAST SERPL-MCNC: 103 MG/DL (ref 65–99)
LDH SERPL-CCNC: 172 U/L (ref 140–271)
POTASSIUM SERPL-SCNC: 4.4 MMOL/L (ref 3.5–5.3)
PROT SERPL-MCNC: 6.7 G/DL (ref 6.4–8.4)
SODIUM SERPL-SCNC: 141 MMOL/L (ref 135–147)
URATE SERPL-MCNC: 5.4 MG/DL (ref 2–7.5)

## 2024-03-01 PROCEDURE — 36415 COLL VENOUS BLD VENIPUNCTURE: CPT

## 2024-03-01 PROCEDURE — 84550 ASSAY OF BLOOD/URIC ACID: CPT

## 2024-03-01 PROCEDURE — 83615 LACTATE (LD) (LDH) ENZYME: CPT

## 2024-03-01 PROCEDURE — 80053 COMPREHEN METABOLIC PANEL: CPT

## 2024-03-05 ENCOUNTER — HOSPITAL ENCOUNTER (OUTPATIENT)
Dept: INFUSION CENTER | Facility: HOSPITAL | Age: 74
Discharge: HOME | End: 2024-03-05
Payer: MEDICARE

## 2024-03-05 VITALS — SYSTOLIC BLOOD PRESSURE: 122 MMHG | DIASTOLIC BLOOD PRESSURE: 70 MMHG

## 2024-03-05 DIAGNOSIS — D69.3 IDIOPATHIC THROMBOCYTOPENIC PURPURA (ITP) (HCC): Primary | ICD-10-CM

## 2024-03-05 DIAGNOSIS — C91.10 CLL (CHRONIC LYMPHOCYTIC LEUKEMIA) (HCC): ICD-10-CM

## 2024-03-05 DIAGNOSIS — F32.A DEPRESSION, UNSPECIFIED DEPRESSION TYPE: ICD-10-CM

## 2024-03-05 RX ORDER — SERTRALINE HYDROCHLORIDE 100 MG/1
100 TABLET, FILM COATED ORAL DAILY
Qty: 90 TABLET | Refills: 3 | Status: SHIPPED | OUTPATIENT
Start: 2024-03-05 | End: 2024-03-06 | Stop reason: SDUPTHER

## 2024-03-05 RX ADMIN — ROMIPLOSTIM 125 MCG: 125 INJECTION, POWDER, LYOPHILIZED, FOR SOLUTION SUBCUTANEOUS at 12:26

## 2024-03-05 NOTE — TELEPHONE ENCOUNTER
Name from pharmacy: SERTRALINE  MG TABLET         Will file in chart as: sertraline (ZOLOFT) 100 mg tablet    Sig: take 1 tablet by mouth once daily    Disp: 90 tablet    Refills: 5    Start: 3/5/2024    Class: Normal    Non-formulary For: Depression, unspecified depression type    Last ordered: 2 months ago (12/19/2023) by Kwame Ovalles DO    Rx #: [id number]    Pharmacy comment: 30 to 90 day supply.    Psychiatry:  Antidepressants - SSRI [id number]/2024 11:29 AM   Protocol Details Valid encounter within last 6 months      This request has changes from the previous prescription.   To be filled at: [id number] - [place] - [address]

## 2024-03-05 NOTE — PROGRESS NOTES
Nicolle Wood  tolerated treatment well with no complications.      Nicolle Wood is aware of future appt on 4/2 at 11am.     AVS printed and given to Nicolle Wood:  Yes

## 2024-03-06 ENCOUNTER — CONSULT (OUTPATIENT)
Dept: PALLIATIVE MEDICINE | Facility: CLINIC | Age: 74
End: 2024-03-06
Payer: MEDICARE

## 2024-03-06 VITALS
OXYGEN SATURATION: 98 % | HEART RATE: 80 BPM | TEMPERATURE: 96 F | DIASTOLIC BLOOD PRESSURE: 64 MMHG | SYSTOLIC BLOOD PRESSURE: 128 MMHG | BODY MASS INDEX: 18.93 KG/M2 | WEIGHT: 115.52 LBS

## 2024-03-06 DIAGNOSIS — Z71.89 GOALS OF CARE, COUNSELING/DISCUSSION: ICD-10-CM

## 2024-03-06 DIAGNOSIS — E43 SEVERE PROTEIN-CALORIE MALNUTRITION (GOMEZ: LESS THAN 60% OF STANDARD WEIGHT) (HCC): ICD-10-CM

## 2024-03-06 DIAGNOSIS — Z51.5 PALLIATIVE CARE PATIENT: Primary | ICD-10-CM

## 2024-03-06 DIAGNOSIS — F41.9 ANXIETY: ICD-10-CM

## 2024-03-06 DIAGNOSIS — D59.10 AUTOIMMUNE HEMOLYTIC ANEMIA (HCC): ICD-10-CM

## 2024-03-06 DIAGNOSIS — C91.10 CLL (CHRONIC LYMPHOCYTIC LEUKEMIA) (HCC): ICD-10-CM

## 2024-03-06 DIAGNOSIS — F32.A DEPRESSION, UNSPECIFIED DEPRESSION TYPE: ICD-10-CM

## 2024-03-06 PROCEDURE — G2211 COMPLEX E/M VISIT ADD ON: HCPCS | Performed by: INTERNAL MEDICINE

## 2024-03-06 PROCEDURE — 99204 OFFICE O/P NEW MOD 45 MIN: CPT | Performed by: INTERNAL MEDICINE

## 2024-03-06 RX ORDER — SERTRALINE HYDROCHLORIDE 100 MG/1
50 TABLET, FILM COATED ORAL DAILY
Start: 2024-03-06

## 2024-03-06 RX ORDER — MIRTAZAPINE 7.5 MG/1
7.5 TABLET, FILM COATED ORAL
Qty: 30 TABLET | Refills: 0 | Status: SHIPPED | OUTPATIENT
Start: 2024-03-06

## 2024-03-06 NOTE — ASSESSMENT & PLAN NOTE
Start Remeron 7.5 mg HS  Provided information on MMJ  Discussed nutrition consult, she'll consider  Provided samples/coupons for Ensure supplements    Malnutrition Findings:                                 BMI Findings:           Body mass index is 18.93 kg/m².

## 2024-03-06 NOTE — PROGRESS NOTES
Outpatient Consultation - Palliative and Supportive Care   Nicolle Wood 73 y.o. female [id number]    1. Palliative care patient  Assessment & Plan:  Time spent with patient providing supportive listening.   All questions and concerns were addressed to their satisfaction.    Decisional apparatus:  Patient is competent on my exam today.  If competence is lost, patient's substitute decision maker would default to niece by [place] Act 169.  Advance Directive / Living Will / POLST:  no'  Opioid Agreement: no, no scheduled medications prescribed    RTC: 2 weeks for MMJ Cert, ACP, and Zoloft wean      Orders:  -     mirtazapine (REMERON) 7.5 MG tablet; Take 1 tablet (7.5 mg total) by mouth daily at bedtime    2. CLL (chronic lymphocytic leukemia) ([facility])  -     Ambulatory referral to Palliative Care    3. Autoimmune hemolytic anemia ([facility])  -     Ambulatory referral to Palliative Care    4. Severe protein-calorie malnutrition (Emanuel: less than 60% of standard weight) ([facility])  Assessment & Plan:  Start Remeron 7.5 mg HS  Provided information on MMJ  Discussed nutrition consult, she'll consider  Provided samples/coupons for Ensure supplements    Malnutrition Findings:                                 BMI Findings:           Body mass index is 18.93 kg/m².       Orders:  -     mirtazapine (REMERON) 7.5 MG tablet; Take 1 tablet (7.5 mg total) by mouth daily at bedtime    5. Anxiety  Assessment & Plan:  Continue PRN Xanax  Discussed psychotherapy with LCSW team, she will consider    Orders:  -     mirtazapine (REMERON) 7.5 MG tablet; Take 1 tablet (7.5 mg total) by mouth daily at bedtime    6. Depression, unspecified depression type  Assessment & Plan:  Start Zoloft wean in order to optimize Remeron dosing, will reduce to 50 mg and start Remeron at 7.5 mg    Orders:  -     sertraline (ZOLOFT) 100 mg tablet; Take 0.5 tablets (50 mg total) by mouth daily    7. Goals of care, counseling/discussion  Assessment & Plan:  Separate 6+ minutes  spent reviewing and completing advanced directives (the [facility] Advanced Directive). Advanced directive counseling given. Reviewed with patient. All questions answered. Patient wishes to review and discuss w/ family before signing document. Instructions provided. When complete, scanned into the chart.            Medications Discontinued During This Encounter   Medication Reason    sertraline (ZOLOFT) 100 mg tablet Reorder    cycloSPORINE (RESTASIS) 0.05 % ophthalmic emulsion              Nicolle Wood was seen today for symptoms and planning cares related to above illnesses.  Above orders were sent electronically, or provided in hardcopy in clinic.  I have reviewed the patient's controlled substance dispensing history in the Prescription Drug Monitoring Program in compliance with the [facility] regulations before prescribing any controlled substances.    We appreciate the referral, and wish for her to continue to follow with us.  If there are questions or concerns, please contact us through our clinic/answering service 24 hours a day, seven days a week.    Beverly Payton, [facility] Palliative and Supportive Care  [phone]        Visit Information    Accompanied By: No one    Source of History: Self    History Limitations: None    Chief Complaint  No chief complaint on file.      Narrative and Interval History      Nicolle Wood is a 73 y.o. female who presents as a referral from Dr. Holly of hematology for primary palliative diagnosis of CLL.  Consultation is requested for: symptom management, advance care planning, emotional support in the setting of serious illness.    Patient seen today in consultation to discuss symptom management.  Right now her main symptom complaint is her lack of appetite and ongoing weight loss.  She states that her inability to eat consumes her mind all day and causes her a lot of anxiety.  She tells me that food tastes normal and is truly just a lack of appetite with no desire  to eat.  She is taking Ensure supplements every morning and she goes to the grocery store to try to find food that she wants to eat.  We discussed both pharmacologic and nonpharmacologic management of anorexia at this time she would like to hold off on nutrition referral.  We discussed different medications that may assist and ultimately after discussing risks and benefits of medications will start on mirtazapine with a subsequent Zoloft wean as she is not getting benefit from the Zoloft.  We also discussed medical marijuana and she is interested as well and we will plan to do medical marijuana certification at her next visit.  She does have underlying anxiety and depression and we discussed having her meet with one of her [facility] teammates for psychotherapy and she will consider this.    Unfortunately, Nicolle does not have a very large support system.  Her only living relative is the niece that she rarely sees but is supposed to help her if needed.  She does have a friend group that used to get together for meals but they tell me there is some internal fighting it has been a while since she seen them.  She enjoys crafting and doing puzzles during the day.  She does not have her advance directive or any ACP completed but was receptive to discuss and provided her information and we will plan to do this at her next visit.  Will also have social work join us in her next visit to do a full social work assessment.    Pertinent Palliative Care Domains    Physical Symptoms:ambulates, uses cane    Psychological Symptoms:moderate anxiety, good insight    Social Aspects: support system limited    Spiritual Aspects: did not address    Advance Directive and Living Will:      Power of :    POLST:      Historical Data  Past Medical History:   Diagnosis Date    Anxiety     Autoimmune hemolytic anemia (HCC)     Cataract     Disease of thyroid gland     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HCC)     GERD (gastroesophageal reflux disease)      Hypothyroidism     Hypothyroidism     Idiopathic thrombocytopenic purpura (ITP) (HCC)     Menopause     Sjogrens syndrome (HCC)      Past Surgical History:   Procedure Laterality Date    CATARACT EXTRACTION Left     COLONOSCOPY  2012    per pt    TOOTH EXTRACTION       Social History     Socioeconomic History    Marital status: Single     Spouse name: Not on file    Number of children: Not on file    Years of education: Not on file    Highest education level: Not on file   Occupational History    Not on file   Tobacco Use    Smoking status: Never    Smokeless tobacco: Never   Vaping Use    Vaping status: Never Used   Substance and Sexual Activity    Alcohol use: No    Drug use: No    Sexual activity: Not Currently   Other Topics Concern    Not on file   Social History Narrative    Caffeine use     Social Determinants of Health     Financial Resource Strain: Low Risk  (12/8/2022)    Overall Financial Resource Strain (CARDIA)     Difficulty of Paying Living Expenses: Not hard at all   Food Insecurity: Not on file   Transportation Needs: No Transportation Needs (12/8/2022)    PRAPARE - Transportation     Lack of Transportation (Medical): No     Lack of Transportation (Non-Medical): No   Physical Activity: Not on file   Stress: Not on file   Social Connections: Not on file   Intimate Partner Violence: Not on file   Housing Stability: Not on file     Family History   Problem Relation Age of Onset    Colon cancer Mother     Heart disease Mother         cardiac disorder     Cancer Mother     Liver cancer Father     Cancer Father     Heart disease Other         cardiac disorder     No Known Allergies  Current Outpatient Medications   Medication Sig Dispense Refill    Acalabrutinib Maleate (Calquence) 100 MG TABS Take 1 tablet (100mg total)  by mouth once daily. 30 tablet 5    ALPRAZolam (XANAX) 0.5 mg tablet Take 1 tablet (0.5 mg total) by mouth daily at bedtime as needed for anxiety 30 tablet 2    Cyanocobalamin (Vitamin B  12) 250 MCG LOZG Take by mouth      levothyroxine 75 mcg tablet take 1 tablet by mouth once daily IN THE EARLY MORNING 30 tablet 1    methocarbamol (ROBAXIN) 500 mg tablet Take 1 tablet (500 mg total) by mouth every 8 (eight) hours as needed for muscle spasms 30 tablet 2    mirtazapine (REMERON) 7.5 MG tablet Take 1 tablet (7.5 mg total) by mouth daily at bedtime 30 tablet 0    sertraline (ZOLOFT) 100 mg tablet Take 0.5 tablets (50 mg total) by mouth daily      Vitamin D, Cholecalciferol, 10 MCG (400 UNIT) CHEW Chew      polyethylene glycol (MIRALAX) 17 g packet Take 17 g by mouth daily for 10 days 10 each 0     No current facility-administered medications for this visit.       Review of Systems   Constitutional: Positive for decreased appetite, malaise/fatigue and weight loss.   Gastrointestinal:  Negative for nausea and vomiting.   Psychiatric/Behavioral:  Positive for depression. The patient is nervous/anxious. The patient does not have insomnia.    All other systems reviewed and are negative.          Physical Exam and Objective Data  Vital Signs - /64 (BP Location: Left arm, Patient Position: Sitting, Cuff Size: Standard)   Pulse 80   Temp (!) 96 °F (35.6 °C) (Temporal)   Wt 52.4 kg (115 lb 8.3 oz)   SpO2 98%   BMI 18.93 kg/m²   Physical Exam  Vitals and nursing note reviewed.   Constitutional:       General: She is not in acute distress.     Appearance: She is ill-appearing.      Comments: Frail and cachetic    HENT:      Head: Normocephalic and atraumatic.      Right Ear: External ear normal.      Left Ear: External ear normal.   Eyes:      General:         Right eye: No discharge.         Left eye: No discharge.   Cardiovascular:      Rate and Rhythm: Normal rate and regular rhythm.   Pulmonary:      Effort: Pulmonary effort is normal. No respiratory distress.   Abdominal:      General: There is no distension.      Palpations: Abdomen is soft.   Skin:     General: Skin is warm and dry.       Coloration: Skin is pale.   Neurological:      General: No focal deficit present.      Mental Status: She is oriented to person, place, and time.   Psychiatric:         Mood and Affect: Mood normal.         Behavior: Behavior normal.         Thought Content: Thought content normal.         Judgment: Judgment normal.           Radiology and Laboratory:  I personally reviewed and interpreted all recent lab work and imaging.     I have spent a total time of 45+ minutes on 03/06/24 in caring for this patient including Risks and benefits of tx options, Instructions for management, Patient and family education, Importance of tx compliance, Risk factor reductions, Impressions, Counseling / Coordination of care, Documenting in the medical record, Reviewing / ordering tests, medicine, procedures  , and Obtaining or reviewing history  .

## 2024-03-06 NOTE — Clinical Note
I'm going to have you join us at our next visit. She'll need a full assessment, but I also worry about her lack of support, in general.

## 2024-03-06 NOTE — ASSESSMENT & PLAN NOTE
Start Zoloft wean in order to optimize Remeron dosing, will reduce to 50 mg and start Remeron at 7.5 mg

## 2024-03-06 NOTE — ASSESSMENT & PLAN NOTE
Time spent with patient providing supportive listening.   All questions and concerns were addressed to their satisfaction.    Decisional apparatus:  Patient is competent on my exam today.  If competence is lost, patient's substitute decision maker would default to niece by [place] 169.  Advance Directive / Living Will / POLST:  no'  Opioid Agreement: no, no scheduled medications prescribed    RTC: 2 weeks for MMJ Cert, ACP, and Zoloft wean

## 2024-03-06 NOTE — ASSESSMENT & PLAN NOTE
Separate 6+ minutes spent reviewing and completing advanced directives (the [facility] Advanced Directive). Advanced directive counseling given. Reviewed with patient. All questions answered. Patient wishes to review and discuss w/ family before signing document. Instructions provided. When complete, scanned into the chart.

## 2024-03-22 ENCOUNTER — TELEPHONE (OUTPATIENT)
Dept: PALLIATIVE MEDICINE | Facility: CLINIC | Age: 74
End: 2024-03-22

## 2024-03-22 NOTE — TELEPHONE ENCOUNTER
Nicolle stated she has been taking mirtazapine and its making her sleep a lot and when she wakes up she is still tired. She wants to know if she should continue to take it. She also stated if she had a hard time registering for J to call the office for assistance so she is asking for help. Please advise.

## 2024-03-26 ENCOUNTER — TELEPHONE (OUTPATIENT)
Dept: PALLIATIVE MEDICINE | Facility: CLINIC | Age: 74
End: 2024-03-26

## 2024-03-26 NOTE — TELEPHONE ENCOUNTER
MSW contacted the patient and left message introducing self.  MSW encouraged the patient to contact our office at her earliest availability/leisure.

## 2024-03-27 ENCOUNTER — APPOINTMENT (OUTPATIENT)
Dept: LAB | Age: 74
End: 2024-03-27
Payer: MEDICARE

## 2024-03-27 DIAGNOSIS — D69.3 IDIOPATHIC THROMBOCYTOPENIC PURPURA (ITP) (HCC): ICD-10-CM

## 2024-03-27 LAB
BASOPHILS # BLD AUTO: 0.02 THOUSANDS/ÂΜL (ref 0–0.1)
BASOPHILS NFR BLD AUTO: 1 % (ref 0–1)
EOSINOPHIL # BLD AUTO: 0.06 THOUSAND/ÂΜL (ref 0–0.61)
EOSINOPHIL NFR BLD AUTO: 2 % (ref 0–6)
ERYTHROCYTE [DISTWIDTH] IN BLOOD BY AUTOMATED COUNT: 13.9 % (ref 11.6–15.1)
HCT VFR BLD AUTO: 38.2 % (ref 34.8–46.1)
HGB BLD-MCNC: 11.8 G/DL (ref 11.5–15.4)
IMM GRANULOCYTES # BLD AUTO: 0 THOUSAND/UL (ref 0–0.2)
IMM GRANULOCYTES NFR BLD AUTO: 0 % (ref 0–2)
LYMPHOCYTES # BLD AUTO: 0.71 THOUSANDS/ÂΜL (ref 0.6–4.47)
LYMPHOCYTES NFR BLD AUTO: 22 % (ref 14–44)
MCH RBC QN AUTO: 29.1 PG (ref 26.8–34.3)
MCHC RBC AUTO-ENTMCNC: 30.9 G/DL (ref 31.4–37.4)
MCV RBC AUTO: 94 FL (ref 82–98)
MONOCYTES # BLD AUTO: 0.26 THOUSAND/ÂΜL (ref 0.17–1.22)
MONOCYTES NFR BLD AUTO: 8 % (ref 4–12)
NEUTROPHILS # BLD AUTO: 2.12 THOUSANDS/ÂΜL (ref 1.85–7.62)
NEUTS SEG NFR BLD AUTO: 67 % (ref 43–75)
NRBC BLD AUTO-RTO: 0 /100 WBCS
PLATELET # BLD AUTO: 136 THOUSANDS/UL (ref 149–390)
PMV BLD AUTO: 13 FL (ref 8.9–12.7)
RBC # BLD AUTO: 4.05 MILLION/UL (ref 3.81–5.12)
WBC # BLD AUTO: 3.17 THOUSAND/UL (ref 4.31–10.16)

## 2024-03-27 PROCEDURE — 85025 COMPLETE CBC W/AUTO DIFF WBC: CPT

## 2024-03-27 PROCEDURE — 36415 COLL VENOUS BLD VENIPUNCTURE: CPT

## 2024-04-01 ENCOUNTER — OFFICE VISIT (OUTPATIENT)
Dept: PALLIATIVE MEDICINE | Facility: CLINIC | Age: 74
End: 2024-04-01
Payer: MEDICARE

## 2024-04-01 ENCOUNTER — TELEPHONE (OUTPATIENT)
Dept: PALLIATIVE MEDICINE | Facility: CLINIC | Age: 74
End: 2024-04-01

## 2024-04-01 VITALS
BODY MASS INDEX: 18.75 KG/M2 | DIASTOLIC BLOOD PRESSURE: 70 MMHG | SYSTOLIC BLOOD PRESSURE: 120 MMHG | TEMPERATURE: 96 F | HEART RATE: 66 BPM | WEIGHT: 114.42 LBS | OXYGEN SATURATION: 97 %

## 2024-04-01 DIAGNOSIS — Z71.89 GOALS OF CARE, COUNSELING/DISCUSSION: ICD-10-CM

## 2024-04-01 DIAGNOSIS — E43 SEVERE PROTEIN-CALORIE MALNUTRITION (GOMEZ: LESS THAN 60% OF STANDARD WEIGHT) (HCC): ICD-10-CM

## 2024-04-01 DIAGNOSIS — Z71.89 COUNSELING AND COORDINATION OF CARE: Primary | ICD-10-CM

## 2024-04-01 DIAGNOSIS — Z51.5 PALLIATIVE CARE PATIENT: ICD-10-CM

## 2024-04-01 DIAGNOSIS — C91.10 CLL (CHRONIC LYMPHOCYTIC LEUKEMIA) (HCC): Primary | ICD-10-CM

## 2024-04-01 PROCEDURE — 99213 OFFICE O/P EST LOW 20 MIN: CPT | Performed by: INTERNAL MEDICINE

## 2024-04-01 PROCEDURE — G2211 COMPLEX E/M VISIT ADD ON: HCPCS | Performed by: INTERNAL MEDICINE

## 2024-04-01 PROCEDURE — NC001 PR NO CHARGE: Performed by: COUNSELOR

## 2024-04-01 RX ORDER — DRONABINOL 2.5 MG/1
2.5 CAPSULE ORAL DAILY
Qty: 30 CAPSULE | Refills: 0 | Status: SHIPPED | OUTPATIENT
Start: 2024-04-01

## 2024-04-01 NOTE — ASSESSMENT & PLAN NOTE
Patient unable to register for medical marijuana as she does not have computer access or an email  Will try Marinol 2.5 mg daily  Malnutrition Findings:                                 BMI Findings:           There is no height or weight on file to calculate BMI.

## 2024-04-01 NOTE — PROGRESS NOTES
Outpatient Follow-Up - Palliative and Supportive Care   Nicolle Wood 73 y.o. female [id number]  1. CLL (chronic lymphocytic leukemia) ([facility])    2. Palliative care patient  Assessment & Plan:  Time spent with patient providing supportive listening.   All questions and concerns were addressed to their satisfaction.    Decisional apparatus:  Patient is competent on my exam today.  If competence is lost, patient's substitute decision maker would default to niece [place] 169.  Advance Directive / Living Will / POLST:  no'  Opioid Agreement: no, no scheduled medications prescribed    RTC: 4 weeks      Orders:  -     dronabinol (MARINOL) 2.5 mg capsule; Take 1 capsule (2.5 mg total) by mouth in the morning    3. Severe protein-calorie malnutrition (Emanuel: less than 60% of standard weight) ([facility])  Assessment & Plan:  Patient unable to register for medical marijuana as she does not have computer access or an email  Will try Marinol 2.5 mg daily  Malnutrition Findings:                                 BMI Findings:           There is no height or weight on file to calculate BMI.       Orders:  -     dronabinol (MARINOL) 2.5 mg capsule; Take 1 capsule (2.5 mg total) by mouth in the morning    4. Goals of care, counseling/discussion  Assessment & Plan:              There are no discontinued medications.        Nicolle Wood was seen today for symptoms and planning cares related to above illnesses.  I have reviewed the patient's controlled substance dispensing history in the Prescription Drug Monitoring Program in compliance with the [facility] regulations before prescribing any controlled substances.    They are invited to continue to follow with us.  If there are questions or concerns, please contact us through our clinic/answering service 24 hours a day, seven days a week.    Beverly Payton, [facility] Palliative and Supportive Care  [phone]      Visit Information    Accompanied By: No one    Source of History:  Self    History Limitations: None    Narrative and Interval History      Nicolle Wood is a 73 y.o. female who presents in follow up of symptoms related to CLL.  Pertinent issues include: symptom management, assessment of goals of care, disease process education and discussion of prognosis    Patient here today to discuss medical marijuana certification.  Unfortunately, patient does not have a computer and does not have an email address and these are limitations for undergoing medical marijuana certification.  We did discuss alternatives such as trying dronabinol and she agrees to this.  Will try a low-dose of dronabinol at 2.5 mg daily.  Of note we did try mirtazapine at a low dose.  She took this for couple days but the fatigue was debilitating and she stopped.    Past medical, surgical, social, and family histories are reviewed and pertinent updates are made.    Review of Systems   Constitutional: Positive for decreased appetite and weight loss.   Gastrointestinal:  Positive for anorexia. Negative for constipation, diarrhea and dysphagia.   All other systems reviewed and are negative.        Physical Exam and Objective Data  Vital Signs - There were no vitals taken for this visit.  Physical Exam  Vitals and nursing note reviewed.   Constitutional:       General: She is not in acute distress.  HENT:      Head: Normocephalic and atraumatic.      Right Ear: External ear normal.      Left Ear: External ear normal.   Eyes:      General:         Right eye: No discharge.         Left eye: No discharge.   Cardiovascular:      Rate and Rhythm: Normal rate and regular rhythm.      Heart sounds: No murmur heard.  Pulmonary:      Effort: Pulmonary effort is normal. No respiratory distress.      Breath sounds: No wheezing or rales.   Abdominal:      General: There is no distension.      Palpations: Abdomen is soft.   Skin:     Coloration: Skin is pale.   Neurological:      General: No focal deficit present.      Mental  Status: She is alert and oriented to person, place, and time.   Psychiatric:         Mood and Affect: Mood normal.         Behavior: Behavior normal.           Radiology and Laboratory:  I personally reviewed and interpreted the following results: no new    I have spent a total time of 25 minutes on 04/01/24 in caring for this patient including Risks and benefits of tx options, Instructions for management, Patient and family education, Importance of tx compliance, Risk factor reductions, Impressions, Counseling / Coordination of care, Documenting in the medical record, Reviewing / ordering tests, medicine, procedures  , and Obtaining or reviewing history  .

## 2024-04-01 NOTE — ASSESSMENT & PLAN NOTE
Time spent with patient providing supportive listening.   All questions and concerns were addressed to their satisfaction.    Decisional apparatus:  Patient is competent on my exam today.  If competence is lost, patient's substitute decision maker would default to jo by [place] Act 169.  Advance Directive / Living Will / POLST:  no'  Opioid Agreement: no, no scheduled medications prescribed    RTC: 4 weeks

## 2024-04-01 NOTE — PROGRESS NOTES
"Palliative Outpatient Assessment Note    LSW appreciates the opportunity to provide the patient with outpatient emotional support and guidance while they continue to receive medical attention from a Palliative provider.    LSW completed an assessment of need which was completed with the patient.    Relationship status: Single.    Duration of relationship:     Name of significant other:    Children and Ages: No children.     Pets:    Other important family information: The patient has a niece, Brenda, who lives a half mile away, a good friend, Serene, who the patient has been friends with for \"many many\" years, and a neighbor that she is friendly with, Malachi.     Living situation (place and with whom): The patient lives alone in a first floor apartment.      Patient's primary caregiver: Self.     Any limitations: The patient's only limitation at this time is vacuuming, she reports that she can do it but that the vacuum itself is very heavy.    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  The patient's current form of income in Social Security.      Disability:    Concerns regarding literacy: None.     Spirituality/ Confucianist: Gnosticism.     Cultural information:     Mental Health and/or Drug and Alcohol history:    Advanced Directives: The patient reports that she does not have a living will. LSW provided the patient with our ACP document and reviewed it at length.  The patient is going to review it with her niece and friend and bring it back once completed.    Patient's strengths, social supports, and resources: The patient reports that she has a very small support system.  The patient's niece, Brenda, lives only a half mile down the road - the patient stated that she hasn't been very helpful or supportive as of late.     Patient/family current level of coping: The patient " reports that she has days where she feels down and depressed.  The patient expressed that she finds herself worrying a lot.  LSW reviewed the palliative supportive role and encouraged the patient to reach out if she were to need support.  LSW also reviewed our therapy services for future reference if the patient were ever interested.     Level of understanding: The patient has a good understanding of her diagnosis and next steps.     Patient/family concerns and areas of need:    The patient has no immediate needs or concerns at this time. LSW attempted to assist the patient with her MMJ registration, but the patient unfortunately does not have an email, a computer, or cell phone.  It is required to have an email in order to register.  The patient expressed that she did try to have her niece help her but that it did not work out.  MD Dr. Payton came in and discussed alternative options as the patient is not interested in making an email at this time. The patient was appreciative. The patient reports that her sleep regiment is good, but that her appetite is poor.  LSW provided the patient with Ensure samples at today's encounter. The patient has a cane that she uses to ambulate with in addition to a walker, the patient expressed that she does not need the walker at this time. The patient has no transportation concerns at this time as she is registered with [employer]. The patient reports no financial concerns. LSW encouraged the patient to contact our office if any needs or concerns were to arise.     I have spent 40 minutes with Patient  today in which greater than 50% of this time was spent in counseling/coordination of care regarding  Ongoing emotional support .    *All questions may not be answered due to constraints.  Follow-up discussions may need to occur

## 2024-04-01 NOTE — TELEPHONE ENCOUNTER
Prior Authorization [COMPLETED] for [DRONABINOL 2.5MG CAPSULE ]    KEY# [id number]    Pharmacy: [facility]  Phone: [phone]  Fax: [phone]

## 2024-04-02 ENCOUNTER — HOSPITAL ENCOUNTER (OUTPATIENT)
Dept: INFUSION CENTER | Facility: HOSPITAL | Age: 74
Discharge: HOME | End: 2024-04-02
Attending: INTERNAL MEDICINE
Payer: MEDICARE

## 2024-04-02 VITALS
RESPIRATION RATE: 16 BRPM | DIASTOLIC BLOOD PRESSURE: 69 MMHG | HEART RATE: 69 BPM | TEMPERATURE: 96.5 F | SYSTOLIC BLOOD PRESSURE: 115 MMHG

## 2024-04-02 DIAGNOSIS — D69.3 IDIOPATHIC THROMBOCYTOPENIC PURPURA (ITP) (HCC): Primary | ICD-10-CM

## 2024-04-02 DIAGNOSIS — C91.10 CLL (CHRONIC LYMPHOCYTIC LEUKEMIA) (HCC): ICD-10-CM

## 2024-04-02 PROCEDURE — 96372 THER/PROPH/DIAG INJ SC/IM: CPT

## 2024-04-02 RX ADMIN — ROMIPLOSTIM 125 MCG: 125 INJECTION, POWDER, LYOPHILIZED, FOR SOLUTION SUBCUTANEOUS at 11:23

## 2024-04-02 NOTE — TELEPHONE ENCOUNTER
Received communication that Prior Auth for (DRONABINOL Capsule] was DENIED.    Per insurance, Prior Auth was denied due to the information provided by your prescriber did not meet the requirements for covering this medication. Your plan does not allow coverage of this medication based on your prescriber answering no to following questions: Has the patient experienced an inadequate treatment response, intolerance, or contraindication to one oral 5-HT3 receptor antagonist?            Document scanned to media.

## 2024-04-02 NOTE — PROGRESS NOTES
Nicolle Wood  tolerated treatment well with no complications.      Nicolle Wood is aware of future appt on 4/30/24 at 1300.     AVS provided.

## 2024-04-03 ENCOUNTER — TELEPHONE (OUTPATIENT)
Dept: NEUROLOGY | Facility: CLINIC | Age: 74
End: 2024-04-03

## 2024-04-03 NOTE — TELEPHONE ENCOUNTER
----- Message from Panda Junior MD sent at 11/2/2023  8:57 AM EDT -----  Films reviewed. I don't see an obvious abnormality but I want to take another look at her. Routine follow-up

## 2024-04-25 ENCOUNTER — APPOINTMENT (OUTPATIENT)
Dept: LAB | Age: 74
End: 2024-04-25
Payer: MEDICARE

## 2024-04-25 DIAGNOSIS — D69.3 IDIOPATHIC THROMBOCYTOPENIC PURPURA (ITP) (HCC): ICD-10-CM

## 2024-04-25 DIAGNOSIS — M81.8 OSTEOPOROSIS OF FOREARM ASSOCIATED WITH ENDOCRINE DISORDER: ICD-10-CM

## 2024-04-25 DIAGNOSIS — E34.9 OSTEOPOROSIS OF FOREARM ASSOCIATED WITH ENDOCRINE DISORDER: ICD-10-CM

## 2024-04-25 LAB
ALBUMIN SERPL BCP-MCNC: 3.5 G/DL (ref 3.5–5)
ALP SERPL-CCNC: 35 U/L (ref 34–104)
ALT SERPL W P-5'-P-CCNC: 4 U/L (ref 7–52)
ANION GAP SERPL CALCULATED.3IONS-SCNC: 4 MMOL/L (ref 4–13)
AST SERPL W P-5'-P-CCNC: 13 U/L (ref 13–39)
BASOPHILS # BLD AUTO: 0.01 THOUSANDS/ÂΜL (ref 0–0.1)
BASOPHILS NFR BLD AUTO: 0 % (ref 0–1)
BILIRUB SERPL-MCNC: 0.51 MG/DL (ref 0.2–1)
BUN SERPL-MCNC: 9 MG/DL (ref 5–25)
CALCIUM SERPL-MCNC: 9 MG/DL (ref 8.4–10.2)
CHLORIDE SERPL-SCNC: 111 MMOL/L (ref 96–108)
CO2 SERPL-SCNC: 28 MMOL/L (ref 21–32)
CREAT SERPL-MCNC: 0.8 MG/DL (ref 0.6–1.3)
EOSINOPHIL # BLD AUTO: 0.06 THOUSAND/ÂΜL (ref 0–0.61)
EOSINOPHIL NFR BLD AUTO: 2 % (ref 0–6)
ERYTHROCYTE [DISTWIDTH] IN BLOOD BY AUTOMATED COUNT: 14.5 % (ref 11.6–15.1)
GFR SERPL CREATININE-BSD FRML MDRD: 74 ML/MIN/1.73SQ M
GLUCOSE P FAST SERPL-MCNC: 86 MG/DL (ref 65–99)
HCT VFR BLD AUTO: 36.9 % (ref 34.8–46.1)
HGB BLD-MCNC: 11.6 G/DL (ref 11.5–15.4)
IMM GRANULOCYTES # BLD AUTO: 0 THOUSAND/UL (ref 0–0.2)
IMM GRANULOCYTES NFR BLD AUTO: 0 % (ref 0–2)
LYMPHOCYTES # BLD AUTO: 0.69 THOUSANDS/ÂΜL (ref 0.6–4.47)
LYMPHOCYTES NFR BLD AUTO: 22 % (ref 14–44)
MCH RBC QN AUTO: 29.6 PG (ref 26.8–34.3)
MCHC RBC AUTO-ENTMCNC: 31.4 G/DL (ref 31.4–37.4)
MCV RBC AUTO: 94 FL (ref 82–98)
MONOCYTES # BLD AUTO: 0.27 THOUSAND/ÂΜL (ref 0.17–1.22)
MONOCYTES NFR BLD AUTO: 9 % (ref 4–12)
NEUTROPHILS # BLD AUTO: 2.05 THOUSANDS/ÂΜL (ref 1.85–7.62)
NEUTS SEG NFR BLD AUTO: 67 % (ref 43–75)
NRBC BLD AUTO-RTO: 0 /100 WBCS
PLATELET # BLD AUTO: 124 THOUSANDS/UL (ref 149–390)
PMV BLD AUTO: 12.7 FL (ref 8.9–12.7)
POTASSIUM SERPL-SCNC: 3.7 MMOL/L (ref 3.5–5.3)
PROT SERPL-MCNC: 6.5 G/DL (ref 6.4–8.4)
RBC # BLD AUTO: 3.92 MILLION/UL (ref 3.81–5.12)
SODIUM SERPL-SCNC: 143 MMOL/L (ref 135–147)
WBC # BLD AUTO: 3.08 THOUSAND/UL (ref 4.31–10.16)

## 2024-04-25 PROCEDURE — 85025 COMPLETE CBC W/AUTO DIFF WBC: CPT

## 2024-04-25 PROCEDURE — 80053 COMPREHEN METABOLIC PANEL: CPT

## 2024-04-25 PROCEDURE — 36415 COLL VENOUS BLD VENIPUNCTURE: CPT

## 2024-04-30 ENCOUNTER — HOSPITAL ENCOUNTER (OUTPATIENT)
Dept: INFUSION CENTER | Facility: HOSPITAL | Age: 74
Discharge: HOME | End: 2024-04-30
Attending: INTERNAL MEDICINE
Payer: MEDICARE

## 2024-04-30 DIAGNOSIS — D69.3 IDIOPATHIC THROMBOCYTOPENIC PURPURA (ITP) (HCC): Primary | ICD-10-CM

## 2024-04-30 DIAGNOSIS — C91.10 CLL (CHRONIC LYMPHOCYTIC LEUKEMIA) (HCC): ICD-10-CM

## 2024-04-30 PROCEDURE — 96372 THER/PROPH/DIAG INJ SC/IM: CPT

## 2024-04-30 RX ADMIN — ROMIPLOSTIM 125 MCG: 125 INJECTION, POWDER, LYOPHILIZED, FOR SOLUTION SUBCUTANEOUS at 13:42

## 2024-04-30 NOTE — PROGRESS NOTES
Nicolle Wood  tolerated Nplate well with no complications.      Nicolle Wood is aware of future appt on 5/29 at 1230.     AVS printed: Yes x

## 2024-05-15 ENCOUNTER — TELEPHONE (OUTPATIENT)
Dept: HEMATOLOGY ONCOLOGY | Facility: CLINIC | Age: 74
End: 2024-05-15

## 2024-05-23 ENCOUNTER — APPOINTMENT (OUTPATIENT)
Dept: LAB | Age: 74
End: 2024-05-23
Payer: MEDICARE

## 2024-05-23 DIAGNOSIS — M81.8 OSTEOPOROSIS OF FOREARM ASSOCIATED WITH ENDOCRINE DISORDER: ICD-10-CM

## 2024-05-23 DIAGNOSIS — E34.9 OSTEOPOROSIS OF FOREARM ASSOCIATED WITH ENDOCRINE DISORDER: ICD-10-CM

## 2024-05-23 LAB
ALBUMIN SERPL BCP-MCNC: 3.7 G/DL (ref 3.5–5)
ALP SERPL-CCNC: 36 U/L (ref 34–104)
ALT SERPL W P-5'-P-CCNC: 6 U/L (ref 7–52)
ANION GAP SERPL CALCULATED.3IONS-SCNC: 8 MMOL/L (ref 4–13)
AST SERPL W P-5'-P-CCNC: 16 U/L (ref 13–39)
BILIRUB SERPL-MCNC: 0.68 MG/DL (ref 0.2–1)
BUN SERPL-MCNC: 8 MG/DL (ref 5–25)
CALCIUM SERPL-MCNC: 8.5 MG/DL (ref 8.4–10.2)
CHLORIDE SERPL-SCNC: 109 MMOL/L (ref 96–108)
CO2 SERPL-SCNC: 26 MMOL/L (ref 21–32)
CREAT SERPL-MCNC: 0.9 MG/DL (ref 0.6–1.3)
GFR SERPL CREATININE-BSD FRML MDRD: 64 ML/MIN/1.73SQ M
GLUCOSE P FAST SERPL-MCNC: 89 MG/DL (ref 65–99)
POTASSIUM SERPL-SCNC: 3.7 MMOL/L (ref 3.5–5.3)
PROT SERPL-MCNC: 6.5 G/DL (ref 6.4–8.4)
SODIUM SERPL-SCNC: 143 MMOL/L (ref 135–147)

## 2024-05-23 PROCEDURE — 80053 COMPREHEN METABOLIC PANEL: CPT

## 2024-05-23 PROCEDURE — 36415 COLL VENOUS BLD VENIPUNCTURE: CPT

## 2024-05-29 ENCOUNTER — HOSPITAL ENCOUNTER (OUTPATIENT)
Dept: INFUSION CENTER | Facility: HOSPITAL | Age: 74
Discharge: HOME | End: 2024-05-29
Attending: INTERNAL MEDICINE
Payer: MEDICARE

## 2024-05-29 DIAGNOSIS — M81.8 OSTEOPOROSIS OF FOREARM ASSOCIATED WITH ENDOCRINE DISORDER: Primary | ICD-10-CM

## 2024-05-29 DIAGNOSIS — E34.9 OSTEOPOROSIS OF FOREARM ASSOCIATED WITH ENDOCRINE DISORDER: Primary | ICD-10-CM

## 2024-05-29 DIAGNOSIS — D69.3 IDIOPATHIC THROMBOCYTOPENIC PURPURA (ITP) (HCC): Primary | ICD-10-CM

## 2024-05-29 DIAGNOSIS — C91.10 CLL (CHRONIC LYMPHOCYTIC LEUKEMIA) (HCC): ICD-10-CM

## 2024-05-29 RX ADMIN — ROMIPLOSTIM 125 MCG: 125 INJECTION, POWDER, LYOPHILIZED, FOR SOLUTION SUBCUTANEOUS at 12:53

## 2024-05-29 NOTE — PROGRESS NOTES
Nicolle Wood  tolerated treatment well with no complications.      Nicolle Wood is aware of future appt on 6/25/24 at 1200.    AVS provided.

## 2024-05-30 ENCOUNTER — TELEPHONE (OUTPATIENT)
Dept: HEMATOLOGY ONCOLOGY | Facility: CLINIC | Age: 74
End: 2024-05-30

## 2024-05-30 NOTE — TELEPHONE ENCOUNTER
Lab Inquiry   Who are you speaking with? Patient     If it is not the patient, are they listed on an active communication consent form? N/A   Name of ordering provider Aisha Holly PA-C   What is being requested? Lab orders need to be entered   Lab draw location [facility]   What is the best call back number? [phone]    If patient at the lab, Was a live attempts to contact the team made? N/a

## 2024-05-31 DIAGNOSIS — C91.10 CLL (CHRONIC LYMPHOCYTIC LEUKEMIA) (HCC): Primary | ICD-10-CM

## 2024-05-31 DIAGNOSIS — D69.3 IDIOPATHIC THROMBOCYTOPENIC PURPURA (ITP) (HCC): ICD-10-CM

## 2024-06-04 DIAGNOSIS — F41.9 ANXIETY: ICD-10-CM

## 2024-06-04 DIAGNOSIS — E03.9 HYPOTHYROIDISM, UNSPECIFIED TYPE: ICD-10-CM

## 2024-06-05 RX ORDER — ALPRAZOLAM 0.5 MG/1
0.5 TABLET ORAL
Qty: 30 TABLET | Refills: 2 | Status: SHIPPED | OUTPATIENT
Start: 2024-06-05

## 2024-06-05 RX ORDER — LEVOTHYROXINE SODIUM 0.07 MG/1
75 TABLET ORAL
Qty: 90 TABLET | Refills: 1 | Status: SHIPPED | OUTPATIENT
Start: 2024-06-05

## 2024-06-05 NOTE — TELEPHONE ENCOUNTER
ALPRAZolam (XANAX) 0.5 mg tablet         Sig: Take 1 tablet (0.5 mg total) by mouth daily at bedtime as needed for anxiety    Disp: 30 tablet    Refills: 2    Start: 6/5/2024    Class: Normal    PDMP Review May Be Needed    For: Anxiety    Last ordered: 9 months ago (8/23/2023) by Kwame Ovalles DO    Psychiatry:  Anxiolytics/Hypnotics [id number]/04/2024 12:07 PM   Protocol Details This refill cannot be delegated    Valid encounter within last 6 months      To be filled at: [id number] - [facility] [address]

## 2024-06-20 ENCOUNTER — APPOINTMENT (OUTPATIENT)
Dept: LAB | Age: 74
End: 2024-06-20
Payer: MEDICARE

## 2024-06-20 DIAGNOSIS — D69.3 IDIOPATHIC THROMBOCYTOPENIC PURPURA (ITP) (HCC): ICD-10-CM

## 2024-06-20 DIAGNOSIS — C91.10 CLL (CHRONIC LYMPHOCYTIC LEUKEMIA) (HCC): ICD-10-CM

## 2024-06-20 LAB
BASOPHILS # BLD AUTO: 0.02 THOUSANDS/ÂΜL (ref 0–0.1)
BASOPHILS NFR BLD AUTO: 0 % (ref 0–1)
EOSINOPHIL # BLD AUTO: 0.04 THOUSAND/ÂΜL (ref 0–0.61)
EOSINOPHIL NFR BLD AUTO: 1 % (ref 0–6)
ERYTHROCYTE [DISTWIDTH] IN BLOOD BY AUTOMATED COUNT: 14.7 % (ref 11.6–15.1)
HCT VFR BLD AUTO: 37.9 % (ref 34.8–46.1)
HGB BLD-MCNC: 11.8 G/DL (ref 11.5–15.4)
IMM GRANULOCYTES # BLD AUTO: 0.01 THOUSAND/UL (ref 0–0.2)
IMM GRANULOCYTES NFR BLD AUTO: 0 % (ref 0–2)
LYMPHOCYTES # BLD AUTO: 0.7 THOUSANDS/ÂΜL (ref 0.6–4.47)
LYMPHOCYTES NFR BLD AUTO: 15 % (ref 14–44)
MCH RBC QN AUTO: 29.9 PG (ref 26.8–34.3)
MCHC RBC AUTO-ENTMCNC: 31.1 G/DL (ref 31.4–37.4)
MCV RBC AUTO: 96 FL (ref 82–98)
MONOCYTES # BLD AUTO: 0.33 THOUSAND/ÂΜL (ref 0.17–1.22)
MONOCYTES NFR BLD AUTO: 7 % (ref 4–12)
NEUTROPHILS # BLD AUTO: 3.44 THOUSANDS/ÂΜL (ref 1.85–7.62)
NEUTS SEG NFR BLD AUTO: 77 % (ref 43–75)
NRBC BLD AUTO-RTO: 0 /100 WBCS
PLATELET # BLD AUTO: 166 THOUSANDS/UL (ref 149–390)
PMV BLD AUTO: 12.8 FL (ref 8.9–12.7)
RBC # BLD AUTO: 3.95 MILLION/UL (ref 3.81–5.12)
WBC # BLD AUTO: 4.54 THOUSAND/UL (ref 4.31–10.16)

## 2024-06-20 PROCEDURE — 36415 COLL VENOUS BLD VENIPUNCTURE: CPT

## 2024-06-20 PROCEDURE — 85025 COMPLETE CBC W/AUTO DIFF WBC: CPT

## 2024-06-25 ENCOUNTER — HOSPITAL ENCOUNTER (OUTPATIENT)
Dept: INFUSION CENTER | Facility: HOSPITAL | Age: 74
Discharge: HOME | End: 2024-06-25
Attending: INTERNAL MEDICINE
Payer: MEDICARE

## 2024-06-25 VITALS — WEIGHT: 111.55 LBS | BODY MASS INDEX: 18.28 KG/M2

## 2024-06-25 DIAGNOSIS — M81.8 OSTEOPOROSIS OF FOREARM ASSOCIATED WITH ENDOCRINE DISORDER: ICD-10-CM

## 2024-06-25 DIAGNOSIS — E34.9 OSTEOPOROSIS OF FOREARM ASSOCIATED WITH ENDOCRINE DISORDER: ICD-10-CM

## 2024-06-25 DIAGNOSIS — D69.3 IDIOPATHIC THROMBOCYTOPENIC PURPURA (ITP) (HCC): Primary | ICD-10-CM

## 2024-06-25 DIAGNOSIS — C91.10 CLL (CHRONIC LYMPHOCYTIC LEUKEMIA) (HCC): ICD-10-CM

## 2024-06-25 RX ADMIN — DENOSUMAB 60 MG: 60 INJECTION SUBCUTANEOUS at 12:17

## 2024-06-25 RX ADMIN — ROMIPLOSTIM 125 MCG: 125 INJECTION, POWDER, LYOPHILIZED, FOR SOLUTION SUBCUTANEOUS at 12:18

## 2024-06-25 NOTE — PROGRESS NOTES
Nicolle Wood  tolerated treatment well with no complications.      Nicolle Wood is aware of future appt on 7/24/24 at 1230.     AVS printed and given to Nicolle Wood.

## 2024-07-18 ENCOUNTER — APPOINTMENT (OUTPATIENT)
Dept: LAB | Age: 74
End: 2024-07-18
Payer: MEDICARE

## 2024-07-18 DIAGNOSIS — C91.10 CLL (CHRONIC LYMPHOCYTIC LEUKEMIA) (HCC): ICD-10-CM

## 2024-07-18 DIAGNOSIS — D69.3 IDIOPATHIC THROMBOCYTOPENIC PURPURA (ITP) (HCC): ICD-10-CM

## 2024-07-18 LAB
ALBUMIN SERPL BCG-MCNC: 3.6 G/DL (ref 3.5–5)
ALP SERPL-CCNC: 38 U/L (ref 34–104)
ALT SERPL W P-5'-P-CCNC: 4 U/L (ref 7–52)
ANION GAP SERPL CALCULATED.3IONS-SCNC: 4 MMOL/L (ref 4–13)
AST SERPL W P-5'-P-CCNC: 16 U/L (ref 13–39)
BASOPHILS # BLD AUTO: 0.02 THOUSANDS/ÂΜL (ref 0–0.1)
BASOPHILS NFR BLD AUTO: 0 % (ref 0–1)
BILIRUB SERPL-MCNC: 0.37 MG/DL (ref 0.2–1)
BUN SERPL-MCNC: 11 MG/DL (ref 5–25)
CALCIUM SERPL-MCNC: 8.9 MG/DL (ref 8.4–10.2)
CHLORIDE SERPL-SCNC: 109 MMOL/L (ref 96–108)
CO2 SERPL-SCNC: 26 MMOL/L (ref 21–32)
CREAT SERPL-MCNC: 0.74 MG/DL (ref 0.6–1.3)
EOSINOPHIL # BLD AUTO: 0.06 THOUSAND/ÂΜL (ref 0–0.61)
EOSINOPHIL NFR BLD AUTO: 1 % (ref 0–6)
ERYTHROCYTE [DISTWIDTH] IN BLOOD BY AUTOMATED COUNT: 14.6 % (ref 11.6–15.1)
GFR SERPL CREATININE-BSD FRML MDRD: 81 ML/MIN/1.73SQ M
GLUCOSE SERPL-MCNC: 95 MG/DL (ref 65–140)
HCT VFR BLD AUTO: 37.5 % (ref 34.8–46.1)
HGB BLD-MCNC: 11.6 G/DL (ref 11.5–15.4)
IMM GRANULOCYTES # BLD AUTO: 0.02 THOUSAND/UL (ref 0–0.2)
IMM GRANULOCYTES NFR BLD AUTO: 0 % (ref 0–2)
LYMPHOCYTES # BLD AUTO: 0.8 THOUSANDS/ÂΜL (ref 0.6–4.47)
LYMPHOCYTES NFR BLD AUTO: 17 % (ref 14–44)
MCH RBC QN AUTO: 29.7 PG (ref 26.8–34.3)
MCHC RBC AUTO-ENTMCNC: 30.9 G/DL (ref 31.4–37.4)
MCV RBC AUTO: 96 FL (ref 82–98)
MONOCYTES # BLD AUTO: 0.37 THOUSAND/ÂΜL (ref 0.17–1.22)
MONOCYTES NFR BLD AUTO: 8 % (ref 4–12)
NEUTROPHILS # BLD AUTO: 3.33 THOUSANDS/ÂΜL (ref 1.85–7.62)
NEUTS SEG NFR BLD AUTO: 74 % (ref 43–75)
NRBC BLD AUTO-RTO: 0 /100 WBCS
PLATELET # BLD AUTO: 153 THOUSANDS/UL (ref 149–390)
PMV BLD AUTO: 12.4 FL (ref 8.9–12.7)
POTASSIUM SERPL-SCNC: 4.5 MMOL/L (ref 3.5–5.3)
PROT SERPL-MCNC: 6.5 G/DL (ref 6.4–8.4)
RBC # BLD AUTO: 3.9 MILLION/UL (ref 3.81–5.12)
SODIUM SERPL-SCNC: 139 MMOL/L (ref 135–147)
WBC # BLD AUTO: 4.6 THOUSAND/UL (ref 4.31–10.16)

## 2024-07-18 PROCEDURE — 85025 COMPLETE CBC W/AUTO DIFF WBC: CPT

## 2024-07-18 PROCEDURE — 36415 COLL VENOUS BLD VENIPUNCTURE: CPT

## 2024-07-18 PROCEDURE — 80053 COMPREHEN METABOLIC PANEL: CPT

## 2024-07-24 ENCOUNTER — HOSPITAL ENCOUNTER (OUTPATIENT)
Dept: INFUSION CENTER | Facility: HOSPITAL | Age: 74
Discharge: HOME | End: 2024-07-24
Attending: INTERNAL MEDICINE
Payer: MEDICARE

## 2024-07-24 DIAGNOSIS — C91.10 CLL (CHRONIC LYMPHOCYTIC LEUKEMIA) (HCC): ICD-10-CM

## 2024-07-24 DIAGNOSIS — D69.3 IDIOPATHIC THROMBOCYTOPENIC PURPURA (ITP) (HCC): Primary | ICD-10-CM

## 2024-07-24 PROCEDURE — 96372 THER/PROPH/DIAG INJ SC/IM: CPT

## 2024-07-24 RX ADMIN — ROMIPLOSTIM 125 MCG: 125 INJECTION, POWDER, LYOPHILIZED, FOR SOLUTION SUBCUTANEOUS at 13:05

## 2024-07-24 NOTE — PROGRESS NOTES
Nicolle Wood  tolerated treatment well with no complications.      Nicolle Wood is aware of future appt on 8/21 at 100.     AVS printed and given to Nicolle Wood:  Yes

## 2024-08-14 ENCOUNTER — HOSPITAL ENCOUNTER (OUTPATIENT)
Dept: RADIOLOGY | Facility: IMAGING CENTER | Age: 74
Discharge: HOME | End: 2024-08-14
Payer: MEDICARE

## 2024-08-14 DIAGNOSIS — E43 SEVERE PROTEIN-CALORIE MALNUTRITION (GOMEZ: LESS THAN 60% OF STANDARD WEIGHT) (HCC): ICD-10-CM

## 2024-08-14 DIAGNOSIS — D69.3 IDIOPATHIC THROMBOCYTOPENIC PURPURA (ITP) (HCC): ICD-10-CM

## 2024-08-14 DIAGNOSIS — C91.10 CLL (CHRONIC LYMPHOCYTIC LEUKEMIA) (HCC): ICD-10-CM

## 2024-08-14 DIAGNOSIS — D59.10 AUTOIMMUNE HEMOLYTIC ANEMIA (HCC): ICD-10-CM

## 2024-08-14 PROCEDURE — 71260 CT THORAX DX C+: CPT

## 2024-08-14 PROCEDURE — G1004 CDSM NDSC: HCPCS

## 2024-08-14 PROCEDURE — 74177 CT ABD & PELVIS W/CONTRAST: CPT

## 2024-08-14 RX ADMIN — IOHEXOL 100 ML: 350 INJECTION, SOLUTION INTRAVENOUS at 12:40

## 2024-08-15 ENCOUNTER — APPOINTMENT (OUTPATIENT)
Dept: LAB | Age: 74
End: 2024-08-15
Payer: MEDICARE

## 2024-08-15 DIAGNOSIS — C91.10 CLL (CHRONIC LYMPHOCYTIC LEUKEMIA) (HCC): ICD-10-CM

## 2024-08-15 DIAGNOSIS — D69.3 IDIOPATHIC THROMBOCYTOPENIC PURPURA (ITP) (HCC): ICD-10-CM

## 2024-08-15 DIAGNOSIS — E34.9 OSTEOPOROSIS OF FOREARM ASSOCIATED WITH ENDOCRINE DISORDER: ICD-10-CM

## 2024-08-15 DIAGNOSIS — M81.8 OSTEOPOROSIS OF FOREARM ASSOCIATED WITH ENDOCRINE DISORDER: ICD-10-CM

## 2024-08-15 LAB
ALBUMIN SERPL BCG-MCNC: 3.8 G/DL (ref 3.5–5)
ALP SERPL-CCNC: 36 U/L (ref 34–104)
ALT SERPL W P-5'-P-CCNC: 4 U/L (ref 7–52)
ANION GAP SERPL CALCULATED.3IONS-SCNC: 9 MMOL/L (ref 4–13)
AST SERPL W P-5'-P-CCNC: 14 U/L (ref 13–39)
BASOPHILS # BLD AUTO: 0.02 THOUSANDS/ÂΜL (ref 0–0.1)
BASOPHILS NFR BLD AUTO: 1 % (ref 0–1)
BILIRUB SERPL-MCNC: 0.52 MG/DL (ref 0.2–1)
BUN SERPL-MCNC: 8 MG/DL (ref 5–25)
CALCIUM SERPL-MCNC: 8.7 MG/DL (ref 8.4–10.2)
CHLORIDE SERPL-SCNC: 107 MMOL/L (ref 96–108)
CO2 SERPL-SCNC: 25 MMOL/L (ref 21–32)
CREAT SERPL-MCNC: 0.81 MG/DL (ref 0.6–1.3)
EOSINOPHIL # BLD AUTO: 0.04 THOUSAND/ÂΜL (ref 0–0.61)
EOSINOPHIL NFR BLD AUTO: 1 % (ref 0–6)
ERYTHROCYTE [DISTWIDTH] IN BLOOD BY AUTOMATED COUNT: 14.2 % (ref 11.6–15.1)
GFR SERPL CREATININE-BSD FRML MDRD: 72 ML/MIN/1.73SQ M
GLUCOSE P FAST SERPL-MCNC: 90 MG/DL (ref 65–99)
HCT VFR BLD AUTO: 38 % (ref 34.8–46.1)
HGB BLD-MCNC: 11.8 G/DL (ref 11.5–15.4)
IMM GRANULOCYTES # BLD AUTO: 0.02 THOUSAND/UL (ref 0–0.2)
IMM GRANULOCYTES NFR BLD AUTO: 1 % (ref 0–2)
LYMPHOCYTES # BLD AUTO: 0.71 THOUSANDS/ÂΜL (ref 0.6–4.47)
LYMPHOCYTES NFR BLD AUTO: 21 % (ref 14–44)
MCH RBC QN AUTO: 29.2 PG (ref 26.8–34.3)
MCHC RBC AUTO-ENTMCNC: 31.1 G/DL (ref 31.4–37.4)
MCV RBC AUTO: 94 FL (ref 82–98)
MONOCYTES # BLD AUTO: 0.39 THOUSAND/ÂΜL (ref 0.17–1.22)
MONOCYTES NFR BLD AUTO: 11 % (ref 4–12)
NEUTROPHILS # BLD AUTO: 2.24 THOUSANDS/ÂΜL (ref 1.85–7.62)
NEUTS SEG NFR BLD AUTO: 65 % (ref 43–75)
NRBC BLD AUTO-RTO: 0 /100 WBCS
PLATELET # BLD AUTO: 157 THOUSANDS/UL (ref 149–390)
PMV BLD AUTO: 11.7 FL (ref 8.9–12.7)
POTASSIUM SERPL-SCNC: 3.6 MMOL/L (ref 3.5–5.3)
PROT SERPL-MCNC: 7 G/DL (ref 6.4–8.4)
RBC # BLD AUTO: 4.04 MILLION/UL (ref 3.81–5.12)
SODIUM SERPL-SCNC: 141 MMOL/L (ref 135–147)
WBC # BLD AUTO: 3.42 THOUSAND/UL (ref 4.31–10.16)

## 2024-08-15 PROCEDURE — 36415 COLL VENOUS BLD VENIPUNCTURE: CPT

## 2024-08-15 PROCEDURE — 85025 COMPLETE CBC W/AUTO DIFF WBC: CPT

## 2024-08-15 PROCEDURE — 80053 COMPREHEN METABOLIC PANEL: CPT

## 2024-08-21 ENCOUNTER — HOSPITAL ENCOUNTER (OUTPATIENT)
Dept: INFUSION CENTER | Facility: HOSPITAL | Age: 74
Discharge: HOME | End: 2024-08-21
Attending: INTERNAL MEDICINE
Payer: MEDICARE

## 2024-08-21 DIAGNOSIS — C91.10 CLL (CHRONIC LYMPHOCYTIC LEUKEMIA) (HCC): ICD-10-CM

## 2024-08-21 DIAGNOSIS — D69.3 IDIOPATHIC THROMBOCYTOPENIC PURPURA (ITP) (HCC): Primary | ICD-10-CM

## 2024-08-21 PROCEDURE — 96372 THER/PROPH/DIAG INJ SC/IM: CPT

## 2024-08-21 RX ADMIN — ROMIPLOSTIM 125 MCG: 125 INJECTION, POWDER, LYOPHILIZED, FOR SOLUTION SUBCUTANEOUS at 12:51

## 2024-08-21 NOTE — PROGRESS NOTES
Nicolle Wood  tolerated treatment well with no complications.      Nicolle Wood is aware of future appt on 09/18/24 at 12pm.     AVS printed and given to Nicolle Wood:  Yes

## 2024-08-22 ENCOUNTER — OFFICE VISIT (OUTPATIENT)
Dept: HEMATOLOGY ONCOLOGY | Facility: CLINIC | Age: 74
End: 2024-08-22
Payer: MEDICARE

## 2024-08-22 ENCOUNTER — TELEPHONE (OUTPATIENT)
Age: 74
End: 2024-08-22

## 2024-08-22 VITALS
BODY MASS INDEX: 18.68 KG/M2 | WEIGHT: 116.2 LBS | HEART RATE: 69 BPM | DIASTOLIC BLOOD PRESSURE: 60 MMHG | TEMPERATURE: 97.8 F | RESPIRATION RATE: 16 BRPM | HEIGHT: 66 IN | OXYGEN SATURATION: 95 % | SYSTOLIC BLOOD PRESSURE: 122 MMHG

## 2024-08-22 DIAGNOSIS — E34.9 OSTEOPOROSIS OF FOREARM ASSOCIATED WITH ENDOCRINE DISORDER: ICD-10-CM

## 2024-08-22 DIAGNOSIS — M81.8 OSTEOPOROSIS OF FOREARM ASSOCIATED WITH ENDOCRINE DISORDER: ICD-10-CM

## 2024-08-22 DIAGNOSIS — C91.10 CLL (CHRONIC LYMPHOCYTIC LEUKEMIA) (HCC): Primary | ICD-10-CM

## 2024-08-22 DIAGNOSIS — D69.3 IDIOPATHIC THROMBOCYTOPENIC PURPURA (ITP) (HCC): ICD-10-CM

## 2024-08-22 PROCEDURE — G2211 COMPLEX E/M VISIT ADD ON: HCPCS | Performed by: PHYSICIAN ASSISTANT

## 2024-08-22 PROCEDURE — 99215 OFFICE O/P EST HI 40 MIN: CPT | Performed by: PHYSICIAN ASSISTANT

## 2024-08-22 NOTE — PROGRESS NOTES
Hematology/Oncology Outpatient Follow- up Note  Nicolle Wood 74 y.o. female MRN: @ Encounter: [id number]        Date:  8/22/2024      Assessment / Plan:    CLL/ SLL dx 6/2020 when she presented with AIHA, ITP. On acalabrutinib 100 mg p.o. daily since 10/2020 with excellent tolerance.     CT scan on 10/2021 showed resolution of retroperitoneal, retrocrural lymphadenopathy.  CT C/A/P 8/14/24 compared to 2020 scan -Interval decrease in previously noted retrocrural and retroperitoneal lymph nodes.   Continue acalabrutinib.     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 She responded initially to steroids, counts dropped with taper. No prolonged response to Promacta.   9/1/2020 Prednisone 40 mg po daily restarted, reduced by 10 mg po weekly.   She was on prednisone 5mg po daily.   11/2/20 platelet count 27,000.   11/12/20 Platelet count [id number]   11/17/20 Prednisone increased to 10mg daily,  11/23/20  increased to 20mg when platelets decreased to 12,000.   11/2022 prednisone 5 mg alternating with 10 mg the next day, reduced to 5 mg PO daily . 2/7/23  prednisone Reduced to 2.5mg po daily.   8/2023 steroid discontinued.     12/2020  N-plate initiated.    She currently is on 250 mcg every 4 weeks.    Platelet count has remained > 100.  Will extend interval to every 5 weeks.  If still elevated, can try to extend further.         3. 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 Continue.  Last Dexa 8/2022.    F/U DEXA requested.        4. 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  History of rectal bleeding.  colonoscopy 3/2023 unremarkable.  EGD ordered by GI 6/2023.  Patient cancelled.      6. unintentional weight loss  132 3/2023 -> 124 6/2023. Weight 112 2/20/24.  Further weight loss plateaued  Patient states she has no appetite. Steroids she was on for her ITP likely helped with her appetite.   Marinol rx 4/2024  per palliative  care- made her lightheaded  8/2024 CT C/A/P no abnormal findings.      On breast exam, tan discoloration right inframammary fold, patient states aware 'for awhile' appears to be 2nd to friction, history of yeast dermatitis. No palpable mass. Mammogram recommended.   Patient declines mammogram.     F/U in 6 months        HPI:   Nicolle Wood is a 74-year-old  female who was admitted to [facility] 6/2020 regarding easy bruising and bleeding.      She has history of polyclonal gammopathy, with no evidence of monoclonal protein, Sjogren syndrome, autoimmune connective tissue disease with highly elevated sed rate, CRP, BALDO, rheumatoid factor. PT and PTT normal.      She is followed by Dr. Alexandra with Rheumatology. She was prescribed hydroxychloroquine 200 milligram p.o. B.i.d.         6/18/2020 She was found to have grade 4 thrombocytopenia with platelet count of 1000, hemoglobin 10, WBC 7.2, 63% neutrophils, 34 percent lymphocytes.     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     She was treated with dexamethasone for 3 days with improvement of platelet count up to 62,000 6/22/21      On 06/25/2020 platelet count of 10,000      Initiated on Promacta 75 milligram p.o. Daily in July 2020, platelet count up gradually with platelet count of 73,000 on 08/17/2020. hemoglobin 11.4, WBC 5.0   8/28/20: Patient had CBC as gums were bleeding that morning AM, no further bleeding. Platelet count was 19,000.      Promacta increased to  150mg every other day and 75 mg the other days.   Platelet count was 12,000 8/31/20 and she was advised to initiate prednisone 40mg po daily, tapering by 10mg po weekly. Promacta returned to 75mg po daily.   Platelet count increased to 173,000 9/4/20.      She had BMBX 9/9/20: B-cell lymphoproliferative disorder, compatible with chronic lymphocytic leukemia. Virtually absent stainable storage iron.   On flow cytometry, CLL phenotype accounted for 11%; CD5+, CD23+, CD20+ (dim) and CD 38-      9/14/20: platelet count 283.   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 b.i.d. since the beginning of October 2020.     She was seen on 10/14/2020 with severe depression requiring admission to the hospital, she is on Zoloft 25 mg p.o. daily, Synthroid 75 micro g p.o. daily      Acalabrutinib was reduced to 100 mg p.o. daily 11/2020   Recurrence of thrombocytopenia 27,000 11/2/20 once prednisone was discontinued.   Prednisone re-introduced back at 5 mg p.o. daily, platelet count increased to 50,000 range, fluctuated to 20,000 range, prednisone increased to 10mg daily 11/17/20, increased to 20mg 11/23/20 when platelets decreased to 12,000.      N-plate ordered 12/15/20 when platelet count 29,000 12/15/20. And given every 4 weeks      Prednisone has been slowly tapered. 5 mg alternating with 10 mg the next day     Dexa scan showed osteoporosis in August 2022 1/30/23 presented to ED for BRBPR.  On examination - No external hemorrhoids. No anal fissure. Small amount of bright red blood on digital rectal exam. No pain with digital rectal exam. Small amount of blood in pad.  Hemoglobin 13.2.  referral to GI made.  UTI 2/3/23, abx rx at urgent care.      3/30/23  Colonoscopy-sessile polyps removed from the cecum, small internal hemorrhoids, scattered diverticula.  Random colon biopsies negative      At her June 7, 2023 f/u with GI reported unintentional weight loss of 10 pounds over 3  months and decreased appetite.  CT scan chest abdomen pelvis was ordered     EGD ordered 6/7/23.  Patient cancelled her procedure 8/11/23 and didn't want to r/s.     6/12/23  Albumin 2.9;  Total protein 6.4, normal quantitative immunoglobulins, ferritin 73, iron saturation 22%     7/13/23  Normal cbcd    10/2023 consult with neurology re: balance, tremor- MRI 10/30/23     2/1/ 2024 hemoglobin 12.6, MCV 97, white blood cell count 4.24, 70% neutrophils, 1% immature granulocytes, 18% lymphocytes, 9% monocytes, 1% eosinophils, platelet count 119  CMP stable    At baseline.      Review of Systems   Constitutional:  Positive for appetite change and fatigue. Negative for chills, diaphoresis, fever and unexpected weight change.   HENT:   Negative for mouth sores, nosebleeds, sore throat, tinnitus and voice change.    Eyes:  Negative for eye problems.   Respiratory:  Negative for chest tightness, cough, shortness of breath and wheezing.    Cardiovascular:  Negative for chest pain, leg swelling and palpitations.   Gastrointestinal:  Negative for abdominal distention, abdominal pain, blood in stool, constipation, diarrhea, nausea, rectal pain and vomiting.   Endocrine: Negative for hot flashes.   Genitourinary: Negative.     Musculoskeletal:  Positive for arthralgias and gait problem. Negative for myalgias.   Skin:  Negative for itching and rash.   Neurological:  Positive for gait problem. Negative for dizziness, headaches, light-headedness and numbness.   Hematological:  Negative for adenopathy.   Psychiatric/Behavioral:  Negative for confusion and sleep disturbance. The patient is not nervous/anxious.         Test Results:        Labs:   Lab Results   Component Value Date    HGB 11.8 08/15/2024    HCT 38.0 08/15/2024    MCV 94 08/15/2024     08/15/2024    WBC 3.42 (L) 08/15/2024    NRBC 0 08/15/2024    BANDSPCT 1 03/03/2021    ATYLMPCT 4 (H) 04/05/2021     Lab Results   Component Value Date    K 3.6 08/15/2024    CL  107 08/15/2024    CO2 25 08/15/2024    BUN 8 08/15/2024    CREATININE 0.81 08/15/2024    GLUF 90 08/15/2024    CALCIUM 8.7 08/15/2024    CORRECTEDCA 9.4 10/16/2023    AST 14 08/15/2024    ALT 4 (L) 08/15/2024    ALKPHOS 36 08/15/2024    EGFR 72 08/15/2024           Imaging: CT chest abdomen pelvis w contrast    Result Date: 8/16/2024  Narrative: CT CHEST, ABDOMEN AND PELVIS WITH IV CONTRAST INDICATION: 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 COMPARISON: 6/19/2020 TECHNIQUE: CT examination of the chest, abdomen and pelvis was performed. Multiplanar 2D reformatted images were created from the source data. This examination, like all CT scans performed in the [facility], was performed utilizing techniques to minimize radiation dose exposure, including the use of iterative reconstruction and automated exposure control. Radiation dose length product (DLP) for this visit: 337 mGy-cm IV Contrast: 100 mL of iohexol (OMNIPAQUE) Enteric Contrast: Not administered. FINDINGS: CHEST LUNGS: Linear subpleural nodularity is identified of the right upper lobe series 4 images 48 and 49 likely scarring. Stable benign 6 mm juxtapleural nodule is identified within the inferior right lower lobe series 4 image 106.. No tracheal or endobronchial lesion. PLEURA: Unremarkable. HEART/GREAT VESSELS: Heart is unremarkable for patient's age. No thoracic aortic aneurysm. MEDIASTINUM AND TRUDI: 6 mm retrocrural lymph node is decreased in size in prominence from prior exam. CHEST WALL AND LOWER NECK: Unremarkable. ABDOMEN LIVER/BILIARY TREE: Unremarkable. GALLBLADDER: No calcified gallstones. No pericholecystic inflammatory change. SPLEEN: Unremarkable. PANCREAS: Unremarkable. ADRENAL GLANDS: Unremarkable. KIDNEYS/URETERS: Unremarkable. No  "hydronephrosis. STOMACH AND BOWEL: Unremarkable. APPENDIX: No findings to suggest appendicitis. ABDOMINOPELVIC CAVITY: No ascites. No pneumoperitoneum. No lymphadenopathy. VESSELS: Unremarkable for patient's age. PELVIS REPRODUCTIVE ORGANS: Unremarkable for patient's age. URINARY BLADDER: Unremarkable. ABDOMINAL WALL/INGUINAL REGIONS: Unremarkable. BONES: No acute fracture or suspicious osseous lesion.     Impression: Interval decrease in previously noted retrocrural and retroperitoneal lymph nodes. Clustered subpleural linear nodularity within the right upper lobe likely represents scarring. This can be reassessed on follow-up. Workstation performed: [id number]             Allergies: No Known Allergies  Current Medications: Reviewed  PMH/FH/SH:  Reviewed      Physical Exam:    1.58 meters squared    Ht Readings from Last 3 Encounters:   08/22/24 5' 5.5\" (1.664 m)   02/20/24 5' 5.5\" (1.664 m)   12/19/23 5' 5.5\" (1.664 m)        Wt Readings from Last 3 Encounters:   08/22/24 52.7 kg (116 lb 3.2 oz)   06/25/24 50.6 kg (111 lb 8.8 oz)   04/01/24 51.9 kg (114 lb 6.7 oz)        Temp Readings from Last 3 Encounters:   04/02/24 (!) 96.5 °F (35.8 °C) (Temporal)   04/01/24 (!) 96 °F (35.6 °C) (Temporal)   03/06/24 (!) 96 °F (35.6 °C) (Temporal)        BP Readings from Last 3 Encounters:   04/02/24 115/69   04/01/24 120/70   03/06/24 128/64             Physical Exam  Vitals reviewed.   Constitutional:       General: She is not in acute distress.     Appearance: She is well-developed. She is not diaphoretic.   HENT:      Head: Normocephalic and atraumatic.   Eyes:      Conjunctiva/sclera: Conjunctivae normal.   Neck:      Trachea: No tracheal deviation.   Cardiovascular:      Rate and Rhythm: Normal rate and regular rhythm.      Heart sounds: No murmur heard.     No friction rub. No gallop.   Pulmonary:      Effort: Pulmonary effort is normal. No respiratory distress.      Breath sounds: Normal breath sounds. No wheezing or " rales.   Chest:      Chest wall: No tenderness.   Abdominal:      General: There is no distension.      Palpations: Abdomen is soft.      Tenderness: There is no abdominal tenderness.   Musculoskeletal:      Cervical back: Normal range of motion and neck supple.      Comments: cane   Lymphadenopathy:      Cervical: No cervical adenopathy.      Upper Body:      Right upper body: No supraclavicular or axillary adenopathy.      Left upper body: No supraclavicular or axillary adenopathy.   Skin:     General: Skin is warm and dry.      Coloration: Skin is not pale.      Findings: No erythema.   Neurological:      Mental Status: She is alert. Mental status is at baseline.   Psychiatric:         Behavior: Behavior normal.         Thought Content: Thought content normal.         ECO      Emergency Contacts:    Extended Emergency Contact Information  Primary Emergency Contact: Brenda Min   [facility]  Home Phone: [phone]  Mobile Phone: [phone]  Relation: Nury

## 2024-08-22 NOTE — TELEPHONE ENCOUNTER
Patient calling in to confirm what time the ride will arrive for the 3pm appt for Aisha Holly, called over to transportation and was informed that no ride was scheduled. Scar kindly assisted and set up a ride for today via rideshare, informed us that this is the office's responsibility and any rides after 2 are rideshare (uber / lyft).    Ride should arrive for patient by 2:20 pm, patient informed to call back if no one shows up. FYI for office.

## 2024-08-26 ENCOUNTER — TELEPHONE (OUTPATIENT)
Age: 74
End: 2024-08-26

## 2024-08-26 NOTE — TELEPHONE ENCOUNTER
Nicolle called today to set up an appointment with Dr Payton and during the call she stated that she would like to talk to her .

## 2024-09-12 ENCOUNTER — APPOINTMENT (OUTPATIENT)
Dept: LAB | Age: 74
End: 2024-09-12
Payer: MEDICARE

## 2024-09-12 DIAGNOSIS — C91.10 CLL (CHRONIC LYMPHOCYTIC LEUKEMIA) (HCC): ICD-10-CM

## 2024-09-12 DIAGNOSIS — E34.9 OSTEOPOROSIS OF FOREARM ASSOCIATED WITH ENDOCRINE DISORDER: ICD-10-CM

## 2024-09-12 DIAGNOSIS — D69.3 IDIOPATHIC THROMBOCYTOPENIC PURPURA (ITP) (HCC): ICD-10-CM

## 2024-09-12 DIAGNOSIS — M81.8 OSTEOPOROSIS OF FOREARM ASSOCIATED WITH ENDOCRINE DISORDER: ICD-10-CM

## 2024-09-12 LAB
ALBUMIN SERPL BCG-MCNC: 3.8 G/DL (ref 3.5–5)
ALP SERPL-CCNC: 33 U/L (ref 34–104)
ALT SERPL W P-5'-P-CCNC: 6 U/L (ref 7–52)
ANION GAP SERPL CALCULATED.3IONS-SCNC: 7 MMOL/L (ref 4–13)
AST SERPL W P-5'-P-CCNC: 15 U/L (ref 13–39)
BASOPHILS # BLD AUTO: 0.01 THOUSANDS/ΜL (ref 0–0.1)
BASOPHILS NFR BLD AUTO: 0 % (ref 0–1)
BILIRUB SERPL-MCNC: 0.6 MG/DL (ref 0.2–1)
BUN SERPL-MCNC: 7 MG/DL (ref 5–25)
CALCIUM SERPL-MCNC: 9 MG/DL (ref 8.4–10.2)
CHLORIDE SERPL-SCNC: 105 MMOL/L (ref 96–108)
CO2 SERPL-SCNC: 27 MMOL/L (ref 21–32)
CREAT SERPL-MCNC: 0.79 MG/DL (ref 0.6–1.3)
EOSINOPHIL # BLD AUTO: 0.02 THOUSAND/ΜL (ref 0–0.61)
EOSINOPHIL NFR BLD AUTO: 0 % (ref 0–6)
ERYTHROCYTE [DISTWIDTH] IN BLOOD BY AUTOMATED COUNT: 14.2 % (ref 11.6–15.1)
GFR SERPL CREATININE-BSD FRML MDRD: 75 ML/MIN/1.73SQ M
GLUCOSE P FAST SERPL-MCNC: 95 MG/DL (ref 65–99)
HCT VFR BLD AUTO: 37.5 % (ref 34.8–46.1)
HGB BLD-MCNC: 11.8 G/DL (ref 11.5–15.4)
IMM GRANULOCYTES # BLD AUTO: 0.02 THOUSAND/UL (ref 0–0.2)
IMM GRANULOCYTES NFR BLD AUTO: 0 % (ref 0–2)
LYMPHOCYTES # BLD AUTO: 0.54 THOUSANDS/ΜL (ref 0.6–4.47)
LYMPHOCYTES NFR BLD AUTO: 11 % (ref 14–44)
MCH RBC QN AUTO: 29.6 PG (ref 26.8–34.3)
MCHC RBC AUTO-ENTMCNC: 31.5 G/DL (ref 31.4–37.4)
MCV RBC AUTO: 94 FL (ref 82–98)
MONOCYTES # BLD AUTO: 0.45 THOUSAND/ΜL (ref 0.17–1.22)
MONOCYTES NFR BLD AUTO: 9 % (ref 4–12)
NEUTROPHILS # BLD AUTO: 4.12 THOUSANDS/ΜL (ref 1.85–7.62)
NEUTS SEG NFR BLD AUTO: 80 % (ref 43–75)
NRBC BLD AUTO-RTO: 0 /100 WBCS
PLATELET # BLD AUTO: 184 THOUSANDS/UL (ref 149–390)
PMV BLD AUTO: 12.2 FL (ref 8.9–12.7)
POTASSIUM SERPL-SCNC: 4.1 MMOL/L (ref 3.5–5.3)
PROT SERPL-MCNC: 6.5 G/DL (ref 6.4–8.4)
RBC # BLD AUTO: 3.98 MILLION/UL (ref 3.81–5.12)
SODIUM SERPL-SCNC: 139 MMOL/L (ref 135–147)
WBC # BLD AUTO: 5.16 THOUSAND/UL (ref 4.31–10.16)

## 2024-09-12 PROCEDURE — 85025 COMPLETE CBC W/AUTO DIFF WBC: CPT

## 2024-09-12 PROCEDURE — 80053 COMPREHEN METABOLIC PANEL: CPT

## 2024-09-12 PROCEDURE — 36415 COLL VENOUS BLD VENIPUNCTURE: CPT

## 2024-09-20 ENCOUNTER — APPOINTMENT (EMERGENCY)
Dept: CT IMAGING | Facility: HOSPITAL | Age: 74
End: 2024-09-20
Payer: MEDICARE

## 2024-09-20 ENCOUNTER — HOSPITAL ENCOUNTER (EMERGENCY)
Facility: HOSPITAL | Age: 74
Discharge: HOME | End: 2024-09-20
Attending: INTERNAL MEDICINE
Payer: MEDICARE

## 2024-09-20 VITALS
SYSTOLIC BLOOD PRESSURE: 125 MMHG | DIASTOLIC BLOOD PRESSURE: 59 MMHG | TEMPERATURE: 97.6 F | HEART RATE: 82 BPM | RESPIRATION RATE: 17 BRPM | OXYGEN SATURATION: 99 % | WEIGHT: 108.91 LBS | BODY MASS INDEX: 17.85 KG/M2

## 2024-09-20 DIAGNOSIS — R10.9 ABDOMINAL PAIN: Primary | ICD-10-CM

## 2024-09-20 LAB
ALBUMIN SERPL BCG-MCNC: 3.9 G/DL (ref 3.5–5)
ALP SERPL-CCNC: 34 U/L (ref 34–104)
ALT SERPL W P-5'-P-CCNC: <3 U/L (ref 7–52)
ANION GAP SERPL CALCULATED.3IONS-SCNC: 7 MMOL/L (ref 4–13)
AST SERPL W P-5'-P-CCNC: 14 U/L (ref 13–39)
BASOPHILS # BLD AUTO: 0.02 THOUSANDS/ΜL (ref 0–0.1)
BASOPHILS NFR BLD AUTO: 0 % (ref 0–1)
BILIRUB SERPL-MCNC: 0.51 MG/DL (ref 0.2–1)
BUN SERPL-MCNC: 11 MG/DL (ref 5–25)
CALCIUM SERPL-MCNC: 9.3 MG/DL (ref 8.4–10.2)
CHLORIDE SERPL-SCNC: 105 MMOL/L (ref 96–108)
CO2 SERPL-SCNC: 23 MMOL/L (ref 21–32)
CREAT SERPL-MCNC: 0.76 MG/DL (ref 0.6–1.3)
EOSINOPHIL # BLD AUTO: 0.03 THOUSAND/ΜL (ref 0–0.61)
EOSINOPHIL NFR BLD AUTO: 1 % (ref 0–6)
ERYTHROCYTE [DISTWIDTH] IN BLOOD BY AUTOMATED COUNT: 14 % (ref 11.6–15.1)
GFR SERPL CREATININE-BSD FRML MDRD: 78 ML/MIN/1.73SQ M
GLUCOSE SERPL-MCNC: 127 MG/DL (ref 65–140)
HCT VFR BLD AUTO: 38.2 % (ref 34.8–46.1)
HGB BLD-MCNC: 12 G/DL (ref 11.5–15.4)
IMM GRANULOCYTES # BLD AUTO: 0.02 THOUSAND/UL (ref 0–0.2)
IMM GRANULOCYTES NFR BLD AUTO: 0 % (ref 0–2)
LIPASE SERPL-CCNC: 40 U/L (ref 11–82)
LYMPHOCYTES # BLD AUTO: 0.63 THOUSANDS/ΜL (ref 0.6–4.47)
LYMPHOCYTES NFR BLD AUTO: 12 % (ref 14–44)
MCH RBC QN AUTO: 30 PG (ref 26.8–34.3)
MCHC RBC AUTO-ENTMCNC: 31.4 G/DL (ref 31.4–37.4)
MCV RBC AUTO: 96 FL (ref 82–98)
MONOCYTES # BLD AUTO: 0.39 THOUSAND/ΜL (ref 0.17–1.22)
MONOCYTES NFR BLD AUTO: 8 % (ref 4–12)
NEUTROPHILS # BLD AUTO: 4.12 THOUSANDS/ΜL (ref 1.85–7.62)
NEUTS SEG NFR BLD AUTO: 79 % (ref 43–75)
NRBC BLD AUTO-RTO: 0 /100 WBCS
PLATELET # BLD AUTO: 104 THOUSANDS/UL (ref 149–390)
PLATELET BLD QL SMEAR: ABNORMAL
PMV BLD AUTO: 12.3 FL (ref 8.9–12.7)
POTASSIUM SERPL-SCNC: 3.9 MMOL/L (ref 3.5–5.3)
PROT SERPL-MCNC: 6.6 G/DL (ref 6.4–8.4)
RBC # BLD AUTO: 4 MILLION/UL (ref 3.81–5.12)
RBC MORPH BLD: NORMAL
SODIUM SERPL-SCNC: 135 MMOL/L (ref 135–147)
WBC # BLD AUTO: 5.21 THOUSAND/UL (ref 4.31–10.16)

## 2024-09-20 PROCEDURE — 74177 CT ABD & PELVIS W/CONTRAST: CPT

## 2024-09-20 PROCEDURE — 83690 ASSAY OF LIPASE: CPT

## 2024-09-20 PROCEDURE — 99284 EMERGENCY DEPT VISIT MOD MDM: CPT

## 2024-09-20 PROCEDURE — 99285 EMERGENCY DEPT VISIT HI MDM: CPT

## 2024-09-20 PROCEDURE — 85025 COMPLETE CBC W/AUTO DIFF WBC: CPT

## 2024-09-20 PROCEDURE — 36415 COLL VENOUS BLD VENIPUNCTURE: CPT

## 2024-09-20 PROCEDURE — 96374 THER/PROPH/DIAG INJ IV PUSH: CPT

## 2024-09-20 PROCEDURE — 80053 COMPREHEN METABOLIC PANEL: CPT

## 2024-09-20 RX ORDER — ONDANSETRON 2 MG/ML
4 INJECTION INTRAMUSCULAR; INTRAVENOUS ONCE
Status: COMPLETED | OUTPATIENT
Start: 2024-09-20 | End: 2024-09-20

## 2024-09-20 RX ORDER — DICYCLOMINE HCL 20 MG
20 TABLET ORAL 2 TIMES DAILY
Qty: 20 TABLET | Refills: 0 | Status: SHIPPED | OUTPATIENT
Start: 2024-09-20

## 2024-09-20 RX ADMIN — IOHEXOL 85 ML: 350 INJECTION, SOLUTION INTRAVENOUS at 10:11

## 2024-09-20 RX ADMIN — ONDANSETRON 4 MG: 2 INJECTION INTRAMUSCULAR; INTRAVENOUS at 10:23

## 2024-09-20 NOTE — ED ATTENDING ATTESTATION
9/20/2024  I, Janelle Dillard MD, saw and evaluated the patient. I have discussed the patient with the resident/non-physician practitioner and agree with the resident's/non-physician practitioner's findings, Plan of Care, and MDM as documented in the resident's/non-physician practitioner's note, except where noted. All available labs and Radiology studies were reviewed.  I was present for key portions of any procedure(s) performed by the resident/non-physician practitioner and I was immediately available to provide assistance.       At this point I agree with the current assessment done in the Emergency Department.  I have conducted an independent evaluation of this patient a history and physical is as follows:    ED Course   74-year-old woman with CLL, ITP, hypothyroidism and anxiety presents to ED for abdominal pain.  Reports been having pain for the last few days in the morning.  Reports its located in the middle of her abdomen.  It does not radiate.  She reports as the day goes on, the pain resolves.  He reports nausea without vomiting.  Reports associated loose stool. On exam the patient is awake, alert, and comfortable. Mucous membranes are moist. The patient's heart is regular. No respiratory distress.  Abdomen non-distended. Benign abdominal exam.No tenderness to palpation, both light and deep in all 4 quadrants. Moves all extremities. Patient neurologically nonfocal. No skin rashes. Back without deformities. MDM: Work-up to include blood work, CBC as marker of infectivity, hemoconcentration for hydration status, CMP to check for electrolytes, renal function, liver function, Lipase to check for pancreatitis, CT scan to evaluate for potential intra-abdominal surgical pathology.         Critical Care Time  Procedures

## 2024-09-20 NOTE — ED PROVIDER NOTES
1. Abdominal pain      ED Disposition       ED Disposition   Discharge    Condition   Stable    Date/Time   Fri Sep 20, 2024 10:39 AM    Comment   Nicolle Wood discharge to home/self care.                   Assessment & Plan       Medical Decision Making  75 yo female with PMHx of anxiety, GERD, hypothyroidism, ITP, and CLL presenting with a chief complaint of abdominal pain. Reports intermittent constipation but has been having small formed BM. Nausea without vomiting. On exam overall well-appearing. Heart and lungs CTA. Abdomen soft, tender in periumbilical region but without rebound or guarding.     Plan: CBC to evaluate for any significant leukocytosis or anemia. CMP for electrolyte disturbance, renal function, or bilary dysfunction. Lipase to evaluate for pancreatitis. U/A for urinary tract infection. CT Abdomen/pelvis to evaluate for acute intra-abdominal infection or pathology such as gastritis, colitis, appendicitis, cholecystitis, pancreatitis, diverticulitis, pyelonephritis, urolithiasis, tumor/mass.      Labs unremarkable aside from chronic thrombocytopenia. Ct A/P without acute abnormality. All results discussed with patient and all questions answered. Incidental findings discussed. Rx sent to pharmacy for bentyl - patient understands how to take this medication. Advised follow-up with PCP. ED return precautions discussed. Patient stable throughout ED course and at time of discharge. Patient is in agreement and understanding with this plan.     Amount and/or Complexity of Data Reviewed  Labs: ordered. Decision-making details documented in ED Course.  Radiology: ordered. Decision-making details documented in ED Course.    Risk  Prescription drug management.                ED Course as of 09/20/24 1634   Fri Sep 20, 2024   0958 LIPASE: 40   0958 Comprehensive metabolic panel(!)   0958 Creatinine: 0.76   1018 CBC and differential(!)   1018 Platelet Count(!): 104   1018 Smear Review(Phlebs Do Not  "Order)(!)   1039 CT abdomen pelvis with contrast  No acute abdominal or pelvic pathology.     No bowel obstruction. Normal appendix. Evaluation for bowel inflammation elsewhere somewhat limited by underdistention and lack of oral contrast, however no convincing inflammatory changes. Please note mild inflammation may not be apparent.     No CT evidence of acute pancreatitis or cholecystitis. No biliary ductal dilatation.       Medications   ondansetron (ZOFRAN) injection 4 mg (4 mg Intravenous Given 9/20/24 1023)   iohexol (OMNIPAQUE) 350 MG/ML injection (SINGLE-DOSE) 85 mL (85 mL Intravenous Given 9/20/24 1011)       History of Present Illness       Nicolle is a 73 yo female with a past medical history of anxiety, GERD, hypothyroidism, ITP, and CLL presenting with a chief complaint of abdominal pain. She states every morning for the last \"few days\" she has awoken with abdominal pain in the middle of her abdomen. It gets better as the day goes on but comes back stronger each morning. She has associated nausea without vomiting. She reports sometimes she is constipated but she has been having very small formed stools. Denies blood in stool. Denies fever/chills, chest pain, headache, dizziness, or urinary symptoms. She reports she is compliant with her Calquence and follows outpatient with heme/onc for her recent diagnosis of CLL.       History provided by:  Patient   used: No    Abdominal Pain  Associated symptoms: nausea    Associated symptoms: no chest pain, no constipation, no cough, no diarrhea, no dysuria, no fatigue, no fever, no shortness of breath and no vomiting      Past Medical History:   Diagnosis Date    Anxiety     Autoimmune hemolytic anemia (HCC)     Cataract     Disease of thyroid gland     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HCC)     GERD (gastroesophageal reflux disease)     Hypothyroidism     Hypothyroidism     Idiopathic thrombocytopenic purpura (ITP) (HCC)     Menopause     Sjogrens syndrome (HCC)  "      No current facility-administered medications for this encounter.    Current Outpatient Medications:     dicyclomine (BENTYL) 20 mg tablet, Take 1 tablet (20 mg total) by mouth 2 (two) times a day, Disp: 20 tablet, Rfl: 0    Acalabrutinib Maleate (Calquence) 100 MG TABS, Take 1 tablet (100mg total)  by mouth once daily., Disp: 30 tablet, Rfl: 5    ALPRAZolam (XANAX) 0.5 mg tablet, Take 1 tablet (0.5 mg total) by mouth daily at bedtime as needed for anxiety, Disp: 30 tablet, Rfl: 2    Cyanocobalamin (Vitamin B 12) 250 MCG LOZG, Take by mouth, Disp: , Rfl:     dronabinol (MARINOL) 2.5 mg capsule, Take 1 capsule (2.5 mg total) by mouth in the morning, Disp: 30 capsule, Rfl: 0    levothyroxine 75 mcg tablet, Take 1 tablet (75 mcg total) by mouth daily in the early morning, Disp: 90 tablet, Rfl: 1    methocarbamol (ROBAXIN) 500 mg tablet, Take 1 tablet (500 mg total) by mouth every 8 (eight) hours as needed for muscle spasms, Disp: 30 tablet, Rfl: 2    polyethylene glycol (MIRALAX) 17 g packet, Take 17 g by mouth daily for 10 days, Disp: 10 each, Rfl: 0    sertraline (ZOLOFT) 100 mg tablet, Take 0.5 tablets (50 mg total) by mouth daily, Disp: , Rfl:     Vitamin D, Cholecalciferol, 10 MCG (400 UNIT) CHEW, Chew, Disp: , Rfl:      Review of Systems   Constitutional:  Negative for fatigue and fever.   HENT:  Negative for congestion.    Respiratory:  Negative for cough and shortness of breath.    Cardiovascular:  Negative for chest pain.   Gastrointestinal:  Positive for abdominal pain and nausea. Negative for constipation, diarrhea and vomiting.   Genitourinary:  Negative for dysuria and frequency.   Skin:  Negative for color change and pallor.   Neurological:  Negative for dizziness, light-headedness and headaches.   All other systems reviewed and are negative.          Objective     ED Triage Vitals   Temperature Pulse Blood Pressure Respirations SpO2 Patient Position - Orthostatic VS   09/20/24 1403 09/20/24 0913  09/20/24 0913 09/20/24 0913 09/20/24 0913 09/20/24 0913   97.6 °F (36.4 °C) 82 125/59 17 99 % Sitting      Temp Source Heart Rate Source BP Location FiO2 (%) Pain Score    09/20/24 0913 09/20/24 0913 09/20/24 0913 -- --    Oral Monitor Right arm          Physical Exam  Vitals and nursing note reviewed.   Constitutional:       General: She is not in acute distress.     Appearance: Normal appearance.   HENT:      Head: Normocephalic and atraumatic.      Right Ear: External ear normal.      Left Ear: External ear normal.      Nose: Nose normal.      Mouth/Throat:      Mouth: Mucous membranes are moist.      Pharynx: Oropharynx is clear.   Eyes:      Extraocular Movements: Extraocular movements intact.      Conjunctiva/sclera: Conjunctivae normal.      Pupils: Pupils are equal, round, and reactive to light.   Cardiovascular:      Rate and Rhythm: Normal rate and regular rhythm.      Pulses: Normal pulses.      Heart sounds: Normal heart sounds.   Pulmonary:      Effort: Pulmonary effort is normal. No respiratory distress.      Breath sounds: Normal breath sounds. No wheezing.   Abdominal:      Palpations: Abdomen is soft.      Tenderness: There is abdominal tenderness in the periumbilical area. There is no guarding or rebound. Negative signs include Pena's sign and McBurney's sign.   Musculoskeletal:         General: Normal range of motion.      Cervical back: Normal range of motion.   Skin:     General: Skin is warm and dry.      Capillary Refill: Capillary refill takes less than 2 seconds.   Neurological:      General: No focal deficit present.      Mental Status: She is alert. Mental status is at baseline.   Psychiatric:         Mood and Affect: Mood normal.         Behavior: Behavior normal.         Labs Reviewed   CBC AND DIFFERENTIAL - Abnormal       Result Value    WBC 5.21      RBC 4.00      Hemoglobin 12.0      Hematocrit 38.2      MCV 96      MCH 30.0      MCHC 31.4      RDW 14.0      MPV 12.3      Platelets  104 (*)     nRBC 0      Segmented % 79 (*)     Immature Grans % 0      Lymphocytes % 12 (*)     Monocytes % 8      Eosinophils Relative 1      Basophils Relative 0      Absolute Neutrophils 4.12      Absolute Immature Grans 0.02      Absolute Lymphocytes 0.63      Absolute Monocytes 0.39      Eosinophils Absolute 0.03      Basophils Absolute 0.02      Narrative:     This is an appended report.  These results have been appended to a previously verified report.   COMPREHENSIVE METABOLIC PANEL - Abnormal    Sodium 135      Potassium 3.9      Chloride 105      CO2 23      ANION GAP 7      BUN 11      Creatinine 0.76      Glucose 127      Calcium 9.3      AST 14      ALT <3 (*)     Alkaline Phosphatase 34      Total Protein 6.6      Albumin 3.9      Total Bilirubin 0.51      eGFR 78      Narrative:     [employer] Kid[employer] guidelines for Chronic Kidney Disease (CKD):                     Stage 1 with normal or high GFR (GFR > 90 mL/min/1.73 square meters)                    Stage 2 Mild CKD (GFR = 60-89 mL/min/1.73 square meters)                    Stage 3A Moderate CKD (GFR = 45-59 mL/min/1.73 square meters)                    Stage 3B Moderate CKD (GFR = 30-44 mL/min/1.73 square meters)                    Stage 4 Severe CKD (GFR = 15-29 mL/min/1.73 square meters)                    Stage 5 End Stage CKD (GFR <15 mL/min/1.73 square meters)                  Note: GFR calculation is accurate only with a steady state creatinine   SMEAR REVIEW(PHLEBS DO NOT ORDER) - Abnormal    RBC Morphology Normal      Platelet Estimate Borderline (*)    LIPASE - Normal    Lipase 40       CT abdomen pelvis with contrast   Final Interpretation by Roxann Epstein MD (09/20 6075)      No acute abdominal or pelvic pathology.      No bowel obstruction. Normal appendix. Evaluation for bowel inflammation elsewhere somewhat limited by underdistention and lack of oral contrast, however no convincing inflammatory changes. Please note  mild inflammation may not be apparent.      No CT evidence of acute pancreatitis or cholecystitis. No biliary ductal dilatation.      Additional findings as above.                  Workstation performed: [id number]             Procedures    ED Medication and Procedure Management   Prior to Admission Medications   Prescriptions Last Dose Informant Patient Reported? Taking?   ALPRAZolam (XANAX) 0.5 mg tablet  Self No No   Sig: Take 1 tablet (0.5 mg total) by mouth daily at bedtime as needed for anxiety   Acalabrutinib Maleate (Calquence) 100 MG TABS  Self No No   Sig: Take 1 tablet (100mg total)  by mouth once daily.   Cyanocobalamin (Vitamin B 12) 250 MCG LOZG  Self Yes No   Sig: Take by mouth   Vitamin D, Cholecalciferol, 10 MCG (400 UNIT) CHEW  Self Yes No   Sig: Chew   dronabinol (MARINOL) 2.5 mg capsule  Self No No   Sig: Take 1 capsule (2.5 mg total) by mouth in the morning   levothyroxine 75 mcg tablet  Self No No   Sig: Take 1 tablet (75 mcg total) by mouth daily in the early morning   methocarbamol (ROBAXIN) 500 mg tablet  Self No No   Sig: Take 1 tablet (500 mg total) by mouth every 8 (eight) hours as needed for muscle spasms   polyethylene glycol (MIRALAX) 17 g packet  Self No No   Sig: Take 17 g by mouth daily for 10 days   sertraline (ZOLOFT) 100 mg tablet  Self No No   Sig: Take 0.5 tablets (50 mg total) by mouth daily      Facility-Administered Medications: None     Discharge Medication List as of 9/20/2024 10:40 AM        START taking these medications    Details   dicyclomine (BENTYL) 20 mg tablet Take 1 tablet (20 mg total) by mouth 2 (two) times a day, Starting Fri 9/20/2024, Normal           CONTINUE these medications which have NOT CHANGED    Details   Acalabrutinib Maleate (Calquence) 100 MG TABS Take 1 tablet (100mg total)  by mouth once daily., Normal      ALPRAZolam (XANAX) 0.5 mg tablet Take 1 tablet (0.5 mg total) by mouth daily at bedtime as needed for anxiety, Starting Wed 6/5/2024, Normal       Cyanocobalamin (Vitamin B 12) 250 MCG LOZG Take by mouth, Historical Med      dronabinol (MARINOL) 2.5 mg capsule Take 1 capsule (2.5 mg total) by mouth in the morning, Starting Mon 4/1/2024, Normal      levothyroxine 75 mcg tablet Take 1 tablet (75 mcg total) by mouth daily in the early morning, Starting Wed 6/5/2024, Normal      methocarbamol (ROBAXIN) 500 mg tablet Take 1 tablet (500 mg total) by mouth every 8 (eight) hours as needed for muscle spasms, Starting Tue 12/19/2023, Normal      polyethylene glycol (MIRALAX) 17 g packet Take 17 g by mouth daily for 10 days, Starting Mon 10/4/2021, Until Thu 8/22/2024, Normal      sertraline (ZOLOFT) 100 mg tablet Take 0.5 tablets (50 mg total) by mouth daily, Starting Wed 3/6/2024, No Print      Vitamin D, Cholecalciferol, 10 MCG (400 UNIT) CHEW Chew, Historical Med           No discharge procedures on file.     Katalina Pascual PA-C  09/20/24 0540

## 2024-09-23 DIAGNOSIS — C91.10 CLL (CHRONIC LYMPHOCYTIC LEUKEMIA) (HCC): ICD-10-CM

## 2024-09-23 RX ORDER — ACALABRUTINIB 100 MG/1
TABLET, FILM COATED ORAL
Qty: 30 TABLET | Refills: 10 | Status: SHIPPED | OUTPATIENT
Start: 2024-09-23

## 2024-09-24 ENCOUNTER — TELEPHONE (OUTPATIENT)
Age: 74
End: 2024-09-24

## 2024-09-24 NOTE — TELEPHONE ENCOUNTER
Patient on Dicyclomine. Causing her to become shaky and dizzy. Asking if she should discontinue this.

## 2024-09-25 ENCOUNTER — HOSPITAL ENCOUNTER (OUTPATIENT)
Dept: INFUSION CENTER | Facility: HOSPITAL | Age: 74
Discharge: HOME | End: 2024-09-25
Attending: INTERNAL MEDICINE
Payer: MEDICARE

## 2024-09-25 VITALS
HEART RATE: 80 BPM | SYSTOLIC BLOOD PRESSURE: 138 MMHG | TEMPERATURE: 97.4 F | RESPIRATION RATE: 16 BRPM | DIASTOLIC BLOOD PRESSURE: 75 MMHG

## 2024-09-25 DIAGNOSIS — D69.3 IDIOPATHIC THROMBOCYTOPENIC PURPURA (ITP) (HCC): Primary | ICD-10-CM

## 2024-09-25 DIAGNOSIS — C91.10 CLL (CHRONIC LYMPHOCYTIC LEUKEMIA) (HCC): ICD-10-CM

## 2024-09-25 PROCEDURE — 96372 THER/PROPH/DIAG INJ SC/IM: CPT

## 2024-09-25 RX ADMIN — ROMIPLOSTIM 125 MCG: 125 INJECTION, POWDER, LYOPHILIZED, FOR SOLUTION SUBCUTANEOUS at 12:35

## 2024-09-25 NOTE — PROGRESS NOTES
Nicolle Wood  tolerated treatment well with no complications.      Nicolle Wood is aware of future appt on 12/23/2024 at 12:00 pm.     NPLATE SC injection given without incident.  Patient given printed AVS.  Patient to return to department as scheduled.

## 2024-10-07 DIAGNOSIS — F41.9 ANXIETY: ICD-10-CM

## 2024-10-07 NOTE — TELEPHONE ENCOUNTER
Reason for call:   [x] Refill   [] Prior Auth  [] Other:     Office:[facility]   [x] PCP/Provider - Kwame Ovalles    [] Specialty/Provider -     Medication: alprazolam     Dose/Frequency: 0.5 mg/ daily PRN     Quantity: 30 day supply     Pharmacy: [facility] in [place]     Does the patient have enough for 3 days?   [x] Yes   [] No - Send as HP to POD

## 2024-10-08 RX ORDER — ALPRAZOLAM 0.5 MG
0.5 TABLET ORAL
Qty: 30 TABLET | Refills: 0 | Status: SHIPPED | OUTPATIENT
Start: 2024-10-08

## 2024-10-08 NOTE — TELEPHONE ENCOUNTER
Patient called the RX Refill Line. Message is being forwarded to the office.     Patient called to check if her alprazolam she requested yesterday was sent to the pharmacy.     Patient advised medication is pending the doctor's approval. Patient have enough for 2 days.     She stated she went to  the medication and it was not at the pharmacy.    Advised patient medications can take up to 3D to be approved, I also advised contacting the pharmacy to make sure her medication is available for  before going to the pharmacy.    Patient verbalized understanding.    Please contact patient at [phone] with any concerns

## 2024-10-24 ENCOUNTER — APPOINTMENT (OUTPATIENT)
Dept: LAB | Age: 74
End: 2024-10-24
Payer: MEDICARE

## 2024-10-24 DIAGNOSIS — C91.10 CLL (CHRONIC LYMPHOCYTIC LEUKEMIA) (HCC): ICD-10-CM

## 2024-10-24 DIAGNOSIS — D69.3 IDIOPATHIC THROMBOCYTOPENIC PURPURA (ITP) (HCC): ICD-10-CM

## 2024-10-24 LAB
BASOPHILS # BLD AUTO: 0.02 THOUSANDS/ΜL (ref 0–0.1)
BASOPHILS NFR BLD AUTO: 0 % (ref 0–1)
EOSINOPHIL # BLD AUTO: 0.08 THOUSAND/ΜL (ref 0–0.61)
EOSINOPHIL NFR BLD AUTO: 1 % (ref 0–6)
ERYTHROCYTE [DISTWIDTH] IN BLOOD BY AUTOMATED COUNT: 14 % (ref 11.6–15.1)
HCT VFR BLD AUTO: 39.6 % (ref 34.8–46.1)
HGB BLD-MCNC: 12.3 G/DL (ref 11.5–15.4)
IMM GRANULOCYTES # BLD AUTO: 0.03 THOUSAND/UL (ref 0–0.2)
IMM GRANULOCYTES NFR BLD AUTO: 1 % (ref 0–2)
LYMPHOCYTES # BLD AUTO: 0.66 THOUSANDS/ΜL (ref 0.6–4.47)
LYMPHOCYTES NFR BLD AUTO: 12 % (ref 14–44)
MCH RBC QN AUTO: 29.9 PG (ref 26.8–34.3)
MCHC RBC AUTO-ENTMCNC: 31.1 G/DL (ref 31.4–37.4)
MCV RBC AUTO: 96 FL (ref 82–98)
MONOCYTES # BLD AUTO: 0.42 THOUSAND/ΜL (ref 0.17–1.22)
MONOCYTES NFR BLD AUTO: 7 % (ref 4–12)
NEUTROPHILS # BLD AUTO: 4.45 THOUSANDS/ΜL (ref 1.85–7.62)
NEUTS SEG NFR BLD AUTO: 79 % (ref 43–75)
NRBC BLD AUTO-RTO: 0 /100 WBCS
PLATELET # BLD AUTO: 109 THOUSANDS/UL (ref 149–390)
PMV BLD AUTO: 12.5 FL (ref 8.9–12.7)
RBC # BLD AUTO: 4.11 MILLION/UL (ref 3.81–5.12)
WBC # BLD AUTO: 5.66 THOUSAND/UL (ref 4.31–10.16)

## 2024-10-24 PROCEDURE — 36415 COLL VENOUS BLD VENIPUNCTURE: CPT

## 2024-10-24 PROCEDURE — 85025 COMPLETE CBC W/AUTO DIFF WBC: CPT

## 2024-10-29 ENCOUNTER — OFFICE VISIT (OUTPATIENT)
Dept: PALLIATIVE MEDICINE | Facility: CLINIC | Age: 74
End: 2024-10-29
Payer: MEDICARE

## 2024-10-29 VITALS
WEIGHT: 108.91 LBS | TEMPERATURE: 96.6 F | DIASTOLIC BLOOD PRESSURE: 72 MMHG | OXYGEN SATURATION: 98 % | HEART RATE: 89 BPM | BODY MASS INDEX: 17.85 KG/M2 | SYSTOLIC BLOOD PRESSURE: 130 MMHG

## 2024-10-29 DIAGNOSIS — E43 SEVERE PROTEIN-CALORIE MALNUTRITION (GOMEZ: LESS THAN 60% OF STANDARD WEIGHT) (HCC): ICD-10-CM

## 2024-10-29 DIAGNOSIS — Z51.5 PALLIATIVE CARE PATIENT: Primary | ICD-10-CM

## 2024-10-29 DIAGNOSIS — C91.10 CLL (CHRONIC LYMPHOCYTIC LEUKEMIA) (HCC): ICD-10-CM

## 2024-10-29 PROCEDURE — G2211 COMPLEX E/M VISIT ADD ON: HCPCS | Performed by: INTERNAL MEDICINE

## 2024-10-29 PROCEDURE — 99213 OFFICE O/P EST LOW 20 MIN: CPT | Performed by: INTERNAL MEDICINE

## 2024-10-29 RX ORDER — OLANZAPINE 5 MG/1
5 TABLET, ORALLY DISINTEGRATING ORAL
Qty: 15 TABLET | Refills: 0 | Status: SHIPPED | OUTPATIENT
Start: 2024-10-29

## 2024-10-29 NOTE — ASSESSMENT & PLAN NOTE
Orders:    OLANZapine (ZyPREXA ZYDIS) 5 mg dispersible tablet; Take 1 tablet (5 mg total) by mouth daily at bedtime    Ambulatory Referral to [facility]; Future

## 2024-10-29 NOTE — ASSESSMENT & PLAN NOTE
Time spent with patient providing supportive listening.   All questions and concerns were addressed to their satisfaction.      RTC: 4 weeks      Orders:    OLANZapine (ZyPREXA ZYDIS) 5 mg dispersible tablet; Take 1 tablet (5 mg total) by mouth daily at bedtime    Ambulatory Referral to [facility]; Future

## 2024-10-29 NOTE — PROGRESS NOTES
Ambulatory Visit  Name: Nicolle Wood      : 1950      MRN: [id number]  Encounter Provider: Beverly Payton DO  Encounter Date: 10/29/2024   Encounter department: [facility] PALLIATIVE C[facility] [place]    Assessment & Plan  Palliative care patient  Time spent with patient providing supportive listening.   All questions and concerns were addressed to their satisfaction.      RTC: 4 weeks      Orders:    OLANZapine (ZyPREXA ZYDIS) 5 mg dispersible tablet; Take 1 tablet (5 mg total) by mouth daily at bedtime    Ambulatory Referral to Nutrition Services for Oncology; Future    CLL (chronic lymphocytic leukemia) ([facility])    Orders:    OLANZapine (ZyPREXA ZYDIS) 5 mg dispersible tablet; Take 1 tablet (5 mg total) by mouth daily at bedtime    Ambulatory Referral to Nutrition Services for Oncology; Future    Severe protein-calorie malnutrition (Emanuel: less than 60% of standard weight) ([facility])  Marinol unable to be covered  Will start olanzapine 5 mg HS    Malnutrition Findings:                                 BMI Findings:           Body mass index is 17.85 kg/m².       Orders:    OLANZapine (ZyPREXA ZYDIS) 5 mg dispersible tablet; Take 1 tablet (5 mg total) by mouth daily at bedtime    Ambulatory Referral to Nutrition Services for Oncology; Future      Decisional apparatus: Patient is competent on my exam today. If competence is lost, patient's substitute decision maker would default to jo by [place] Act 169.   Advance Directive / Living Will / POLST: no, will need     PDMP Review: I have reviewed the patient's controlled substance dispensing history in the Prescription Drug Monitoring Program in compliance with the [facility] regulations before prescribing any controlled substances.    History of Present Illness     Nicolle Wood is a 74 y.o. female who presents in follow-up for a palliative diagnosis of CLL and severe protein calorie malnutrition.  Since her last visit patient has continued to lose weight.  She was  unable to get Marinol covered by insurance.  She is drinking an Ensure at least daily.  We discussed alternate agents and will trial olanzapine at bedtime to see if this can help with weight gain.  She did restart her Zoloft but does not notice any difference with her mood.  And she continues to take Xanax as needed at nighttime.  Otherwise, she states that she is not doing much during the day and does note that her quality of life is not where she wishes it would be.  We discussed her homework for the month to find an activity she wants to do and to develop a plan to engage in this.    History obtained from : patient  Review of Systems  Medical History Reviewed by provider this encounter:  Meds               Objective     /72 (BP Location: Left arm, Patient Position: Sitting, Cuff Size: Standard)   Pulse 89   Temp (!) 96.6 °F (35.9 °C) (Temporal)   Wt 49.4 kg (108 lb 14.5 oz)   SpO2 98%   BMI 17.85 kg/m²     Physical Exam  Vitals and nursing note reviewed.   Constitutional:       General: She is not in acute distress.     Comments: Frail and cachetic with bitemporal wasting    HENT:      Head: Normocephalic and atraumatic.      Right Ear: External ear normal.      Left Ear: External ear normal.   Eyes:      General:         Right eye: No discharge.         Left eye: No discharge.   Cardiovascular:      Rate and Rhythm: Normal rate and regular rhythm.   Pulmonary:      Effort: Pulmonary effort is normal. No respiratory distress.   Abdominal:      General: There is no distension.      Palpations: Abdomen is soft.   Skin:     General: Skin is warm and dry.      Coloration: Skin is pale.   Neurological:      Mental Status: Mental status is at baseline.   Psychiatric:         Mood and Affect: Mood normal.         Behavior: Behavior normal.         Recent labs:  Lab Results   Component Value Date/Time    SODIUM 135 09/20/2024 09:34 AM    SODIUM 144 10/21/2020 05:37 AM    K 3.9 09/20/2024 09:34 AM    K 3.9  10/21/2020 05:37 AM    BUN 11 09/20/2024 09:34 AM    BUN 15 10/21/2020 05:37 AM    CREATININE 0.76 09/20/2024 09:34 AM    CREATININE 0.44 10/21/2020 05:37 AM    GLUC 127 09/20/2024 09:34 AM    GLUC 86 10/21/2020 05:37 AM    CALCIUM 9.3 09/20/2024 09:34 AM    CALCIUM 7.9 (L) 10/21/2020 05:37 AM    AST 14 09/20/2024 09:34 AM    AST 17 10/08/2018 03:17 PM    ALT <3 (L) 09/20/2024 09:34 AM    ALT 11 10/08/2018 03:17 PM    ALB 3.9 09/20/2024 09:34 AM    ALB 3.7 10/08/2018 03:17 PM    TP 6.6 09/20/2024 09:34 AM    TP 9.7 (H) 10/08/2018 03:17 PM    EGFR 78 09/20/2024 09:34 AM    EGFR 104 10/21/2020 05:37 AM     Lab Results   Component Value Date/Time    HGB 12.3 10/24/2024 09:26 AM    WBC 5.66 10/24/2024 09:26 AM     (L) 10/24/2024 09:26 AM    INR 1.06 10/04/2021 12:43 PM    PTT 26 10/04/2021 12:43 PM     Lab Results   Component Value Date/Time    XKX3NNAIUALO 2.875 01/04/2024 10:33 AM    DNE5SNMTYFPT 3.434 05/14/2014 06:58 PM       Recent Imaging:  Procedure: CT abdomen pelvis with contrast    Result Date: 9/20/2024  Narrative: CT ABDOMEN AND PELVIS WITH IV CONTRAST INDICATION: Periumbilical/suprapubic abdominal pain. As per review of electronic medical record, patient with history of CLL/SLL initially diagnosed in 2020. COMPARISON: CT of the chest, abdomen and pelvis from 8/14/2024 and CT of the abdomen and pelvis from 10/4/2021 and 7/24/2012. TECHNIQUE: CT examination of the abdomen and pelvis was performed. Multiplanar 2D reformatted images were created from the source data. This examination, like all CT scans performed in the [facility], was performed utilizing techniques to minimize radiation dose exposure, including the use of iterative reconstruction and automated exposure control. Radiation dose length product (DLP) for this visit: 395 mGy-cm IV Contrast: 85 mL of iohexol (OMNIPAQUE) Enteric Contrast: Enteric contrast was not administered. FINDINGS: ABDOMEN LOWER CHEST: Mild bronchiectasis  in the lower lobes. There is a 4 mm triangular-shaped left lower lobe nodule (series 2 image 13), unchanged dating back to at least 2021. Stability over this time period suggests benign etiology. No pleural effusions. LIVER/BILIARY TREE:  Normal liver morphology.  No focal hepatic lesions. No biliary ductal dilation. GALLBLADDER:  No calcified gallstones. No pericholecystic inflammatory change. SPLEEN: The spleen is at the upper limits of normal in size. PANCREAS:  Unremarkable. Normal caliber main pancreatic duct. ADRENAL GLANDS:  Unremarkable. KIDNEYS/URETERS: Symmetric renal enhancement.  No intrarenal or ureteral calculi. No hydronephrosis.  No focal renal lesions. STOMACH AND BOWEL:  Evaluation of the gastrointestinal tract is somewhat limited by underdistention and lack of oral contrast. No bowel obstruction or convincing inflammation. Scattered colonic diverticuli, however no evidence of diverticulitis. Several diverticuli are seen in the proximal duodenum. APPENDIX:  Normal appendix. ABDOMINOPELVIC CAVITY:  No ascites or pneumoperitoneum. LYMPH NODES: No abdominal or pelvic lymphadenopathy. VESSELS: Normal caliber aorta. Patent major branch vessels. Scattered atherosclerotic calcifications in the abdominal aorta and its major branches. PELVIS REPRODUCTIVE ORGANS: The uterus is retroverted. The CT appearance is otherwise within normal limits. URINARY BLADDER:  Unremarkable. ABDOMINAL WALL/INGUINAL REGIONS: Tiny fat-containing umbilical hernia. OSSEOUS STRUCTURES:  No focal aggressive osseous lesions. There is a densely sclerotic lesion in the proximal right femur similar dating back to 2021, and present since at least 2012, statistically likely representing a bone island. Degenerative changes of the spine.     Impression: No acute abdominal or pelvic pathology. No bowel obstruction. Normal appendix. Evaluation for bowel inflammation elsewhere somewhat limited by underdistention and lack of oral contrast,  however no convincing inflammatory changes. Please note mild inflammation may not be apparent. No CT evidence of acute pancreatitis or cholecystitis. No biliary ductal dilatation. Additional findings as above. 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 COMPARISON: 6/19/2020 TECHNIQUE: CT examination of the chest, abdomen and pelvis was performed. Multiplanar 2D reformatted images were created from the source data. This examination, like all CT scans performed [facility] the [facility], was performed utilizing techniques to minimize radiation dose exposure, including the use of iterative reconstruction and automated exposure control. Radiation dose length product (DLP) for this visit: 337 mGy-cm IV Contrast: 100 mL of iohexol (OMNIPAQUE) Enteric Contrast: Not administered. FINDINGS: CHEST LUNGS: Linear subpleural nodularity is identified of the right upper lobe series 4 images 48 and 49 likely scarring. Stable benign 6 mm juxtapleural nodule is identified within the inferior right lower lobe series 4 image 106.. No tracheal or endobronchial lesion. PLEURA: Unremarkable. HEART/GREAT VESSELS: Heart is unremarkable for patient's age. No thoracic aortic aneurysm. MEDIASTINUM AND TRUDI: 6 mm retrocrural lymph node is decreased in size in prominence from prior exam. CHEST WALL AND LOWER NECK: Unremarkable. ABDOMEN LIVER/BILIARY TREE: Unremarkable. GALLBLADDER: No calcified gallstones. No pericholecystic inflammatory change. SPLEEN: Unremarkable. PANCREAS: Unremarkable. ADRENAL GLANDS: Unremarkable. KIDNEYS/URETERS: Unremarkable. No hydronephrosis. STOMACH AND  BOWEL: Unremarkable. APPENDIX: No findings to suggest appendicitis. ABDOMINOPELVIC CAVITY: No ascites. No pneumoperitoneum. No lymphadenopathy. VESSELS: Unremarkable for patient's age. PELVIS REPRODUCTIVE ORGANS: Unremarkable for patient's age. URINARY BLADDER: Unremarkable. ABDOMINAL WALL/INGUINAL REGIONS: Unremarkable. BONES: No acute fracture or suspicious osseous lesion.     Impression: Interval decrease in previously noted retrocrural and retroperitoneal lymph nodes. Clustered subpleural linear nodularity within the right upper lobe likely represents scarring. This can be reassessed on follow-up. Workstation performed: [id number]      Administrative Statements   I have spent a total time of 20 minutes in caring for this patient on the day of the visit/encounter including Risks and benefits of tx options, Instructions for management, Patient and family education, Importance of tx compliance, Risk factor reductions, Impressions, Counseling / Coordination of care, Documenting in the medical record, Reviewing / ordering tests, medicine, procedures  , and Obtaining or reviewing history  . Topics discussed with the patient / family include symptom assessment and management, medication review, medication adjustment, psychosocial support, supportive listening, and anticipatory guidance.

## 2024-10-29 NOTE — ASSESSMENT & PLAN NOTE
Marinol unable to be covered  Will start olanzapine 5 mg HS    Malnutrition Findings:                                 BMI Findings:           Body mass index is 17.85 kg/m².       Orders:    OLANZapine (ZyPREXA ZYDIS) 5 mg dispersible tablet; Take 1 tablet (5 mg total) by mouth daily at bedtime    Ambulatory Referral to Nutrition Services for Oncology; Future

## 2024-10-30 ENCOUNTER — HOSPITAL ENCOUNTER (OUTPATIENT)
Dept: INFUSION CENTER | Facility: HOSPITAL | Age: 74
Discharge: HOME | End: 2024-10-30
Attending: INTERNAL MEDICINE
Payer: MEDICARE

## 2024-10-30 DIAGNOSIS — D69.3 IDIOPATHIC THROMBOCYTOPENIC PURPURA (ITP) (HCC): Primary | ICD-10-CM

## 2024-10-30 DIAGNOSIS — C91.10 CLL (CHRONIC LYMPHOCYTIC LEUKEMIA) (HCC): ICD-10-CM

## 2024-10-30 PROCEDURE — 96372 THER/PROPH/DIAG INJ SC/IM: CPT

## 2024-10-30 RX ADMIN — ROMIPLOSTIM 125 MCG: 125 INJECTION, POWDER, LYOPHILIZED, FOR SOLUTION SUBCUTANEOUS at 14:25

## 2024-10-30 NOTE — PROGRESS NOTES
Nicolle Wood  tolerated Nplate injection in L arm well with no complications.      Nicolle Wood is aware of future appt on 12/4 at 12:30pm.     AVS printed and given to Nicolle Wood:  Yes     Left unit in stable condition.

## 2024-10-30 NOTE — PLAN OF CARE
Problem: HEMATOLOGIC - ADULT  Goal: Maintains hematologic stability  Description: INTERVENTIONS  - Monitor labs  - Administer Nplate as ordered  Outcome: Progressing

## 2024-10-31 ENCOUNTER — TELEPHONE (OUTPATIENT)
Dept: NUTRITION | Facility: HOSPITAL | Age: 74
End: 2024-10-31

## 2024-10-31 NOTE — TELEPHONE ENCOUNTER
Received notification by Beverly Payton DO on 10/29 that pt has triggered for oncology nutrition care (reason for referral: Ambulatory referral for oncology nutrition services). Pt noted to have malnutrition a/w weight loss and poor appetite.    Contacted Nicolle today to establish care.  No answer and attempted twice. Was unable to leave voicemail. Will try again at later time.

## 2024-11-01 ENCOUNTER — TELEPHONE (OUTPATIENT)
Dept: NUTRITION | Facility: HOSPITAL | Age: 74
End: 2024-11-01

## 2024-11-01 NOTE — TELEPHONE ENCOUNTER
Second attempt made today to reach Nicolle to establish care and discuss her nutrition.  No answer.  Left a voice message requesting a call back at Nicolle's convenience.

## 2024-11-15 DIAGNOSIS — F41.9 ANXIETY: ICD-10-CM

## 2024-11-15 RX ORDER — ALPRAZOLAM 0.5 MG
0.5 TABLET ORAL
Qty: 30 TABLET | Refills: 0 | Status: SHIPPED | OUTPATIENT
Start: 2024-11-15

## 2024-11-15 NOTE — TELEPHONE ENCOUNTER
ALPRAZolam (XANAX) 0.5 mg tablet         Sig: Take 1 tablet (0.5 mg total) by mouth daily at bedtime as needed for anxiety    Disp: 30 tablet    Refills: 0    Start: 11/15/2024    Class: Normal    PDMP Review May Be Needed    For: Anxiety    Last ordered: 1 month ago (10/8/2024) by Evangelista Cervantes DO    Psychiatry:  Anxiolytics/[facility]notics Pyhvux88/15/2024 09:10 AM   Protocol Details This refill cannot be delegated    Valid encounter within last 6 months      To be filled at: [id number] - [facility] [address]

## 2024-11-15 NOTE — TELEPHONE ENCOUNTER
Reason for call: Evangelista Cervantes, or Kwame Ovalles,( both from [facility]) manage this medication!    [x] Refill   [] Prior Auth  [] Other:     Office:   [x] PCP/Provider -   [] Specialty/Provider -     Medication:     ALPRAZolam (XANAX) 0.5 mg tablet       Dose/Frequency: Take 1 tablet (0.5 mg total) by mouth daily at bedtime as needed for anxiety,     Quantity: 30 tablet     Pharmacy: [id number] [address] [phone]     Does the patient have enough for 3 days?   [x] Yes   [] No - Send as HP to POD

## 2024-11-19 ENCOUNTER — TELEPHONE (OUTPATIENT)
Dept: PALLIATIVE MEDICINE | Facility: CLINIC | Age: 74
End: 2024-11-19

## 2024-11-19 NOTE — TELEPHONE ENCOUNTER
Left message for patient to call back to reschedule her appointment on 1/22 due to provider not being available.

## 2024-11-25 ENCOUNTER — APPOINTMENT (OUTPATIENT)
Dept: LAB | Age: 74
End: 2024-11-25
Payer: MEDICARE

## 2024-11-25 DIAGNOSIS — D69.3 IDIOPATHIC THROMBOCYTOPENIC PURPURA (ITP) (HCC): ICD-10-CM

## 2024-11-25 DIAGNOSIS — C91.10 CLL (CHRONIC LYMPHOCYTIC LEUKEMIA) (HCC): ICD-10-CM

## 2024-11-25 LAB
BASOPHILS # BLD AUTO: 0.02 THOUSANDS/ΜL (ref 0–0.1)
BASOPHILS NFR BLD AUTO: 0 % (ref 0–1)
EOSINOPHIL # BLD AUTO: 0.07 THOUSAND/ΜL (ref 0–0.61)
EOSINOPHIL NFR BLD AUTO: 2 % (ref 0–6)
ERYTHROCYTE [DISTWIDTH] IN BLOOD BY AUTOMATED COUNT: 13.8 % (ref 11.6–15.1)
HCT VFR BLD AUTO: 38.1 % (ref 34.8–46.1)
HGB BLD-MCNC: 11.7 G/DL (ref 11.5–15.4)
IMM GRANULOCYTES # BLD AUTO: 0.02 THOUSAND/UL (ref 0–0.2)
IMM GRANULOCYTES NFR BLD AUTO: 0 % (ref 0–2)
LYMPHOCYTES # BLD AUTO: 0.65 THOUSANDS/ΜL (ref 0.6–4.47)
LYMPHOCYTES NFR BLD AUTO: 15 % (ref 14–44)
MCH RBC QN AUTO: 29.5 PG (ref 26.8–34.3)
MCHC RBC AUTO-ENTMCNC: 30.7 G/DL (ref 31.4–37.4)
MCV RBC AUTO: 96 FL (ref 82–98)
MONOCYTES # BLD AUTO: 0.37 THOUSAND/ΜL (ref 0.17–1.22)
MONOCYTES NFR BLD AUTO: 8 % (ref 4–12)
NEUTROPHILS # BLD AUTO: 3.36 THOUSANDS/ΜL (ref 1.85–7.62)
NEUTS SEG NFR BLD AUTO: 75 % (ref 43–75)
NRBC BLD AUTO-RTO: 0 /100 WBCS
PLATELET # BLD AUTO: 123 THOUSANDS/UL (ref 149–390)
PMV BLD AUTO: 12.7 FL (ref 8.9–12.7)
RBC # BLD AUTO: 3.96 MILLION/UL (ref 3.81–5.12)
WBC # BLD AUTO: 4.49 THOUSAND/UL (ref 4.31–10.16)

## 2024-11-25 PROCEDURE — 36415 COLL VENOUS BLD VENIPUNCTURE: CPT

## 2024-11-25 PROCEDURE — 85025 COMPLETE CBC W/AUTO DIFF WBC: CPT

## 2024-11-29 DIAGNOSIS — E03.9 HYPOTHYROIDISM, UNSPECIFIED TYPE: ICD-10-CM

## 2024-11-29 RX ORDER — LEVOTHYROXINE SODIUM 75 UG/1
75 TABLET ORAL
Qty: 90 TABLET | Refills: 1 | Status: SHIPPED | OUTPATIENT
Start: 2024-11-29

## 2024-12-04 ENCOUNTER — HOSPITAL ENCOUNTER (OUTPATIENT)
Dept: INFUSION CENTER | Facility: HOSPITAL | Age: 74
Discharge: HOME | End: 2024-12-04
Attending: INTERNAL MEDICINE
Payer: MEDICARE

## 2024-12-04 DIAGNOSIS — C91.10 CLL (CHRONIC LYMPHOCYTIC LEUKEMIA) (HCC): ICD-10-CM

## 2024-12-04 DIAGNOSIS — D69.3 IDIOPATHIC THROMBOCYTOPENIC PURPURA (ITP) (HCC): Primary | ICD-10-CM

## 2024-12-04 PROCEDURE — 96372 THER/PROPH/DIAG INJ SC/IM: CPT

## 2024-12-04 RX ADMIN — ROMIPLOSTIM 125 MCG: 125 INJECTION, POWDER, LYOPHILIZED, FOR SOLUTION SUBCUTANEOUS at 12:31

## 2024-12-04 NOTE — PROGRESS NOTES
Nicolle Wood  tolerated Nplate injection in R arm well with no complications.      Nicolle Wood is aware of future appt on 12/23 at 12pm.     AVS printed and given to Nicolle Wood:  No (Declined by Nicolle Wood)   Print out of appointments provided.     Left unit in stable condition.

## 2024-12-16 ENCOUNTER — APPOINTMENT (OUTPATIENT)
Dept: LAB | Age: 74
End: 2024-12-16
Payer: MEDICARE

## 2024-12-16 DIAGNOSIS — D69.3 IDIOPATHIC THROMBOCYTOPENIC PURPURA (ITP) (HCC): ICD-10-CM

## 2024-12-16 DIAGNOSIS — C91.10 CLL (CHRONIC LYMPHOCYTIC LEUKEMIA) (HCC): ICD-10-CM

## 2024-12-16 LAB
BASOPHILS # BLD AUTO: 0.02 THOUSANDS/ÂΜL (ref 0–0.1)
BASOPHILS NFR BLD AUTO: 0 % (ref 0–1)
EOSINOPHIL # BLD AUTO: 0.1 THOUSAND/ÂΜL (ref 0–0.61)
EOSINOPHIL NFR BLD AUTO: 2 % (ref 0–6)
ERYTHROCYTE [DISTWIDTH] IN BLOOD BY AUTOMATED COUNT: 13.9 % (ref 11.6–15.1)
HCT VFR BLD AUTO: 37 % (ref 34.8–46.1)
HGB BLD-MCNC: 11.5 G/DL (ref 11.5–15.4)
IMM GRANULOCYTES # BLD AUTO: 0.03 THOUSAND/UL (ref 0–0.2)
IMM GRANULOCYTES NFR BLD AUTO: 1 % (ref 0–2)
LYMPHOCYTES # BLD AUTO: 0.87 THOUSANDS/ÂΜL (ref 0.6–4.47)
LYMPHOCYTES NFR BLD AUTO: 19 % (ref 14–44)
MCH RBC QN AUTO: 29.3 PG (ref 26.8–34.3)
MCHC RBC AUTO-ENTMCNC: 31.1 G/DL (ref 31.4–37.4)
MCV RBC AUTO: 94 FL (ref 82–98)
MONOCYTES # BLD AUTO: 0.45 THOUSAND/ÂΜL (ref 0.17–1.22)
MONOCYTES NFR BLD AUTO: 10 % (ref 4–12)
NEUTROPHILS # BLD AUTO: 3.23 THOUSANDS/ÂΜL (ref 1.85–7.62)
NEUTS SEG NFR BLD AUTO: 68 % (ref 43–75)
NRBC BLD AUTO-RTO: 0 /100 WBCS
PLATELET # BLD AUTO: 426 THOUSANDS/UL (ref 149–390)
PMV BLD AUTO: 10.9 FL (ref 8.9–12.7)
RBC # BLD AUTO: 3.92 MILLION/UL (ref 3.81–5.12)
WBC # BLD AUTO: 4.7 THOUSAND/UL (ref 4.31–10.16)

## 2024-12-16 PROCEDURE — 36415 COLL VENOUS BLD VENIPUNCTURE: CPT

## 2024-12-16 PROCEDURE — 85025 COMPLETE CBC W/AUTO DIFF WBC: CPT

## 2024-12-19 ENCOUNTER — OFFICE VISIT (OUTPATIENT)
Dept: FAMILY MEDICINE CLINIC | Facility: CLINIC | Age: 74
End: 2024-12-19
Payer: MEDICARE

## 2024-12-19 VITALS
RESPIRATION RATE: 16 BRPM | WEIGHT: 112.2 LBS | HEART RATE: 88 BPM | BODY MASS INDEX: 18.03 KG/M2 | HEIGHT: 66 IN | TEMPERATURE: 97.3 F | DIASTOLIC BLOOD PRESSURE: 78 MMHG | OXYGEN SATURATION: 99 % | SYSTOLIC BLOOD PRESSURE: 120 MMHG

## 2024-12-19 DIAGNOSIS — F32.9 MAJOR DEPRESSIVE DISORDER WITH CURRENT ACTIVE EPISODE, UNSPECIFIED DEPRESSION EPISODE SEVERITY, UNSPECIFIED WHETHER RECURRENT: ICD-10-CM

## 2024-12-19 DIAGNOSIS — M81.8 OSTEOPOROSIS OF FOREARM ASSOCIATED WITH ENDOCRINE DISORDER: ICD-10-CM

## 2024-12-19 DIAGNOSIS — F41.9 ANXIETY: ICD-10-CM

## 2024-12-19 DIAGNOSIS — E03.4 HYPOTHYROIDISM DUE TO ACQUIRED ATROPHY OF THYROID: ICD-10-CM

## 2024-12-19 DIAGNOSIS — M35.00 SJOGREN'S SYNDROME, WITH UNSPECIFIED ORGAN INVOLVEMENT (HCC): ICD-10-CM

## 2024-12-19 DIAGNOSIS — E34.9 OSTEOPOROSIS OF FOREARM ASSOCIATED WITH ENDOCRINE DISORDER: ICD-10-CM

## 2024-12-19 DIAGNOSIS — F32.A DEPRESSION, UNSPECIFIED DEPRESSION TYPE: ICD-10-CM

## 2024-12-19 DIAGNOSIS — E55.9 VITAMIN D DEFICIENCY: ICD-10-CM

## 2024-12-19 DIAGNOSIS — Z00.00 MEDICARE ANNUAL WELLNESS VISIT, SUBSEQUENT: Primary | ICD-10-CM

## 2024-12-19 PROCEDURE — G0439 PPPS, SUBSEQ VISIT: HCPCS | Performed by: FAMILY MEDICINE

## 2024-12-19 PROCEDURE — 99214 OFFICE O/P EST MOD 30 MIN: CPT | Performed by: FAMILY MEDICINE

## 2024-12-19 RX ORDER — SERTRALINE HYDROCHLORIDE 100 MG/1
50 TABLET, FILM COATED ORAL DAILY
Qty: 15 TABLET | Refills: 0 | Status: SHIPPED | OUTPATIENT
Start: 2024-12-19

## 2024-12-19 RX ORDER — ALPRAZOLAM 0.5 MG
0.5 TABLET ORAL
Qty: 30 TABLET | Refills: 0 | Status: SHIPPED | OUTPATIENT
Start: 2024-12-19

## 2024-12-19 NOTE — PROGRESS NOTES
Assessment/Plan:   Patient to obtain labs for TSH with next lab draw per hematology/oncology.  Patient to continue present treatment and recommend she take sertraline daily.  Recommend patient take calorie and protein supplement drinks daily.  Follow-up with specialist as scheduled.  Return to the office in 1 year.   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 female.    Patient is here for annual Medicare wellness exam and follow-up of chronic conditions.  Patient is feeling fairly well overall.  Recent labs reviewed.  Patient follows closely with hematology oncology and palliative care.    Thyroid Problem  Presents for follow-up visit. Symptoms include anxiety, depressed mood, diaphoresis, dry skin, fatigue and weight loss. Patient reports no cold intolerance, constipation, diarrhea, hair loss, heat intolerance, hoarse voice, leg swelling, palpitations, tremors, visual change or weight gain. The symptoms have been stable.       Review of Systems   Constitutional:  Positive for diaphoresis, fatigue and weight loss. Negative for weight gain.   HENT:  Negative for hoarse voice.    Cardiovascular:  Negative for palpitations.   Gastrointestinal:  Negative for constipation and diarrhea.    Endocrine: Negative for cold intolerance and heat intolerance.   Neurological:  Negative for tremors.   Psychiatric/Behavioral:  The patient is nervous/anxious.          Objective:     Physical Exam  Constitutional:       General: She is not in acute distress.     Appearance: Normal appearance.   HENT:      Head: Normocephalic.      Mouth/Throat:      Mouth: Mucous membranes are dry.   Eyes:      General: No scleral icterus.     Conjunctiva/sclera: Conjunctivae normal.   Neck:      Vascular: No carotid bruit.   Cardiovascular:      Rate and Rhythm: Normal rate and regular rhythm.   Pulmonary:      Effort: Pulmonary effort is normal.      Breath sounds: Normal breath sounds.   Abdominal:      Palpations: Abdomen is soft.      Tenderness: There is no abdominal tenderness.   Musculoskeletal:      Cervical back: Neck supple.      Right lower leg: No edema.      Left lower leg: No edema.   Lymphadenopathy:      Cervical: No cervical adenopathy.   Skin:     General: Skin is warm and dry.   Neurological:      General: No focal deficit present.      Mental Status: She is alert and oriented to person, place, and time.   Psychiatric:         Mood and Affect: Mood normal.         Behavior: Behavior normal.         Thought Content: Thought content normal.         Judgment: Judgment normal.

## 2024-12-19 NOTE — PROGRESS NOTES
Name: Nicolle Wood      : 1950      MRN: [id number]  Encounter Provider: Kwame Ovalles DO  Encounter Date: 2024   Encounter department: [facility]    Assessment & Plan      Depression Screening and Follow-up Plan: Patient's depression screening was positive with a PHQ-9 score of 6. Patient declines further evaluation by mental health professional and/or medications. Brief counseling provided. Will re-evaluate at next office visit. Patient with underlying depression and was advised to continue current medications as prescribed.       Preventive health issues were discussed with patient, and age appropriate screening tests were ordered as noted in patient's After Visit Summary. Personalized health advice and appropriate referrals for health education or preventive services given if needed, as noted in patient's After Visit Summary.    History of Present Illness     HPI   Patient Care Team:  Kwame Ovalles DO as PCP - General  Aisha Holly PA-C (Hematology and Oncology)  Beverly Payton DO (Palliative Care)  Juju Diaz RD (Nutrition)    Review of Systems  Medical History Reviewed by provider this encounter:       Annual Wellness Visit Questionnaire   Nicolle is here for her Subsequent Wellness visit. Last Medicare Wellness visit information reviewed, patient interviewed, no change since last AWV.     Health Risk Assessment:   Patient rates overall health as good. Patient feels that their physical health rating is same. Patient is satisfied with their life. Eyesight was rated as slightly worse. Hearing was rated as slightly worse. Patient feels that their emotional and mental health rating is same. Patients states they are sometimes angry. Patient states they are always unusually tired/fatigued. Pain experienced in the last 7 days has been some. Patient's pain rating has been 5/10. Patient states that she has experienced no weight loss or gain in last  6 months.     Depression Screening:   PHQ-9 Score: 6      Fall Risk Screening:   In the past year, patient has experienced: no history of falling in past year      Urinary Incontinence Screening:   Patient has not leaked urine accidently in the last six months.     Home Safety:  Patient does not have trouble with stairs inside or outside of their home. Patient has working smoke alarms and has working carbon monoxide detector. Home safety hazards include: none.     Nutrition:   Current diet is Regular.     Medications:   Patient is currently taking over-the-counter supplements. OTC medications include: see medication list. Patient is able to manage medications.     Activities of Daily Living (ADLs)/Instrumental Activities of Daily Living (IADLs):   Walk and transfer into and out of bed and chair?: Yes  Dress and groom yourself?: Yes    Bathe or shower yourself?: Yes    Feed yourself? Yes  Do your laundry/housekeeping?: Yes  Manage your money, pay your bills and track your expenses?: Yes  Make your own meals?: Yes    Do your own shopping?: Yes    Previous Hospitalizations:   Any hospitalizations or ED visits within the last 12 months?: No      Advance Care Planning:   Living will: No    Durable POA for healthcare: No    Advanced directive: No    Advanced directive counseling given: Yes      Cognitive Screening:   Provider or family/friend/caregiver concerned regarding cognition?: No    PREVENTIVE SCREENINGS      Cardiovascular Screening:    General: Screening Current and Risks and Benefits Discussed      Diabetes Screening:     General: Screening Current and Risks and Benefits Discussed      Colorectal Cancer Screening:     General: Screening Current      Breast Cancer Screening:     General: Risks and Benefits Discussed and Patient Declines      Cervical Cancer Screening:    General: Screening Not Indicated and Risks and Benefits Discussed      Osteoporosis Screening:    General: Screening Not Indicated and History  "Osteoporosis    Due for: DXA Axial      Abdominal Aortic Aneurysm (AAA) Screening:        General: Risks and Benefits Discussed and Screening Not Indicated      Lung Cancer Screening:     General: Screening Not Indicated and Risks and Benefits Discussed      Hepatitis C Screening:    General: Screening Current and Risks and Benefits Discussed    Screening, Brief Intervention, and Referral to Treatment (SBIRT)    Screening  Typical number of drinks in a day: 0  Typical number of drinks in a week: 0  Interpretation: Low risk drinking behavior.    AUDIT-C Screenin) How often did you have a drink containing alcohol in the past year? monthly or less  2) How many drinks did you have on a typical day when you were drinking in the past year? 1 to 2  3) How often did you have 6 or more drinks on one occasion in the past year? never    AUDIT-C Score: 1  Interpretation: Score 0-2 (female): Negative screen for alcohol misuse    Single Item Drug Screening:  How often have you used an illegal drug (including marijuana) or a prescription medication for non-medical reasons in the past year? never    Single Item Drug Screen Score: 0  Interpretation: Negative screen for possible drug use disorder    Brief Intervention  Alcohol & drug use screenings were reviewed. No concerns regarding substance use disorder identified.     Other Counseling Topics:   Car/seat belt/driving safety, skin self-exam, sunscreen and regular weightbearing exercise and calcium and vitamin D intake.     Social Drivers of Health     Financial Resource Strain: Low Risk  (2022)    Overall Financial Resource Strain (CARDIA)     Difficulty of Paying Living Expenses: Not hard at all   Transportation Needs: No Transportation Needs (2022)    PRAPARE - Transportation     Lack of Transportation (Medical): No     Lack of Transportation (Non-Medical): No     No results found.    Objective   /78   Pulse 90   Temp (!) 97.3 °F (36.3 °C)   Ht 5' 5.5\" " (1.664 m)   Wt 50.9 kg (112 lb 3.2 oz)   SpO2 99%   BMI 18.39 kg/m²     Physical Exam

## 2024-12-19 NOTE — PATIENT INSTRUCTIONS
Medicare Preventive Visit Patient Instructions  Thank you for completing your Welcome to Medicare Visit or Medicare Annual Wellness Visit today. Your next wellness visit will be due in one year (12/20/2025).  The screening/preventive services that you may require over the next 5-10 years are detailed below. Some tests may not apply to you based off risk factors and/or age. Screening tests ordered at today's visit but not completed yet may show as past due. Also, please note that scanned in results may not display below.  Preventive Screenings:  Service Recommendations Previous Testing/Comments   Colorectal Cancer Screening  * Colonoscopy    * Fecal Occult Blood Test (FOBT)/Fecal Immunochemical Test (FIT)  * Fecal DNA/Cologuard Test  * Flexible Sigmoidoscopy Age: 45-75 years old   Colonoscopy: every 10 years (may be performed more frequently if at higher risk)  OR  FOBT/FIT: every 1 year  OR  Cologuard: every 3 years  OR  Sigmoidoscopy: every 5 years  Screening may be recommended earlier than age 45 if at higher risk for colorectal cancer. Also, an individualized decision between you and your healthcare provider will decide whether screening between the ages of 76-85 would be appropriate. Colonoscopy: 03/30/2023  FOBT/FIT: Not on file  Cologuard: Not on file  Sigmoidoscopy: Not on file    Screening Current     Breast Cancer Screening Age: 40+ years old  Frequency: every 1-2 years  Not required if history of left and right mastectomy Mammogram: 06/02/2015    Risks and Benefits Discussed  Patient Declines   Cervical Cancer Screening Between the ages of 21-29, pap smear recommended once every 3 years.   Between the ages of 30-65, can perform pap smear with HPV co-testing every 5 years.   Recommendations may differ for women with a history of total hysterectomy, cervical cancer, or abnormal pap smears in past. Pap Smear: Not on file    Screening Not Indicated  Risks and Benefits Discussed   Hepatitis C Screening Once for  adults born between 1945 and 1965  More frequently in patients at high risk for Hepatitis C Hep C Antibody: 12/14/2022    Screening Current  Risks and Benefits Discussed   Diabetes Screening 1-2 times per year if you're at risk for diabetes or have pre-diabetes Fasting glucose: 95 mg/dL (9/12/2024)  A1C: No results in last 5 years (No results in last 5 years)  Screening Current  Risks and Benefits Discussed   Cholesterol Screening Once every 5 years if you don't have a lipid disorder. May order more often based on risk factors. Lipid panel: 01/04/2024    Screening Current  Risks and Benefits Discussed     Other Preventive Screenings Covered by Medicare:  Abdominal Aortic Aneurysm (AAA) Screening: covered once if your at risk. You're considered to be at risk if you have a family history of AAA.  Lung Cancer Screening: covers low dose CT scan once per year if you meet all of the following conditions: (1) Age 55-77; (2) No signs or symptoms of lung cancer; (3) Current smoker or have quit smoking within the last 15 years; (4) You have a tobacco smoking history of at least 20 pack years (packs per day multiplied by number of years you smoked); (5) You get a written order from a healthcare provider.  Glaucoma Screening: covered annually if you're considered high risk: (1) You have diabetes OR (2) Family history of glaucoma OR (3)  aged 50 and older OR (4)  American aged 65 and older  Osteoporosis Screening: covered every 2 years if you meet one of the following conditions: (1) You're estrogen deficient and at risk for osteoporosis based off medical history and other findings; (2) Have a vertebral abnormality; (3) On glucocorticoid therapy for more than 3 months; (4) Have primary hyperparathyroidism; (5) On osteoporosis medications and need to assess response to drug therapy.   Last bone density test (DXA Scan): 08/05/2022.  HIV Screening: covered annually if you're between the age of 15-65. Also  covered annually if you are younger than 15 and older than 65 with risk factors for HIV infection. For pregnant patients, it is covered up to 3 times per pregnancy.    Immunizations:  Immunization Recommendations   Influenza Vaccine Annual influenza vaccination during flu season is recommended for all persons aged >= 6 months who do not have contraindications   Pneumococcal Vaccine   * Pneumococcal conjugate vaccine = PCV13 (Prevnar 13), PCV15 (Vaxneuvance), PCV20 (Prevnar 20)  * Pneumococcal polysaccharide vaccine = PPSV23 (Pneumovax) Adults 19-65 yo with certain risk factors or if 65+ yo  If never received any pneumonia vaccine: recommend Prevnar 20 (PCV20)  Give PCV20 if previously received 1 dose of PCV13 or PPSV23   Hepatitis B Vaccine 3 dose series if at intermediate or high risk (ex: diabetes, end stage renal disease, liver disease)   Respiratory syncytial virus (RSV) Vaccine - COVERED BY MEDICARE PART D  * RSVPreF3 (Arexvy) CDC recommends that adults 60 years of age and older may receive a single dose of RSV vaccine using shared clinical decision-making (SCDM)   Tetanus (Td) Vaccine - COST NOT COVERED BY MEDICARE PART B Following completion of primary series, a booster dose should be given every 10 years to maintain immunity against tetanus. Td may also be given as tetanus wound prophylaxis.   Tdap Vaccine - COST NOT COVERED BY MEDICARE PART B Recommended at least once for all adults. For pregnant patients, recommended with each pregnancy.   Shingles Vaccine (Shingrix) - COST NOT COVERED BY MEDICARE PART B  2 shot series recommended in those 19 years and older who have or will have weakened immune systems or those 50 years and older     Health Maintenance Due:      Topic Date Due   • Breast Cancer Screening: Mammogram  06/02/2016   • Colorectal Cancer Screening  03/29/2026   • Hepatitis C Screening  Completed     Immunizations Due:      Topic Date Due   • COVID-19 Vaccine (7 - 2024-25 season) 12/05/2024      Advance Directives   What are advance directives?  Advance directives are legal documents that state your wishes and plans for medical care. These plans are made ahead of time in case you lose your ability to make decisions for yourself. Advance directives can apply to any medical decision, such as the treatments you want, and if you want to donate organs.   What are the types of advance directives?  There are many types of advance directives, and each state has rules about how to use them. You may choose a combination of any of the following:  Living will:  This is a written record of the treatment you want. You can also choose which treatments you do not want, which to limit, and which to stop at a certain time. This includes surgery, medicine, IV fluid, and tube feedings.   Durable power of  for healthcare (DPAHC):  This is a written record that states who you want to make healthcare choices for you when you are unable to make them for yourself. This person, called a proxy, is usually a family member or a friend. You may choose more than 1 proxy.  Do not resuscitate (DNR) order:  A DNR order is used in case your heart stops beating or you stop breathing. It is a request not to have certain forms of treatment, such as CPR. A DNR order may be included in other types of advance directives.  Medical directive:  This covers the care that you want if you are in a coma, near death, or unable to make decisions for yourself. You can list the treatments you want for each condition. Treatment may include pain medicine, surgery, blood transfusions, dialysis, IV or tube feedings, and a ventilator (breathing machine).  Values history:  This document has questions about your views, beliefs, and how you feel and think about life. This information can help others choose the care that you would choose.  Why are advance directives important?  An advance directive helps you control your care. Although spoken wishes may  be used, it is better to have your wishes written down. Spoken wishes can be misunderstood, or not followed. Treatments may be given even if you do not want them. An advance directive may make it easier for your family to make difficult choices about your care.   Underweight  Underweight is defined as having a body mass index (BMI) of less than 18.5 kg/m2   Anorexia  is a loss of appetite, decreased food intake, or both. Your appetite naturally decreases as you get older. You also get full faster than you used to. This occurs because your body needs less energy. Other body changes can also lead to a decreased appetite. Even though some appetite loss is normal, you still need to get enough calories and nutrients to keep you healthy. You can start to lose too much weight if you do not eat as much food as your body needs. Unwanted weight loss can cause health problems, or worsen health problems you already have. You can also become dehydrated if you do not drink enough liquid.  How to eat healthy and get enough nutrients:   Choose healthy foods.  Eat a variety of fruits, vegetables, whole grains, low-fat dairy foods, lean meats, and other protein foods. Limit foods high in fat, sugar, and salt. Limit or avoid alcohol as directed. Work with a dietitian to help you plan your meals if you need to follow a special diet. A dietitian can also teach you how to modify foods if you have trouble chewing or swallowing.   Snack on healthy foods between meals  if you only eat a small amount during meals. Snacks provide extra healthy nutrients and calories between meals. Examples include fruit, cheese, and whole grain crackers.   Drink liquids as directed  to avoid dehydration. Drink liquids between meals if they cause you to get full too quickly during meals. Ask how much liquid to drink each day and which liquids are best for you.   Use herbs, spices, and flavor enhancers to add flavor to foods.  Avoid using herbs and spice blends  that also contain sodium. Ask your healthcare provider or dietitian about flavor enhancers. Flavor enhancers with ham, natural jimenez, and roast beef flavors can also be sprinkled on food to add flavor.   Share meals with others as often as you can.  Eating with others may help you to eat better during meal time. Ask family members, neighbors, or friends to join you for lunch. There are also senior centers where you can meet people, and share meals with them.   Ask family and friends for help  with shopping or preparing foods. Ask for a ride to the grocery store, if needed.       © Copyright [employer] 2018 Information is for End User's use only and may not be sold, redistributed or otherwise used for commercial purposes. All illustrations and images included in CareNotes® are the copyrighted property of [employer] Inc[employer] or [employer]

## 2024-12-23 ENCOUNTER — HOSPITAL ENCOUNTER (OUTPATIENT)
Dept: INFUSION CENTER | Facility: HOSPITAL | Age: 74
Discharge: HOME | End: 2024-12-23
Attending: INTERNAL MEDICINE
Payer: MEDICARE

## 2024-12-23 DIAGNOSIS — M81.8 OSTEOPOROSIS OF FOREARM ASSOCIATED WITH ENDOCRINE DISORDER: Primary | ICD-10-CM

## 2024-12-23 DIAGNOSIS — E34.9 OSTEOPOROSIS OF FOREARM ASSOCIATED WITH ENDOCRINE DISORDER: Primary | ICD-10-CM

## 2024-12-23 RX ADMIN — DENOSUMAB 60 MG: 60 INJECTION SUBCUTANEOUS at 12:28

## 2024-12-23 NOTE — PROGRESS NOTES
Nicolle Wood  tolerated treatment well with no complications.      Nciolle Wood is aware of future appt on 01/08/25 at 1pm.     AVS printed and given to Nicolle Wood:  Yes

## 2025-01-03 ENCOUNTER — APPOINTMENT (OUTPATIENT)
Dept: LAB | Age: 75
End: 2025-01-03
Payer: MEDICARE

## 2025-01-03 DIAGNOSIS — D69.3 IDIOPATHIC THROMBOCYTOPENIC PURPURA (ITP) (HCC): ICD-10-CM

## 2025-01-03 DIAGNOSIS — C91.10 CLL (CHRONIC LYMPHOCYTIC LEUKEMIA) (HCC): ICD-10-CM

## 2025-01-03 LAB
BASOPHILS # BLD AUTO: 0.02 THOUSANDS/ΜL (ref 0–0.1)
BASOPHILS NFR BLD AUTO: 0 % (ref 0–1)
EOSINOPHIL # BLD AUTO: 0.05 THOUSAND/ΜL (ref 0–0.61)
EOSINOPHIL NFR BLD AUTO: 1 % (ref 0–6)
ERYTHROCYTE [DISTWIDTH] IN BLOOD BY AUTOMATED COUNT: 14.4 % (ref 11.6–15.1)
HCT VFR BLD AUTO: 36.8 % (ref 34.8–46.1)
HGB BLD-MCNC: 11.2 G/DL (ref 11.5–15.4)
IMM GRANULOCYTES # BLD AUTO: 0.03 THOUSAND/UL (ref 0–0.2)
IMM GRANULOCYTES NFR BLD AUTO: 1 % (ref 0–2)
LYMPHOCYTES # BLD AUTO: 0.81 THOUSANDS/ΜL (ref 0.6–4.47)
LYMPHOCYTES NFR BLD AUTO: 13 % (ref 14–44)
MCH RBC QN AUTO: 27.9 PG (ref 26.8–34.3)
MCHC RBC AUTO-ENTMCNC: 30.4 G/DL (ref 31.4–37.4)
MCV RBC AUTO: 92 FL (ref 82–98)
MONOCYTES # BLD AUTO: 0.41 THOUSAND/ΜL (ref 0.17–1.22)
MONOCYTES NFR BLD AUTO: 7 % (ref 4–12)
NEUTROPHILS # BLD AUTO: 5.03 THOUSANDS/ΜL (ref 1.85–7.62)
NEUTS SEG NFR BLD AUTO: 78 % (ref 43–75)
NRBC BLD AUTO-RTO: 0 /100 WBCS
PLATELET # BLD AUTO: 143 THOUSANDS/UL (ref 149–390)
PMV BLD AUTO: 11.8 FL (ref 8.9–12.7)
RBC # BLD AUTO: 4.01 MILLION/UL (ref 3.81–5.12)
TSH SERPL DL<=0.05 MIU/L-ACNC: 0.27 UIU/ML (ref 0.45–4.5)
WBC # BLD AUTO: 6.35 THOUSAND/UL (ref 4.31–10.16)

## 2025-01-03 PROCEDURE — 85025 COMPLETE CBC W/AUTO DIFF WBC: CPT

## 2025-01-04 LAB — T4 FREE SERPL-MCNC: 1.13 NG/DL (ref 0.61–1.12)

## 2025-01-05 ENCOUNTER — RESULTS FOLLOW-UP (OUTPATIENT)
Dept: FAMILY MEDICINE CLINIC | Facility: CLINIC | Age: 75
End: 2025-01-05

## 2025-01-05 DIAGNOSIS — E03.9 HYPOTHYROIDISM, UNSPECIFIED TYPE: ICD-10-CM

## 2025-01-05 RX ORDER — LEVOTHYROXINE SODIUM 50 UG/1
50 TABLET ORAL
Qty: 30 TABLET | Refills: 2 | Status: SHIPPED | OUTPATIENT
Start: 2025-01-05

## 2025-01-08 ENCOUNTER — HOSPITAL ENCOUNTER (OUTPATIENT)
Dept: INFUSION CENTER | Facility: HOSPITAL | Age: 75
Discharge: HOME | End: 2025-01-08
Attending: INTERNAL MEDICINE
Payer: MEDICARE

## 2025-01-08 DIAGNOSIS — D69.3 IDIOPATHIC THROMBOCYTOPENIC PURPURA (ITP) (HCC): Primary | ICD-10-CM

## 2025-01-08 DIAGNOSIS — C91.10 CLL (CHRONIC LYMPHOCYTIC LEUKEMIA) (HCC): ICD-10-CM

## 2025-01-08 PROCEDURE — 96372 THER/PROPH/DIAG INJ SC/IM: CPT

## 2025-01-08 RX ADMIN — ROMIPLOSTIM 125 MCG: 125 INJECTION, POWDER, LYOPHILIZED, FOR SOLUTION SUBCUTANEOUS at 13:38

## 2025-01-08 NOTE — PROGRESS NOTES
Nicolle Wood  tolerated treatment well with no complications.      Nicolle Wood is aware of future appt on 2/13/25 at 1230.     AVS printed and given to Nicolle Wood.

## 2025-01-18 ENCOUNTER — APPOINTMENT (EMERGENCY)
Dept: ULTRASOUND IMAGING | Facility: HOSPITAL | Age: 75
End: 2025-01-18
Payer: MEDICARE

## 2025-01-18 ENCOUNTER — HOSPITAL ENCOUNTER (EMERGENCY)
Facility: HOSPITAL | Age: 75
Discharge: HOME | End: 2025-01-18
Attending: EMERGENCY MEDICINE | Admitting: EMERGENCY MEDICINE
Payer: MEDICARE

## 2025-01-18 ENCOUNTER — OFFICE VISIT (OUTPATIENT)
Dept: URGENT CARE | Age: 75
End: 2025-01-18
Payer: MEDICARE

## 2025-01-18 VITALS
BODY MASS INDEX: 17.51 KG/M2 | OXYGEN SATURATION: 100 % | TEMPERATURE: 98.2 F | DIASTOLIC BLOOD PRESSURE: 63 MMHG | HEART RATE: 76 BPM | WEIGHT: 108.47 LBS | SYSTOLIC BLOOD PRESSURE: 125 MMHG | RESPIRATION RATE: 18 BRPM

## 2025-01-18 VITALS
HEIGHT: 66 IN | BODY MASS INDEX: 17.19 KG/M2 | OXYGEN SATURATION: 98 % | DIASTOLIC BLOOD PRESSURE: 82 MMHG | HEART RATE: 88 BPM | SYSTOLIC BLOOD PRESSURE: 124 MMHG | RESPIRATION RATE: 18 BRPM | WEIGHT: 107 LBS | TEMPERATURE: 97.2 F

## 2025-01-18 DIAGNOSIS — N39.0 UTI (URINARY TRACT INFECTION): ICD-10-CM

## 2025-01-18 DIAGNOSIS — N95.0 POSTMENOPAUSAL BLEEDING: Primary | ICD-10-CM

## 2025-01-18 DIAGNOSIS — N93.9 VAGINAL BLEEDING: Primary | ICD-10-CM

## 2025-01-18 LAB
BACTERIA UR QL AUTO: ABNORMAL /HPF
BASOPHILS # BLD AUTO: 0.01 THOUSANDS/ΜL (ref 0–0.1)
BASOPHILS NFR BLD AUTO: 0 % (ref 0–1)
BILIRUB UR QL STRIP: NEGATIVE
CLARITY UR: ABNORMAL
COLOR UR: ABNORMAL
EOSINOPHIL # BLD AUTO: 0.01 THOUSAND/ΜL (ref 0–0.61)
EOSINOPHIL NFR BLD AUTO: 0 % (ref 0–6)
ERYTHROCYTE [DISTWIDTH] IN BLOOD BY AUTOMATED COUNT: 15.7 % (ref 11.6–15.1)
GLUCOSE UR STRIP-MCNC: NEGATIVE MG/DL
HCT VFR BLD AUTO: 39.2 % (ref 34.8–46.1)
HGB BLD-MCNC: 12.2 G/DL (ref 11.5–15.4)
HGB UR QL STRIP.AUTO: ABNORMAL
IMM GRANULOCYTES # BLD AUTO: 0.02 THOUSAND/UL (ref 0–0.2)
IMM GRANULOCYTES NFR BLD AUTO: 0 % (ref 0–2)
KETONES UR STRIP-MCNC: NEGATIVE MG/DL
LEUKOCYTE ESTERASE UR QL STRIP: ABNORMAL
LYMPHOCYTES # BLD AUTO: 0.58 THOUSANDS/ΜL (ref 0.6–4.47)
LYMPHOCYTES NFR BLD AUTO: 9 % (ref 14–44)
MCH RBC QN AUTO: 28.1 PG (ref 26.8–34.3)
MCHC RBC AUTO-ENTMCNC: 31.1 G/DL (ref 31.4–37.4)
MCV RBC AUTO: 90 FL (ref 82–98)
MONOCYTES # BLD AUTO: 0.37 THOUSAND/ΜL (ref 0.17–1.22)
MONOCYTES NFR BLD AUTO: 6 % (ref 4–12)
NEUTROPHILS # BLD AUTO: 5.21 THOUSANDS/ΜL (ref 1.85–7.62)
NEUTS SEG NFR BLD AUTO: 85 % (ref 43–75)
NITRITE UR QL STRIP: NEGATIVE
NON-SQ EPI CELLS URNS QL MICRO: ABNORMAL /HPF
NRBC BLD AUTO-RTO: 0 /100 WBCS
PH UR STRIP.AUTO: 7 [PH] (ref 4.5–8)
PLATELET # BLD AUTO: 305 THOUSANDS/UL (ref 149–390)
PMV BLD AUTO: 10.3 FL (ref 8.9–12.7)
PROT UR STRIP-MCNC: ABNORMAL MG/DL
RBC # BLD AUTO: 4.34 MILLION/UL (ref 3.81–5.12)
RBC #/AREA URNS AUTO: ABNORMAL /HPF
SP GR UR STRIP.AUTO: 1.01 (ref 1–1.03)
UROBILINOGEN UR QL STRIP.AUTO: 0.2 E.U./DL
WBC # BLD AUTO: 6.2 THOUSAND/UL (ref 4.31–10.16)
WBC #/AREA URNS AUTO: ABNORMAL /HPF

## 2025-01-18 PROCEDURE — 99284 EMERGENCY DEPT VISIT MOD MDM: CPT | Performed by: EMERGENCY MEDICINE

## 2025-01-18 PROCEDURE — 85025 COMPLETE CBC W/AUTO DIFF WBC: CPT | Performed by: EMERGENCY MEDICINE

## 2025-01-18 PROCEDURE — 36415 COLL VENOUS BLD VENIPUNCTURE: CPT | Performed by: EMERGENCY MEDICINE

## 2025-01-18 PROCEDURE — 99284 EMERGENCY DEPT VISIT MOD MDM: CPT

## 2025-01-18 PROCEDURE — 87086 URINE CULTURE/COLONY COUNT: CPT

## 2025-01-18 PROCEDURE — 87077 CULTURE AEROBIC IDENTIFY: CPT

## 2025-01-18 PROCEDURE — G0463 HOSPITAL OUTPT CLINIC VISIT: HCPCS | Performed by: EMERGENCY MEDICINE

## 2025-01-18 PROCEDURE — 81001 URINALYSIS AUTO W/SCOPE: CPT

## 2025-01-18 PROCEDURE — 76856 US EXAM PELVIC COMPLETE: CPT

## 2025-01-18 PROCEDURE — 99213 OFFICE O/P EST LOW 20 MIN: CPT | Performed by: EMERGENCY MEDICINE

## 2025-01-18 RX ORDER — NITROFURANTOIN 25; 75 MG/1; MG/1
100 CAPSULE ORAL 2 TIMES DAILY
Qty: 14 CAPSULE | Refills: 0 | Status: SHIPPED | OUTPATIENT
Start: 2025-01-18 | End: 2025-01-25

## 2025-01-18 NOTE — ED PROVIDER NOTES
ED Disposition       None          Assessment & Plan       Medical Decision Making  74-year-old female presents to the emergency department with abnormal vaginal bleeding.  This occurred when she urinated this afternoon.  She states she has had some bright red blood in the toilet.  The bleeding continued after urination but now is slowing down.  No associated dysuria or pelvic pain.  No prior history of abnormal vaginal bleeding.  No history of gynecologic surgery.  She is not anticoagulated.  On exam she looks well in no distress.  She has no abdominal tenderness or masses palpated on exam.  She does have a small amount of blood in the vaginal vault but no active bleeding from the cervical os and the cervical os looks normal.  No palpable masses on exam.  Will obtain urine to rule out UTI.  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  Will order ultrasound for postmenopausal bleeding to rule out structural abnormalities on the uterus such as fibroid tumors or other mass.  Likely patient will require follow-up with OB/GYN to rule out endometrial/uterine cancer    Amount and/or Complexity of Data Reviewed  Labs: ordered. Decision-making details documented in ED Course.  Radiology: ordered.        ED Course as of 01/19/25 1152   Sat Jan 18, 2025   1510 Blood Pressure: 154/70   1510 Temperature: 98.2 °F (36.8 °C)   1510 Pulse: 86   1510 Respirations: 16   1510 SpO2: 97 %   1606 Leukocytes, UA(!): Small   1606 Nitrite, UA: Negative   1606 Blood, UA(!): Large   1606 WBC: 6.20   1607 Hemoglobin: 12.2   1608 Platelet Count: 305   1718 RBC Urine(!): Innumerable   1719 WBC, UA(!): Innumerable   1719 Bacteria, UA: None Seen   1719 Epithelial Cells: None Seen   1858 Pelvis ultrasound:No abnormal endometrial stripe thickening but there is small amount of fluid seen within the endometrium. No discrete uterine fibroids. No abnormal adnexal masses or significant free  fluid. If clinical symptoms persist, consider further evaluation with a    follow-up female pelvis MRI study.                   Workstation performed: [id number]            Medications - No data to display    ED Risk Strat Scores                          SBIRT 22yo+      Flowsheet Row Most Recent Value   Initial Alcohol Screen: US AUDIT-C     1. How often do you have a drink containing alcohol? 0 Filed at: 01/18/2025 1550   2. How many drinks containing alcohol do you have on a typical day you are drinking?  0 Filed at: 01/18/2025 1550   3b. FEMALE Any Age, or MALE 65+: How often do you have 4 or more drinks on one occassion? 0 Filed at: 01/18/2025 1550   Audit-C Score 0 Filed at: 01/18/2025 1550   TRACIE: How many times in the past year have you...    Used an illegal drug or used a prescription medication for non-medical reasons? Never Filed at: 01/18/2025 1550                            History of Present Illness       Chief Complaint   Patient presents with   • Vaginal Bleeding     Patient brought by EMS from home. Vaginal bleeding x2 hours today. Reports has changed 3 pads since onset. Denies pain, clots, thinners, recent injury or history of same. No dizziness, SOB.       Past Medical History:   Diagnosis Date   • Anxiety    • Autoimmune hemolytic anemia (HCC)    • Cataract    • Disease of thyroid gland    • 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HCC)    • GERD (gastroesophageal reflux disease)    • Hypothyroidism    • Hypothyroidism    • Idiopathic thrombocytopenic purpura (ITP) (HCC)    • Menopause    • Sjogrens syndrome (HCC)       Past Surgical History:   Procedure Laterality Date   • CATARACT EXTRACTION Left    • COLONOSCOPY  2012    per pt   • FL LUMBAR PUNCTURE DIAGNOSTIC  5/11/2018   • TOOTH EXTRACTION        Family History   Problem Relation Age of Onset   • Colon cancer Mother    • Heart disease Mother         cardiac disorder    • Cancer Mother    • Liver cancer Father    • Cancer Father    • Heart disease Other          cardiac disorder      Social History     Tobacco Use   • Smoking status: Never   • Smokeless tobacco: Never   Vaping Use   • Vaping status: Never Used   Substance Use Topics   • Alcohol use: No   • Drug use: No      E-Cigarette/Vaping   • E-Cigarette Use Never User       E-Cigarette/Vaping Substances   • Nicotine No    • THC No    • CBD No    • Flavoring No    • Other No    • Unknown No       I have reviewed and agree with the history as documented.     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  Patient states she urinated and then noted bright red blood in the toilet.  No clots.  This only occurred once.  She did have some bleeding after urination.  She states it is starting to slow down now.  No prior history of abnormal vaginal bleeding.  She denies any abdominal or pelvic pain.  No history of gynecologic surgery.  Patient is not anticoagulated.  No UTI symptoms.      History provided by:  Patient   used: No    Vaginal Bleeding  Quality:  Bright red  Onset quality:  Sudden  Progression:  Improving  Chronicity:  New  Number of pads used:  1  Possible pregnancy: no    Context: during urination    Associated symptoms: no abdominal pain, no back pain, no dizziness, no dysuria, no fatigue, no fever, no nausea and no vaginal discharge    Risk factors: no bleeding disorder, no hx of endometriosis and no gynecological surgery        Review of Systems   Constitutional: Negative.  Negative for chills, diaphoresis, fatigue and fever.   HENT: Negative.  Negative for congestion, rhinorrhea and sore throat.    Eyes: Negative.  Negative for discharge, redness and itching.   Respiratory: Negative.  Negative for apnea, cough, chest tightness, shortness of breath and wheezing.    Cardiovascular:  Negative for chest pain, palpitations and leg swelling.   Gastrointestinal: Negative.  Negative for abdominal pain and  nausea.   Endocrine: Negative.    Genitourinary:  Positive for vaginal bleeding. Negative for dysuria, flank pain, frequency, urgency, vaginal discharge and vaginal pain.   Musculoskeletal: Negative.  Negative for back pain.   Skin: Negative.    Allergic/Immunologic: Negative.    Neurological: Negative.  Negative for dizziness, syncope, weakness, light-headedness, numbness and headaches.   Hematological: Negative.    All other systems reviewed and are negative.          Objective       ED Triage Vitals [01/18/25 1508]   Temperature Pulse Blood Pressure Respirations SpO2 Patient Position - Orthostatic VS   98.2 °F (36.8 °C) 86 154/70 16 97 % Sitting      Temp Source Heart Rate Source BP Location FiO2 (%) Pain Score    Oral Monitor Left arm -- No Pain      Vitals      Date and Time Temp Pulse SpO2 Resp BP Pain Score FACES Pain Rating User   01/18/25 1508 98.2 °F (36.8 °C) 86 97 % 16 154/70 No Pain -- TMS            Physical Exam  Vitals and nursing note reviewed.   Constitutional:       General: She is awake. She is not in acute distress.     Appearance: Normal appearance. She is well-developed and underweight. She is not ill-appearing, toxic-appearing or diaphoretic.   HENT:      Head: Normocephalic and atraumatic.      Right Ear: External ear normal.      Left Ear: External ear normal.      Nose: Nose normal.   Eyes:      General: No scleral icterus.        Right eye: No discharge.         Left eye: No discharge.      Conjunctiva/sclera: Conjunctivae normal.   Neck:      Thyroid: No thyromegaly.      Vascular: No JVD.      Trachea: No tracheal deviation.   Cardiovascular:      Rate and Rhythm: Normal rate and regular rhythm.      Heart sounds: Normal heart sounds. No murmur heard.     No friction rub. No gallop.   Pulmonary:      Effort: Pulmonary effort is normal. No respiratory distress.      Breath sounds: Normal breath sounds. No stridor. No wheezing, rhonchi or rales.   Chest:      Chest wall: No tenderness.    Abdominal:      General: Bowel sounds are normal. There is no distension.      Palpations: Abdomen is soft. There is no mass.      Tenderness: There is no abdominal tenderness.      Hernia: No hernia is present. There is no hernia in the left inguinal area or right inguinal area.   Genitourinary:     General: Normal vulva.      Labia:         Right: No rash, tenderness, lesion or injury.         Left: No rash, tenderness, lesion or injury.       Vagina: Bleeding (Small amount of blood vaginal vault) present.      Cervix: No cervical motion tenderness, discharge, lesion, erythema or cervical bleeding.      Uterus: Normal.       Adnexa: Right adnexa normal and left adnexa normal.   Lymphadenopathy:      Lower Body: No right inguinal adenopathy. No left inguinal adenopathy.   Skin:     General: Skin is warm and dry.      Coloration: Skin is not jaundiced or pale.      Findings: No bruising, erythema, lesion or rash.   Neurological:      General: No focal deficit present.      Mental Status: She is alert and oriented to person, place, and time.      Motor: No weakness or abnormal muscle tone.      Deep Tendon Reflexes: Reflexes are normal and symmetric.   Psychiatric:         Mood and Affect: Mood normal.         Behavior: Behavior is cooperative.         Results Reviewed       Procedure Component Value Units Date/Time    CBC and differential [[id number]]  (Abnormal) Collected: 01/18/25 1550    Lab Status: Final result Specimen: Blood from Arm, Right Updated: 01/18/25 1556     WBC 6.20 Thousand/uL      RBC 4.34 Million/uL      Hemoglobin 12.2 g/dL      Hematocrit 39.2 %      MCV 90 fL      MCH 28.1 pg      MCHC 31.1 g/dL      RDW 15.7 %      MPV 10.3 fL      Platelets 305 Thousands/uL      nRBC 0 /100 WBCs      Segmented % 85 %      Immature Grans % 0 %      Lymphocytes % 9 %      Monocytes % 6 %      Eosinophils Relative 0 %      Basophils Relative 0 %      Absolute Neutrophils 5.21 Thousands/µL      Absolute Immature  Grans 0.02 Thousand/uL      Absolute Lymphocytes 0.58 Thousands/µL      Absolute Monocytes 0.37 Thousand/µL      Eosinophils Absolute 0.01 Thousand/µL      Basophils Absolute 0.01 Thousands/µL     Urine Macroscopic, POC [[id number]]  (Abnormal) Collected: 01/18/25 1552    Lab Status: Final result Specimen: Urine Updated: 01/18/25 1554     Color, UA Red     Clarity, UA Other     pH, UA 7.0     Leukocytes, UA Small     Nitrite, UA Negative     Protein,  (2+) mg/dl      Glucose, UA Negative mg/dl      Ketones, UA Negative mg/dl      Urobilinogen, UA 0.2 E.U./dl      Bilirubin, UA Negative     Occult Blood, UA Large     Specific Gravity, UA 1.015    Narrative:      CLINITEK RESULT    Urine Microscopic [[id number]] Collected: 01/18/25 1552    Lab Status: No result Specimen: Urine             US pelvis complete non OB    (Results Pending)       Procedures    ED Medication and Procedure Management   Prior to Admission Medications   Prescriptions Last Dose Informant Patient Reported? Taking?   ALPRAZolam (XANAX) 0.5 mg tablet   No No   Sig: Take 1 tablet (0.5 mg total) by mouth daily at bedtime as needed for anxiety   Calquence 100 MG TABS   No No   Sig: Take 1 tablet (100mg total)  by mouth once daily.   OLANZapine (ZyPREXA ZYDIS) 5 mg dispersible tablet   No No   Sig: Take 1 tablet (5 mg total) by mouth daily at bedtime   Patient not taking: Reported on 12/19/2024   Vitamin D, Cholecalciferol, 10 MCG (400 UNIT) CHEW  Self Yes No   Sig: Chew   levothyroxine 50 mcg tablet   No No   Sig: Take 1 tablet (50 mcg total) by mouth daily in the early morning   methocarbamol (ROBAXIN) 500 mg tablet  Self No No   Sig: Take 1 tablet (500 mg total) by mouth every 8 (eight) hours as needed for muscle spasms   sertraline (ZOLOFT) 100 mg tablet   No No   Sig: Take 0.5 tablets (50 mg total) by mouth daily      Facility-Administered Medications: None     Patient's Medications   Discharge Prescriptions    No medications on file     No  discharge procedures on file.  ED SEPSIS DOCUMENTATION            Aisha Kwan, DO  01/19/25 1156

## 2025-01-18 NOTE — PROGRESS NOTES
"  [facility]        NAME: Nicolle Wood is a 74 y.o. female  : 1950    MRN: [id number]  DATE: 2025  TIME: 2:11 PM      Assessment and Plan     Vaginal bleeding [N93.9]  1. Vaginal bleeding            Recommended patient to proceed to the ER for further evaluation.  Offered EMS.  Patient voices her concerns as  she is worried about keeping her car here in the [facility] parking lot.  States that she would feel more comfortable if she drove home and called 911 from her private residence he once again offered EMS and declined.    Hemodynamically stable at this time.  No dizziness.  GCS 15  Patient Instructions     Proceed to the ER for further evaluation.       Chief Complaint     Chief Complaint   Patient presents with    Vaginal Bleeding     Patient states she has a sudden onset of a \"large\" amount of vaginal bleeding.  Patient denies UTI symptoms. Patient states she has no signifigant history.  Patient denies cramping. Has gone through 3 pad in a matter of \"couple hours\" Patient denies chest pain, shortness of breath or dizziness.         History of Present Illness     Patient is 74-year-old female who presents with vaginal bleeding started approximately 1 hour ago.  States she has gone through 3 pads in the past hour.  Denies chest pain, dizziness, shortness of breath, fevers, abdominal pain, lightheadedness.  Does have a history of ITP.        Review of Systems     Review of Systems   Constitutional:  Negative for fatigue.   Gastrointestinal:  Negative for abdominal pain, diarrhea, nausea and vomiting.   Genitourinary:  Positive for vaginal bleeding.   Neurological:  Negative for dizziness, light-headedness and headaches.   All other systems reviewed and are negative.        Current Medications       Current Outpatient Medications:     ALPRAZolam (XANAX) 0.5 mg tablet, Take 1 tablet (0.5 mg total) by mouth daily at bedtime as needed for anxiety, Disp: 30 tablet, Rfl: 0    Calquence 100 " MG TABS, Take 1 tablet (100mg total)  by mouth once daily., Disp: 30 tablet, Rfl: 10    levothyroxine 50 mcg tablet, Take 1 tablet (50 mcg total) by mouth daily in the early morning, Disp: 30 tablet, Rfl: 2    methocarbamol (ROBAXIN) 500 mg tablet, Take 1 tablet (500 mg total) by mouth every 8 (eight) hours as needed for muscle spasms, Disp: 30 tablet, Rfl: 2    sertraline (ZOLOFT) 100 mg tablet, Take 0.5 tablets (50 mg total) by mouth daily, Disp: 15 tablet, Rfl: 0    Vitamin D, Cholecalciferol, 10 MCG (400 UNIT) CHEW, Chew, Disp: , Rfl:     OLANZapine (ZyPREXA ZYDIS) 5 mg dispersible tablet, Take 1 tablet (5 mg total) by mouth daily at bedtime (Patient not taking: Reported on 12/19/2024), Disp: 15 tablet, Rfl: 0    Current Allergies     Allergies as of 01/18/2025    (No Known Allergies)              The following portions of the patient's history were reviewed and updated as appropriate: allergies, current medications, past family history, past medical history, past social history, past surgical history and problem list.     Past Medical History:   Diagnosis Date    Anxiety     Autoimmune hemolytic anemia (HCC)     Cataract     Disease of thyroid gland     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HCC)     GERD (gastroesophageal reflux disease)     Hypothyroidism     Hypothyroidism     Idiopathic thrombocytopenic purpura (ITP) (HCC)     Menopause     Sjogrens syndrome (HCC)        Past Surgical History:   Procedure Laterality Date    CATARACT EXTRACTION Left     COLONOSCOPY  2012    per pt    FL LUMBAR PUNCTURE DIAGNOSTIC  5/11/2018    TOOTH EXTRACTION         Family History   Problem Relation Age of Onset    Colon cancer Mother     Heart disease Mother         cardiac disorder     Cancer Mother     Liver cancer Father     Cancer Father     Heart disease Other         cardiac disorder         Medications have been verified.        Objective     /82 (BP Location: Right arm, Patient Position: Sitting)   Pulse 88   Temp (!) 97.2 °F  "(36.2 °C) (Tympanic)   Resp 18   Ht 5' 6\" (1.676 m)   Wt 48.5 kg (107 lb)   SpO2 98%   BMI 17.27 kg/m²   No LMP recorded. Patient is postmenopausal.         Physical Exam     Physical Exam  Vitals and nursing note reviewed.   Constitutional:       General: She is awake. She is not in acute distress.     Appearance: Normal appearance. She is not ill-appearing, toxic-appearing or diaphoretic.   Cardiovascular:      Rate and Rhythm: Normal rate.      Pulses: Normal pulses.      Heart sounds: Normal heart sounds, S1 normal and S2 normal.   Pulmonary:      Effort: Pulmonary effort is normal.      Breath sounds: Normal breath sounds and air entry.   Skin:     General: Skin is warm.      Capillary Refill: Capillary refill takes less than 2 seconds.   Neurological:      Mental Status: She is alert.   Psychiatric:         Mood and Affect: Mood normal.         Behavior: Behavior normal.         Thought Content: Thought content normal.         Judgment: Judgment normal.       "

## 2025-01-20 ENCOUNTER — DOCUMENTATION (OUTPATIENT)
Dept: HEMATOLOGY ONCOLOGY | Facility: CLINIC | Age: 75
End: 2025-01-20

## 2025-01-20 ENCOUNTER — TELEPHONE (OUTPATIENT)
Age: 75
End: 2025-01-20

## 2025-01-20 LAB — BACTERIA UR CULT: ABNORMAL

## 2025-01-20 NOTE — PROGRESS NOTES
An introductory outreach call was made to the patient, who answered. This writer introduced herself and explained the scope of the Oncology  role. This writer provided contact information, reviewed insurance details, discussed the upcoming treatment date and time, and answered all questions.  Issues with portal. Will call tomorrow.       Alyssa Gonzáles MPH  Phone: [phone]  Email: [email or web address]

## 2025-01-20 NOTE — TELEPHONE ENCOUNTER
Scheduled appt for  bleeding 2/18/25, pt states needs transportation. Message sent to office for [employer] services.

## 2025-01-21 ENCOUNTER — DOCUMENTATION (OUTPATIENT)
Dept: HEMATOLOGY ONCOLOGY | Facility: CLINIC | Age: 75
End: 2025-01-21

## 2025-01-21 NOTE — PROGRESS NOTES
This writer called [facility] at [phone] and spoke with Al.   This PT's kimberley was re-instated for Calquence with the following billing information.   Effective through 2/5/25  ID #: [id number]   Card ID #: [id number]   GRP: [id number]   PCN: [id number]   BIN: [id number]   Balance: $4,500     Alyssa Gonzáles MPH  Phone: [phone]  Email: [email or web address]

## 2025-01-27 ENCOUNTER — TELEPHONE (OUTPATIENT)
Age: 75
End: 2025-01-27

## 2025-01-27 NOTE — TELEPHONE ENCOUNTER
Patient calling in, needs [facility] scheduled for DEXA on 2/4. Best # [phone] to call back with details

## 2025-02-04 ENCOUNTER — TELEPHONE (OUTPATIENT)
Age: 75
End: 2025-02-04

## 2025-02-04 ENCOUNTER — HOSPITAL ENCOUNTER (OUTPATIENT)
Dept: RADIOLOGY | Facility: IMAGING CENTER | Age: 75
Discharge: HOME | End: 2025-02-04
Payer: MEDICARE

## 2025-02-04 DIAGNOSIS — E34.9 OSTEOPOROSIS OF FOREARM ASSOCIATED WITH ENDOCRINE DISORDER: ICD-10-CM

## 2025-02-04 DIAGNOSIS — M81.8 OSTEOPOROSIS OF FOREARM ASSOCIATED WITH ENDOCRINE DISORDER: ICD-10-CM

## 2025-02-04 PROCEDURE — 77080 DXA BONE DENSITY AXIAL: CPT

## 2025-02-04 NOTE — TELEPHONE ENCOUNTER
"Dexa Scan Technician had questions regarding Pt's \"Forearm\" associated with endocrine disorder.    Technician did not see any documentation regarding this endocrine disorder.    Technician to message ILIANA TIPTON by secure txt.   "

## 2025-02-05 ENCOUNTER — APPOINTMENT (OUTPATIENT)
Dept: LAB | Age: 75
End: 2025-02-05
Payer: MEDICARE

## 2025-02-05 DIAGNOSIS — C91.10 CLL (CHRONIC LYMPHOCYTIC LEUKEMIA) (HCC): ICD-10-CM

## 2025-02-05 DIAGNOSIS — D69.3 IDIOPATHIC THROMBOCYTOPENIC PURPURA (ITP) (HCC): ICD-10-CM

## 2025-02-05 LAB
BASOPHILS # BLD AUTO: 0.02 THOUSANDS/ΜL (ref 0–0.1)
BASOPHILS NFR BLD AUTO: 0 % (ref 0–1)
EOSINOPHIL # BLD AUTO: 0.04 THOUSAND/ΜL (ref 0–0.61)
EOSINOPHIL NFR BLD AUTO: 1 % (ref 0–6)
ERYTHROCYTE [DISTWIDTH] IN BLOOD BY AUTOMATED COUNT: 16.1 % (ref 11.6–15.1)
HCT VFR BLD AUTO: 38.8 % (ref 34.8–46.1)
HGB BLD-MCNC: 12 G/DL (ref 11.5–15.4)
IMM GRANULOCYTES # BLD AUTO: 0.01 THOUSAND/UL (ref 0–0.2)
IMM GRANULOCYTES NFR BLD AUTO: 0 % (ref 0–2)
LYMPHOCYTES # BLD AUTO: 0.89 THOUSANDS/ΜL (ref 0.6–4.47)
LYMPHOCYTES NFR BLD AUTO: 18 % (ref 14–44)
MCH RBC QN AUTO: 28.5 PG (ref 26.8–34.3)
MCHC RBC AUTO-ENTMCNC: 30.9 G/DL (ref 31.4–37.4)
MCV RBC AUTO: 92 FL (ref 82–98)
MONOCYTES # BLD AUTO: 0.45 THOUSAND/ΜL (ref 0.17–1.22)
MONOCYTES NFR BLD AUTO: 9 % (ref 4–12)
NEUTROPHILS # BLD AUTO: 3.51 THOUSANDS/ΜL (ref 1.85–7.62)
NEUTS SEG NFR BLD AUTO: 72 % (ref 43–75)
NRBC BLD AUTO-RTO: 0 /100 WBCS
PLATELET # BLD AUTO: 117 THOUSANDS/UL (ref 149–390)
PMV BLD AUTO: 12.5 FL (ref 8.9–12.7)
RBC # BLD AUTO: 4.21 MILLION/UL (ref 3.81–5.12)
WBC # BLD AUTO: 4.92 THOUSAND/UL (ref 4.31–10.16)

## 2025-02-05 PROCEDURE — 85025 COMPLETE CBC W/AUTO DIFF WBC: CPT

## 2025-02-05 PROCEDURE — 36415 COLL VENOUS BLD VENIPUNCTURE: CPT

## 2025-02-10 ENCOUNTER — RESULTS FOLLOW-UP (OUTPATIENT)
Dept: HEMATOLOGY ONCOLOGY | Facility: CLINIC | Age: 75
End: 2025-02-10

## 2025-02-12 ENCOUNTER — OFFICE VISIT (OUTPATIENT)
Dept: HEMATOLOGY ONCOLOGY | Facility: CLINIC | Age: 75
End: 2025-02-12
Payer: MEDICARE

## 2025-02-12 VITALS
BODY MASS INDEX: 17.28 KG/M2 | SYSTOLIC BLOOD PRESSURE: 128 MMHG | WEIGHT: 107.5 LBS | DIASTOLIC BLOOD PRESSURE: 86 MMHG | TEMPERATURE: 97.6 F | OXYGEN SATURATION: 98 % | RESPIRATION RATE: 18 BRPM | HEART RATE: 80 BPM | HEIGHT: 66 IN

## 2025-02-12 DIAGNOSIS — D59.10 AUTOIMMUNE HEMOLYTIC ANEMIA (HCC): ICD-10-CM

## 2025-02-12 DIAGNOSIS — C83.02 SMALL CELL B-CELL LYMPHOMA OF INTRATHORACIC LYMPH NODES (HCC): Primary | ICD-10-CM

## 2025-02-12 DIAGNOSIS — D69.3 IDIOPATHIC THROMBOCYTOPENIC PURPURA (ITP) (HCC): ICD-10-CM

## 2025-02-12 DIAGNOSIS — M81.0 AGE-RELATED OSTEOPOROSIS WITHOUT CURRENT PATHOLOGICAL FRACTURE: ICD-10-CM

## 2025-02-12 PROCEDURE — 99215 OFFICE O/P EST HI 40 MIN: CPT | Performed by: PHYSICIAN ASSISTANT

## 2025-02-12 NOTE — ASSESSMENT & PLAN NOTE
CLL/ SLL dx 6/2020 when she presented with AIHA, ITP. On acalabrutinib 100 mg p.o. daily since 10/2020 with excellent tolerance.      CT scan on 10/2021 showed resolution of retroperitoneal, retrocrural lymphadenopathy.  CT C/A/P 8/14/24 compared to 2020 scan -Interval decrease in previously noted retrocrural and retroperitoneal lymph nodes.   Continue acalabrutinib.      2/12/25 Given ongoing decreased appetite, cervical adenopathy, pet/ct requested.       Orders:  •  NM PET CT skull base to mid thigh; Future  •  CBC and differential; Future  •  Leukemia/Lymphoma flow cytometry; Future  •  Comprehensive metabolic panel; Future  •  LD,Blood; Future

## 2025-02-12 NOTE — PROGRESS NOTES
Name: Nicolle Wood      : 1950      MRN: [id number]  Encounter Provider: Aisha Holly PA-C  Encounter Date: 2025   Encounter department: 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 On acalabrutinib 100 mg p.o. daily since 10/2020 with excellent tolerance.      CT scan on 10/2021 showed resolution of retroperitoneal, retrocrural lymphadenopathy.  CT C/A/P 24 compared to  scan -Interval decrease in previously noted retrocrural and retroperitoneal lymph nodes.   Continue acalabrutinib.      25 Given ongoing decreased appetite, cervical adenopathy, pet/ct requested.       Orders:  •  NM PET CT skull base to mid thigh; Future  •  CBC and differential; Future  •  Leukemia/Lymphoma flow cytometry; Future  •  Comprehensive metabolic panel; Future  •  LD,Blood; Future    Idiopathic thrombocytopenic purpura (ITP) (HCC)    Chronic ITP, Presented 2020 with platelet count 1000. She responded initially to steroids, counts dropped with taper. No prolonged response to Promacta.   2020 Prednisone 40 mg po daily restarted, reduced by 10 mg po weekly.   She was on prednisone 5mg po daily.   20 platelet count 27,000.   20 Platelet count [id number]   20 Prednisone increased to 10mg daily,  20  increased to 20mg when platelets decreased to 12,000.   2022 prednisone 5 mg alternating with 10 mg the next day, reduced to 5 mg PO daily . 23  prednisone Reduced to 2.5mg po daily.   2023 steroid discontinued.      2020  N-plate initiated.    She currently is on 250 mcg every 4 weeks.     Platelet count remained > 100.  Nplate interval extended to every 5 weeks.        NPLATE        Platelet count  24   104  9/25/24     10/24/24 109  10/30/24     11/25/24 123  12/4/24 12/16/24 426     1/3/25  143  1/8/25 1/18/25  305     2/5/25  117  2/13/25       As evidence with platelet count assessed 2 weeks post Nplate, she does respond,  However, her platelet count at 5 weeks remains greater than 100,000.    She will go for Nplate as scheduled February 13, 2025.  Will then hold Nplate.  Will see if platelet count remains greater than 100,000 in 6 weeks.         Autoimmune hemolytic anemia (HCC)  Resolved       Age-related osteoporosis without current pathological fracture  Stable on 2/2025 DEXA.  She receives Prolia.            History of Present Illness   Chief Complaint   Patient presents with   • Follow-up     Pertinent Medical History   02/12/25: HPI:   Nicolle Wood is a 74-year-old  female who was admitted to [facility] 6/2020 regarding easy bruising and bleeding.      She has history of polyclonal gammopathy, with no evidence of monoclonal protein, Sjogren syndrome, autoimmune connective tissue disease with highly elevated sed rate, CRP, BALDO, rheumatoid factor. PT and PTT normal.      She is followed by Dr. Alexandra with Rheumatology. She was prescribed hydroxychloroquine 200 milligram p.o. B.i.d.         6/18/2020 She was found to have grade 4 thrombocytopenia with platelet count of 1000, hemoglobin 10, WBC 7.2, 63% neutrophils, 34 percent lymphocytes.     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     She was treated  with dexamethasone for 3 days with improvement of platelet count up to 62,000 6/22/21      On 06/25/2020 platelet count of 10,000      Initiated on Promacta 75 milligram p.o. Daily in July 2020, platelet count up gradually with platelet count of 73,000 on 08/17/2020. hemoglobin 11.4, WBC 5.0   8/28/20: Patient had CBC as gums were bleeding that morning AM, no further bleeding. Platelet count was 19,000.      Promacta increased to 150mg every other day and 75 mg the other days.   Platelet count was 12,000 8/31/20 and she was advised to initiate prednisone 40mg po daily, tapering by 10mg po weekly. Promacta returned to 75mg po daily.   Platelet count increased to 173,000 9/4/20.      She had BMBX 9/9/20: B-cell lymphoproliferative disorder, compatible with chronic lymphocytic leukemia. Virtually absent stainable storage iron.   On flow cytometry, CLL phenotype accounted for 11%; CD5+, CD23+, CD20+ (dim) and CD 38-      9/14/20: platelet count 283.   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 b.i.d. since the beginning of October 2020.     She was seen on 10/14/2020 with severe depression requiring admission to the hospital, she is on Zoloft 25 mg p.o. daily, Synthroid 75 micro g p.o. daily      Acalabrutinib was reduced to 100 mg p.o. daily 11/2020   Recurrence of thrombocytopenia 27,000 11/2/20 once prednisone was discontinued.   Prednisone re-introduced back at 5 mg p.o. daily, platelet count increased to 50,000 range, fluctuated to 20,000 range, prednisone increased to 10mg daily 11/17/20, increased to 20mg 11/23/20 when platelets decreased to 12,000.      N-plate ordered 12/15/20 when platelet count 29,000 12/15/20. And given every 4 weeks      Prednisone has been slowly tapered. 5 mg alternating with 10 mg the next day     Dexa scan showed osteoporosis in August 2022 1/30/23 presented to ED for BRBPR.  On examination - No external hemorrhoids. No anal fissure. Small  amount of bright red blood on digital rectal exam. No pain with digital rectal exam. Small amount of blood in pad.  Hemoglobin 13.2.  referral to GI made.  UTI 2/3/23, abx rx at urgent care.      3/30/23  Colonoscopy-sessile polyps removed from the cecum, small internal hemorrhoids, scattered diverticula.  Random colon biopsies negative      At her June 7, 2023 f/u with GI reported unintentional weight loss of 10 pounds over 3 months and decreased appetite.  CT scan chest abdomen pelvis was ordered     EGD ordered 6/7/23.  Patient cancelled her procedure 8/11/23 and didn't want to r/s.     6/12/23  Albumin 2.9;  Total protein 6.4, normal quantitative immunoglobulins, ferritin 73, iron saturation 22%     7/13/23  Normal cbcd     10/2023 consult with neurology re: balance, tremor- MRI 10/30/23     2/1/2024 hemoglobin 12.6, MCV 97, white blood cell count 4.24, 70% neutrophils, 1% immature granulocytes, 18% lymphocytes, 9% monocytes, 1% eosinophils, platelet count 119  CMP stable       Denies any fevers, chills, sweats.  Her weight has decreased.  She just has no appetite.  Zyprexa did not help and therefore she discontinued it.          Review of Systems   Constitutional:  Negative for activity change, appetite change, chills, diaphoresis, fatigue, fever and unexpected weight change.   HENT:  Negative for congestion, dental problem, ear pain, facial swelling, hearing loss, mouth sores, nosebleeds, rhinorrhea, trouble swallowing and voice change.    Eyes:  Negative for photophobia, pain, discharge, redness, itching and visual disturbance.   Respiratory:  Negative for cough, chest tightness, shortness of breath, wheezing and stridor.    Cardiovascular:  Negative for chest pain and palpitations.   Gastrointestinal:  Negative for abdominal distention, abdominal pain, anal bleeding, blood in stool, constipation, diarrhea, nausea, rectal pain and vomiting.   Endocrine: Negative for cold intolerance and heat intolerance.  "  Genitourinary:  Negative for decreased urine volume, difficulty urinating, dysuria, flank pain, frequency, hematuria and urgency.   Musculoskeletal:  Negative for arthralgias, back pain, gait problem, joint swelling, myalgias, neck pain and neck stiffness.   Skin:  Negative for color change, pallor, rash and wound.   Neurological:  Negative for dizziness, tremors, seizures, syncope, weakness, light-headedness, numbness and headaches.   Hematological:  Negative for adenopathy. Does not bruise/bleed easily.   Psychiatric/Behavioral:  Negative for agitation, confusion, decreased concentration and sleep disturbance. The patient is not nervous/anxious.    All other systems reviewed and are negative.          Objective   /86 (BP Location: Right arm, Patient Position: Sitting, Cuff Size: Adult)   Pulse 80   Temp 97.6 °F (36.4 °C) (Temporal)   Resp 18   Ht 5' 6\" (1.676 m)   Wt 48.8 kg (107 lb 8 oz)   SpO2 98%   BMI 17.35 kg/m²     Physical Exam  Vitals reviewed.   Constitutional:       General: She is not in acute distress.     Appearance: She is well-developed. She is not diaphoretic.      Comments: underweight   HENT:      Head: Normocephalic and atraumatic.   Eyes:      Conjunctiva/sclera: Conjunctivae normal.   Neck:      Trachea: No tracheal deviation.   Cardiovascular:      Rate and Rhythm: Normal rate and regular rhythm.      Heart sounds: No murmur heard.     No friction rub. No gallop.   Pulmonary:      Effort: Pulmonary effort is normal. No respiratory distress.      Breath sounds: Normal breath sounds. No wheezing or rales.   Chest:      Chest wall: No tenderness.   Abdominal:      General: There is no distension.      Palpations: Abdomen is soft.      Tenderness: There is no abdominal tenderness.   Musculoskeletal:      Cervical back: Normal range of motion and neck supple.   Lymphadenopathy:      Cervical: Cervical adenopathy present.      Upper Body:      Right upper body: No supraclavicular or " axillary adenopathy.      Left upper body: No supraclavicular or axillary adenopathy.   Skin:     General: Skin is warm and dry.      Coloration: Skin is not pale.      Findings: No erythema.   Neurological:      Mental Status: She is alert.   Psychiatric:         Behavior: Behavior normal.         Thought Content: Thought content normal.         Labs: I have reviewed the following labs:  Results for orders placed or performed in visit on 02/05/25   CBC and differential   Result Value Ref Range    WBC 4.92 4.31 - 10.16 Thousand/uL    RBC 4.21 3.81 - 5.12 Million/uL    Hemoglobin 12.0 11.5 - 15.4 g/dL    Hematocrit 38.8 34.8 - 46.1 %    MCV 92 82 - 98 fL    MCH 28.5 26.8 - 34.3 pg    MCHC 30.9 (L) 31.4 - 37.4 g/dL    RDW 16.1 (H) 11.6 - 15.1 %    MPV 12.5 8.9 - 12.7 fL    Platelets 117 (L) 149 - 390 Thousands/uL    nRBC 0 /100 WBCs    Segmented % 72 43 - 75 %    Immature Grans % 0 0 - 2 %    Lymphocytes % 18 14 - 44 %    Monocytes % 9 4 - 12 %    Eosinophils Relative 1 0 - 6 %    Basophils Relative 0 0 - 1 %    Absolute Neutrophils 3.51 1.85 - 7.62 Thousands/µL    Absolute Immature Grans 0.01 0.00 - 0.20 Thousand/uL    Absolute Lymphocytes 0.89 0.60 - 4.47 Thousands/µL    Absolute Monocytes 0.45 0.17 - 1.22 Thousand/µL    Eosinophils Absolute 0.04 0.00 - 0.61 Thousand/µL    Basophils Absolute 0.02 0.00 - 0.10 Thousands/µL

## 2025-02-12 NOTE — ASSESSMENT & PLAN NOTE
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 She responded initially to steroids, counts dropped with taper. No prolonged response to Promacta.   9/1/2020 Prednisone 40 mg po daily restarted, reduced by 10 mg po weekly.   She was on prednisone 5mg po daily.   11/2/20 platelet count 27,000.   11/12/20 Platelet count [id number]   11/17/20 Prednisone increased to 10mg daily,  11/23/20  increased to 20mg when platelets decreased to 12,000.   11/2022 prednisone 5 mg alternating with 10 mg the next day, reduced to 5 mg PO daily . 2/7/23  prednisone Reduced to 2.5mg po daily.   8/2023 steroid discontinued.      12/2020  N-plate initiated.    She currently is on 250 mcg every 4 weeks.     Platelet count remained > 100.  Nplate interval extended to every 5 weeks.        NPLATE        Platelet count  8/21/24 9/20/25  104  9/25/24     10/24/24 109  10/30/24     11/25/24 123  12/4/24 12/16/24 426     1/3/25  143  1/8/25 1/18/25  305     2/5/25  117  2/13/25       As evidence with platelet count assessed 2 weeks post Nplate, she does respond,  However, her platelet count at 5 weeks remains greater than 100,000.    She will go for Nplate as scheduled February 13, 2025.  Will then hold Nplate.  Will see if platelet count remains greater than 100,000 in 6 weeks.

## 2025-02-13 ENCOUNTER — HOSPITAL ENCOUNTER (OUTPATIENT)
Dept: INFUSION CENTER | Facility: HOSPITAL | Age: 75
Discharge: HOME | End: 2025-02-13
Attending: INTERNAL MEDICINE
Payer: MEDICARE

## 2025-02-13 DIAGNOSIS — C91.10 CLL (CHRONIC LYMPHOCYTIC LEUKEMIA) (HCC): ICD-10-CM

## 2025-02-13 DIAGNOSIS — D69.3 IDIOPATHIC THROMBOCYTOPENIC PURPURA (ITP) (HCC): Primary | ICD-10-CM

## 2025-02-13 PROCEDURE — 96372 THER/PROPH/DIAG INJ SC/IM: CPT

## 2025-02-13 RX ADMIN — ROMIPLOSTIM 125 MCG: 125 INJECTION, POWDER, LYOPHILIZED, FOR SOLUTION SUBCUTANEOUS at 12:23

## 2025-02-13 NOTE — PROGRESS NOTES
Nicolle Wood  tolerated treatment well with no complications.      Nicolle Wood is aware of future appt on 6/25/25 at 1200 for prolia.     AVS printed and given to Nicolle Wood.

## 2025-02-18 DIAGNOSIS — F41.9 ANXIETY: ICD-10-CM

## 2025-02-18 NOTE — TELEPHONE ENCOUNTER
Reason for call:   [x] Refill   [] Prior Auth  [] Other:     Office:   [x] PCP/Provider -   [] Specialty/Provider -     Medication: ALPRAZolam (XANAX) 0.5 mg tablet    Dose/Frequency: Take 1 tablet (0.5 mg total) by mouth daily at bedtime as needed     Quantity: 30 tablet    Pharmacy: [id number] - [place]      Does the patient have enough for 3 days?   [x] Yes   [] No - Send as HP to POD

## 2025-02-19 RX ORDER — ALPRAZOLAM 0.5 MG
0.5 TABLET ORAL
Qty: 30 TABLET | Refills: 0 | Status: SHIPPED | OUTPATIENT
Start: 2025-02-19

## 2025-02-19 NOTE — TELEPHONE ENCOUNTER
ALPRAZolam (XANAX) 0.5 mg tablet         Sig: Take 1 tablet (0.5 mg total) by mouth daily at bedtime as needed for anxiety    Disp: 30 tablet    Refills: 0    Start: 2/18/2025    Class: Normal    PDMP Review May Be Needed    For: Anxiety    Last ordered: 2 months ago (12/19/2024) by Kwame Ovalles DO    Psychiatry:  Anxiolytics/Hypnotics [id number]/18/2025 12:57 PM   Protocol Details This refill cannot be delegated    Valid encounter within last 6 months      To be filled at: [id number] - [facility] [address] [facility]

## 2025-03-06 ENCOUNTER — HOSPITAL ENCOUNTER (OUTPATIENT)
Dept: NUCLEAR MEDICINE | Facility: HOSPITAL | Age: 75
End: 2025-03-06
Payer: MEDICARE

## 2025-03-06 DIAGNOSIS — C83.02 SMALL CELL B-CELL LYMPHOMA OF INTRATHORACIC LYMPH NODES (HCC): ICD-10-CM

## 2025-03-06 LAB — GLUCOSE SERPL-MCNC: 79 MG/DL (ref 65–140)

## 2025-03-06 PROCEDURE — 78815 PET IMAGE W/CT SKULL-THIGH: CPT

## 2025-03-06 PROCEDURE — 82948 REAGENT STRIP/BLOOD GLUCOSE: CPT

## 2025-03-06 PROCEDURE — A9552 F18 FDG: HCPCS

## 2025-03-07 DIAGNOSIS — N63.0 BREAST MASS IN FEMALE: ICD-10-CM

## 2025-03-07 DIAGNOSIS — E27.9 LESION OF ADRENAL GLAND (HCC): Primary | ICD-10-CM

## 2025-03-07 DIAGNOSIS — R93.89 ABNORMAL FINDING OF DIAGNOSTIC IMAGING: ICD-10-CM

## 2025-03-07 DIAGNOSIS — R92.8 OTHER ABNORMAL AND INCONCLUSIVE FINDINGS ON DIAGNOSTIC IMAGING OF BREAST: ICD-10-CM

## 2025-03-07 NOTE — PROGRESS NOTES
Patient with anorexia, weight loss.    Pet/ct ordered.     3/6/25 No suspicious FDG avid lymphadenopathy in the neck, chest, abdomen, or pelvis.     Incidentally noted FDG avid right breast nodule measuring 0.9 x 0.7 cm suspicious for malignancy. There is also suspicious asymmetric FDG uptake in the right breast parenchyma more superiorly. Recommend further evaluation beginning with mammography.     Mild asymmetric increased uptake in the left adrenal gland with SUV max 3.4 with questionable nodularity on CT. This is nonspecific and could be related to an underlying adrenal adenoma. This can be reassessed on follow-up PET/CT or with dedicated MRI   of the adrenals.    No recent mammogram.    Discussed MRI to evaluate adrenal nodule.  Discussed mammogram to evaluated right breast nodule.  She is agreeable.  Orders placed.  Patient requests [facility] transport.

## 2025-03-10 ENCOUNTER — TELEPHONE (OUTPATIENT)
Dept: HEMATOLOGY ONCOLOGY | Facility: CLINIC | Age: 75
End: 2025-03-10

## 2025-03-10 ENCOUNTER — TELEPHONE (OUTPATIENT)
Age: 75
End: 2025-03-10

## 2025-03-10 NOTE — TELEPHONE ENCOUNTER
Please schedule [facility] transport for MRI on 4/11/25. Address verified with patient on phone. Thank you!

## 2025-03-10 NOTE — TELEPHONE ENCOUNTER
Lvm. MRI ordered by provider. There are some screening questions we must go over first in order to get this scheduled left CS [phone] to get this appt scheduled.    A message has been sent over to the RBC in CV. Once both appts have been scheduled then we will schedule [facility] transport.

## 2025-03-12 ENCOUNTER — TELEPHONE (OUTPATIENT)
Age: 75
End: 2025-03-12

## 2025-03-12 NOTE — TELEPHONE ENCOUNTER
Patient called to notify her pcp that she recently had a PET scan done and the scan have revealed something located in her breast. Patient stated she is now scheduled to have a mammogram as well as an ultrasound completed. Patient wanted to make her pcp aware so that he can review results as they come in.

## 2025-03-13 ENCOUNTER — HOSPITAL ENCOUNTER (OUTPATIENT)
Dept: MAMMOGRAPHY | Facility: CLINIC | Age: 75
Discharge: HOME | End: 2025-03-13
Payer: MEDICARE

## 2025-03-13 ENCOUNTER — HOSPITAL ENCOUNTER (OUTPATIENT)
Dept: ULTRASOUND IMAGING | Facility: CLINIC | Age: 75
Discharge: HOME | End: 2025-03-13
Payer: MEDICARE

## 2025-03-13 DIAGNOSIS — R93.89 ABNORMAL FINDING OF DIAGNOSTIC IMAGING: ICD-10-CM

## 2025-03-13 DIAGNOSIS — R92.8 OTHER ABNORMAL AND INCONCLUSIVE FINDINGS ON DIAGNOSTIC IMAGING OF BREAST: ICD-10-CM

## 2025-03-13 DIAGNOSIS — N63.0 BREAST MASS IN FEMALE: ICD-10-CM

## 2025-03-13 DIAGNOSIS — R93.89 ABNORMAL FINDING ON CT SCAN: ICD-10-CM

## 2025-03-13 PROCEDURE — G0279 TOMOSYNTHESIS, MAMMO: HCPCS

## 2025-03-13 PROCEDURE — 77066 DX MAMMO INCL CAD BI: CPT

## 2025-03-13 PROCEDURE — 76642 ULTRASOUND BREAST LIMITED: CPT

## 2025-03-13 NOTE — PROGRESS NOTES
Met with patient and Dr. Patterson regarding recommendation for;    __x___ RIGHT ______LEFT      ___x__Ultrasound guided  ______Stereotactic breast biopsy.      __X___Verbalized understanding.    Reviewed clip placement with patient, pt states understanding: Yes: ___x_ No: ____  Comments:    Blood thinners:  No: ___x__ Yes: ______ What:          Biopsy teaching sheet given:  Yes: ___X___ No: ________    Pt given contact information and adv to call with any questions/needs    Patient advised to arrive at .130.. for a 2:00... appointment

## 2025-03-25 DIAGNOSIS — F41.9 ANXIETY: ICD-10-CM

## 2025-03-25 NOTE — TELEPHONE ENCOUNTER
Reason for call:   [x] Refill   [] Prior Auth  [] Other:     Office:   [x] PCP/Provider -   [] Specialty/Provider -     Medication: ALPRAZolam (XANAX) 0.5 mg     Dose/Frequency: Take 1 tablet (0.5 mg total) by mouth daily at bedtime as needed for anxiety     Quantity: 30    Pharmacy: [id number] [address] [facility]   Does the patient have enough for 3 days?   [x] Yes   [] No - Send as HP to POD    Mail Away Pharmacy   Does the patient have enough for 10 days?   [] Yes   [] No - Send as HP to POD

## 2025-03-25 NOTE — TELEPHONE ENCOUNTER
ALPRAZolam (XANAX) 0.5 mg tablet         Sig: Take 1 tablet (0.5 mg total) by mouth daily at bedtime as needed for anxiety    Disp: 30 tablet    Refills: 0    Start: 3/25/2025    Class: Normal    PDMP Review May Be Needed    For: Anxiety    Last ordered: 1 month ago (2/19/2025) by Kwame Ovalles DO    Psychiatry:  Anxiolytics/Hypnotics Kdsjzy2003/25/2025 09:39 AM   Protocol Details This refill cannot be delegated    Valid encounter within last 6 months      To be filled at: [id number] - [facility] [address]

## 2025-03-26 ENCOUNTER — APPOINTMENT (OUTPATIENT)
Dept: LAB | Age: 75
End: 2025-03-26
Payer: MEDICARE

## 2025-03-26 DIAGNOSIS — C91.10 CLL (CHRONIC LYMPHOCYTIC LEUKEMIA) (HCC): ICD-10-CM

## 2025-03-26 DIAGNOSIS — M81.0 AGE-RELATED OSTEOPOROSIS WITHOUT CURRENT PATHOLOGICAL FRACTURE: ICD-10-CM

## 2025-03-26 DIAGNOSIS — C83.02 SMALL CELL B-CELL LYMPHOMA OF INTRATHORACIC LYMPH NODES (HCC): ICD-10-CM

## 2025-03-26 DIAGNOSIS — D69.3 IDIOPATHIC THROMBOCYTOPENIC PURPURA (ITP) (HCC): ICD-10-CM

## 2025-03-26 DIAGNOSIS — E03.9 HYPOTHYROIDISM, UNSPECIFIED TYPE: ICD-10-CM

## 2025-03-26 LAB
ALBUMIN SERPL BCG-MCNC: 4 G/DL (ref 3.5–5)
ALP SERPL-CCNC: 40 U/L (ref 34–104)
ALT SERPL W P-5'-P-CCNC: 4 U/L (ref 7–52)
ANION GAP SERPL CALCULATED.3IONS-SCNC: 8 MMOL/L (ref 4–13)
AST SERPL W P-5'-P-CCNC: 13 U/L (ref 13–39)
BASOPHILS # BLD AUTO: 0.02 THOUSANDS/ÂΜL (ref 0–0.1)
BASOPHILS NFR BLD AUTO: 0 % (ref 0–1)
BILIRUB SERPL-MCNC: 0.45 MG/DL (ref 0.2–1)
BUN SERPL-MCNC: 10 MG/DL (ref 5–25)
CALCIUM SERPL-MCNC: 9.1 MG/DL (ref 8.4–10.2)
CHLORIDE SERPL-SCNC: 106 MMOL/L (ref 96–108)
CO2 SERPL-SCNC: 27 MMOL/L (ref 21–32)
CREAT SERPL-MCNC: 0.74 MG/DL (ref 0.6–1.3)
EOSINOPHIL # BLD AUTO: 0.02 THOUSAND/ÂΜL (ref 0–0.61)
EOSINOPHIL NFR BLD AUTO: 0 % (ref 0–6)
ERYTHROCYTE [DISTWIDTH] IN BLOOD BY AUTOMATED COUNT: 15.6 % (ref 11.6–15.1)
GFR SERPL CREATININE-BSD FRML MDRD: 81 ML/MIN/1.73SQ M
GLUCOSE SERPL-MCNC: 126 MG/DL (ref 65–140)
HCT VFR BLD AUTO: 40.6 % (ref 34.8–46.1)
HGB BLD-MCNC: 12.1 G/DL (ref 11.5–15.4)
IMM GRANULOCYTES # BLD AUTO: 0.02 THOUSAND/UL (ref 0–0.2)
IMM GRANULOCYTES NFR BLD AUTO: 0 % (ref 0–2)
LDH SERPL-CCNC: 164 U/L (ref 140–271)
LYMPHOCYTES # BLD AUTO: 0.8 THOUSANDS/ÂΜL (ref 0.6–4.47)
LYMPHOCYTES NFR BLD AUTO: 17 % (ref 14–44)
MCH RBC QN AUTO: 28.2 PG (ref 26.8–34.3)
MCHC RBC AUTO-ENTMCNC: 29.8 G/DL (ref 31.4–37.4)
MCV RBC AUTO: 95 FL (ref 82–98)
MONOCYTES # BLD AUTO: 0.34 THOUSAND/ÂΜL (ref 0.17–1.22)
MONOCYTES NFR BLD AUTO: 7 % (ref 4–12)
NEUTROPHILS # BLD AUTO: 3.58 THOUSANDS/ÂΜL (ref 1.85–7.62)
NEUTS SEG NFR BLD AUTO: 76 % (ref 43–75)
NRBC BLD AUTO-RTO: 0 /100 WBCS
PLATELET # BLD AUTO: 156 THOUSANDS/UL (ref 149–390)
PMV BLD AUTO: 13 FL (ref 8.9–12.7)
POTASSIUM SERPL-SCNC: 4.5 MMOL/L (ref 3.5–5.3)
PROT SERPL-MCNC: 6.8 G/DL (ref 6.4–8.4)
RBC # BLD AUTO: 4.29 MILLION/UL (ref 3.81–5.12)
SODIUM SERPL-SCNC: 141 MMOL/L (ref 135–147)
TSH SERPL DL<=0.05 MIU/L-ACNC: 2.5 UIU/ML (ref 0.45–4.5)
WBC # BLD AUTO: 4.78 THOUSAND/UL (ref 4.31–10.16)

## 2025-03-26 PROCEDURE — 88185 FLOWCYTOMETRY/TC ADD-ON: CPT | Performed by: PHYSICIAN ASSISTANT

## 2025-03-26 PROCEDURE — 88184 FLOWCYTOMETRY/ TC 1 MARKER: CPT

## 2025-03-26 PROCEDURE — 80053 COMPREHEN METABOLIC PANEL: CPT

## 2025-03-26 PROCEDURE — 83615 LACTATE (LD) (LDH) ENZYME: CPT

## 2025-03-26 PROCEDURE — 36415 COLL VENOUS BLD VENIPUNCTURE: CPT

## 2025-03-26 PROCEDURE — 84443 ASSAY THYROID STIM HORMONE: CPT

## 2025-03-26 PROCEDURE — 85025 COMPLETE CBC W/AUTO DIFF WBC: CPT

## 2025-03-26 RX ORDER — ALPRAZOLAM 0.5 MG
0.5 TABLET ORAL
Qty: 30 TABLET | Refills: 0 | Status: SHIPPED | OUTPATIENT
Start: 2025-03-26

## 2025-03-27 ENCOUNTER — RESULTS FOLLOW-UP (OUTPATIENT)
Dept: FAMILY MEDICINE CLINIC | Facility: CLINIC | Age: 75
End: 2025-03-27

## 2025-04-08 DIAGNOSIS — E03.9 HYPOTHYROIDISM, UNSPECIFIED TYPE: ICD-10-CM

## 2025-04-08 LAB — SCAN RESULT: NORMAL

## 2025-04-09 ENCOUNTER — TELEPHONE (OUTPATIENT)
Age: 75
End: 2025-04-09

## 2025-04-09 DIAGNOSIS — E03.9 HYPOTHYROIDISM, UNSPECIFIED TYPE: ICD-10-CM

## 2025-04-09 RX ORDER — LEVOTHYROXINE SODIUM 50 UG/1
TABLET ORAL
Qty: 30 TABLET | Refills: 5 | Status: SHIPPED | OUTPATIENT
Start: 2025-04-09 | End: 2025-04-10

## 2025-04-09 NOTE — TELEPHONE ENCOUNTER
Patient is calling to confirm that she is set up with [facility] for her MRI appointment on 4/11.  Please call her to confirm.

## 2025-04-10 RX ORDER — LEVOTHYROXINE SODIUM 50 UG/1
TABLET ORAL
Qty: 100 TABLET | Refills: 1 | Status: SHIPPED | OUTPATIENT
Start: 2025-04-10

## 2025-04-11 ENCOUNTER — HOSPITAL ENCOUNTER (OUTPATIENT)
Dept: MRI IMAGING | Facility: HOSPITAL | Age: 75
Discharge: HOME | End: 2025-04-11
Payer: MEDICARE

## 2025-04-11 DIAGNOSIS — E27.9 LESION OF ADRENAL GLAND (HCC): ICD-10-CM

## 2025-04-11 DIAGNOSIS — R93.89 ABNORMAL FINDING OF DIAGNOSTIC IMAGING: ICD-10-CM

## 2025-04-11 PROCEDURE — A9585 GADOBUTROL INJECTION: HCPCS | Performed by: PHYSICIAN ASSISTANT

## 2025-04-11 PROCEDURE — 74183 MRI ABD W/O CNTR FLWD CNTR: CPT

## 2025-04-11 RX ORDER — GADOBUTROL 604.72 MG/ML
5 INJECTION INTRAVENOUS
Status: COMPLETED | OUTPATIENT
Start: 2025-04-11 | End: 2025-04-11

## 2025-04-11 RX ADMIN — GADOBUTROL 5 ML: 604.72 INJECTION INTRAVENOUS at 12:35

## 2025-04-14 ENCOUNTER — TELEPHONE (OUTPATIENT)
Dept: HEMATOLOGY ONCOLOGY | Facility: CLINIC | Age: 75
End: 2025-04-14

## 2025-04-24 ENCOUNTER — TELEPHONE (OUTPATIENT)
Age: 75
End: 2025-04-24

## 2025-04-24 NOTE — TELEPHONE ENCOUNTER
Patient is calling to confirm that [facility] is set up for her appointment on 4/28, please call her to confirm.

## 2025-04-28 ENCOUNTER — HOSPITAL ENCOUNTER (OUTPATIENT)
Dept: MAMMOGRAPHY | Facility: CLINIC | Age: 75
Discharge: HOME | End: 2025-04-28
Payer: MEDICARE

## 2025-04-28 ENCOUNTER — HOSPITAL ENCOUNTER (OUTPATIENT)
Dept: ULTRASOUND IMAGING | Facility: CLINIC | Age: 75
Discharge: HOME | End: 2025-04-28
Payer: MEDICARE

## 2025-04-28 VITALS — DIASTOLIC BLOOD PRESSURE: 68 MMHG | HEART RATE: 78 BPM | SYSTOLIC BLOOD PRESSURE: 115 MMHG

## 2025-04-28 DIAGNOSIS — R92.8 ABNORMAL MAMMOGRAM: ICD-10-CM

## 2025-04-28 PROCEDURE — 19084 BX BREAST ADD LESION US IMAG: CPT

## 2025-04-28 PROCEDURE — 88341 IMHCHEM/IMCYTCHM EA ADD ANTB: CPT | Performed by: STUDENT IN AN ORGANIZED HEALTH CARE EDUCATION/TRAINING PROGRAM

## 2025-04-28 PROCEDURE — 88342 IMHCHEM/IMCYTCHM 1ST ANTB: CPT | Performed by: STUDENT IN AN ORGANIZED HEALTH CARE EDUCATION/TRAINING PROGRAM

## 2025-04-28 PROCEDURE — A4648 IMPLANTABLE TISSUE MARKER: HCPCS

## 2025-04-28 PROCEDURE — 19083 BX BREAST 1ST LESION US IMAG: CPT

## 2025-04-28 PROCEDURE — 88305 TISSUE EXAM BY PATHOLOGIST: CPT | Performed by: STUDENT IN AN ORGANIZED HEALTH CARE EDUCATION/TRAINING PROGRAM

## 2025-04-28 PROCEDURE — 88360 TUMOR IMMUNOHISTOCHEM/MANUAL: CPT | Performed by: STUDENT IN AN ORGANIZED HEALTH CARE EDUCATION/TRAINING PROGRAM

## 2025-04-28 RX ORDER — LIDOCAINE HYDROCHLORIDE 10 MG/ML
5 INJECTION, SOLUTION EPIDURAL; INFILTRATION; INTRACAUDAL; PERINEURAL ONCE
Status: COMPLETED | OUTPATIENT
Start: 2025-04-28 | End: 2025-04-28

## 2025-04-28 RX ADMIN — LIDOCAINE HYDROCHLORIDE 5 ML: 10 INJECTION, SOLUTION EPIDURAL; INFILTRATION; INTRACAUDAL; PERINEURAL at 14:21

## 2025-04-28 RX ADMIN — LIDOCAINE HYDROCHLORIDE 5 ML: 10 INJECTION, SOLUTION EPIDURAL; INFILTRATION; INTRACAUDAL; PERINEURAL at 14:35

## 2025-04-28 RX ADMIN — LIDOCAINE HYDROCHLORIDE 5 ML: 10 INJECTION, SOLUTION EPIDURAL; INFILTRATION; INTRACAUDAL; PERINEURAL at 14:29

## 2025-04-28 NOTE — PROGRESS NOTES
Ice pack given:    __X___yes _____no    Discharge instructions reviewed and given to patient by Leann    __X___yes _____no    Discharged via:    __X___amulatory    _____wheelchair    _____stretcher    Biopsy site clean and dry with no bleeding on discharge:    __X___yes ____no    Patient wrapped with an ace bandage with help of nurse Morris . Per Doctor patient to return tomorrow for nurse visit to remove ace bandage

## 2025-04-28 NOTE — PROGRESS NOTES
Procedure type: Site # 1    ___x__ultrasound guided _____stereotactic    Breast:    _____Left ___x__Right    Location:    Needle:    # of passes:    Clip: Iraida    Procedure type: Site # 2    ___x__ultrasound guided _____stereotactic    Breast:    _____Left ___x__Right    Location: 9 o'clock 4cmfn    Needle:12G    # of passes:3    Clip: mini iraida      Procedure type: Site # 3    ___x__ultrasound guided _____stereotactic    Breast:    _____Left ___x__Right    Location: 10 o'clock 6cmfn    Needle: 12G    # of passes: 3    Clip: butterly    Performed by: Dr. Randle    Pressure held for 15 minutes total  by:Aisha Woods and Dr. Henrry Tompkins Strips:    ___X__yes _____no    Band aid: guaze and ace bandage    __X___yes_____no    Tolerated procedure:    __X___yes _____no

## 2025-04-29 NOTE — PROGRESS NOTES
Post procedure check completed in person    Patient came into [facility] to have ACE wrap removed. Scant old blood present under steri strips. Slight bruising present. Band Aid removed. Patient Denies pain    Bleeding: _____yes __X___no    Pain: _____yes ___X___no    Redness/Swelling: ______yes ___X___no    Band aid removed: __x___yes _____no     Steri-Strips intact: ___X___yes _____no (discussed with patient to remove steri strips on Saturday if they have not come off on their own)    Pt with no questions at this time, adv will call when results available, adv to call with any questions or concerns, has name/# for contact

## 2025-04-30 DIAGNOSIS — F41.9 ANXIETY: ICD-10-CM

## 2025-04-30 RX ORDER — ALPRAZOLAM 0.5 MG
0.5 TABLET ORAL
Qty: 30 TABLET | Refills: 2 | Status: SHIPPED | OUTPATIENT
Start: 2025-04-30

## 2025-04-30 NOTE — TELEPHONE ENCOUNTER
Reason for call:   [x] Refill   [] Prior Auth  [] Other:     Office:   [x] PCP/Provider -   [] Specialty/Provider -     Medication:     ALPRAZolam (XANAX) 0.5 mg Take 1 tablet (0.5 mg total) by mouth daily at bedtime as needed for anxiety          Pharmacy:  [id number] - [place], [place]      Local Pharmacy   Does the patient have enough for 3 days?   [x] Yes   [] No - Send as HP to POD    Mail Away Pharmacy   Does the patient have enough for 10 days?   [] Yes   [] No - Send as HP to POD

## 2025-05-01 ENCOUNTER — TELEPHONE (OUTPATIENT)
Dept: HEMATOLOGY ONCOLOGY | Facility: CLINIC | Age: 75
End: 2025-05-01

## 2025-05-01 ENCOUNTER — TELEPHONE (OUTPATIENT)
Age: 75
End: 2025-05-01

## 2025-05-01 DIAGNOSIS — C80.1 DUCTAL CARCINOMA (HCC): Primary | ICD-10-CM

## 2025-05-01 PROCEDURE — 88360 TUMOR IMMUNOHISTOCHEM/MANUAL: CPT | Performed by: STUDENT IN AN ORGANIZED HEALTH CARE EDUCATION/TRAINING PROGRAM

## 2025-05-01 PROCEDURE — 88305 TISSUE EXAM BY PATHOLOGIST: CPT | Performed by: STUDENT IN AN ORGANIZED HEALTH CARE EDUCATION/TRAINING PROGRAM

## 2025-05-01 PROCEDURE — 88342 IMHCHEM/IMCYTCHM 1ST ANTB: CPT | Performed by: STUDENT IN AN ORGANIZED HEALTH CARE EDUCATION/TRAINING PROGRAM

## 2025-05-01 PROCEDURE — 88341 IMHCHEM/IMCYTCHM EA ADD ANTB: CPT | Performed by: STUDENT IN AN ORGANIZED HEALTH CARE EDUCATION/TRAINING PROGRAM

## 2025-05-01 NOTE — TELEPHONE ENCOUNTER
Pt would like a call back to discuss her MRI results that she stated was on 4/11/25. Pt can be reached at [phone]. Thank you.

## 2025-05-01 NOTE — TELEPHONE ENCOUNTER
Called and spoke with patient.  Gave her the results of breast biopsy which did identify invasive ductal carcinoma and DCIS.  Discussed evaluation with breast surgeon.  She is agreeable.  Referral made.

## 2025-05-02 ENCOUNTER — DOCUMENTATION (OUTPATIENT)
Dept: HEMATOLOGY ONCOLOGY | Facility: CLINIC | Age: 75
End: 2025-05-02

## 2025-05-02 NOTE — PROGRESS NOTES
All records needed are in patients chart. No records retrieval needed at this time.    Pt referred to surg onc and should be scheduled within 7 days.  Please notify me if not scheduled within time frame.    Referral received/ Chart reviewed for work up completed     Imaging completed: [facility]   [x] PET/CT   [] MRI   [] CT   [x] US   [x] Mammo   [] Bone scan   [] N/A    Pathology completed:    Date: 4/28/2025   Location: [facility]   []N/A    Additional records needed:    [] Genomic report   [] Genetic testing results   [] Office Note   [] Procedure/ Operative note   [] Lab results   [x] N/A      [] Radiation Oncology records retrieval needed (PN to route to rad/onc clerical pool once scheduled) N/A  Date:  Location:    Chart reviewed for prepping for initial outreach by nurse navigator.    Diagnosis: R IDC, DCIS, Grade 1 & 2, receptors pending, 3 separate sites    Recent Imaging:    3/13/2025- Bilateral diagnostic mammogram and ultrasound  4/28/2025- Right ultrasound guided breast biopsy x 3    Recent Pathology: 4/28/2025 tissue exam- Right ultrasound guided breast biopsy x 3      Previous Breast Cancer Diagnosis:  No    Does patient have a DIEGO ? yes x 2 and audible, in right breast 9:00 and 10:00 6 cm fn, butterfly clip in 9:00 periareolar    Is patient diabetic?  YES/NO: no  On medication?  N/A  Last A1C-  Date: N/A  Result: N/A    Is patient on a blood thinner?  no    Surgical Oncology: TBD    Medical Oncology: pt sees Aisha TIPTON for CLL    Radiation Oncology: TBD  Previous radiation treated noted?  None noted    Genetic testing: TBD    Family history of cancer: mother had colon cancer and father had liver cancer    Any further needs: TBD      Information forwarded to Outbound [facility]P      Nurse Navigator will call patient for initial outreach.      Will have Patient Navigator outreach patient after surgical oncology consultation.  Any clinical questions to be directed to ONN or office nurse.

## 2025-05-05 ENCOUNTER — PATIENT OUTREACH (OUTPATIENT)
Dept: HEMATOLOGY ONCOLOGY | Facility: CLINIC | Age: 75
End: 2025-05-05

## 2025-05-05 ENCOUNTER — DOCUMENTATION (OUTPATIENT)
Dept: HEMATOLOGY ONCOLOGY | Facility: CLINIC | Age: 75
End: 2025-05-05

## 2025-05-06 PROBLEM — C50.911 INVASIVE DUCTAL CARCINOMA OF RIGHT BREAST IN FEMALE (HCC): Status: ACTIVE | Noted: 2025-05-06

## 2025-05-07 ENCOUNTER — APPOINTMENT (OUTPATIENT)
Dept: LAB | Age: 75
End: 2025-05-07
Payer: MEDICARE

## 2025-05-07 DIAGNOSIS — D69.3 IDIOPATHIC THROMBOCYTOPENIC PURPURA (ITP) (HCC): ICD-10-CM

## 2025-05-07 DIAGNOSIS — C91.10 CLL (CHRONIC LYMPHOCYTIC LEUKEMIA) (HCC): ICD-10-CM

## 2025-05-07 LAB
BASOPHILS # BLD AUTO: 0.03 THOUSANDS/ÂΜL (ref 0–0.1)
BASOPHILS NFR BLD AUTO: 0 % (ref 0–1)
EOSINOPHIL # BLD AUTO: 0.04 THOUSAND/ÂΜL (ref 0–0.61)
EOSINOPHIL NFR BLD AUTO: 0 % (ref 0–6)
ERYTHROCYTE [DISTWIDTH] IN BLOOD BY AUTOMATED COUNT: 14.1 % (ref 11.6–15.1)
HCT VFR BLD AUTO: 36.8 % (ref 34.8–46.1)
HGB BLD-MCNC: 11.4 G/DL (ref 11.5–15.4)
IMM GRANULOCYTES # BLD AUTO: 0.04 THOUSAND/UL (ref 0–0.2)
IMM GRANULOCYTES NFR BLD AUTO: 0 % (ref 0–2)
LYMPHOCYTES # BLD AUTO: 0.73 THOUSANDS/ÂΜL (ref 0.6–4.47)
LYMPHOCYTES NFR BLD AUTO: 8 % (ref 14–44)
MCH RBC QN AUTO: 28.9 PG (ref 26.8–34.3)
MCHC RBC AUTO-ENTMCNC: 31 G/DL (ref 31.4–37.4)
MCV RBC AUTO: 93 FL (ref 82–98)
MONOCYTES # BLD AUTO: 0.36 THOUSAND/ÂΜL (ref 0.17–1.22)
MONOCYTES NFR BLD AUTO: 4 % (ref 4–12)
NEUTROPHILS # BLD AUTO: 7.85 THOUSANDS/ÂΜL (ref 1.85–7.62)
NEUTS SEG NFR BLD AUTO: 88 % (ref 43–75)
NRBC BLD AUTO-RTO: 0 /100 WBCS
PLATELET # BLD AUTO: 146 THOUSANDS/UL (ref 149–390)
PMV BLD AUTO: 13.3 FL (ref 8.9–12.7)
RBC # BLD AUTO: 3.94 MILLION/UL (ref 3.81–5.12)
WBC # BLD AUTO: 9.05 THOUSAND/UL (ref 4.31–10.16)

## 2025-05-07 PROCEDURE — 36415 COLL VENOUS BLD VENIPUNCTURE: CPT

## 2025-05-07 PROCEDURE — 85025 COMPLETE CBC W/AUTO DIFF WBC: CPT

## 2025-05-08 ENCOUNTER — PATIENT OUTREACH (OUTPATIENT)
Dept: CASE MANAGEMENT | Facility: OTHER | Age: 75
End: 2025-05-08

## 2025-05-08 ENCOUNTER — PATIENT OUTREACH (OUTPATIENT)
Dept: HEMATOLOGY ONCOLOGY | Facility: CLINIC | Age: 75
End: 2025-05-08

## 2025-05-08 DIAGNOSIS — Z17.1 MALIGNANT NEOPLASM OF RIGHT BREAST IN FEMALE, ESTROGEN RECEPTOR NEGATIVE, UNSPECIFIED SITE OF BREAST (HCC): Primary | ICD-10-CM

## 2025-05-08 DIAGNOSIS — C50.911 MALIGNANT NEOPLASM OF RIGHT BREAST IN FEMALE, ESTROGEN RECEPTOR NEGATIVE, UNSPECIFIED SITE OF BREAST (HCC): Primary | ICD-10-CM

## 2025-05-08 NOTE — PROGRESS NOTES
Breast Oncology Nurse Navigator    Called patient for initial outreach from nurse navigator.  Introduced myself and my role as well as members of the navigation team.  Oncology Nurse Navigator will follow patient until they are seen by surgical oncology, after which the patient navigator will initiate outreach and follow the patient to survivorship.      Referral received from [facility]  Breast cancer diagnosis was made after pt had a PET scan and a nodule was noted in right breast, pt was sent for diagnostic breast imaging, a right ultrasound guided breast biopsy x 3 was recommended, Pathology showed R IDC in 1 site and DCIS in the other 2 sites, Grade 2 triple negative  Patient states basic understanding/awareness of diagnosis    Does patient have a PCP?  yes  Confirm name. Kwame Ovalles DO  If no, referral placed to family medicine.  Insurance? yes Confirm insurance carrier: Medicare  Any other issues/diagnoses?  yes.  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 Connell  Any implanted devices?  no  Is patient a current smoker: no     Confirmed upcoming appointment with Dr. Abdi, including date, time and location.  Patient will be accompanied by her niece  Provided brief explanation of what to expect at this visit.  Patient has transportation to appointments.    Patient lives alone  Support system includes: her niece, she was never  and has no children, her brother is   Referral placed to oncology social worker: yes    Cancer family history includes: mother had colon cancer and dad had liver cancer  Referral to oncology genetics: no pt declined    Does patient have a history of cancer? yes - see above  Has patient received Radiation Therapy in the past? No  Where did you receive RT? N/A   When? N/A  Does patient have any implantable devices?   [] Pacemaker  [] Pain stimulator  [] Defibrillator  [] Implanted sleep apnea device  [x] none    Was it [facility] at [facility]? No   If not,  where was it placed?  N/A   When? N/A     Is patient pre/post menopausal?  post menopausal    Is patient taking hormone replacement therapy? N/A If so, how long?  N/A  Birth control?  N/A  Fertility issues? N/A    Discussed the Cancer Support Community and some of the programs and services offered, in person and virtually for patients and caregivers.  Discussed Breast Cancer Support group that [facility] at [facility].  Discussed [facility] and some services offered.      Patient has my contact information and knows she can reach out with questions.  Office hours provided.  Patient provided with the phone number for [employer]-[phone].  General assessment completed.  Patient does have [employer] set up but does not use it, she has no computer  Contact information given to pt.

## 2025-05-09 ENCOUNTER — PATIENT OUTREACH (OUTPATIENT)
Dept: CASE MANAGEMENT | Facility: OTHER | Age: 75
End: 2025-05-09

## 2025-05-09 NOTE — PROGRESS NOTES
Biopsychosocial and Barriers Assessment    Type of Cancer:Breast and small B cell lymphoma   Treatment plan: TBD  Noted barriers to care: none  Cultural/Mu-ism concerns: none   Hair Loss/ Wig resources needed: did not discuss today     DT completed: yes   DT score: 5/10  Issues noted:  fatigue, change in eating (not interested in speaking with dietician/only eating 1 meal a day, no snacks, no shakes), worry/anxiety, sadness/depression, loneliness    Marital status/Lives with: single, resides alone in an apartment which she can navigate   Pt's support system: patient reported that she has a niece that lives a mile away and she has some friends that she talks to daily.   Mental Health history: patient reported she has been dealing with anxiety and depression for awhile. She reported that she had been on Zoloft in the past with no benefit and stopped with provider's permission. Patient reported that praying helps improve her anxiety and depression at times. Patient denies SI. Patient denies interest in counseling or support groups.     Substance Abuse: denies     Employment/income source: retired     Concerns with bills (treatment vs household): denies concerns     Noted issues with home: none at this time    Narrative note: OSW outreached to patient assessment and dt completed today with patient. Patient reported that she is worries about get upcoming appointment with Dr. Abdi and what the next steps will be. Patient is open to OSW reaching out to her to check in. Patient did take OSW contact information to reach out for support if needed.

## 2025-05-12 NOTE — ASSESSMENT & PLAN NOTE
Newly diagnosed triple negative multi focal right breast cancer    3/13/25 Diagnostic mammogram:  IMPRESSION:  1.  1.3 cm irregular, hypoechoic mass in the right breast at 9:00, 4 cm from the nipple, which is highly suggestive of malignancy.  Recommend ultrasound-guided biopsy.     2.  Irregular, heterogeneous echogenicity area anterior to the mass mentioned in the impression point #1, which is suspicious for malignancy, noting correlative findings on recent FDG PET/CT 3/6/2025.  Recommend ultrasound-guided biopsy.     3.  Multiple morphologically abnormal right axillary lymph nodes, which are highly suggestive of malignancy.  Recommend ultrasound-guided biopsy.    PATHOLOGY RESULTS:   1) Mass [A] (Right; 9 o'clock; Middle)  The pathology results indicate a Malignant finding with invasive breast carcinoma and ductal carcinoma in situ.   Radiology and pathology are concordant.     3) Mass [D] (Right; 10 o'clock; 6 cm from nipple)  The pathology results indicate a Malignant finding with ductal carcinoma in situ.   Radiology and pathology are concordant.     2) Asymmetry [B] (Right; Outer Central; Anterior)  The pathology results indicate a Malignant finding with ductal carcinoma in situ.   Radiology and pathology are concordant.     In review of the patient’s recent imaging, the right malignancy appears multifocal. Suspicious masses cover an area of of approximately 7.1 cm measured on the cc view from the periareolar clip to the upper outer quadrant posterior Iraida clip.  Ultrasound of the right axilla was reperformed on 4/28/2025 and showed no suspicious adenopathy.  Recent imaging of the contralateral left breast dated 3/13/2025 was reviewed and shows no suspicious findings.     4/28/25 breast biopsy:  Final Diagnosis   A. Right Breast, 9:00, 4 cm From Nipple, 12G Ultrasound-Guided Core Needle Biopsy for Mass: - Invasive breast carcinoma Grade II, ER/NY negative (<1%), HER2 negative (score 1+)     5/14 Meeting with  Dr Abdi to discuss surgical options.     PLAN:  Will send off Caris testing on breast tissue   Patient was informed of diagnosis of triple negative breast cancer.  Final staging to be determined upon surgical resection.  However, patient was informed of aggressive nature of triple negative breast cancer.  Will await recommendations from Dr. Abdi to determine if patient would be best served by neoadjuvant versus adjuvant chemotherapy.  Given patient's multiple comorbidities, she is not a good candidate for keynote 522, i.e. anthracycline.  However, could consider treatment with Taxol, carboplatinum and pembrolizumab.  Reviewed with Dr. Connell, plan per Dr Connell. Potential side effects could include but may not be limited to: Myelosuppression, alopecia, cardiac toxicity, arrhythmias, anaphylaxis, mucositis, hypersensitivity reaction, blood pressure abnormalities, hepatotoxicity, arthralgias, myalgias, peripheral neuropathy, nail changes, electrolyte disturbances, nausea, vomiting, diarrhea, constipation, nephrotoxicity, immune related toxicities such as thyroiditis, colitis, etc  Questions asked and answered.  Patient was consented for Taxol, Carbo and pembrolizumab.    Will hold off ordering treatment plan until patient is evaluated by Dr. Abdi and surgical plan is decided.  Will add patient for discussion at breast multidisciplinary tumor board. I do have concerns given patients overall PS, multiple comorbidities and recent weight loss that she we will be able to tolerate chemotherapy.  Will reach out to oncology social worker as patient has limited social support.  Patient lives alone.

## 2025-05-12 NOTE — PROGRESS NOTES
Name: Nicolle Wood      : 1950      MRN: [id number]  Encounter Provider: MONTSERRAT Lee  Encounter Date: 2025   Encounter department: [facility]  :  Assessment & Plan  Invasive ductal carcinoma of right breast in female (HCC)  Newly diagnosed triple negative multi focal right breast cancer    3/13/25 Diagnostic mammogram:  IMPRESSION:  1.  1.3 cm irregular, hypoechoic mass in the right breast at 9:00, 4 cm from the nipple, which is highly suggestive of malignancy.  Recommend ultrasound-guided biopsy.     2.  Irregular, heterogeneous echogenicity area anterior to the mass mentioned in the impression point #1, which is suspicious for malignancy, noting correlative findings on recent FDG PET/CT 3/6/2025.  Recommend ultrasound-guided biopsy.     3.  Multiple morphologically abnormal right axillary lymph nodes, which are highly suggestive of malignancy.  Recommend ultrasound-guided biopsy.    PATHOLOGY RESULTS:   1) Mass [A] (Right; 9 o'clock; Middle)  The pathology results indicate a Malignant finding with invasive breast carcinoma and ductal carcinoma in situ.   Radiology and pathology are concordant.     3) Mass [D] (Right; 10 o'clock; 6 cm from nipple)  The pathology results indicate a Malignant finding with ductal carcinoma in situ.   Radiology and pathology are concordant.     2) Asymmetry [B] (Right; Outer Central; Anterior)  The pathology results indicate a Malignant finding with ductal carcinoma in situ.   Radiology and pathology are concordant.     In review of the patient’s recent imaging, the right malignancy appears multifocal. Suspicious masses cover an area of of approximately 7.1 cm measured on the cc view from the periareolar clip to the upper outer quadrant posterior Iraida clip.  Ultrasound of the right axilla was reperformed on 2025 and showed no suspicious adenopathy.  Recent imaging of the contralateral left breast dated 3/13/2025  was reviewed and shows no suspicious findings.     4/28/25 breast biopsy:  Final Diagnosis   A. Right Breast, 9:00, 4 cm From Nipple, 12G Ultrasound-Guided Core Needle Biopsy for Mass: - Invasive breast carcinoma Grade II, ER/MS negative (<1%), HER2 negative (score 1+)     5/14 Meeting with Dr Abdi to discuss surgical options.     PLAN:  Will send off Caris testing on breast tissue   Patient was informed of diagnosis of triple negative breast cancer.  Final staging to be determined upon surgical resection.  However, patient was informed of aggressive nature of triple negative breast cancer.  Will await recommendations from Dr. Abdi to determine if patient would be best served by neoadjuvant versus adjuvant chemotherapy.  Given patient's multiple comorbidities, she is not a good candidate for keynote 522, i.e. anthracycline.  However, could consider treatment with Taxol, carboplatinum and pembrolizumab.  Reviewed with Dr. Connell, plan per Dr Connell. Potential side effects could include but may not be limited to: Myelosuppression, alopecia, cardiac toxicity, arrhythmias, anaphylaxis, mucositis, hypersensitivity reaction, blood pressure abnormalities, hepatotoxicity, arthralgias, myalgias, peripheral neuropathy, nail changes, electrolyte disturbances, nausea, vomiting, diarrhea, constipation, nephrotoxicity, immune related toxicities such as thyroiditis, colitis, etc  Questions asked and answered.  Patient was consented for Taxol, Carbo and pembrolizumab.    Will hold off ordering treatment plan until patient is evaluated by Dr. Abdi and surgical plan is decided.  Will add patient for discussion at breast multidisciplinary tumor board. I do have concerns given patients overall PS, multiple comorbidities and recent weight loss that she we will be able to tolerate chemotherapy.  Will reach out to oncology social worker as patient has limited social support.  Patient lives alone.    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 On acalabrutinib 100 mg p.o. daily since 10/2020 with excellent tolerance.      10/2021 CT Scan showed resolution of retroperitoneal, retrocrural lymphadenopathy.    8/14/24 Ct CAP compared to 2020 scan -Interval decrease in previously noted retrocrural and retroperitoneal lymph nodes.     3/6/25 PET CT showed no suspicious FDG avid lymphadenopathy in the neck chest abdomen or pelvis.  However FDG avid right breast nodule measuring 0.9 x 0.7 cm suspicious for malignancy was noted which prompted workup leading to newly diagnosed right breast cancer as noted above    3/26/25 leukemia lymphoma flow cytometry on peripheral blood shows low-level involvement of her known lymphoproliferative disorder.  Continue acalabrutinib daily    Idiopathic thrombocytopenic purpura (ITP) (HCC)  Chronic ITP, Presented 6/2020 with platelet count 1000. She responded initially to steroids, counts dropped with taper. No prolonged response to Promacta.   9/1/2020 Prednisone 40 mg po daily restarted, reduced by 10 mg po weekly.   She was on prednisone 5mg po daily.   11/2/20 platelet count 27,000.   11/12/20 Platelet count [id number]   11/17/20 Prednisone increased to 10mg daily,  11/23/20  increased to 20mg when platelets decreased to 12,000.   11/2022 prednisone 5 mg alternating with 10 mg the next day, reduced to 5 mg PO daily . 2/7/23  prednisone Reduced to 2.5mg po daily.   8/2023 steroid discontinued.      12/2020  N-plate initiated.    She currently is on 250 mcg every 4 weeks.     2/13/25 last dose of N-plate received. Has been on hold as platelets have been stable >100  5/7/25 Platelet count 146.  Continue to hold N-plate   Weight loss  Patient has lost 12 pounds over the last 3 months.  Has previously followed with palliative care for anorexia  Does not wish to reestablish with palliative care at this time.\  Provided with Ensure samples.  We discussed  importance of proper nutrition and multiple strategies to increase protein in her diet as this is important if she is to undergo surgery and chemotherapy.  Will place referral to oncology social work as she does seem to have limited social support      Patient was seen together with Dr. Connell today.  Will arrange follow-up once her surgical treatment plan has been established to determine whether she will require neoadjuvant versus adjuvant chemotherapy.  Patient verbalized understanding of this plan of care.  She knows to call anytime with questions or concerns      No follow-ups on file.    History of Present Illness   Chief Complaint   Patient presents with    Follow-up     Nicolle Wood is a 75 year-old  female who was admitted to [facility] 6/2020 regarding easy bruising and bleeding.      She has history of polyclonal gammopathy, with no evidence of monoclonal protein, Sjogren syndrome, autoimmune connective tissue disease with highly elevated sed rate, CRP, BALDO, rheumatoid factor. PT and PTT normal.      She is followed by Dr. Alexandra with Rheumatology. She was prescribed hydroxychloroquine 200 milligram p.o. B.i.d.      6/18/2020 She was found to have grade 4 thrombocytopenia with platelet count of 1000, hemoglobin 10, WBC 7.2, 63% neutrophils, 34 percent lymphocytes.     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     She was treated with dexamethasone for 3 days with improvement of platelet count up to 62,000  6/22/21      On 06/25/2020 platelet count of 10,000      Initiated on Promacta 75 milligram p.o. Daily in July 2020, platelet count up gradually with platelet count of 73,000 on 08/17/2020. hemoglobin 11.4, WBC 5.0   8/28/20: Patient had CBC as gums were bleeding that morning AM, no further bleeding. Platelet count was 19,000.      Promacta increased to 150mg every other day and 75 mg the other days.   Platelet count was 12,000 8/31/20 and she was advised to initiate prednisone 40mg po daily, tapering by 10mg po weekly. Promacta returned to 75mg po daily.   Platelet count increased to 173,000 9/4/20.      She had BMBX 9/9/20: B-cell lymphoproliferative disorder, compatible with chronic lymphocytic leukemia. Virtually absent stainable storage iron.   On flow cytometry, CLL phenotype accounted for 11%; CD5+, CD23+, CD20+ (dim) and CD 38-      9/14/20: platelet count 283.   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 b.i.d. since the beginning of October 2020.     She was seen on 10/14/2020 with severe depression requiring admission to the hospital, she is on Zoloft 25 mg p.o. daily, Synthroid 75 micro g p.o. daily      Acalabrutinib was reduced to 100 mg p.o. daily 11/2020   Recurrence of thrombocytopenia 27,000 11/2/20 once prednisone was discontinued.   Prednisone re-introduced back at 5 mg p.o. daily, platelet count increased to 50,000 range, fluctuated to 20,000 range, prednisone increased to 10mg daily 11/17/20, increased to 20mg 11/23/20 when platelets decreased to 12,000.      N-plate ordered 12/15/20 when platelet count 29,000 12/15/20. And given every 4 weeks      Prednisone has been slowly tapered. 5 mg alternating with 10 mg the next day     Dexa scan showed osteoporosis in August 2022 1/30/23 presented to ED for BRBPR.  On examination - No external hemorrhoids. No anal fissure. Small amount of bright red blood on digital rectal exam. No pain with digital rectal  exam. Small amount of blood in pad.  Hemoglobin 13.2.  referral to GI made.  UTI 2/3/23, abx rx at urgent care.      3/30/23  Colonoscopy-sessile polyps removed from the cecum, small internal hemorrhoids, scattered diverticula.  Random colon biopsies negative      At her June 7, 2023 f/u with GI reported unintentional weight loss of 10 pounds over 3 months and decreased appetite.  CT scan chest abdomen pelvis was ordered     EGD ordered 6/7/23.  Patient cancelled her procedure 8/11/23 and didn't want to r/s.     6/12/23  Albumin 2.9;  Total protein 6.4, normal quantitative immunoglobulins, ferritin 73, iron saturation 22%     7/13/23  Normal cbcd     10/2023 consult with neurology re: balance, tremor- MRI 10/30/23     2/1/ 2024 hemoglobin 12.6, MCV 97, white blood cell count 4.24, 70% neutrophils, 1% immature granulocytes, 18% lymphocytes, 9% monocytes, 1% eosinophils, platelet count 119  CMP stable      Oncology History   Cancer Staging   No matching staging information was found for the patient.  Oncology History   Invasive ductal carcinoma of right breast in female (HCC)   3/6/2025 Observation    NM PET CT    No suspicious FDG avid lymphadenopathy in the neck, chest, abdomen, or pelvis.  Incidentally noted FDG avid right breast nodule measuring 0.9 x 0.7 cm suspicious for malignancy. There is also suspicious asymmetric FDG uptake in the right breast parenchyma more superiorly. Recommend further evaluation beginning with mammography. Mild asymmetric increased uptake in the left adrenal gland with SUV max 3.4 with questionable nodularity on CT. This is nonspecific and could be related to an underlying adrenal adenoma. This can be reassessed on follow-up PET/CT or with dedicated MRI of the adrenals.     4/28/2025 Biopsy    Right US-guided bx    A. 9:00 4cmfn  IDC  Grade 1  ER <1, NC <1, HER2 1+  2cm    B. 10:00 6cmfn  DCIS  Grade 2  ER <1, NC <1    C. 9:00 periareolar  DCIS  Grade 2  ER <1, NC <1     5/13/2025 -  "5/13/2025 Chemotherapy    DOXOrubicin (ADRIAMYCIN), 60 mg/m2 = 89 mg, 0 of 4 cycles  cyclophosphamide (CYTOXAN) IVPB, 600 mg/m2 = 888 mg, 0 of 4 cycles  CARBOplatin (PARAPLATIN) IVPB (GOG AUC DOSING), , 0 of 4 cycles  PACLItaxel (TAXOL) chemo IVPB, 80 mg/m2 = 118.2 mg, 0 of 4 cycles  pembrolizumab (KEYTRUDA) IVPB, 200 mg, 0 of 17 cycles        Interval Medical History     05/13/25: Nicolle presents for follow-up for history of SLL/CLL on acalabrutinib.  She also receives Nplate for chronic ITP.  Recent PET CT scan ordered for evaluation of cervical adenopathy demonstrated suspicious asymmetric FDG uptake in the right breast parenchyma more superiorly.  Mammogram was ordered for further evaluation.  This was completed on 3/13/2025 and demonstrated a 1.3 cm irregular hypoechogenic mass in the right breast 4 cm from the nipple which was highly suggestive of malignancy.  She did undergo an ultrasound guided biopsy on 4/28/2025 and pathology confirmed triple negative DCIS grade 2 carcinoma.  She will be seeing Dr. Abdi from surgical oncology tomorrow.  Recent blood work from 5/7/2025 shows normal white count 9.05, hemoglobin 11.4, MCV 93, platelets 146, ANC 7.85  Her Nplate has been on hold since 2/13/2025.  She has been able to sustain a platelet count greater than 100,000 since last dose of Nplate         Weight down to 98 lbs for 107 3 months ago. No N/V, just not hungry.       Review of Systems   Constitutional:  Positive for fatigue and unexpected weight change.   Neurological:  Positive for dizziness.   All other systems reviewed and are negative.          Objective   /80 (BP Location: Left arm, Patient Position: Sitting, Cuff Size: Adult)   Pulse 72   Temp 97.6 °F (36.4 °C) (Temporal)   Resp 17   Ht 5' 6\" (1.676 m)   Wt 44.5 kg (98 lb)   SpO2 99%   BMI 15.82 kg/m²     Pain Screening:  Pain Score: 0-No pain  ECOG     Physical Exam  Constitutional:       General: She is not in acute distress.     " Comments: Thin appearing   HENT:      Head: Normocephalic and atraumatic.   Eyes:      General: No scleral icterus.        Right eye: No discharge.         Left eye: No discharge.      Conjunctiva/sclera: Conjunctivae normal.   Cardiovascular:      Rate and Rhythm: Normal rate and regular rhythm.   Pulmonary:      Effort: Pulmonary effort is normal. No respiratory distress.      Breath sounds: Normal breath sounds.   Chest:   Breasts:     Right: Mass present. No inverted nipple, skin change or tenderness.      Left: Normal.       Abdominal:      General: Bowel sounds are normal. There is no distension.      Palpations: Abdomen is soft. There is no mass.      Tenderness: There is no abdominal tenderness.   Musculoskeletal:         General: Normal range of motion.   Lymphadenopathy:      Cervical: No cervical adenopathy.      Upper Body:      Right upper body: No supraclavicular, axillary or pectoral adenopathy.      Left upper body: No supraclavicular, axillary or pectoral adenopathy.   Skin:     General: Skin is warm and dry.   Neurological:      General: No focal deficit present.      Mental Status: She is alert and oriented to person, place, and time.   Psychiatric:         Mood and Affect: Mood normal.         Behavior: Behavior normal.         Labs: I have reviewed the following labs:  Lab Results   Component Value Date/Time    WBC 9.05 05/07/2025 09:41 AM    RBC 3.94 05/07/2025 09:41 AM    Hemoglobin 11.4 (L) 05/07/2025 09:41 AM    Hematocrit 36.8 05/07/2025 09:41 AM    MCV 93 05/07/2025 09:41 AM    MCH 28.9 05/07/2025 09:41 AM    RDW 14.1 05/07/2025 09:41 AM    Platelets 146 (L) 05/07/2025 09:41 AM    Segmented % 88 (H) 05/07/2025 09:41 AM    Lymphocytes % 8 (L) 05/07/2025 09:41 AM    Monocytes % 4 05/07/2025 09:41 AM    Eosinophils Relative 0 05/07/2025 09:41 AM    Basophils Relative 0 05/07/2025 09:41 AM    Immature Grans % 0 05/07/2025 09:41 AM    Absolute Neutrophils 7.85 (H) 05/07/2025 09:41 AM     Lab  Results   Component Value Date/Time    Potassium 4.5 03/26/2025 09:29 AM    Chloride 106 03/26/2025 09:29 AM    CO2 27 03/26/2025 09:29 AM    BUN 10 03/26/2025 09:29 AM    Creatinine 0.74 03/26/2025 09:29 AM    Glucose, Fasting 95 09/12/2024 09:36 AM    Calcium 9.1 03/26/2025 09:29 AM    AST 13 03/26/2025 09:29 AM    ALT 4 (L) 03/26/2025 09:29 AM    Alkaline Phosphatase 40 03/26/2025 09:29 AM    Total Protein 6.8 03/26/2025 09:29 AM    Albumin 4.0 03/26/2025 09:29 AM    Total Bilirubin 0.45 03/26/2025 09:29 AM    eGFR 81 03/26/2025 09:29 AM           Administrative Statements   I have spent a total time of 45 minutes in caring for this patient on the day of the visit/encounter including Diagnostic results, Prognosis, Instructions for management, Patient and family education, Impressions, Counseling / Coordination of care, Documenting in the medical record, Reviewing/placing orders in the medical record (including tests, medications, and/or procedures), and Obtaining or reviewing history  .

## 2025-05-12 NOTE — ASSESSMENT & PLAN NOTE
CLL/ SLL dx 6/2020 when she presented with AIHA, ITP. On acalabrutinib 100 mg p.o. daily since 10/2020 with excellent tolerance.      10/2021 CT Scan showed resolution of retroperitoneal, retrocrural lymphadenopathy.    8/14/24 Ct CAP compared to 2020 scan -Interval decrease in previously noted retrocrural and retroperitoneal lymph nodes.     3/6/25 PET CT showed no suspicious FDG avid lymphadenopathy in the neck chest abdomen or pelvis.  However FDG avid right breast nodule measuring 0.9 x 0.7 cm suspicious for malignancy was noted which prompted workup leading to newly diagnosed right breast cancer as noted above    3/26/25 leukemia lymphoma flow cytometry on peripheral blood shows low-level involvement of her known lymphoproliferative disorder.  Continue acalabrutinib daily

## 2025-05-12 NOTE — ASSESSMENT & PLAN NOTE
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 She responded initially to steroids, counts dropped with taper. No prolonged response to Promacta.   9/1/2020 Prednisone 40 mg po daily restarted, reduced by 10 mg po weekly.   She was on prednisone 5mg po daily.   11/2/20 platelet count 27,000.   11/12/20 Platelet count 08615   11/17/20 Prednisone increased to 10mg daily,  11/23/20  increased to 20mg when platelets decreased to 12,000.   11/2022 prednisone 5 mg alternating with 10 mg the next day, reduced to 5 mg PO daily . 2/7/23  prednisone Reduced to 2.5mg po daily.   8/2023 steroid discontinued.      12/2020  N-plate initiated.    She currently is on 250 mcg every 4 weeks.     2/13/25 last dose of N-plate received. Has been on hold as platelets have been stable >100  5/7/25 Platelet count 146.  Continue to hold N-plate

## 2025-05-13 ENCOUNTER — OFFICE VISIT (OUTPATIENT)
Dept: HEMATOLOGY ONCOLOGY | Facility: CLINIC | Age: 75
End: 2025-05-13
Payer: MEDICARE

## 2025-05-13 VITALS
RESPIRATION RATE: 17 BRPM | DIASTOLIC BLOOD PRESSURE: 80 MMHG | BODY MASS INDEX: 15.75 KG/M2 | TEMPERATURE: 97.6 F | OXYGEN SATURATION: 99 % | WEIGHT: 98 LBS | SYSTOLIC BLOOD PRESSURE: 132 MMHG | HEIGHT: 66 IN | HEART RATE: 72 BPM

## 2025-05-13 DIAGNOSIS — D69.3 IDIOPATHIC THROMBOCYTOPENIC PURPURA (ITP) (HCC): ICD-10-CM

## 2025-05-13 DIAGNOSIS — C50.911 INVASIVE DUCTAL CARCINOMA OF RIGHT BREAST IN FEMALE (HCC): Primary | ICD-10-CM

## 2025-05-13 DIAGNOSIS — R63.4 WEIGHT LOSS: ICD-10-CM

## 2025-05-13 DIAGNOSIS — C83.02 SMALL CELL B-CELL LYMPHOMA OF INTRATHORACIC LYMPH NODES (HCC): ICD-10-CM

## 2025-05-13 PROCEDURE — 99215 OFFICE O/P EST HI 40 MIN: CPT | Performed by: NURSE PRACTITIONER

## 2025-05-13 NOTE — ASSESSMENT & PLAN NOTE
Patient has lost 12 pounds over the last 3 months.  Has previously followed with palliative care for anorexia  Does not wish to reestablish with palliative care at this time.\  Provided with Ensure samples.  We discussed importance of proper nutrition and multiple strategies to increase protein in her diet as this is important if she is to undergo surgery and chemotherapy.  Will place referral to oncology social work as she does seem to have limited social support      Patient was seen together with Dr. Connell today.  Will arrange follow-up once her surgical treatment plan has been established to determine whether she will require neoadjuvant versus adjuvant chemotherapy.  Patient verbalized understanding of this plan of care.  She knows to call anytime with questions or concerns

## 2025-05-14 ENCOUNTER — OFFICE VISIT (OUTPATIENT)
Dept: SURGICAL ONCOLOGY | Facility: CLINIC | Age: 75
End: 2025-05-14
Payer: MEDICARE

## 2025-05-14 ENCOUNTER — DOCUMENTATION (OUTPATIENT)
Dept: HEMATOLOGY ONCOLOGY | Facility: CLINIC | Age: 75
End: 2025-05-14

## 2025-05-14 ENCOUNTER — PATIENT OUTREACH (OUTPATIENT)
Dept: HEMATOLOGY ONCOLOGY | Facility: CLINIC | Age: 75
End: 2025-05-14

## 2025-05-14 VITALS
WEIGHT: 99 LBS | HEART RATE: 71 BPM | DIASTOLIC BLOOD PRESSURE: 68 MMHG | TEMPERATURE: 97.3 F | BODY MASS INDEX: 15.91 KG/M2 | HEIGHT: 66 IN | OXYGEN SATURATION: 99 % | SYSTOLIC BLOOD PRESSURE: 118 MMHG

## 2025-05-14 DIAGNOSIS — C50.911 INVASIVE DUCTAL CARCINOMA OF RIGHT BREAST IN FEMALE (HCC): ICD-10-CM

## 2025-05-14 DIAGNOSIS — C50.411 MALIGNANT NEOPLASM OF UPPER-OUTER QUADRANT OF RIGHT BREAST IN FEMALE, ESTROGEN RECEPTOR NEGATIVE (HCC): Primary | ICD-10-CM

## 2025-05-14 DIAGNOSIS — Z17.1 MALIGNANT NEOPLASM OF UPPER-OUTER QUADRANT OF RIGHT BREAST IN FEMALE, ESTROGEN RECEPTOR NEGATIVE (HCC): Primary | ICD-10-CM

## 2025-05-14 DIAGNOSIS — R92.8 ABNORMAL MAMMOGRAM OF LEFT BREAST: ICD-10-CM

## 2025-05-14 PROCEDURE — 99205 OFFICE O/P NEW HI 60 MIN: CPT | Performed by: SURGERY

## 2025-05-14 RX ORDER — TRAMADOL HYDROCHLORIDE 50 MG/1
50 TABLET ORAL EVERY 8 HOURS PRN
Qty: 15 TABLET | Refills: 0 | Status: CANCELLED | OUTPATIENT
Start: 2025-05-14

## 2025-05-14 RX ORDER — OXYCODONE AND ACETAMINOPHEN 5; 325 MG/1; MG/1
1 TABLET ORAL EVERY 6 HOURS PRN
Qty: 12 TABLET | Refills: 0 | Status: SHIPPED | OUTPATIENT
Start: 2025-05-14

## 2025-05-14 RX ORDER — ENOXAPARIN SODIUM 300 MG/3ML
30 INJECTION INTRAVENOUS; SUBCUTANEOUS ONCE
OUTPATIENT
Start: 2025-05-14 | End: 2025-05-14

## 2025-05-14 NOTE — ASSESSMENT & PLAN NOTE
Orders:    Case request operating room: RIGHT MASTECTOMY WITH BIOPSY LYMPH NODE SENTINEL; Standing

## 2025-05-14 NOTE — PROGRESS NOTES
In-basket message received from MONTSERRAT Shay to add patient to the breast [facility] on 5/19/2025. Chart reviewed and prep completed.

## 2025-05-14 NOTE — H&P (VIEW-ONLY)
Name: Nicolle Wood      : 1950      MRN: [id number]  Encounter Provider: Li Abdi MD  Encounter Date: 2025   Encounter department: [facility] [place]  :  Assessment & Plan  Invasive ductal carcinoma of right breast in female (HCC)    Orders:    Case request operating room: RIGHT MASTECTOMY WITH BIOPSY LYMPH NODE SENTINEL; Standing    Malignant neoplasm of upper-outer quadrant of right breast in female, estrogen receptor negative (HCC)    Orders:    UA w Reflex to Microscopic w Reflex to Culture; Future    Comprehensive metabolic panel; Future    CBC and differential; Future    EKG 12 lead; Future    XR chest pa and lateral; Future    NM lymphatic breast; Future    naloxone (NARCAN) 4 mg/0.1 mL nasal spray; Administer 1 spray into a nostril. If no response after 2-3 minutes, give another dose in the other nostril using a new spray.    oxyCODONE-acetaminophen (Percocet) 5-325 mg per tablet; Take 1 tablet by mouth every 6 (six) hours as needed for moderate pain Max Daily Amount: 4 tablets    Abnormal mammogram of left breast    Orders:    Mammo diagnostic left w 3d and cad; Future    75-year-old female who presents with multifocal/multicentric carcinoma of the right breast with total extent of disease spanning greater than 7 cm.  This is triple negative in status.  The invasive focus is 2 cm i.e. T1c N0.  I discussed these findings with the patient and her niece who accompanies her today.  I reviewed her images with her.  We discussed the multimodality treatment of breast cancer to include surgery, radiation and medical therapy.  I also reached out to her treating oncologist who prefers upfront surgery.  Given the extent of disease in the breast, she is not a good candidate for breast conservation.  I recommended a mastectomy with sentinel node biopsy to her.  She understands and consents to surgery.  I see no current indications for postmastectomy radiation.  All  of her questions were answered.  Consent was signed today in the office.  She will be scheduled for surgery in the near term.      History of Present Illness   Nicolle Wood is a 75 y.o. year old female who presents newly diagnosed carcinoma of the right breast.  She had a PET scan secondary to her lymphoma and CLL which revealed a nodule in the breast itself.  In retrospect this is palpable in nature.  She had diagnostic imaging and a 3 site biopsy of the right breast.  She also needs short-term follow-up of the left breast.  She denies any family history of breast cancer.  She is declining genetic testing.     Oncology History   Cancer Staging   No matching staging information was found for the patient.  Oncology History   Invasive ductal carcinoma of right breast in female (HCC)   3/6/2025 Observation    NM PET CT    No suspicious FDG avid lymphadenopathy in the neck, chest, abdomen, or pelvis.  Incidentally noted FDG avid right breast nodule measuring 0.9 x 0.7 cm suspicious for malignancy. There is also suspicious asymmetric FDG uptake in the right breast parenchyma more superiorly. Recommend further evaluation beginning with mammography. Mild asymmetric increased uptake in the left adrenal gland with SUV max 3.4 with questionable nodularity on CT. This is nonspecific and could be related to an underlying adrenal adenoma. This can be reassessed on follow-up PET/CT or with dedicated MRI of the adrenals.     4/28/2025 Biopsy    Right US-guided bx    A. 9:00 4cmfn  IDC  Grade 1  ER <1, VA <1, HER2 1+  2cm    B. 10:00 6cmfn  DCIS  Grade 2  ER <1, VA <1    C. 9:00 periareolar  DCIS  Grade 2  ER <1, VA <1     5/13/2025 - 5/13/2025 Chemotherapy    DOXOrubicin (ADRIAMYCIN), 60 mg/m2 = 89 mg, 0 of 4 cycles  cyclophosphamide (CYTOXAN) IVPB, 600 mg/m2 = 888 mg, 0 of 4 cycles  CARBOplatin (PARAPLATIN) IVPB (GOG AUC DOSING), , 0 of 4 cycles  PACLItaxel (TAXOL) chemo IVPB, 80 mg/m2 = 118.2 mg, 0 of 4 cycles  pembrolizumab  (KEYTRUDA) IVPB, 200 mg, 0 of 17 cycles        Review of Systems   Constitutional:  Positive for appetite change, fatigue and unexpected weight change. Negative for fever.   HENT:  Positive for hearing loss. Negative for trouble swallowing.    Eyes: Negative.    Respiratory:  Positive for shortness of breath. Negative for cough.    Cardiovascular:  Positive for leg swelling. Negative for chest pain.   Gastrointestinal: Negative.  Negative for abdominal pain, nausea and vomiting.   Endocrine: Negative.    Genitourinary: Negative.  Negative for dysuria.   Musculoskeletal: Negative.  Negative for arthralgias and myalgias.   Skin: Negative.    Allergic/Immunologic: Positive for immunocompromised state.   Neurological:  Positive for dizziness and light-headedness. Negative for headaches.   Hematological: Negative.  Negative for adenopathy. Does not bruise/bleed easily.   Psychiatric/Behavioral:  The patient is nervous/anxious.         Depression    A complete review of systems is negative other than that noted above in the HPI.    Past Medical History   Past Medical History:   Diagnosis Date    Anxiety     Autoimmune hemolytic anemia (HCC)     Cataract     Disease of thyroid gland     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HCC)     GERD (gastroesophageal reflux disease)     Hypothyroidism     Hypothyroidism     Idiopathic thrombocytopenic purpura (ITP) (HCC)     Menopause     Sjogrens syndrome (HCC)      Past Surgical History:   Procedure Laterality Date    BREAST BIOPSY Right 04/28/2025    CATARACT EXTRACTION Left     COLONOSCOPY  2012    per pt    FL LUMBAR PUNCTURE DIAGNOSTIC  05/11/2018    TOOTH EXTRACTION      US GUIDANCE BREAST BIOPSY RIGHT EACH ADDITIONAL Right 4/28/2025    US GUIDANCE BREAST BIOPSY RIGHT EACH ADDITIONAL Right 4/28/2025    US GUIDED BREAST BIOPSY RIGHT COMPLETE Right 4/28/2025     Family History   Problem Relation Age of Onset    Colon cancer Mother     Heart disease Mother         cardiac disorder     Cancer Mother   "   Liver cancer Father     Cancer Father     Heart disease Brother       reports that she has never smoked. She has never used smokeless tobacco. She reports that she does not drink alcohol and does not use drugs.  Current Outpatient Medications   Medication Instructions    ALPRAZolam (XANAX) 0.5 mg, Oral, Daily at bedtime PRN    Calquence 100 MG TABS Take 1 tablet (100mg total)  by mouth once daily.    levothyroxine 50 mcg tablet take 1 tablet by mouth ONCE every morning    methocarbamol (ROBAXIN) 500 mg, Oral, Every 8 hours PRN    naloxone (NARCAN) 4 mg/0.1 mL nasal spray Administer 1 spray into a nostril. If no response after 2-3 minutes, give another dose in the other nostril using a new spray.    OLANZapine (ZYPREXA ZYDIS) 5 mg, Oral, Daily at bedtime    oxyCODONE-acetaminophen (Percocet) 5-325 mg per tablet 1 tablet, Oral, Every 6 hours PRN    sertraline (ZOLOFT) 50 mg, Oral, Daily    Vitamin D, Cholecalciferol, 10 MCG (400 UNIT) CHEW Chew   Allergies[1]        Objective   /68 (BP Location: Left arm, Patient Position: Sitting, Cuff Size: Standard)   Pulse 71   Temp (!) 97.3 °F (36.3 °C) (Temporal)   Ht 5' 6\" (1.676 m)   Wt 44.9 kg (99 lb)   SpO2 99%   BMI 15.98 kg/m²     Pain Screening:  Pain Score: 0-No pain  ECOG    Physical Exam  Constitutional:       General: She is not in acute distress.     Appearance: Normal appearance. She is well-developed.   HENT:      Head: Normocephalic and atraumatic.     Cardiovascular:      Heart sounds: Normal heart sounds.   Pulmonary:      Breath sounds: Normal breath sounds.   Chest:   Breasts:     Right: Mass present. No swelling, bleeding, inverted nipple, nipple discharge, skin change or tenderness.      Left: No swelling, bleeding, inverted nipple, mass, nipple discharge, skin change or tenderness.     Abdominal:      Palpations: Abdomen is soft.     Musculoskeletal:      Right lower leg: Edema present.      Left lower leg: Edema present.   Lymphadenopathy: "      Upper Body:      Right upper body: No supraclavicular, axillary or pectoral adenopathy.      Left upper body: No supraclavicular, axillary or pectoral adenopathy.     Neurological:      Mental Status: She is alert and oriented to person, place, and time.     Psychiatric:         Mood and Affect: Mood normal.         Labs: I have reviewed pertinent labs.     4/28/2025 core biopsy right breast 9:00 4 cm from the nipple reveals grade 1 triple negative invasive ductal carcinoma; additional biopsies at 10:00 6 cm from the nipple and 9:00 periareolar anterior both revealed intermediate grade DCIS estrogen and progesterone receptor negative    Appointment on 05/07/2025   Component Date Value Ref Range Status    WBC 05/07/2025 9.05  4.31 - 10.16 Thousand/uL Final    RBC 05/07/2025 3.94  3.81 - 5.12 Million/uL Final    Hemoglobin 05/07/2025 11.4 (L)  11.5 - 15.4 g/dL Final    Hematocrit 05/07/2025 36.8  34.8 - 46.1 % Final    MCV 05/07/2025 93  82 - 98 fL Final    MCH 05/07/2025 28.9  26.8 - 34.3 pg Final    MCHC 05/07/2025 31.0 (L)  31.4 - 37.4 g/dL Final    RDW 05/07/2025 14.1  11.6 - 15.1 % Final    MPV 05/07/2025 13.3 (H)  8.9 - 12.7 fL Final    Platelets 05/07/2025 146 (L)  149 - 390 Thousands/uL Final    nRBC 05/07/2025 0  /100 WBCs Final    Segmented % 05/07/2025 88 (H)  43 - 75 % Final    Immature Grans % 05/07/2025 0  0 - 2 % Final    Lymphocytes % 05/07/2025 8 (L)  14 - 44 % Final    Monocytes % 05/07/2025 4  4 - 12 % Final    Eosinophils Relative 05/07/2025 0  0 - 6 % Final    Basophils Relative 05/07/2025 0  0 - 1 % Final    Absolute Neutrophils 05/07/2025 7.85 (H)  1.85 - 7.62 Thousands/µL Final    Absolute Immature Grans 05/07/2025 0.04  0.00 - 0.20 Thousand/uL Final    Absolute Lymphocytes 05/07/2025 0.73  0.60 - 4.47 Thousands/µL Final    Absolute Monocytes 05/07/2025 0.36  0.17 - 1.22 Thousand/µL Final    Eosinophils Absolute 05/07/2025 0.04  0.00 - 0.61 Thousand/µL Final    Basophils Absolute  05/07/2025 0.03  0.00 - 0.10 Thousands/µL Final   Hospital Outpatient Visit on 04/28/2025   Component Date Value Ref Range Status    Case Report 04/28/2025    Final                    Value:Surgical Pathology Report                         Case: [id number]                                  Authorizing Provider:  Aisah Holly,   Collected:           04/28/2025 1424                                     PA-C                                                                         Ordering Location:     [facility] Received:            04/29/2025 06                                     Center                                                                       Pathologist:           Kwame Russ,                                                           Specimens:   A) - Breast, Right, us rt br bx 9 4cmfn 3 passes 12g marquee                                        B) - Breast, Right, us rt br bx 10 6cmfn 3 passes 12g marquee                                       C) - Breast, Right, us rt br bx 9 sofya 3 passes 12g marquee                                Addendum 04/28/2025    Final                    Value:Please see CAP checklist for receptor study results.     NOTE:  Patients with breast cancers that are HER2 IHC 3+ or IHC 2+/RODRIGUEZ amplified may be eligible for several therapies that disrupt HER2 signaling pathways. Invasive breast cancers that test 'HER2-negative' (IHC 0, 1+ or 2+/RODRIGUEZ not-amplified) are more specifically considered 'HER2-negative for protein overexpression/gene amplification' since non-overexpressed levels of the HER2 protein may be present in these cases. Patients with breast cancers that are HER2 IHC 1+ or IHC 2+/RODRIGUEZ not-amplified may be eligible for a treatment that targets non-amplified/non-overexpressed levels of HER2 expression for cytotoxic drug delivery (IHC 0 results do not result in eligibility currently).    Reference:   Human Epidermal Growth Factor Receptor 2  Testing in Breast Cancer: ASCO-College of American Pathologists Guideline Update Cedric Ordonez, Jaswant Perkins, ILIANA Noland, Vincent Rodas, Luly Hernandez,                           Ayad Albarran, Saba Connor, and Ayana Person. Journal of Clinical Oncology 2023 41:22, [id number].          Final Diagnosis 04/28/2025    Final                    Value:A. Right Breast, 9:00, 4 cm From Nipple, 12G Ultrasound-Guided Core Needle Biopsy for Mass:  - Invasive breast carcinoma of no special type (ductal NST/invasive ductal carcinoma).   * Chelle grade 1 of 3 (total score: 5 of 9)    -- tubule formation 10%-75%, score 2    -- nuclear grade 2 of 3, score 2    -- mitoses < 3/mm2, (</= 7 mitoses/10HPF), score 1.   * Confirmed by tumor cell immunophenotype:    -- positive: membranous stain for E-cadherin and p120.    -- negative: p63, calponin-B.     * Invasive carcinoma involves 3 of 3 submitted core biopsies, max. dimension = 11 millimeters.   * Estrogen, progesterone & HER2 receptor studies pending, to be described in a separate receptor report.    * Ductal carcinoma in situ (DCIS): Present.    -- cribriform architectural pattern, nuclear grade 2 of 3, without necrosis.    * Lymph-vascular invasion: Not identified.    * Microcalcifications: Present in invasive carcinoma.     B. Right Breast, 10:00, 6 cm From Nipple, 12G Ultrasound-Guided                           Core Needle Biopsy for Mass:  - Ductal carcinoma in-situ (DCIS).    -- Size (extent) of DCIS (mm): 6 mm    -- Architectural Patterns: Cribriform.     -- Nuclear Grade: 2    -- Necrosis: Absent    -- Confirmed by positive myoepithelial immunostains (p63, [facility]).    -- DCIS involves 3 of 3 submitted core biopsies, max. Dimension= 6 mm.    -- Estrogen and Progesterone receptor studies pending, to be described in an addendum.  - No invasive carcinoma identified (including on AE1/AE3 immunostain).   -  Microcalcifications: Absent.    C. Right Breast, 9:00, Periareolar, 12G Ultrasound-Guided Core Needle Biopsy for Focal Asymmetry:  - Ductal carcinoma in-situ (DCIS).    -- Size (extent) of DCIS (mm): 9 mm    -- Architectural Patterns: Cribriform.     -- Nuclear Grade: 2    -- Necrosis: Absent    -- Confirmed by positive myoepithelial immunostains (p63, [facility]).    -- DCIS involves 4 of 4 submitted core biopsies, max. Dimension= 9 mm.    -- Estrogen and Progesterone receptor studies pending, to                           be described in an addendum.  - No invasive carcinoma identified (including on AE1/AE3 immunostain).    - Microcalcifications: Absent.         Note 04/28/2025    Final                    Value:Intradepartmental consultation is in agreement.    If clinically indicated, the most appropriate tissue blocks for ancillary testing are blocks A1 and A2.    Epic Secure Chat message sent to [facility] nurse navigators on 5/1/2025 at 12:00 pm.      Additional Information 04/28/2025    Final                    Value:All reported additional testing was performed with appropriately reactive controls.  These tests were developed and their performance characteristics determined by [facility] or appropriate performing facility, though some tests may be performed on tissues which have not been validated for performance characteristics (such as staining performed on alcohol exposed cell blocks and decalcified tissues).  Results should be interpreted with caution and in the context of the patients’ clinical condition. These tests may not be cleared or approved by the U.S. Food and Drug Administration, though the FDA has determined that such clearance or approval is not necessary. These tests are used for clinical purposes and they should not be regarded as investigational or for research. This laboratory has been approved by CLIA 88, designated as a high-complexity laboratory and is qualified to perform  these tests.    Interpretation performed at [facility] [place] [address].      Synoptic Checklist 04/28/2025    Corrected                    Value:INVASIVE CARCINOMA OF THE BREAST: Biopsy                            INVASIVE CARCINOMA OF THE BREAST: BIOPSY - A                            Protocol posted: 3/22/2023                                                        SPECIMEN                               Procedure:    Needle biopsy                                Specimen Laterality:    Right                                                         TUMOR                               Tumor Site:    Clock position                                :    9 o'clock                              Tumor Site:    Distance from nipple (Centimeters): 4 cm                             Histologic Type:    Invasive carcinoma of no special type (ductal)                              Histologic Grade (Hubbell Histologic Score):                                   Glandular (Acinar) / Tubular Differentiation:    Score 2                                Nuclear Pleomorphism:    Score 2                                Mitotic Rate:    Score 1                                Overall Grade:    Grade 1 (scores of 3, 4 or 5)                              Largest Invasive Focus in this Limited Biopsy Sample:    11 mm                             Ductal Carcinoma In Situ (DCIS):    Present                                Architectural Patterns:    Cribriform                                Nuclear Grade:    Grade II (intermediate)                                Necrosis:    Not identified                              Lymphatic and / or Vascular Invasion:    Not identified                              Microcalcifications:    Present in invasive carcinoma                             DCIS OF THE BREAST: Biopsy                            DCIS OF THE BREAST: BIOPSY - B                            Protocol  posted: 9/20/2023                                                        SPECIMEN                               Procedure:    Needle biopsy                                Specimen Laterality:    Right                                                         TUMOR                               Tumor Site:    Clock position                                :    10 o'clock                              Tumor Site:    Distance from nipple (Centimeters): 6 cm                             Histologic Type:    Ductal carcinoma in situ (DCIS)                              Architectural Patterns:    Cribriform                              Nuclear Grade:    Grade II (intermediate)                              Necrosis:    Not identified                              Microcalcifications:    Not identified                             DCIS OF THE BREAST: Biopsy                            DCIS OF THE BREAST: BIOPSY - C                            Protocol posted: 9/20/2023                                                        SPECIMEN                               Procedure:    Needle biopsy                                Specimen Laterality:    Right                                                         TUMOR                               Tumor Site:    Clock position                                :    9 o'clock                              Tumor Site:    Periareolar                              Histologic Type:    Ductal carcinoma in situ (DCIS)                              Architectural Patterns:    Cribriform                              Nuclear Grade:    Grade II (intermediate)                              Necrosis:    Not identified                              Microcalcifications:    Not identified                             Breast Biomarker Reporting Template                            (Added in Addendum) BREAST BIOMARKER REPORTING TEMPLATE - A                            Protocol posted: 12/13/2023                                                            Test(s) Performed:                                     Estrogen Receptor (ER) Status:    Negative (less than 1%)                                    :    Internal control cells present and stain as expected                                  Test Type:    Food and Drug Administration (FDA) cleared (test / vendor): CONFIRM anti-Estrogen Receptor (ER)/[employer]                                  Primary Antibody:    SP1                                Test(s) Performed:                                     Progesterone Receptor (PgR) Status:    Negative (less than 1%)                                    :    Internal control cells present and stain as expected                                  Test Type:    Food and Drug Administration (FDA) cleared (test / vendor): CONFIRM anti-Progesterone Receptor ([place])/[employer]                                  Primary Antibody:    1E2                                Test(s) Performed:                                     HER2 by Immunohistochemistry:    Negative (Score 1+)                                  Test Type:    Food and Drug Administration (FDA) cleared (test / vendor): [employer] anti-HER-2/lexi (4B5)/[employer]                                  Primary Antibody:    4B5                                Cold Ischemia and Fixation Times:    Meet requirements specified in latest version of the ASCO / CAP Guidelines                                Cold Ischemia Time (minutes):    1 min                               Fixation Time (hours):    21.5 hours                               Testing Performed on Block Number(s):    A1                                                         METHODS                               Fixative:    Formalin                                Image Analysis:    Not performed                             Breast Biomarker Reporting Template                            (Added in Addendum) BREAST BIOMARKER REPORTING TEMPLATE - B                             Protocol posted: 12/13/2023                                                           Test(s) Performed:                                     Estrogen Receptor (ER) Status:    Negative (less than 1%)                                    :    Internal control cells present and stain as expected                                  Test Type:    Food and Drug Administration (FDA) cleared (test / vendor): CONFIRM anti-Estrogen Receptor (ER)/[employer]                                  Primary Antibody:    SP1                                Test(s) Performed:                                     Progesterone Receptor (PgR) Status:    Negative (less than 1%)                                    :    Internal control cells present and stain as expected                                  Test Type:    Food and Drug Administration (FDA) cleared (test / vendor): CONFIRM anti-Progesterone Receptor ([place])/[employer]                                  Primary Antibody:    1E2                                Cold Ischemia and Fixation Times:    Meet requirements specified in latest version of the ASCO / CAP Guidelines                                Cold Ischemia Time (minutes):    1 min                               Fixation Time (hours):    21.5 hours                               Testing Performed on Block Number(s):    B1                                                         METHODS                               Fixative:    Formalin                                Image Analysis:    Not performed                             Breast Biomarker Reporting Template                            (Added in Addendum) BREAST BIOMARKER REPORTING TEMPLATE - C                            Protocol posted: 12/13/2023                                                           Test(s) Performed:                                     Estrogen Receptor (ER) Status:    Negative (less than 1%)                                    :    Internal  "control cells present and stain as expected                                  Test Type:    Food and Drug Administration (FDA) cleared (test / vendor): CONFIRM anti-Estrogen Receptor (ER)/[employer]                                  Primary Antibody:    SP1                                Test(s) Performed:                                     Progesterone Receptor (PgR) Status:    Negative (less than 1%)                                    :    Internal control cells present and stain as expected                                  Test Type:    Food and Drug Administration (FDA) cleared (test / vendor): CONFIRM anti-Progesterone Receptor (AL)/Dewey Roche                                  Primary Antibody:    1E2                                Cold Ischemia and Fixation Times:    Meet requirements specified in latest version of the [facility] / [facility] Guidelines                                Cold Ischemia Time (minutes):    1 min                               Fixation Time (hours):    21.5 hours                               Testing Performed on Block Number(s):    C1                                                         METHODS                               Fixative:    Formalin                                Image Analysis:    Not performed       Gross Description 04/28/2025    Final                    Value:A. The specimen is received in formalin, labeled with the patient's name and hospital number, and is designated \" right breast ultrasound biopsy 9:00 4 cm from nipple\".  The specimen consists of 3 fibrofatty tissue cores, each measuring 0.1 to 0.2 cm in diameter, with lengths ranging from 1.6 to 2 cm each.  Entirely submitted. Two cassettes.  2 cores in cassette 1; 1 core in cassette 2.  Between sponges.  B. The specimen is received in formalin, labeled with the patient's name and hospital number, and is designated \" right breast ultrasound biopsy 10:00 6 cm from nipple\".  The specimen consists of 3 fibrofatty tissue " "cores, each measuring 0.2 cm in diameter, with lengths ranging from 1.4 to 2.3 cm each.  Entirely submitted. Two cassettes.  2 cores in cassette 1; 1 core in cassette 2.  Between sponges.  C. The specimen is received in formalin, labeled with the patient's name and hospital number, and is designated \" right breast ultrasound biopsy 9:00 periareolar\".  The specimen consists                           of 4 fibrofatty tissue cores, each measuring 0.2 cm in diameter, with lengths of 0.8 to 2 cm each.  Entirely submitted. Two cassettes.  2 cores in each cassette, between sponges.    Note: The estimated total formalin fixation time based upon information provided by the submitting clinician and the standard processing schedule is 21.5 hours.    The cold ischemia time based upon information provided by the submitting clinician and receiving staff in the laboratory is within 1 minute.    [facility]      Clinical Information 04/28/2025    Final                    Value:Imaging Findings:  LEFT  1) FOCAL ASYMMETRY [C]  Mammo diagnostic bilateral w 3d and cad: There is a focal asymmetry seen in the lower outer quadrant of the left breast in the anterior depth.  Sonographic evaluation of the left breast at 4:00, 3 cm from the nipple as well as the 3-5 o'clock axis demonstrates no sonographic correlate or suspicious abnormality.  Normal left axillary lymph nodes.     RIGHT  2) MASS [A]  Mammo diagnostic bilateral w 3d and cad: There is an irregularly shaped mass seen in the right breast at 9 o'clock in the middle depth.  This corresponds to the finding on FDG PET/CT dated 3/6/2025.  This also corresponds to an area of palpable concern indicated by a triangle marker.  Targeted sonogram of the right breast at 9:00, 4 cm from the nipple, demonstrates an irregular, hypoechoic mass that measures 1.3 x 0.9 x 2.0 cm.     3) FOCAL ASYMMETRY [B]  Mammo diagnostic bilateral w 3d and cad: There is a focal asymmetry seen in the outer central " region of the right                           breast in the anterior depth.  This is noted slightly anterior to the irregular mass at 9:00.  This corresponds to an irregular, heterogeneous echogenicity area at the 9 o'clock position of the periareolar right breast that measures 1.3 x 0.9 x 1.2 cm.    Sonographic examination of the right axilla demonstrates multiple morphologically abnormal right axillary lymph nodes.     IMPRESSION:  1.  1.3 cm irregular, hypoechoic mass in the right breast at 9:00, 4 cm from the nipple, which is highly suggestive of malignancy.  Recommend ultrasound-guided biopsy.  2.  Irregular, heterogeneous echogenicity area anterior to the mass mentioned in the impression point #1, which is suspicious for malignancy, noting correlative findings on recent FDG PET/CT 3/6/2025.  Recommend ultrasound-guided biopsy.  3.  Multiple morphologically abnormal right axillary lymph nodes, which are highly suggestive of malignancy.  Recommend ultrasound-guided biopsy.  4.  Left breast focal asymmetry without sonographic                           correlate.  Recommend diagnostic left breast mammogram in 6 months.       Pathology: I have reviewed pathology reports described above.    Radiology Results Review: I personally reviewed the following image studies in PACS and associated radiology reports: 3/6/2025 PET scan, 3/13/2025 bilateral 3D diagnostic mammogram and ultrasound, 4/11/2025 MRI of the abdomen, 4/28/2025 postbiopsy mammogram. My interpretation of the radiology images/reports is: No evidence of systemic disease, adrenal lesion appears to be benign, multifocal/multicentric carcinoma of the right breast, short-term follow-up needed of the left breast for an asymmetry.  Concordance: yes           [1] No Known Allergies

## 2025-05-14 NOTE — PROGRESS NOTES
Name: Nicolle Wood      : 1950      MRN: [id number]  Encounter Provider: Li Abdi MD  Encounter Date: 2025   Encounter department: [facility] [place]  :  Assessment & Plan  Invasive ductal carcinoma of right breast in female (HCC)    Orders:    Case request operating room: RIGHT MASTECTOMY WITH BIOPSY LYMPH NODE SENTINEL; Standing    Malignant neoplasm of upper-outer quadrant of right breast in female, estrogen receptor negative (HCC)    Orders:    UA w Reflex to Microscopic w Reflex to Culture; Future    Comprehensive metabolic panel; Future    CBC and differential; Future    EKG 12 lead; Future    XR chest pa and lateral; Future    NM lymphatic breast; Future    naloxone (NARCAN) 4 mg/0.1 mL nasal spray; Administer 1 spray into a nostril. If no response after 2-3 minutes, give another dose in the other nostril using a new spray.    oxyCODONE-acetaminophen (Percocet) 5-325 mg per tablet; Take 1 tablet by mouth every 6 (six) hours as needed for moderate pain Max Daily Amount: 4 tablets    Abnormal mammogram of left breast    Orders:    Mammo diagnostic left w 3d and cad; Future    75-year-old female who presents with multifocal/multicentric carcinoma of the right breast with total extent of disease spanning greater than 7 cm.  This is triple negative in status.  The invasive focus is 2 cm i.e. T1c N0.  I discussed these findings with the patient and her niece who accompanies her today.  I reviewed her images with her.  We discussed the multimodality treatment of breast cancer to include surgery, radiation and medical therapy.  I also reached out to her treating oncologist who prefers upfront surgery.  Given the extent of disease in the breast, she is not a good candidate for breast conservation.  I recommended a mastectomy with sentinel node biopsy to her.  She understands and consents to surgery.  I see no current indications for postmastectomy radiation.  All  of her questions were answered.  Consent was signed today in the office.  She will be scheduled for surgery in the near term.      History of Present Illness   Nicolle Wood is a 75 y.o. year old female who presents newly diagnosed carcinoma of the right breast.  She had a PET scan secondary to her lymphoma and CLL which revealed a nodule in the breast itself.  In retrospect this is palpable in nature.  She had diagnostic imaging and a 3 site biopsy of the right breast.  She also needs short-term follow-up of the left breast.  She denies any family history of breast cancer.  She is declining genetic testing.     Oncology History   Cancer Staging   No matching staging information was found for the patient.  Oncology History   Invasive ductal carcinoma of right breast in female (HCC)   3/6/2025 Observation    NM PET CT    No suspicious FDG avid lymphadenopathy in the neck, chest, abdomen, or pelvis.  Incidentally noted FDG avid right breast nodule measuring 0.9 x 0.7 cm suspicious for malignancy. There is also suspicious asymmetric FDG uptake in the right breast parenchyma more superiorly. Recommend further evaluation beginning with mammography. Mild asymmetric increased uptake in the left adrenal gland with SUV max 3.4 with questionable nodularity on CT. This is nonspecific and could be related to an underlying adrenal adenoma. This can be reassessed on follow-up PET/CT or with dedicated MRI of the adrenals.     4/28/2025 Biopsy    Right US-guided bx    A. 9:00 4cmfn  IDC  Grade 1  ER <1, ND <1, HER2 1+  2cm    B. 10:00 6cmfn  DCIS  Grade 2  ER <1, ND <1    C. 9:00 periareolar  DCIS  Grade 2  ER <1, ND <1     5/13/2025 - 5/13/2025 Chemotherapy    DOXOrubicin (ADRIAMYCIN), 60 mg/m2 = 89 mg, 0 of 4 cycles  cyclophosphamide (CYTOXAN) IVPB, 600 mg/m2 = 888 mg, 0 of 4 cycles  CARBOplatin (PARAPLATIN) IVPB (GOG AUC DOSING), , 0 of 4 cycles  PACLItaxel (TAXOL) chemo IVPB, 80 mg/m2 = 118.2 mg, 0 of 4 cycles  pembrolizumab  (KEYTRUDA) IVPB, 200 mg, 0 of 17 cycles        Review of Systems   Constitutional:  Positive for appetite change, fatigue and unexpected weight change. Negative for fever.   HENT:  Positive for hearing loss. Negative for trouble swallowing.    Eyes: Negative.    Respiratory:  Positive for shortness of breath. Negative for cough.    Cardiovascular:  Positive for leg swelling. Negative for chest pain.   Gastrointestinal: Negative.  Negative for abdominal pain, nausea and vomiting.   Endocrine: Negative.    Genitourinary: Negative.  Negative for dysuria.   Musculoskeletal: Negative.  Negative for arthralgias and myalgias.   Skin: Negative.    Allergic/Immunologic: Positive for immunocompromised state.   Neurological:  Positive for dizziness and light-headedness. Negative for headaches.   Hematological: Negative.  Negative for adenopathy. Does not bruise/bleed easily.   Psychiatric/Behavioral:  The patient is nervous/anxious.         Depression    A complete review of systems is negative other than that noted above in the HPI.    Past Medical History   Past Medical History:   Diagnosis Date    Anxiety     Autoimmune hemolytic anemia (HCC)     Cataract     Disease of thyroid gland     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HCC)     GERD (gastroesophageal reflux disease)     Hypothyroidism     Hypothyroidism     Idiopathic thrombocytopenic purpura (ITP) (HCC)     Menopause     Sjogrens syndrome (HCC)      Past Surgical History:   Procedure Laterality Date    BREAST BIOPSY Right 04/28/2025    CATARACT EXTRACTION Left     COLONOSCOPY  2012    per pt    FL LUMBAR PUNCTURE DIAGNOSTIC  05/11/2018    TOOTH EXTRACTION      US GUIDANCE BREAST BIOPSY RIGHT EACH ADDITIONAL Right 4/28/2025    US GUIDANCE BREAST BIOPSY RIGHT EACH ADDITIONAL Right 4/28/2025    US GUIDED BREAST BIOPSY RIGHT COMPLETE Right 4/28/2025     Family History   Problem Relation Age of Onset    Colon cancer Mother     Heart disease Mother         cardiac disorder     Cancer Mother   "   Liver cancer Father     Cancer Father     Heart disease Brother       reports that she has never smoked. She has never used smokeless tobacco. She reports that she does not drink alcohol and does not use drugs.  Current Outpatient Medications   Medication Instructions    ALPRAZolam (XANAX) 0.5 mg, Oral, Daily at bedtime PRN    Calquence 100 MG TABS Take 1 tablet (100mg total)  by mouth once daily.    levothyroxine 50 mcg tablet take 1 tablet by mouth ONCE every morning    methocarbamol (ROBAXIN) 500 mg, Oral, Every 8 hours PRN    naloxone (NARCAN) 4 mg/0.1 mL nasal spray Administer 1 spray into a nostril. If no response after 2-3 minutes, give another dose in the other nostril using a new spray.    OLANZapine (ZYPREXA ZYDIS) 5 mg, Oral, Daily at bedtime    oxyCODONE-acetaminophen (Percocet) 5-325 mg per tablet 1 tablet, Oral, Every 6 hours PRN    sertraline (ZOLOFT) 50 mg, Oral, Daily    Vitamin D, Cholecalciferol, 10 MCG (400 UNIT) CHEW Chew   Allergies[1]        Objective   /68 (BP Location: Left arm, Patient Position: Sitting, Cuff Size: Standard)   Pulse 71   Temp (!) 97.3 °F (36.3 °C) (Temporal)   Ht 5' 6\" (1.676 m)   Wt 44.9 kg (99 lb)   SpO2 99%   BMI 15.98 kg/m²     Pain Screening:  Pain Score: 0-No pain  ECOG    Physical Exam  Constitutional:       General: She is not in acute distress.     Appearance: Normal appearance. She is well-developed.   HENT:      Head: Normocephalic and atraumatic.     Cardiovascular:      Heart sounds: Normal heart sounds.   Pulmonary:      Breath sounds: Normal breath sounds.   Chest:   Breasts:     Right: Mass present. No swelling, bleeding, inverted nipple, nipple discharge, skin change or tenderness.      Left: No swelling, bleeding, inverted nipple, mass, nipple discharge, skin change or tenderness.     Abdominal:      Palpations: Abdomen is soft.     Musculoskeletal:      Right lower leg: Edema present.      Left lower leg: Edema present.   Lymphadenopathy: "      Upper Body:      Right upper body: No supraclavicular, axillary or pectoral adenopathy.      Left upper body: No supraclavicular, axillary or pectoral adenopathy.     Neurological:      Mental Status: She is alert and oriented to person, place, and time.     Psychiatric:         Mood and Affect: Mood normal.         Labs: I have reviewed pertinent labs.     4/28/2025 core biopsy right breast 9:00 4 cm from the nipple reveals grade 1 triple negative invasive ductal carcinoma; additional biopsies at 10:00 6 cm from the nipple and 9:00 periareolar anterior both revealed intermediate grade DCIS estrogen and progesterone receptor negative    Appointment on 05/07/2025   Component Date Value Ref Range Status    WBC 05/07/2025 9.05  4.31 - 10.16 Thousand/uL Final    RBC 05/07/2025 3.94  3.81 - 5.12 Million/uL Final    Hemoglobin 05/07/2025 11.4 (L)  11.5 - 15.4 g/dL Final    Hematocrit 05/07/2025 36.8  34.8 - 46.1 % Final    MCV 05/07/2025 93  82 - 98 fL Final    MCH 05/07/2025 28.9  26.8 - 34.3 pg Final    MCHC 05/07/2025 31.0 (L)  31.4 - 37.4 g/dL Final    RDW 05/07/2025 14.1  11.6 - 15.1 % Final    MPV 05/07/2025 13.3 (H)  8.9 - 12.7 fL Final    Platelets 05/07/2025 146 (L)  149 - 390 Thousands/uL Final    nRBC 05/07/2025 0  /100 WBCs Final    Segmented % 05/07/2025 88 (H)  43 - 75 % Final    Immature Grans % 05/07/2025 0  0 - 2 % Final    Lymphocytes % 05/07/2025 8 (L)  14 - 44 % Final    Monocytes % 05/07/2025 4  4 - 12 % Final    Eosinophils Relative 05/07/2025 0  0 - 6 % Final    Basophils Relative 05/07/2025 0  0 - 1 % Final    Absolute Neutrophils 05/07/2025 7.85 (H)  1.85 - 7.62 Thousands/µL Final    Absolute Immature Grans 05/07/2025 0.04  0.00 - 0.20 Thousand/uL Final    Absolute Lymphocytes 05/07/2025 0.73  0.60 - 4.47 Thousands/µL Final    Absolute Monocytes 05/07/2025 0.36  0.17 - 1.22 Thousand/µL Final    Eosinophils Absolute 05/07/2025 0.04  0.00 - 0.61 Thousand/µL Final    Basophils Absolute  05/07/2025 0.03  0.00 - 0.10 Thousands/µL Final   Hospital Outpatient Visit on 04/28/2025   Component Date Value Ref Range Status    Case Report 04/28/2025    Final                    Value:Surgical Pathology Report                         Case: [id number]                                  Authorizing Provider:  Aisha Holly,   Collected:           04/28/2025 1424                                     PA-C                                                                         Ordering Location:     [facility] Received:            04/29/2025 06                                     Center                                                                       Pathologist:           Kwame Russ,                                                           Specimens:   A) - Breast, Right, us rt br bx 9 4cmfn 3 passes 12g marquee                                        B) - Breast, Right, us rt br bx 10 6cmfn 3 passes 12g marquee                                       C) - Breast, Right, us rt br bx 9 sofya 3 passes 12g marquee                                Addendum 04/28/2025    Final                    Value:Please see CAP checklist for receptor study results.     NOTE:  Patients with breast cancers that are HER2 IHC 3+ or IHC 2+/RODRIGUEZ amplified may be eligible for several therapies that disrupt HER2 signaling pathways. Invasive breast cancers that test 'HER2-negative' (IHC 0, 1+ or 2+/RODRIGUEZ not-amplified) are more specifically considered 'HER2-negative for protein overexpression/gene amplification' since non-overexpressed levels of the HER2 protein may be present in these cases. Patients with breast cancers that are HER2 IHC 1+ or IHC 2+/RODRIGUEZ not-amplified may be eligible for a treatment that targets non-amplified/non-overexpressed levels of HER2 expression for cytotoxic drug delivery (IHC 0 results do not result in eligibility currently).    Reference:   Human Epidermal Growth Factor Receptor 2  Testing in Breast Cancer: ASCO-College of American Pathologists Guideline Update Cedric Ordonez, Jaswant Perkins, ILIANA Noland, Vincent Rodas, Luly Hernandez,                           Ayad Albarran, Saba Connor, and Ayana Person. Journal of Clinical Oncology 2023 41:22, [id number].          Final Diagnosis 04/28/2025    Final                    Value:A. Right Breast, 9:00, 4 cm From Nipple, 12G Ultrasound-Guided Core Needle Biopsy for Mass:  - Invasive breast carcinoma of no special type (ductal NST/invasive ductal carcinoma).   * Chelle grade 1 of 3 (total score: 5 of 9)    -- tubule formation 10%-75%, score 2    -- nuclear grade 2 of 3, score 2    -- mitoses < 3/mm2, (</= 7 mitoses/10HPF), score 1.   * Confirmed by tumor cell immunophenotype:    -- positive: membranous stain for E-cadherin and p120.    -- negative: p63, calponin-B.     * Invasive carcinoma involves 3 of 3 submitted core biopsies, max. dimension = 11 millimeters.   * Estrogen, progesterone & HER2 receptor studies pending, to be described in a separate receptor report.    * Ductal carcinoma in situ (DCIS): Present.    -- cribriform architectural pattern, nuclear grade 2 of 3, without necrosis.    * Lymph-vascular invasion: Not identified.    * Microcalcifications: Present in invasive carcinoma.     B. Right Breast, 10:00, 6 cm From Nipple, 12G Ultrasound-Guided                           Core Needle Biopsy for Mass:  - Ductal carcinoma in-situ (DCIS).    -- Size (extent) of DCIS (mm): 6 mm    -- Architectural Patterns: Cribriform.     -- Nuclear Grade: 2    -- Necrosis: Absent    -- Confirmed by positive myoepithelial immunostains (p63, [facility]).    -- DCIS involves 3 of 3 submitted core biopsies, max. Dimension= 6 mm.    -- Estrogen and Progesterone receptor studies pending, to be described in an addendum.  - No invasive carcinoma identified (including on AE1/AE3 immunostain).   -  Microcalcifications: Absent.    C. Right Breast, 9:00, Periareolar, 12G Ultrasound-Guided Core Needle Biopsy for Focal Asymmetry:  - Ductal carcinoma in-situ (DCIS).    -- Size (extent) of DCIS (mm): 9 mm    -- Architectural Patterns: Cribriform.     -- Nuclear Grade: 2    -- Necrosis: Absent    -- Confirmed by positive myoepithelial immunostains (p63, [facility]).    -- DCIS involves 4 of 4 submitted core biopsies, max. Dimension= 9 mm.    -- Estrogen and Progesterone receptor studies pending, to                           be described in an addendum.  - No invasive carcinoma identified (including on AE1/AE3 immunostain).    - Microcalcifications: Absent.         Note 04/28/2025    Final                    Value:Intradepartmental consultation is in agreement.    If clinically indicated, the most appropriate tissue blocks for ancillary testing are blocks A1 and A2.    Epic Secure Chat message sent to [facility] nurse navigators on 5/1/2025 at 12:00 pm.      Additional Information 04/28/2025    Final                    Value:All reported additional testing was performed with appropriately reactive controls.  These tests were developed and their performance characteristics determined by [facility] or appropriate performing facility, though some tests may be performed on tissues which have not been validated for performance characteristics (such as staining performed on alcohol exposed cell blocks and decalcified tissues).  Results should be interpreted with caution and in the context of the patients’ clinical condition. These tests may not be cleared or approved by the U.S. Food and Drug Administration, though the FDA has determined that such clearance or approval is not necessary. These tests are used for clinical purposes and they should not be regarded as investigational or for research. This laboratory has been approved by CLIA 88, designated as a high-complexity laboratory and is qualified to perform  these tests.    Interpretation performed at [facility] [place] [id number].      Synoptic Checklist 04/28/2025    Corrected                    Value:INVASIVE CARCINOMA OF THE BREAST: Biopsy                            INVASIVE CARCINOMA OF THE BREAST: BIOPSY - A                            Protocol posted: 3/22/2023                                                        SPECIMEN                               Procedure:    Needle biopsy                                Specimen Laterality:    Right                                                         TUMOR                               Tumor Site:    Clock position                                :    9 o'clock                              Tumor Site:    Distance from nipple (Centimeters): 4 cm                             Histologic Type:    Invasive carcinoma of no special type (ductal)                              Histologic Grade (Montrose Histologic Score):                                   Glandular (Acinar) / Tubular Differentiation:    Score 2                                Nuclear Pleomorphism:    Score 2                                Mitotic Rate:    Score 1                                Overall Grade:    Grade 1 (scores of 3, 4 or 5)                              Largest Invasive Focus in this Limited Biopsy Sample:    11 mm                             Ductal Carcinoma In Situ (DCIS):    Present                                Architectural Patterns:    Cribriform                                Nuclear Grade:    Grade II (intermediate)                                Necrosis:    Not identified                              Lymphatic and / or Vascular Invasion:    Not identified                              Microcalcifications:    Present in invasive carcinoma                             DCIS OF THE BREAST: Biopsy                            DCIS OF THE BREAST: BIOPSY - B                            Protocol  posted: 9/20/2023                                                        SPECIMEN                               Procedure:    Needle biopsy                                Specimen Laterality:    Right                                                         TUMOR                               Tumor Site:    Clock position                                :    10 o'clock                              Tumor Site:    Distance from nipple (Centimeters): 6 cm                             Histologic Type:    Ductal carcinoma in situ (DCIS)                              Architectural Patterns:    Cribriform                              Nuclear Grade:    Grade II (intermediate)                              Necrosis:    Not identified                              Microcalcifications:    Not identified                             DCIS OF THE BREAST: Biopsy                            DCIS OF THE BREAST: BIOPSY - C                            Protocol posted: 9/20/2023                                                        SPECIMEN                               Procedure:    Needle biopsy                                Specimen Laterality:    Right                                                         TUMOR                               Tumor Site:    Clock position                                :    9 o'clock                              Tumor Site:    Periareolar                              Histologic Type:    Ductal carcinoma in situ (DCIS)                              Architectural Patterns:    Cribriform                              Nuclear Grade:    Grade II (intermediate)                              Necrosis:    Not identified                              Microcalcifications:    Not identified                             Breast Biomarker Reporting Template                            (Added in Addendum) BREAST BIOMARKER REPORTING TEMPLATE - A                            Protocol posted: 12/13/2023                                                            Test(s) Performed:                                     Estrogen Receptor (ER) Status:    Negative (less than 1%)                                    :    Internal control cells present and stain as expected                                  Test Type:    Food and Drug Administration (FDA) cleared (test / vendor): CONFIRM anti-Estrogen Receptor (ER)/[employer]                                  Primary Antibody:    SP1                                Test(s) Performed:                                     Progesterone Receptor (PgR) Status:    Negative (less than 1%)                                    :    Internal control cells present and stain as expected                                  Test Type:    Food and Drug Administration (FDA) cleared (test / vendor): CON[employer]M anti-Progesterone Receptor ([place])/[employer]                                  Primary Antibody:    1E2                                Test(s) Performed:                                     HER2 by Immunohistochemistry:    Negative (Score 1+)                                  Test Type:    Food and Drug Administration (FDA) cleared (test / vendor): [employer] anti-HER-2/lexi (4B5)/[employer]                                  Primary Antibody:    4B5                                Cold Ischemia and Fixation Times:    Meet requirements specified in latest version of the ASCO / CAP Guidelines                                Cold Ischemia Time (minutes):    1 min                               Fixation Time (hours):    21.5 hours                               Testing Performed on Block Number(s):    A1                                                         METHODS                               Fixative:    Formalin                                Image Analysis:    Not performed                             Breast Biomarker Reporting Template                            (Added in Addendum) BREAST BIOMARKER REPORTING TEMPLATE - B                             Protocol posted: 12/13/2023                                                           Test(s) Performed:                                     Estrogen Receptor (ER) Status:    Negative (less than 1%)                                    :    Internal control cells present and stain as expected                                  Test Type:    Food and Drug Administration (FDA) cleared (test / vendor): CONFIRM anti-Estrogen Receptor (ER)/[employer]                                  Primary Antibody:    SP1                                Test(s) Performed:                                     Progesterone Receptor (PgR) Status:    Negative (less than 1%)                                    :    Internal control cells present and stain as expected                                  Test Type:    Food and Drug Administration (FDA) cleared (test / vendor): CONFIRM anti-Progesterone Receptor ([place])/[employer]                                  Primary Antibody:    1E2                                Cold Ischemia and Fixation Times:    Meet requirements specified in latest version of the ASCO / CAP Guidelines                                Cold Ischemia Time (minutes):    1 min                               Fixation Time (hours):    21.5 hours                               Testing Performed on Block Number(s):    B1                                                         METHODS                               Fixative:    Formalin                                Image Analysis:    Not performed                             Breast Biomarker Reporting Template                            (Added in Addendum) BREAST BIOMARKER REPORTING TEMPLATE - C                            Protocol posted: 12/13/2023                                                           Test(s) Performed:                                     Estrogen Receptor (ER) Status:    Negative (less than 1%)                                    :    Internal  "control cells present and stain as expected                                  Test Type:    Food and Drug Administration (FDA) cleared (test / vendor): CONFIRM anti-Estrogen Receptor (ER)/[place] [employer]                                  Primary Antibody:    SP1                                Test(s) Performed:                                     Progesterone Receptor (PgR) Status:    Negative (less than 1%)                                    :    Internal control cells present and stain as expected                                  Test Type:    Food and Drug Administration (FDA) cleared (test / vendor): [facility] ([place] [facility]                                  Primary Antibody:    1E2                                Cold Ischemia and Fixation Times:    Meet requirements specified in latest version of the ASCO / [facility] Guidelines                                Cold Ischemia Time (minutes):    1 min                               Fixation Time (hours):    21.5 hours                               Testing Performed on Block Number(s):    C1                                                         METHODS                               Fixative:    Formalin                                Image Analysis:    Not performed       Gross Description 04/28/2025    Final                    Value:A. The specimen is received in formalin, labeled with the patient's name and hospital number, and is designated \" right breast ultrasound biopsy 9:00 4 cm from nipple\".  The specimen consists of 3 fibrofatty tissue cores, each measuring 0.1 to 0.2 cm in diameter, with lengths ranging from 1.6 to 2 cm each.  Entirely submitted. Two cassettes.  2 cores in cassette 1; 1 core in cassette 2.  Between sponges.  B. The specimen is received in formalin, labeled with the patient's name and hospital number, and is designated \" right breast ultrasound biopsy 10:00 6 cm from nipple\".  The specimen consists of 3 fibrofatty tissue " "cores, each measuring 0.2 cm in diameter, with lengths ranging from 1.4 to 2.3 cm each.  Entirely submitted. Two cassettes.  2 cores in cassette 1; 1 core in cassette 2.  Between sponges.  C. The specimen is received in formalin, labeled with the patient's name and hospital number, and is designated \" right breast ultrasound biopsy 9:00 periareolar\".  The specimen consists                           of 4 fibrofatty tissue cores, each measuring 0.2 cm in diameter, with lengths of 0.8 to 2 cm each.  Entirely submitted. Two cassettes.  2 cores in each cassette, between sponges.    Note: The estimated total formalin fixation time based upon information provided by the submitting clinician and the standard processing schedule is 21.5 hours.    The cold ischemia time based upon information provided by the submitting clinician and receiving staff in the laboratory is within 1 minute.    [facility]      Clinical Information 04/28/2025    Final                    Value:Imaging Findings:  LEFT  1) FOCAL ASYMMETRY [C]  Mammo diagnostic bilateral w 3d and cad: There is a focal asymmetry seen in the lower outer quadrant of the left breast in the anterior depth.  Sonographic evaluation of the left breast at 4:00, 3 cm from the nipple as well as the 3-5 o'clock axis demonstrates no sonographic correlate or suspicious abnormality.  Normal left axillary lymph nodes.     RIGHT  2) MASS [A]  Mammo diagnostic bilateral w 3d and cad: There is an irregularly shaped mass seen in the right breast at 9 o'clock in the middle depth.  This corresponds to the finding on FDG PET/CT dated 3/6/2025.  This also corresponds to an area of palpable concern indicated by a triangle marker.  Targeted sonogram of the right breast at 9:00, 4 cm from the nipple, demonstrates an irregular, hypoechoic mass that measures 1.3 x 0.9 x 2.0 cm.     3) FOCAL ASYMMETRY [B]  Mammo diagnostic bilateral w 3d and cad: There is a focal asymmetry seen in the outer central " region of the right                           breast in the anterior depth.  This is noted slightly anterior to the irregular mass at 9:00.  This corresponds to an irregular, heterogeneous echogenicity area at the 9 o'clock position of the periareolar right breast that measures 1.3 x 0.9 x 1.2 cm.    Sonographic examination of the right axilla demonstrates multiple morphologically abnormal right axillary lymph nodes.     IMPRESSION:  1.  1.3 cm irregular, hypoechoic mass in the right breast at 9:00, 4 cm from the nipple, which is highly suggestive of malignancy.  Recommend ultrasound-guided biopsy.  2.  Irregular, heterogeneous echogenicity area anterior to the mass mentioned in the impression point #1, which is suspicious for malignancy, noting correlative findings on recent FDG PET/CT 3/6/2025.  Recommend ultrasound-guided biopsy.  3.  Multiple morphologically abnormal right axillary lymph nodes, which are highly suggestive of malignancy.  Recommend ultrasound-guided biopsy.  4.  Left breast focal asymmetry without sonographic                           correlate.  Recommend diagnostic left breast mammogram in 6 months.       Pathology: I have reviewed pathology reports described above.    Radiology Results Review: I personally reviewed the following image studies in PACS and associated radiology reports: 3/6/2025 PET scan, 3/13/2025 bilateral 3D diagnostic mammogram and ultrasound, 4/11/2025 MRI of the abdomen, 4/28/2025 postbiopsy mammogram. My interpretation of the radiology images/reports is: No evidence of systemic disease, adrenal lesion appears to be benign, multifocal/multicentric carcinoma of the right breast, short-term follow-up needed of the left breast for an asymmetry.  Concordance: yes           [1] No Known Allergies

## 2025-05-14 NOTE — PROGRESS NOTES
Breast Oncology Patient Navigator    Met with patient at her Consult appointment with .     Introduced my self to patient as her patient navigator, explained my role.  Mentioned to patient that I will be following up with her as I have a few questions to go over with her.    Offered support and answered questions when appropriate.  Patient was given my business card, Patient has my contact information and knows to reach out with further questions or concerns.       -DT WAS COMPLETED BY SOCIAL WORK ON 5/9.

## 2025-05-14 NOTE — ASSESSMENT & PLAN NOTE
Orders:    UA w Reflex to Microscopic w Reflex to Culture; Future    Comprehensive metabolic panel; Future    CBC and differential; Future    EKG 12 lead; Future    XR chest pa and lateral; Future    NM lymphatic breast; Future    naloxone (NARCAN) 4 mg/0.1 mL nasal spray; Administer 1 spray into a nostril. If no response after 2-3 minutes, give another dose in the other nostril using a new spray.    oxyCODONE-acetaminophen (Percocet) 5-325 mg per tablet; Take 1 tablet by mouth every 6 (six) hours as needed for moderate pain Max Daily Amount: 4 tablets

## 2025-05-15 ENCOUNTER — PATIENT OUTREACH (OUTPATIENT)
Dept: CASE MANAGEMENT | Facility: OTHER | Age: 75
End: 2025-05-15

## 2025-05-15 ENCOUNTER — TELEPHONE (OUTPATIENT)
Dept: HEMATOLOGY ONCOLOGY | Facility: CLINIC | Age: 75
End: 2025-05-15

## 2025-05-15 NOTE — TELEPHONE ENCOUNTER
Patient saw Dr. Abdi yesterday, plan is for surgery upfront. Verified with Dr. Connell, surgery planned for 06/03/2025 then follow up in office to discuss treatment.     AN CLERICAL: Can you please schedule patient for f/u with Dr. Connell about 3 weeks after surgery on 06/03/2025

## 2025-05-15 NOTE — PROGRESS NOTES
OSW placed call to patient to check in with her as she had her appointment with Dr. Abdi yesterday and she had previously reported that she was nervous about it.     Patient reported that her appointment was scheduled for 6/3/25 and that she will be staying overnight and that Dr. Abdi will be taking her entire breast, she will have a drain and that she will have to empty the drain daily and write down how much comes out.     OSW asked her what her plans were after the surgery, patient reported that her and her niece, Tabby discussed possibilities of either staying at [employer] (but Tabby is concerned that patient will trip over her dog), Tabby coming and checking on patient frequently or patient going and staying at her sister-in-law's house.     OSW/patient discussed meal prepping/purchasing frozen meals prior to surgery. Patient reported that at the appointment yesterday, they were concerned that she wasn't eating enough. OSW/patient discussed the importance of eating and how important protein is for healing. Discussed protein shakes. Patient reported that she was given samples of Ensure. OSW also encouraged Ensure and purchasing some of these shakes so she could to have at home to drink some maybe in the morning and then put back in fridge and then she can have more later. Patient asked about ice cream shakes. OSW stated that she could even put some ice cream in with the ensure to make a shake, patient stated it sounded like a good idea.     Patient stated she was nervous about the surgery. OSW validated her concerns. Patient reported that she tries to distract herself with reading but that she can't get into it. Discussed trying something that doesn't require as much focus, she also enjoys jigsaw puzzles, word searches and crosswords which she has been able to be more successful with. OSW agreed to outreach to patient again next week for support. OSW will be out of office 5/19 and 5/20 but will call later in  week, patient aware and agreeable.

## 2025-05-15 NOTE — TELEPHONE ENCOUNTER
Call received by Nicolle,     Patient returned Zan's call. Patient confirmed appointment with  on 6/23. Patient is requesting [facility].       Thanks!

## 2025-05-19 ENCOUNTER — DOCUMENTATION (OUTPATIENT)
Dept: HEMATOLOGY ONCOLOGY | Facility: CLINIC | Age: 75
End: 2025-05-19

## 2025-05-19 ENCOUNTER — APPOINTMENT (OUTPATIENT)
Dept: LAB | Facility: HOSPITAL | Age: 75
End: 2025-05-19
Payer: MEDICARE

## 2025-05-19 ENCOUNTER — HOSPITAL ENCOUNTER (OUTPATIENT)
Dept: RADIOLOGY | Facility: HOSPITAL | Age: 75
Discharge: HOME | End: 2025-05-19
Payer: MEDICARE

## 2025-05-19 DIAGNOSIS — Z17.1 MALIGNANT NEOPLASM OF UPPER-OUTER QUADRANT OF RIGHT BREAST IN FEMALE, ESTROGEN RECEPTOR NEGATIVE (HCC): ICD-10-CM

## 2025-05-19 DIAGNOSIS — C50.411 MALIGNANT NEOPLASM OF UPPER-OUTER QUADRANT OF RIGHT BREAST IN FEMALE, ESTROGEN RECEPTOR NEGATIVE (HCC): ICD-10-CM

## 2025-05-19 LAB
ALBUMIN SERPL BCG-MCNC: 3.9 G/DL (ref 3.5–5)
ALP SERPL-CCNC: 38 U/L (ref 34–104)
ALT SERPL W P-5'-P-CCNC: 4 U/L (ref 7–52)
ANION GAP SERPL CALCULATED.3IONS-SCNC: 5 MMOL/L (ref 4–13)
AST SERPL W P-5'-P-CCNC: 12 U/L (ref 13–39)
ATRIAL RATE: 75 BPM
BACTERIA UR QL AUTO: ABNORMAL /HPF
BASOPHILS # BLD AUTO: 0.03 THOUSANDS/ÂΜL (ref 0–0.1)
BASOPHILS NFR BLD AUTO: 1 % (ref 0–1)
BILIRUB SERPL-MCNC: 0.51 MG/DL (ref 0.2–1)
BILIRUB UR QL STRIP: NEGATIVE
BUN SERPL-MCNC: 15 MG/DL (ref 5–25)
CALCIUM SERPL-MCNC: 8.7 MG/DL (ref 8.4–10.2)
CHLORIDE SERPL-SCNC: 104 MMOL/L (ref 96–108)
CLARITY UR: CLEAR
CO2 SERPL-SCNC: 28 MMOL/L (ref 21–32)
COLOR UR: ABNORMAL
CREAT SERPL-MCNC: 0.68 MG/DL (ref 0.6–1.3)
EOSINOPHIL # BLD AUTO: 0.02 THOUSAND/ÂΜL (ref 0–0.61)
EOSINOPHIL NFR BLD AUTO: 0 % (ref 0–6)
ERYTHROCYTE [DISTWIDTH] IN BLOOD BY AUTOMATED COUNT: 14.2 % (ref 11.6–15.1)
GFR SERPL CREATININE-BSD FRML MDRD: 87 ML/MIN/1.73SQ M
GLUCOSE SERPL-MCNC: 104 MG/DL (ref 65–140)
GLUCOSE UR STRIP-MCNC: NEGATIVE MG/DL
HCT VFR BLD AUTO: 37 % (ref 34.8–46.1)
HGB BLD-MCNC: 11.7 G/DL (ref 11.5–15.4)
HGB UR QL STRIP.AUTO: NEGATIVE
IMM GRANULOCYTES # BLD AUTO: 0.02 THOUSAND/UL (ref 0–0.2)
IMM GRANULOCYTES NFR BLD AUTO: 0 % (ref 0–2)
KETONES UR STRIP-MCNC: NEGATIVE MG/DL
LEUKOCYTE ESTERASE UR QL STRIP: ABNORMAL
LYMPHOCYTES # BLD AUTO: 0.79 THOUSANDS/ÂΜL (ref 0.6–4.47)
LYMPHOCYTES NFR BLD AUTO: 15 % (ref 14–44)
MCH RBC QN AUTO: 29.5 PG (ref 26.8–34.3)
MCHC RBC AUTO-ENTMCNC: 31.6 G/DL (ref 31.4–37.4)
MCV RBC AUTO: 93 FL (ref 82–98)
MONOCYTES # BLD AUTO: 0.28 THOUSAND/ÂΜL (ref 0.17–1.22)
MONOCYTES NFR BLD AUTO: 5 % (ref 4–12)
NEUTROPHILS # BLD AUTO: 4.3 THOUSANDS/ÂΜL (ref 1.85–7.62)
NEUTS SEG NFR BLD AUTO: 79 % (ref 43–75)
NITRITE UR QL STRIP: NEGATIVE
NON-SQ EPI CELLS URNS QL MICRO: ABNORMAL /HPF
NRBC BLD AUTO-RTO: 0 /100 WBCS
P AXIS: 40 DEGREES
PH UR STRIP.AUTO: 6.5 [PH]
PLATELET # BLD AUTO: 148 THOUSANDS/UL (ref 149–390)
PMV BLD AUTO: 11.8 FL (ref 8.9–12.7)
POTASSIUM SERPL-SCNC: 3.7 MMOL/L (ref 3.5–5.3)
PR INTERVAL: 160 MS
PROT SERPL-MCNC: 6.9 G/DL (ref 6.4–8.4)
PROT UR STRIP-MCNC: NEGATIVE MG/DL
QRS AXIS: 43 DEGREES
QRSD INTERVAL: 78 MS
QT INTERVAL: 396 MS
QTC INTERVAL: 443 MS
RBC # BLD AUTO: 3.97 MILLION/UL (ref 3.81–5.12)
RBC #/AREA URNS AUTO: ABNORMAL /HPF
SODIUM SERPL-SCNC: 137 MMOL/L (ref 135–147)
SP GR UR STRIP.AUTO: 1 (ref 1–1.03)
T WAVE AXIS: 66 DEGREES
UROBILINOGEN UR STRIP-ACNC: <2 MG/DL
VENTRICULAR RATE: 75 BPM
WBC # BLD AUTO: 5.44 THOUSAND/UL (ref 4.31–10.16)
WBC #/AREA URNS AUTO: ABNORMAL /HPF

## 2025-05-19 PROCEDURE — 36415 COLL VENOUS BLD VENIPUNCTURE: CPT

## 2025-05-19 PROCEDURE — 80053 COMPREHEN METABOLIC PANEL: CPT

## 2025-05-19 PROCEDURE — 81001 URINALYSIS AUTO W/SCOPE: CPT

## 2025-05-19 PROCEDURE — 85025 COMPLETE CBC W/AUTO DIFF WBC: CPT

## 2025-05-19 PROCEDURE — 71046 X-RAY EXAM CHEST 2 VIEWS: CPT

## 2025-05-19 NOTE — PROGRESS NOTES
BREAST MULTIDISCIPLINARY CANCER CONFERENCE      NCCN guidelines were readily available for review at this discussion    DATE:  5/19/2025    PRESENTING DOCTOR: Dr. LAURIE Connell,   OTHER RELEVANT PROVIDERS: Dr. Li Abdi    DIAGNOSIS:  Right multifocal invasive breast carcinoma, Grade 1, ER-, OR-, Her 2-, Right breast DCIS x 2  Grade 2, ER-OR-  STAGING: [id number]    REASON FOR DISCUSSION: Full review for treatment planning    Nicolle Wood is a 75 y.o. female adult who was presented at the Breast Oncology Multidisciplinary Cancer Conference today for a full review after recently being diagnosed with right triple negative breast cancer. Patient has a history of SLL/CLL on Acalabrutinib.    Genetics: pt declined    Genomics: N/A    Imaging/Studies reviewed:   [x]Mammogram  [x] Breast Ultrasound  [] Breast MRI  [] CT scan  [] MRI  [x] PET CT  [] Bone Scan  [] N/A      Pathology: Slides reviewed      PHYSICIAN RECOMMENDED PLAN:  Recommend proceeding to surgery for a mastectomy  Consider adjuvant CMF        All specialty physicians & supportive services available.  Providers contributing to final recommendations noted below.    Hematology Oncology: [x]    Radiation Oncology: []    Surgical Oncology/Breast Surgeon: [x]    Plastic Surgeon: []    Clinical Trials: Not discussed    Genetics: Pt declined genetic testing    Financial Advocacy: No needs identified at this time    Social Work: Established    Follow-up Appointments:    Future Appointments   Date Time Provider Department Center   6/3/2025  8:00 AM AL NM 1 AL NM [facility]   6/18/2025 10:00 AM Li Abdi MD SURG ONC ALL [facility]   6/23/2025 11:20 AM Manuel Connell MD [facility] ONC [facility]   6/25/2025 12:00 PM BE INF QUICK CHAIR BE Infusion BE [facility]   9/15/2025  1:00 PM BE MAMMO RBC 3 BE RBC Mammo BE RBC         Team agreed to plan.The final treatment plan will be left to the discretion of the patient and the treating physician.     DISCLAIMERS:  TO  THE TREATING PHYSICIAN:  This conference is a meeting of clinicians from various specialty areas who evaluate and discuss patients for whom a multidisciplinary treatment approach is being considered. Please note that the above opinion was a consensus of the conference attendees and is intended only to assist in quality care of the discussed patient.  The responsibility for follow up on the input given during the conference, along with any final decisions regarding plan of care, is that of the patient and the patient's provider. Accordingly, appointments have only been recommended based on this information and have NOT been scheduled unless otherwise noted.      TO THE PATIENT:  This summary is a brief record of major aspects of your cancer treatment. You may choose to share a copy with any of your doctors or nurses. However, this is not a detailed or comprehensive record of your care.

## 2025-05-22 ENCOUNTER — TELEPHONE (OUTPATIENT)
Age: 75
End: 2025-05-22

## 2025-05-22 DIAGNOSIS — C50.411 MALIGNANT NEOPLASM OF UPPER-OUTER QUADRANT OF RIGHT BREAST IN FEMALE, ESTROGEN RECEPTOR NEGATIVE (HCC): Primary | ICD-10-CM

## 2025-05-22 DIAGNOSIS — Z17.1 MALIGNANT NEOPLASM OF UPPER-OUTER QUADRANT OF RIGHT BREAST IN FEMALE, ESTROGEN RECEPTOR NEGATIVE (HCC): Primary | ICD-10-CM

## 2025-05-22 NOTE — TELEPHONE ENCOUNTER
Patient calling to speak with Dr. Abdi.  She states a Dr. Abdi wanted her to see a cardiologist- she believes it is for pre surgical testing.  No one has called her yet regarding an appt.  Please call patient [phone]

## 2025-05-22 NOTE — ASSESSMENT & PLAN NOTE
3/6/25 PET CT showed no suspicious FDG avid lymphadenopathy in the neck chest abdomen or pelvis.    Patient has been on Calquence (acalabrutinib)  No chest radiation therapy or chemotherapy

## 2025-05-22 NOTE — PROGRESS NOTES
Cardiology     Clinic Visit Note  Nicolle Wood 75 y.o. female   MRN: [id number]    Assessment & Plan  Pre-operative cardiovascular examination  Low risk procedure: Mastectomy with sentinel lymph node biopsy  RCRI: 0; [place] perioperative risk: 0.1% risk of myocardial infarction or cardiac arrest, intraoperatively or up to 30 days postop  9/25/2017 stress echo reviewed LVEF 60%, mild TR, mild MAC.  Patient exercised 4 minutes and 59 seconds, which is 6 METS.  The stress test was terminated due to moderate dyspnea and moderate fatigue.  Normal study after maximal exercise.  ECG on 5/19/2025 reviewed normal sinus rhythm, Q-wave in V1 and V2. Heart rate 75.  non-specific t wave changes in V-V3  ECG in on 5/23/2025 revealed NSR. No septal Q wave, non-specific t wave changes in V-V3  Patient use a cane for ambulation. Patient stated that she live alone and can take care of herself without difficulty including housework. She can ambulatory about a block without difficulty. Patient denies chest pain or SOB on daily activity.   Orders:    POCT ECG    Echo; Future    Dyslipidemia      Lab Units 01/04/24  1033   TRIGLYCERIDES mg/dL 170*   HDL mg/dL 33*   LDL CALC mg/dL 87   Not on statin  LDL acceptable.   Orders:    Echo; Future    Malignant neoplasm of upper-outer quadrant of right breast in female, estrogen receptor negative (HCC)  3/6/2025 PET/CT revealed right breast nodule measuring 0.9 x 0.7 cm suspicious for malignancy  Breast ultrasound on 3/13/2025:1.3 cm irregular, hypoechoic mass in the right breast at 9:00, 4 cm from the nipple. biopsy confirmed invasive breast carcinoma. Invasive breast carcinoma Grade II, ER/OH negative (<1%), HER2 negative (score 1+)   Patient follows with surgical oncology and they recommend mastectomy with sentinel node biopsy.  Per surgical oncology, T1c N0  Medical oncology notes reviewed.  Patient is not a good candidate for anthracycline.  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    Patient has been on Calquence (acalabrutinib)  No chest radiation therapy or chemotherapy             Plan/Recommendations:  Check echo with strain at baseline, as patient is likely need chemotherapy.  2.  Patient has acceptable cardiovascular risk for mastectomy.  Patient is likely going to have chemotherapy after mastectomy.  We will check echo with strain as a baseline.  Patient can undergo surgery without echo result.  Patient will benefit from echo with strain at baseline before chemotherapy.  This has been discussed with patient.  3. If patient undergoes chemotherapy in near future, patient will benefit to see cardio-oncology specialist.         Schedule a follow-up appointment in as need.     Chief Complaint:   Chief Complaint   Patient presents with    Follow-up     Pre op appt.    Dizziness     Pt states she is always dizzy      Subjective     History of Present Illness:  Nicolle Wood is a 75 y.o. year old female with a past medical history of hypothyroidism, depression, CLL, autoimmune hemolytic anemia, ITP, depression, Sjogren's disease, Yasir's syndrome dyslipidemia, small cell B-cell lymphoma, and breast cancer who presented to the office for the preop cardiovascular examination.  Patient went on PET CT scan for surveillance of CLL and B-cell lymphoma and incidentally found right breast nodule.  Biopsy confirmed invasive breast carcinoma.  Patient was seen by surgical oncology on 5/14/2025 and recommended a mastectomy and sentinel node biopsy.  Currently patient is scheduled for surgery on 6/3/2025. Patient use a cane for ambulation. Patient stated that she live alone and can take care of herself without difficulty including hoursework.  She can ambulatory about a block without difficulty. Patient denies chest pain or SOB on daily activity.     Patient never smoked.     Previous Cardiology Workup:    EKG:    Encounter Date: 05/19/25   EKG 12 lead   Result Value    Ventricular Rate 75    Atrial Rate 75    NC Interval 160    QRSD Interval 78    QT Interval 396    QTC Interval 443    P Axis 40    QRS Axis 43    T Wave Axis 66    Narrative    Normal sinus rhythm  Septal infarct , age undetermined  Abnormal ECG  When compared with ECG of 14-Oct-2020 16:12,  Septal infarct is now Present  Confirmed by Alfred Gee ([id number]) on 5/19/2025 3:30:49 PM       STRESS TEST:  No results found for this or any previous visit.     No results found for this or any previous visit.    No results found for this or any previous visit.      Cath/PCI:  No results found for this or any previous visit.    No results found for this or any previous visit.    No results found for this or any previous visit.      ECHO:  No results found for this or any previous visit.    No results found for this or any previous visit.    No results found for this or any previous visit.      KAMLA:  No results found for this or any previous visit.    No results found for this or any previous visit.      CMR:  No results found for this or any previous visit.    No results found for this or any previous visit.    No results found for this or any previous visit.      HOLTER  No results found for this or any previous visit.    No results found for this or any previous visit.      CAROTIDS  No results found for this or any previous visit.         ---------------------------------------------------------------------------------  Review of Systems   Constitutional:  Negative for chills and fever.   HENT:  Negative for congestion, rhinorrhea, sneezing and sore throat.    Eyes:  Negative for pain and discharge.   Respiratory:  Negative for cough, chest tightness, shortness of breath and wheezing.     Cardiovascular:  Negative for chest pain and leg swelling.   Gastrointestinal:  Negative for abdominal pain, nausea and vomiting.   Endocrine: Negative for polydipsia, polyphagia and polyuria.   Genitourinary:  Negative for flank pain, frequency and urgency.   Musculoskeletal:  Negative for arthralgias, back pain and joint swelling.   Skin:  Negative for color change and pallor.   Neurological:  Negative for dizziness, weakness, light-headedness and headaches.   Psychiatric/Behavioral:  Negative for agitation and confusion.        Current Medications[1]  Past Medical History[2]  Past Surgical History[3]  Social History     Socioeconomic History    Marital status: Single     Spouse name: Not on file    Number of children: Not on file    Years of education: Not on file    Highest education level: Not on file   Occupational History    Not on file   Tobacco Use    Smoking status: Never    Smokeless tobacco: Never   Vaping Use    Vaping status: Never Used   Substance and Sexual Activity    Alcohol use: No    Drug use: No    Sexual activity: Not Currently   Other Topics Concern    Not on file   Social History Narrative    Caffeine use     Social Drivers of Health     Financial Resource Strain: Low Risk  (12/8/2022)    Overall Financial Resource Strain (CARDIA)     Difficulty of Paying Living Expenses: Not hard at all   Food Insecurity: No Food Insecurity (12/19/2024)    Nursing - Inadequate Food Risk Classification     Worried About Running Out of Food in the Last Year: Never true     Ran Out of Food in the Last Year: Never true     Ran Out of Food in the Last Year: Not on file   Transportation Needs: No Transportation Needs (12/19/2024)    PRAPARE - Transportation     Lack of Transportation (Medical): No     Lack of Transportation (Non-Medical): No   Physical Activity: Not on file   Stress: Not on file   Social Connections: Not on file   Intimate Partner Violence: Not on file   Housing Stability: Low Risk  (12/19/2024)  "   Housing Stability Vital Sign     Unable to Pay for Housing in the Last Year: No     Number of Times Moved in the Last Year: 1     Homeless in the Last Year: No     Family History[4]  Allergies[5]    Objective     Vitals:    05/23/25 0857   BP: 120/72   BP Location: Left arm   Patient Position: Sitting   Cuff Size: Standard   Pulse: 76   SpO2: 97%   Weight: 46.3 kg (102 lb)   Height: 5' 6\" (1.676 m)       Physical exam:     Physical Exam  HENT:      Head: Normocephalic.     Eyes:      Pupils: Pupils are equal, round, and reactive to light.       Cardiovascular:      Rate and Rhythm: Normal rate and regular rhythm.      Heart sounds: No murmur heard.  Pulmonary:      Effort: Pulmonary effort is normal. No respiratory distress.      Breath sounds: No wheezing or rales.   Abdominal:      Palpations: Abdomen is soft.      Tenderness: There is no abdominal tenderness.     Musculoskeletal:         General: No swelling. Normal range of motion.      Cervical back: Normal range of motion.      Right lower leg: No edema.      Left lower leg: No edema.     Skin:     General: Skin is warm.     Neurological:      Mental Status: She is alert and oriented to person, place, and time.     Psychiatric:         Mood and Affect: Mood normal.           ==  PLEASE NOTE:  This encounter was completed utilizing the [employer] Direct Speech Voice Recognition Software. Grammatical errors, random word insertions, pronoun errors and incomplete sentences are occasional consequences of the system due to software limitations, ambient noise and hardware issues.These may be missed by proof reading prior to affixing electronic signature. Any questions or concerns about the content, text or information contained within the body of this dictation should be directly addressed to the physician for clarification. Please do not hesitate to call me directly if you have any any questions or concerns.          [1]   Current Outpatient Medications:    "  ALPRAZolam (XANAX) 0.5 mg tablet, Take 1 tablet (0.5 mg total) by mouth daily at bedtime as needed for anxiety, Disp: 30 tablet, Rfl: 2    Calquence 100 MG TABS, Take 1 tablet (100mg total)  by mouth once daily., Disp: 30 tablet, Rfl: 10    levothyroxine 50 mcg tablet, take 1 tablet by mouth ONCE every morning, Disp: 100 tablet, Rfl: 1    naloxone (NARCAN) 4 mg/0.1 mL nasal spray, Administer 1 spray into a nostril. If no response after 2-3 minutes, give another dose in the other nostril using a new spray., Disp: 1 each, Rfl: 1    oxyCODONE-acetaminophen (Percocet) 5-325 mg per tablet, Take 1 tablet by mouth every 6 (six) hours as needed for moderate pain Max Daily Amount: 4 tablets, Disp: 12 tablet, Rfl: 0    sertraline (ZOLOFT) 100 mg tablet, Take 0.5 tablets (50 mg total) by mouth daily, Disp: 15 tablet, Rfl: 0    Vitamin D, Cholecalciferol, 10 MCG (400 UNIT) CHEW, Chew, Disp: , Rfl:     methocarbamol (ROBAXIN) 500 mg tablet, Take 1 tablet (500 mg total) by mouth every 8 (eight) hours as needed for muscle spasms (Patient not taking: Reported on 5/14/2025), Disp: 30 tablet, Rfl: 2    OLANZapine (ZyPREXA ZYDIS) 5 mg dispersible tablet, Take 1 tablet (5 mg total) by mouth daily at bedtime (Patient not taking: Reported on 12/19/2024), Disp: 15 tablet, Rfl: 0  [2]   Past Medical History:  Diagnosis Date    Anxiety     Autoimmune hemolytic anemia (HCC)     Cataract     Disease of thyroid gland     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HCC)     GERD (gastroesophageal reflux disease)     Hypothyroidism     Hypothyroidism     Idiopathic thrombocytopenic purpura (ITP) (HCC)     Menopause     Sjogrens syndrome (HCC)    [3]   Past Surgical History:  Procedure Laterality Date    BREAST BIOPSY Right 04/28/2025    CATARACT EXTRACTION Left     COLONOSCOPY  2012    per pt    FL LUMBAR PUNCTURE DIAGNOSTIC  05/11/2018    TOOTH EXTRACTION      US GUIDANCE BREAST BIOPSY RIGHT EACH ADDITIONAL Right 4/28/2025    US GUIDANCE BREAST BIOPSY RIGHT EACH  ADDITIONAL Right 4/28/2025    US GUIDED BREAST BIOPSY RIGHT COMPLETE Right 4/28/2025   [4]   Family History  Problem Relation Name Age of Onset    Colon cancer Mother      Heart disease Mother          cardiac disorder     Cancer Mother      Liver cancer Father      Cancer Father      Heart disease Brother     [5] No Known Allergies

## 2025-05-22 NOTE — ASSESSMENT & PLAN NOTE
3/6/2025 PET/CT revealed right breast nodule measuring 0.9 x 0.7 cm suspicious for malignancy  Breast ultrasound on 3/13/2025:1.3 cm irregular, hypoechoic mass in the right breast at 9:00, 4 cm from the nipple. biopsy confirmed invasive breast carcinoma. Invasive breast carcinoma Grade II, ER/PA negative (<1%), HER2 negative (score 1+)   Patient follows with surgical oncology and they recommend mastectomy with sentinel node biopsy.  Per surgical oncology, T1c N0  Medical oncology notes reviewed.  Patient is not a good candidate for anthracycline.  Probably can consider Taxol, carboplatinum, pembrolizumab for chemotherapy  Patient is scheduled for surgery on 6/3/2025  Orders:    Echo; Future

## 2025-05-22 NOTE — ASSESSMENT & PLAN NOTE
Lab Units 01/04/24  1033   TRIGLYCERIDES mg/dL 170*   HDL mg/dL 33*   LDL CALC mg/dL 87   Not on statin  LDL acceptable.   Orders:    Echo; Future

## 2025-05-23 ENCOUNTER — OFFICE VISIT (OUTPATIENT)
Dept: CARDIOLOGY CLINIC | Facility: CLINIC | Age: 75
End: 2025-05-23

## 2025-05-23 ENCOUNTER — PATIENT OUTREACH (OUTPATIENT)
Dept: CASE MANAGEMENT | Facility: OTHER | Age: 75
End: 2025-05-23

## 2025-05-23 VITALS
BODY MASS INDEX: 16.39 KG/M2 | WEIGHT: 102 LBS | SYSTOLIC BLOOD PRESSURE: 120 MMHG | DIASTOLIC BLOOD PRESSURE: 72 MMHG | OXYGEN SATURATION: 97 % | HEIGHT: 66 IN | HEART RATE: 76 BPM

## 2025-05-23 DIAGNOSIS — C50.411 MALIGNANT NEOPLASM OF UPPER-OUTER QUADRANT OF RIGHT BREAST IN FEMALE, ESTROGEN RECEPTOR NEGATIVE (HCC): ICD-10-CM

## 2025-05-23 DIAGNOSIS — C83.02 SMALL CELL B-CELL LYMPHOMA OF INTRATHORACIC LYMPH NODES (HCC): ICD-10-CM

## 2025-05-23 DIAGNOSIS — Z01.810 PRE-OPERATIVE CARDIOVASCULAR EXAMINATION: Primary | ICD-10-CM

## 2025-05-23 DIAGNOSIS — Z17.1 MALIGNANT NEOPLASM OF UPPER-OUTER QUADRANT OF RIGHT BREAST IN FEMALE, ESTROGEN RECEPTOR NEGATIVE (HCC): ICD-10-CM

## 2025-05-23 DIAGNOSIS — E78.5 DYSLIPIDEMIA: ICD-10-CM

## 2025-05-23 LAB
ATRIAL RATE: 76 BPM
P AXIS: 27 DEGREES
PR INTERVAL: 160 MS
QRS AXIS: 31 DEGREES
QRSD INTERVAL: 80 MS
QT INTERVAL: 396 MS
QTC INTERVAL: 445 MS
T WAVE AXIS: 61 DEGREES
VENTRICULAR RATE: 76 BPM

## 2025-05-23 NOTE — PROGRESS NOTES
OSW placed call to patient to check in with her. Patient reported that after her surgery she will be staying with her niece for a couple of days post op. OSW confirmed that the address is [address] [place] [id number]. Discussed that OSW will be setting up [facility] post op and that they will be going to Brenda's house then. OSW reminded patient that it would be a good idea to have some frozen meals ready in her home with some shakes for when she returns home.

## 2025-05-23 NOTE — PATIENT INSTRUCTIONS
Please get echo done before chemotherapy  2. Can follow up with Cardiology as needed. If you undergoes chemotherapy, you may benefit see cardio-oncology.

## 2025-05-27 ENCOUNTER — HOME HEALTH ADMISSION (OUTPATIENT)
Dept: HOME HEALTH SERVICES | Facility: HOME HEALTHCARE | Age: 75
End: 2025-05-27
Payer: MEDICARE

## 2025-05-27 LAB
CARIS ANDROGEN RECEPTOR: POSITIVE
CARIS ER: NEGATIVE
CARIS GENOMIC LOH - EXOME: NORMAL
CARIS HER2/NEU: NORMAL
CARIS HLA-A: NORMAL
CARIS HLA-B: NORMAL
CARIS HLA-C: NORMAL
CARIS MSI - EXOME: NORMAL
CARIS PD-L1 (22C3): NEGATIVE
CARIS PR: NEGATIVE
CARIS PTEN: POSITIVE
CARIS TMB - EXOME: NORMAL

## 2025-05-28 ENCOUNTER — TELEPHONE (OUTPATIENT)
Age: 75
End: 2025-05-28

## 2025-05-28 ENCOUNTER — PATIENT OUTREACH (OUTPATIENT)
Dept: HEMATOLOGY ONCOLOGY | Facility: CLINIC | Age: 75
End: 2025-05-28

## 2025-05-28 ENCOUNTER — TELEPHONE (OUTPATIENT)
Dept: SURGICAL ONCOLOGY | Facility: CLINIC | Age: 75
End: 2025-05-28

## 2025-05-28 NOTE — PRE-PROCEDURE INSTRUCTIONS
Pre-Surgery Instructions:   Medication Instructions    ALPRAZolam (XANAX) 0.5 mg tablet Take night before surgery    Calquence 100 MG TABS Take night before surgery    levothyroxine 50 mcg tablet Take day of surgery.    oxyCODONE-acetaminophen (Percocet) 5-325 mg per tablet Uses PRN- OK to take day of surgery    Vitamin D, Cholecalciferol, 10 MCG (400 UNIT) CHEW Hold day of surgery.   Medication instructions for day of surgery reviewed. Please take all instructed medications with only a sip of water.       You will receive a call one business day prior to surgery with an arrival time and hospital directions. If your surgery is scheduled on a Monday, the hospital will be calling you on the Friday prior to your surgery. If you have not heard from anyone by 8pm, please call the hospital supervisor through the hospital  at [phone]. ([place] [phone] or [place] [phone]).    Do not eat or drink anything after midnight the night before your surgery, including candy, mints, lifesavers, or chewing gum. Do not drink alcohol 24hrs before your surgery. Try not to smoke at least 24hrs before your surgery.       Follow the pre surgery showering instructions as listed in the “My Surgical Experience Booklet” or otherwise provided by your surgeon's office. Do not use a blade to shave the surgical area 1 week before surgery. It is okay to use a clean electric clippers up to 24 hours before surgery. Do not apply any lotions, creams, including makeup, cologne, deodorant, or perfumes after showering on the day of your surgery. Do not use dry shampoo, hair spray, hair gel, or any type of hair products.     No contact lenses, eye make-up, or artificial eyelashes. Remove nail polish, including gel polish, and any artificial, gel, or acrylic nails if possible. Remove all jewelry including rings and body piercing jewelry.     Wear causal clothing that is easy to take on and off. Consider your type of surgery.    Keep  any valuables, jewelry, piercings at home. Please bring any specially ordered equipment (sling, braces) if indicated.    Arrange for a responsible person to drive you to and from the hospital on the day of your surgery. Please confirm the visitor policy for the day of your procedure when you receive your phone call with an arrival time.     Call the surgeon's office with any new illnesses, exposures, or additional questions prior to surgery.    Please reference your “My Surgical Experience Booklet” for additional information to prepare for your upcoming surgery.

## 2025-05-28 NOTE — TELEPHONE ENCOUNTER
Patient called, asking if there was any way she can request an afternoon surgery, as she does not do well in the mornings

## 2025-05-28 NOTE — PROGRESS NOTES
I reached out and spoke with Nicolle to follow up and to review for any new changes in barriers to care and offer supportive services as needed.     Barriers noted previously and outcome of interventions;  Called and spoke with patient to follow up with her.  Patient mentioned that currently all is well and she is not having any pain at this time.  Nicolle mentioned that AMY takes her to and from all of her appointments however her niece will be taking her to her surgery on 6/3.  Nicolle mentioned being a little anxious regarding her surgery that is coming up.  I mentioned to Nicolle that I understand those feeling and that's normal.  Nicolle mentioned her appetite has decreased however when I mentioned placing a referral for an oncology nutritionist she declined at this time.  Patient did not have any additional questions or concerns at this time.  Made Nicolle aware that I am her patient navigator and should she have any questions or concerns in the future that she could reach out to me directly.  Patient was thankful for my call.    Reviewed for any new barriers as noted below.    Are you having any side effects from your treatment?No    Are you eating and drinking normally? No- see above.    Have you been experiencing any uncontrolled pain related to your cancer diagnosis? No    If not already established, have needs changed for a palliative care referral? no    Do you have a good support system? Yes     Are you interested in any support groups? Not at this time.    How are you doing with transportation to your appointments? Patient is established with [facility].    Do you have any questions or concerns regarding your treatment plan? No    Any new financial concerns for your household or medical bills? No    Do you know when your upcoming appointments are? yes  Future Appointments   Date Time Provider Department Center   6/3/2025  8:00 AM AL NM 1 AL [facility]   6/18/2025 10:00 AM Li Abdi MD SURG ONC [facility]    6/23/2025 11:20 AM Manuel Connell MD [facility] ONC [facility]   6/25/2025 12:00 PM [facility]F [facility]ICK CHAIR [facility]   7/2/2025  1:30 PM BE HV ECHO 1 BE HV Car NI [address]   9/15/2025  1:00 PM BE MAMMO RBC 3 BE RBC Mammo BE [facility]          Based on individual needs I will follow up with them in 4/5 weeks. I have provided my direct contact information and welcome them to contact me if their needs as discussed above change. They were appreciative for the call.

## 2025-05-28 NOTE — TELEPHONE ENCOUNTER
Returned call to patient in regards to requesting an afternoon OR start time. I explained that we can not accommodate the request at this time but if the schedule changes we would keep it in mind.

## 2025-05-29 ENCOUNTER — DOCUMENTATION (OUTPATIENT)
Dept: HEMATOLOGY ONCOLOGY | Facility: CLINIC | Age: 75
End: 2025-05-29

## 2025-05-30 ENCOUNTER — PATIENT OUTREACH (OUTPATIENT)
Dept: CASE MANAGEMENT | Facility: OTHER | Age: 75
End: 2025-05-30

## 2025-05-30 DIAGNOSIS — F41.9 ANXIETY: ICD-10-CM

## 2025-05-30 DIAGNOSIS — E03.9 HYPOTHYROIDISM, UNSPECIFIED TYPE: ICD-10-CM

## 2025-05-30 NOTE — TELEPHONE ENCOUNTER
Pharmacy Change    Reason for call:   [x] Refill   [] Prior Auth  [] Other:     Office:   [] PCP/Provider -   [] Specialty/Provider -     Medication:   levothyroxine 50 mcg tablet   ALPRAZolam (XANAX) 0.5 mg tablet     Dose/Frequency:    take 1 tablet by mouth ONCE every morning     0.5 mg, Oral, Daily at bedtime PRN     Quantity:   30  30    [address]   Does the patient have enough for 3 days?   [x] Yes   [] No - Send as HP to POD    Mail Away Pharmacy   Does the patient have enough for 10 days?   [] Yes   [] No - Send as HP to POD

## 2025-05-30 NOTE — PROGRESS NOTES
OSW placed call to patient, message left      OSW placed call to patient's niece in order to confirm plans that patient is still planning on d/c to her house after surgery. Niece reported that patient is still coming to her home for a few days and that the best number to contact is [employer] cell [phone].    OSW placed call to Home Health to confirm that they had correct address to initiate services and Brenda's number to contact. They have SOC 6/5/25.

## 2025-06-01 ENCOUNTER — ANESTHESIA EVENT (OUTPATIENT)
Dept: PERIOP | Facility: HOSPITAL | Age: 75
End: 2025-06-01
Payer: MEDICARE

## 2025-06-01 RX ORDER — FENTANYL CITRATE/PF 50 MCG/ML
25 SYRINGE (ML) INJECTION
Refills: 0 | Status: CANCELLED | OUTPATIENT
Start: 2025-06-01

## 2025-06-01 RX ORDER — HYDROMORPHONE HCL/PF 1 MG/ML
0.5 SYRINGE (ML) INJECTION
Refills: 0 | Status: CANCELLED | OUTPATIENT
Start: 2025-06-01

## 2025-06-01 RX ORDER — MEPERIDINE HYDROCHLORIDE 25 MG/ML
12.5 INJECTION INTRAMUSCULAR; INTRAVENOUS; SUBCUTANEOUS
Status: CANCELLED | OUTPATIENT
Start: 2025-06-01

## 2025-06-01 RX ORDER — ONDANSETRON 2 MG/ML
4 INJECTION INTRAMUSCULAR; INTRAVENOUS ONCE AS NEEDED
Status: CANCELLED | OUTPATIENT
Start: 2025-06-01

## 2025-06-01 RX ORDER — LEVOTHYROXINE SODIUM 50 UG/1
50 TABLET ORAL DAILY
Qty: 30 TABLET | Refills: 5 | Status: SHIPPED | OUTPATIENT
Start: 2025-06-01

## 2025-06-02 RX ORDER — ALPRAZOLAM 0.5 MG
0.5 TABLET ORAL
Qty: 30 TABLET | Refills: 0 | Status: SHIPPED | OUTPATIENT
Start: 2025-06-02

## 2025-06-02 NOTE — ANESTHESIA PREPROCEDURE EVALUATION
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     Dental   No notable dental hx     Pulmonary   Breath sounds clear to auscultation    Neurological    She appears alert and oriented x3.      Other Findings      post-pubertal.            Anesthesia Plan  ASA Score- 2     Anesthesia Type- general with ASA Monitors.         Additional Monitors:     Airway Plan: LMA and LMA.    Comment: Discussed benefits/risks of general anesthesia including possibility of mouth/throat pain, injury to lips/teeth, nausea/vomiting, and surgical pain along with more rare complications such as stroke, MI, pneumonia, aspiration, and injury to blood vessels. All questions answered.  .       Plan Factors-Exercise tolerance (METS): >4 METS.    Chart reviewed. EKG reviewed.  Existing labs reviewed.                   Induction- intravenous.    Postoperative Plan- Plan for postoperative opioid use.   Monitoring Plan - Monitoring plan - standard ASA monitoring  Post Operative Pain Plan - plan for postoperative  opioid use    Perioperative Resuscitation Plan - Level 1 - Full Code.       Informed Consent- Anesthetic plan and risks discussed with patient.  I personally reviewed this patient with the CRNA. Discussed and agreed on the Anesthesia Plan with the CRNA..      NPO Status:  No vitals data found for the desired time range.

## 2025-06-03 ENCOUNTER — HOSPITAL ENCOUNTER (OUTPATIENT)
Facility: HOSPITAL | Age: 75
Setting detail: OUTPATIENT SURGERY
LOS: 1 days | Discharge: HOME | End: 2025-06-04
Attending: SURGERY | Admitting: SURGERY
Payer: MEDICARE

## 2025-06-03 ENCOUNTER — HOSPITAL ENCOUNTER (OUTPATIENT)
Dept: NUCLEAR MEDICINE | Facility: HOSPITAL | Age: 75
Discharge: HOME | End: 2025-06-03
Attending: SURGERY | Admitting: SURGERY
Payer: MEDICARE

## 2025-06-03 ENCOUNTER — ANESTHESIA (OUTPATIENT)
Dept: PERIOP | Facility: HOSPITAL | Age: 75
End: 2025-06-03
Payer: MEDICARE

## 2025-06-03 DIAGNOSIS — C50.411 MALIGNANT NEOPLASM OF UPPER-OUTER QUADRANT OF RIGHT BREAST IN FEMALE, ESTROGEN RECEPTOR NEGATIVE (HCC): ICD-10-CM

## 2025-06-03 DIAGNOSIS — Z17.1 MALIGNANT NEOPLASM OF UPPER-OUTER QUADRANT OF RIGHT BREAST IN FEMALE, ESTROGEN RECEPTOR NEGATIVE (HCC): Primary | ICD-10-CM

## 2025-06-03 DIAGNOSIS — Z17.1 MALIGNANT NEOPLASM OF UPPER-OUTER QUADRANT OF RIGHT BREAST IN FEMALE, ESTROGEN RECEPTOR NEGATIVE (HCC): ICD-10-CM

## 2025-06-03 DIAGNOSIS — C50.411 MALIGNANT NEOPLASM OF UPPER-OUTER QUADRANT OF RIGHT BREAST IN FEMALE, ESTROGEN RECEPTOR NEGATIVE (HCC): Primary | ICD-10-CM

## 2025-06-03 DIAGNOSIS — C50.911 INVASIVE DUCTAL CARCINOMA OF RIGHT BREAST IN FEMALE (HCC): ICD-10-CM

## 2025-06-03 PROCEDURE — 88341 IMHCHEM/IMCYTCHM EA ADD ANTB: CPT | Performed by: STUDENT IN AN ORGANIZED HEALTH CARE EDUCATION/TRAINING PROGRAM

## 2025-06-03 PROCEDURE — 19307 MAST MOD RAD: CPT | Performed by: SURGERY

## 2025-06-03 PROCEDURE — 78195 LYMPH SYSTEM IMAGING: CPT

## 2025-06-03 PROCEDURE — 88342 IMHCHEM/IMCYTCHM 1ST ANTB: CPT | Performed by: STUDENT IN AN ORGANIZED HEALTH CARE EDUCATION/TRAINING PROGRAM

## 2025-06-03 PROCEDURE — 88344 IMHCHEM/IMCYTCHM EA MLT ANTB: CPT | Performed by: STUDENT IN AN ORGANIZED HEALTH CARE EDUCATION/TRAINING PROGRAM

## 2025-06-03 PROCEDURE — 88307 TISSUE EXAM BY PATHOLOGIST: CPT | Performed by: STUDENT IN AN ORGANIZED HEALTH CARE EDUCATION/TRAINING PROGRAM

## 2025-06-03 PROCEDURE — A9541 TC99M SULFUR COLLOID: HCPCS

## 2025-06-03 PROCEDURE — 38900 IO MAP OF SENT LYMPH NODE: CPT | Performed by: SURGERY

## 2025-06-03 RX ORDER — MIDAZOLAM HYDROCHLORIDE 2 MG/2ML
INJECTION, SOLUTION INTRAMUSCULAR; INTRAVENOUS AS NEEDED
Status: DISCONTINUED | OUTPATIENT
Start: 2025-06-03 | End: 2025-06-03

## 2025-06-03 RX ORDER — FENTANYL CITRATE 50 UG/ML
INJECTION, SOLUTION INTRAMUSCULAR; INTRAVENOUS AS NEEDED
Status: DISCONTINUED | OUTPATIENT
Start: 2025-06-03 | End: 2025-06-03

## 2025-06-03 RX ORDER — ACETAMINOPHEN 325 MG/1
975 TABLET ORAL ONCE
Status: COMPLETED | OUTPATIENT
Start: 2025-06-03 | End: 2025-06-03

## 2025-06-03 RX ORDER — BUPIVACAINE HYDROCHLORIDE 5 MG/ML
INJECTION, SOLUTION EPIDURAL; INTRACAUDAL; PERINEURAL AS NEEDED
Status: DISCONTINUED | OUTPATIENT
Start: 2025-06-03 | End: 2025-06-03 | Stop reason: HOSPADM

## 2025-06-03 RX ORDER — OXYCODONE HYDROCHLORIDE 5 MG/1
5 TABLET ORAL EVERY 4 HOURS PRN
Refills: 0 | Status: DISCONTINUED | OUTPATIENT
Start: 2025-06-03 | End: 2025-06-04 | Stop reason: HOSPADM

## 2025-06-03 RX ORDER — PROMETHAZINE HYDROCHLORIDE 25 MG/ML
6.25 INJECTION, SOLUTION INTRAMUSCULAR; INTRAVENOUS ONCE AS NEEDED
Status: DISCONTINUED | OUTPATIENT
Start: 2025-06-03 | End: 2025-06-03 | Stop reason: HOSPADM

## 2025-06-03 RX ORDER — SODIUM CHLORIDE, SODIUM LACTATE, POTASSIUM CHLORIDE, CALCIUM CHLORIDE 600; 310; 30; 20 MG/100ML; MG/100ML; MG/100ML; MG/100ML
125 INJECTION, SOLUTION INTRAVENOUS CONTINUOUS
Status: DISCONTINUED | OUTPATIENT
Start: 2025-06-03 | End: 2025-06-03

## 2025-06-03 RX ORDER — DEXAMETHASONE SODIUM PHOSPHATE 10 MG/ML
INJECTION, SOLUTION INTRAMUSCULAR; INTRAVENOUS AS NEEDED
Status: DISCONTINUED | OUTPATIENT
Start: 2025-06-03 | End: 2025-06-03

## 2025-06-03 RX ORDER — ISOSULFAN BLUE 50 MG/5ML
INJECTION, SOLUTION SUBCUTANEOUS AS NEEDED
Status: DISCONTINUED | OUTPATIENT
Start: 2025-06-03 | End: 2025-06-03 | Stop reason: HOSPADM

## 2025-06-03 RX ORDER — PROPOFOL 10 MG/ML
INJECTION, EMULSION INTRAVENOUS AS NEEDED
Status: DISCONTINUED | OUTPATIENT
Start: 2025-06-03 | End: 2025-06-03

## 2025-06-03 RX ORDER — SODIUM CHLORIDE, SODIUM GLUCONATE, SODIUM ACETATE, POTASSIUM CHLORIDE, MAGNESIUM CHLORIDE, SODIUM PHOSPHATE, DIBASIC, AND POTASSIUM PHOSPHATE .53; .5; .37; .037; .03; .012; .00082 G/100ML; G/100ML; G/100ML; G/100ML; G/100ML; G/100ML; G/100ML
75 INJECTION, SOLUTION INTRAVENOUS CONTINUOUS
Status: DISCONTINUED | OUTPATIENT
Start: 2025-06-03 | End: 2025-06-04

## 2025-06-03 RX ORDER — ENOXAPARIN SODIUM 100 MG/ML
30 INJECTION SUBCUTANEOUS ONCE
Status: COMPLETED | OUTPATIENT
Start: 2025-06-03 | End: 2025-06-03

## 2025-06-03 RX ORDER — CEFAZOLIN SODIUM 2 G/50ML
2000 SOLUTION INTRAVENOUS ONCE
Status: COMPLETED | OUTPATIENT
Start: 2025-06-03 | End: 2025-06-03

## 2025-06-03 RX ORDER — LIDOCAINE HYDROCHLORIDE 20 MG/ML
INJECTION, SOLUTION EPIDURAL; INFILTRATION; INTRACAUDAL; PERINEURAL AS NEEDED
Status: DISCONTINUED | OUTPATIENT
Start: 2025-06-03 | End: 2025-06-03

## 2025-06-03 RX ORDER — ACETAMINOPHEN 325 MG/1
650 TABLET ORAL EVERY 6 HOURS PRN
Status: DISCONTINUED | OUTPATIENT
Start: 2025-06-03 | End: 2025-06-04 | Stop reason: HOSPADM

## 2025-06-03 RX ORDER — SODIUM CHLORIDE, SODIUM LACTATE, POTASSIUM CHLORIDE, CALCIUM CHLORIDE 600; 310; 30; 20 MG/100ML; MG/100ML; MG/100ML; MG/100ML
20 INJECTION, SOLUTION INTRAVENOUS CONTINUOUS
Status: DISCONTINUED | OUTPATIENT
Start: 2025-06-03 | End: 2025-06-03

## 2025-06-03 RX ORDER — HYDROMORPHONE HCL/PF 1 MG/ML
0.2 SYRINGE (ML) INJECTION
Refills: 0 | Status: DISCONTINUED | OUTPATIENT
Start: 2025-06-03 | End: 2025-06-04 | Stop reason: HOSPADM

## 2025-06-03 RX ORDER — MAGNESIUM HYDROXIDE 1200 MG/15ML
LIQUID ORAL AS NEEDED
Status: DISCONTINUED | OUTPATIENT
Start: 2025-06-03 | End: 2025-06-03 | Stop reason: HOSPADM

## 2025-06-03 RX ORDER — PHENYLEPHRINE HCL IN 0.9% NACL 1 MG/10 ML
SYRINGE (ML) INTRAVENOUS AS NEEDED
Status: DISCONTINUED | OUTPATIENT
Start: 2025-06-03 | End: 2025-06-03

## 2025-06-03 RX ORDER — ONDANSETRON 2 MG/ML
4 INJECTION INTRAMUSCULAR; INTRAVENOUS EVERY 6 HOURS PRN
Status: DISCONTINUED | OUTPATIENT
Start: 2025-06-03 | End: 2025-06-04 | Stop reason: HOSPADM

## 2025-06-03 RX ORDER — HYDROMORPHONE HCL/PF 1 MG/ML
0.5 SYRINGE (ML) INJECTION
Status: DISCONTINUED | OUTPATIENT
Start: 2025-06-03 | End: 2025-06-03 | Stop reason: HOSPADM

## 2025-06-03 RX ORDER — LEVOTHYROXINE SODIUM 50 UG/1
50 TABLET ORAL
Status: DISCONTINUED | OUTPATIENT
Start: 2025-06-03 | End: 2025-06-04 | Stop reason: HOSPADM

## 2025-06-03 RX ADMIN — FENTANYL CITRATE 25 MCG: 50 INJECTION INTRAMUSCULAR; INTRAVENOUS at 09:27

## 2025-06-03 RX ADMIN — ENOXAPARIN SODIUM 30 MG: 30 INJECTION SUBCUTANEOUS at 07:04

## 2025-06-03 RX ADMIN — FENTANYL CITRATE 25 MCG: 50 INJECTION INTRAMUSCULAR; INTRAVENOUS at 09:43

## 2025-06-03 RX ADMIN — MIDAZOLAM 1 MG: 1 INJECTION INTRAMUSCULAR; INTRAVENOUS at 08:52

## 2025-06-03 RX ADMIN — SODIUM CHLORIDE, SODIUM LACTATE, POTASSIUM CHLORIDE, AND CALCIUM CHLORIDE 125 ML/HR: .6; .31; .03; .02 INJECTION, SOLUTION INTRAVENOUS at 07:03

## 2025-06-03 RX ADMIN — FENTANYL CITRATE 25 MCG: 50 INJECTION INTRAMUSCULAR; INTRAVENOUS at 09:56

## 2025-06-03 RX ADMIN — FENTANYL CITRATE 25 MCG: 50 INJECTION INTRAMUSCULAR; INTRAVENOUS at 09:30

## 2025-06-03 RX ADMIN — SODIUM CHLORIDE, SODIUM LACTATE, POTASSIUM CHLORIDE, AND CALCIUM CHLORIDE: .6; .31; .03; .02 INJECTION, SOLUTION INTRAVENOUS at 09:20

## 2025-06-03 RX ADMIN — ACETAMINOPHEN 975 MG: 325 TABLET, FILM COATED ORAL at 07:04

## 2025-06-03 RX ADMIN — LIDOCAINE HYDROCHLORIDE 60 MG: 20 INJECTION, SOLUTION EPIDURAL; INFILTRATION; INTRACAUDAL at 08:56

## 2025-06-03 RX ADMIN — Medication 2.5 MG: at 12:17

## 2025-06-03 RX ADMIN — Medication 100 MCG: at 09:07

## 2025-06-03 RX ADMIN — PROPOFOL 50 MCG/KG/MIN: 10 INJECTION, EMULSION INTRAVENOUS at 08:57

## 2025-06-03 RX ADMIN — FENTANYL CITRATE 25 MCG: 50 INJECTION INTRAMUSCULAR; INTRAVENOUS at 09:15

## 2025-06-03 RX ADMIN — CEFAZOLIN SODIUM 2000 MG: 2 SOLUTION INTRAVENOUS at 09:02

## 2025-06-03 RX ADMIN — LEVOTHYROXINE SODIUM 50 MCG: 0.05 TABLET ORAL at 12:17

## 2025-06-03 RX ADMIN — DEXAMETHASONE SODIUM PHOSPHATE 10 MG: 10 INJECTION, SOLUTION INTRAMUSCULAR; INTRAVENOUS at 08:56

## 2025-06-03 RX ADMIN — FENTANYL CITRATE 25 MCG: 50 INJECTION INTRAMUSCULAR; INTRAVENOUS at 10:13

## 2025-06-03 RX ADMIN — SODIUM CHLORIDE, SODIUM GLUCONATE, SODIUM ACETATE, POTASSIUM CHLORIDE, MAGNESIUM CHLORIDE, SODIUM PHOSPHATE, DIBASIC, AND POTASSIUM PHOSPHATE 75 ML/HR: .53; .5; .37; .037; .03; .012; .00082 INJECTION, SOLUTION INTRAVENOUS at 12:19

## 2025-06-03 RX ADMIN — FENTANYL CITRATE 25 MCG: 50 INJECTION INTRAMUSCULAR; INTRAVENOUS at 09:04

## 2025-06-03 RX ADMIN — PROPOFOL 100 MG: 10 INJECTION, EMULSION INTRAVENOUS at 08:56

## 2025-06-03 NOTE — PLAN OF CARE
Problem: PAIN - ADULT  Goal: Verbalizes/displays adequate comfort level or baseline comfort level  Description: Interventions:  - Encourage patient to monitor pain and request assistance  - Assess pain using appropriate pain scale  - Administer analgesics as ordered based on type and severity of pain and evaluate response  - Implement non-pharmacological measures as appropriate and evaluate response  - Consider cultural and social influences on pain and pain management  - Notify physician/advanced practitioner if interventions unsuccessful or patient reports new pain  - Educate patient/family on pain management process including their role and importance of  reporting pain   - Provide non-pharmacologic/complimentary pain relief interventions  Outcome: Progressing     Problem: INFECTION - ADULT  Goal: Absence or prevention of progression during hospitalization  Description: INTERVENTIONS:  - Assess and monitor for signs and symptoms of infection  - Monitor lab/diagnostic results  - Monitor all insertion sites, i.e. indwelling lines, tubes, and drains  - Monitor endotracheal if appropriate and nasal secretions for changes in amount and color  - [place] appropriate cooling/warming therapies per order  - Administer medications as ordered  - Instruct and encourage patient and family to use good hand hygiene technique  - Identify and instruct in appropriate isolation precautions for identified infection/condition  Outcome: Progressing  Goal: Absence of fever/infection during neutropenic period  Description: INTERVENTIONS:  - Monitor WBC  - Perform strict hand hygiene  - Limit to healthy visitors only  - No plants, dried, fresh or silk flowers with evangelista in patient room  Outcome: Progressing     Problem: SAFETY ADULT  Goal: Patient will remain free of falls  Description: INTERVENTIONS:  - Educate patient/family on patient safety including physical limitations  - Instruct patient to call for assistance with activity   -  Consider consulting OT/PT to assist with strengthening/mobility based on AM PAC & JH-HLM score  - Consult OT/PT to assist with strengthening/mobility   - Keep Call bell within reach  - Keep bed low and locked with side rails adjusted as appropriate  - Keep care items and personal belongings within reach  - Initiate and maintain comfort rounds  - Make Fall Risk Sign visible to staff  - Offer Toileting every 2 Hours, in advance of need  - Initiate/Maintain bed alarm  - Obtain necessary fall risk management equipment: call bell, bed alarm,  socks    - Apply yellow socks and bracelet for high fall risk patients  - Consider moving patient to room near nurses station  Outcome: Progressing  Goal: Maintain or return to baseline ADL function  Description: INTERVENTIONS:  -  Assess patient's ability to carry out ADLs; assess patient's baseline for ADL function and identify physical deficits which impact ability to perform ADLs (bathing, care of mouth/teeth, toileting, grooming, dressing, etc.)  - Assess/evaluate cause of self-care deficits   - Assess range of motion  - Assess patient's mobility; develop plan if impaired  - Assess patient's need for assistive devices and provide as appropriate  - Encourage maximum independence but intervene and supervise when necessary  - Involve family in performance of ADLs  - Assess for home care needs following discharge   - Consider OT consult to assist with ADL evaluation and planning for discharge  - Provide patient education as appropriate  - Monitor functional capacity and physical performance, use of AM PAC & JH-HLM   - Monitor gait, balance and fatigue with ambulation    Outcome: Progressing  Goal: Maintains/Returns to pre admission functional level  Description: INTERVENTIONS:  - Perform AM-PAC 6 Click Basic Mobility/ Daily Activity assessment daily.  - Set and communicate daily mobility goal to care team and patient/family/caregiver.   - Collaborate with rehabilitation  services on mobility goals if consulted  - Perform Range of Motion 3 times a day.  - Reposition patient every 2 hours.  - Dangle patient 3 times a day  - Stand patient 3 times a day  - Ambulate patient 3 times a day  - Out of bed to chair 3 times a day   - Out of bed for meals 3 times a day  - Out of bed for toileting  - Record patient progress and toleration of activity level   Outcome: Progressing     Problem: DISCHARGE PLANNING  Goal: Discharge to home or other facility with appropriate resources  Description: INTERVENTIONS:  - Identify barriers to discharge w/patient and caregiver  - Arrange for needed discharge resources and transportation as appropriate  - Identify discharge learning needs (meds, wound care, etc.)  - Arrange for interpretive services to assist at discharge as needed  - Refer to Case Management Department for coordinating discharge planning if the patient needs post-hospital services based on physician/advanced practitioner order or complex needs related to functional status, cognitive ability, or social support system  Outcome: Progressing     Problem: Knowledge Deficit  Goal: Patient/family/caregiver demonstrates understanding of disease process, treatment plan, medications, and discharge instructions  Description: Complete learning assessment and assess knowledge base.  Interventions:  - Provide teaching at level of understanding  - Provide teaching via preferred learning methods  Outcome: Progressing

## 2025-06-03 NOTE — OP NOTE
OPERATIVE REPORT  PATIENT NAME: Nicolle Wood    :  1950  MRN: [id number]  Pt Location: [facility] OR ROOM 05    SURGERY DATE: 6/3/2025    Surgeons and Role:     * Li Abdi MD - Primary     * Vince Combs DO - Assisting    Preop Diagnosis:  Invasive ductal carcinoma of right breast in female (HCC) [C50.911]    Post-Op Diagnosis Codes:     * Invasive ductal carcinoma of right breast in female (HCC) [C50.911]    Procedure(s):  Right - RIGHT MASTECTOMY WITH BIOPSY LYMPH NODE SENTINEL & LYMPHATIC MAPPING; 0800 NUC MED  Injection of blue dye  Use of gamma probe      Specimen(s):  ID Type Source Tests Collected by Time Destination   1 : RIGHT SENTINEL LYMPH NODE #1 Tissue Lymph Node, [place] TISSUE EXAM Li Abdi MD 6/3/2025 0945    2 : RIGHT BREAST STITCH SHORT SUPERIOR LONG LATERAL Tissue Breast, Right TISSUE EXAM Li Abdi MD 6/3/2025 0955        Estimated Blood Loss:   Minimal    Drains:  Closed/Suction Drain Right;Lateral Breast Bulb 10 Fr. (Active)   Number of days: 0       Anesthesia Type:   General    Operative Indications:  Invasive ductal carcinoma of right breast in female (HCC) [C50.911]      Operative Findings:  N/a       Complications:   None    Procedure and Technique:  Nicolle is a 75-year-old female presents with multifocal/multicentric carcinoma of the right breast.  She was counseled on a mastectomy with sentinel node biopsy.  She presented the day of surgery to the radiology suite.  From there she went to the operating room.  She had preoperative antibiotics.  She was administered general anesthesia.  She had a 5 cc injection of Lymphazurin into the right subareolar plexus.  She was prepped and draped in the usual standard fashion.  Timeout was performed.  Tension was turned to the right breast.  Half percent Marcaine plain was injected for local anesthesia.  A broad elliptical incision was created in the chest wall including the nipple areolar complex and mapped out to include a broad  margin around the lateral lower palpable mass.  Electrocautery was used to dissect her mastectomy flaps to the clavicle superior, sternal border medial, inframammary fold inferior and to the edge of the latissimus dorsi muscle lateral.  In the upper outer aspect, the clavipectoral fascia was incised to expose the axillary fat pad.  There was a prominent blue lymphatic channel that traced to a blue stained and radioactive node.  This was excised and submitted as sentinel node right axilla.  There were no additional blue stained, radioactive or palpable nodes following removal of the first sentinel node.  The breast was then excised from the underlying pectoralis including the pectoralis fascia.  This was marked with a short stitch superior and a long stitch lateral.  This was submitted to pathology in formalin.  Her wound was irrigated and hemostasis was achieved.  Surgicel gauze was applied along the muscle bed in multiple locations.  A 10 [place] flat Art-Holden drain was placed within the inferior mastectomy flap and sutured to the skin using a 3-0 nylon suture.  The wound was then closed in a layered fashion using multiple interrupted 3-0 Monocryl suture and a running 4-0 Monocryl subcuticular stitch.  All counts were correct.  The skin was cleaned and dried.  Surgical glue, fluffs and a bra were applied.  The patient was then extubated and taken to recovery in stable condition.       Patient Disposition:  extubated and stable    [place] Node Biopsy for Breast Cancer - Right  Operation performed with curative intent. Yes   Tracer(s) used to identify sentinel nodes in the upfront surgery (non-neoadjuvant) setting (select all that apply). Dye and Radioactive tracer   Tracer(s) used to identify sentinel nodes in the neoadjuvant setting (select all that apply). N/A   All nodes (colored or non-colored) present at the end of a dye-filled lymphatic channel were removed. Yes   All significantly radioactive nodes were  removed. Yes   All palpably suspicious nodes were removed. N/A   Biopsy-proven positive nodes marked with clips prior to chemotherapy were identified and removed. N/A             SIGNATURE: Li Abdi MD  DATE: Lela 3, 2025  TIME: 10:52 AM

## 2025-06-03 NOTE — ANESTHESIA POSTPROCEDURE EVALUATION
Post-Op Assessment Note    CV Status:  Stable    Pain management: adequate       Mental Status:  Awake   Hydration Status:  Euvolemic   PONV Controlled:  Controlled   Airway Patency:  Patent     Post Op Vitals Reviewed: Yes    No anethesia notable event occurred.    Staff: Anesthesiologist           Last Filed PACU Vitals:  Vitals Value Taken Time   Temp 97 °F (36.1 °C) 06/03/25 12:08   Pulse 72 06/03/25 12:08   /68 06/03/25 12:08   Resp 20 06/03/25 12:08   SpO2 96 % 06/03/25 12:08       Modified Nolan:     Vitals Value Taken Time   Activity 2 06/03/25 11:40   Respiration 2 06/03/25 11:40   Circulation 2 06/03/25 11:40   Consciousness 1 06/03/25 11:40   Oxygen Saturation 2 06/03/25 11:40     Modified Nolan Score: 9

## 2025-06-03 NOTE — INTERVAL H&P NOTE
H&P reviewed. After examining the patient I find no changes in the patients condition since the H&P had been written.    Vitals:    06/03/25 0657   BP: 113/61   Pulse: 74   Resp: 16   Temp: (!) 97.4 °F (36.3 °C)   SpO2: 98%

## 2025-06-03 NOTE — ANESTHESIA POSTPROCEDURE EVALUATION
Post-Op Assessment Note    CV Status:  Stable  Pain Score: 0    Pain management: adequate       Mental Status:  Sleepy and arousable   Hydration Status:  Stable   PONV Controlled:  None   Airway Patency:  Patent     Post Op Vitals Reviewed: Yes    No anethesia notable event occurred.    Staff: CRNA           Last Filed PACU Vitals:  Vitals Value Taken Time   Temp 96.8 °F (36 °C) 06/03/25 11:12   Pulse 54 06/03/25 11:13   /57 06/03/25 11:11   Resp 15 06/03/25 11:12   SpO2 99 % 06/03/25 11:13   Vitals shown include unfiled device data.

## 2025-06-03 NOTE — DISCHARGE INSTR - AVS FIRST PAGE
POST-OPERATIVE CARE INSTRUCTIONS       Care after your procedure:   General  Rest and relax for 24 hours, then gradually return to normal activities.  Do not perform any heavy lifting or strenuous physical activities for 14 days.  Your activity restrictions will be re-evaluated at your post op visit.  Drink clear liquids until you are certain there is no nausea, then resume a normal diet.  Do not drink alcohol, drive any vehicle, operate mechanical equipment or make critical decisions for at least 24 hours and until you are off any narcotic pain medications.  The Incision  Your incision is closed with:   dissolvable stiches just underneath the skin.                The incision is also covered with:                          clear waterproof glue  A gauze-pad is covering the wound.  Wound care  Remove your gauze-pad after 24 hours.   You may then shower using soap and water to clean your incision. Gently dry the wound.  You may redress your wound with additional gauze and tape if you choose.  A little bruising at the wound site is normal.   Art-Holden Drain (JPDrain) see separate instructions.  Medication  Resume all previous medications  Pain Medication Instructions: either over the counter tylenol or prescription percocet if needed          Other (If applicable)  Wear a post-surgical bra around the clock.  May use ice to the incision site(s) for the next 24-48 hours, twice daily.   Call your  doctor if you have any of the following:  Redness, swelling, heat, drainage, and/or bleeding from your wound  Chills or fever ( above 101' F )  Pain, not relieved with the above medications  If you have any questions or problems call our office [phone]    Follow-up appointment:  As scheduled

## 2025-06-04 ENCOUNTER — ANCILLARY PROCEDURE (OUTPATIENT)
Dept: LAB | Facility: HOSPITAL | Age: 75
End: 2025-06-04
Attending: SURGERY
Payer: MEDICARE

## 2025-06-04 VITALS
TEMPERATURE: 97.3 F | OXYGEN SATURATION: 99 % | HEIGHT: 66 IN | BODY MASS INDEX: 16.69 KG/M2 | RESPIRATION RATE: 18 BRPM | HEART RATE: 63 BPM | WEIGHT: 103.84 LBS | SYSTOLIC BLOOD PRESSURE: 98 MMHG | DIASTOLIC BLOOD PRESSURE: 53 MMHG

## 2025-06-04 LAB
ANION GAP SERPL CALCULATED.3IONS-SCNC: 5 MMOL/L (ref 4–13)
BUN SERPL-MCNC: 11 MG/DL (ref 5–25)
CALCIUM SERPL-MCNC: 8 MG/DL (ref 8.4–10.2)
CHLORIDE SERPL-SCNC: 109 MMOL/L (ref 96–108)
CO2 SERPL-SCNC: 26 MMOL/L (ref 21–32)
CREAT SERPL-MCNC: 0.61 MG/DL (ref 0.6–1.3)
GFR SERPL CREATININE-BSD FRML MDRD: 90 ML/MIN/1.73SQ M
GLUCOSE P FAST SERPL-MCNC: 101 MG/DL (ref 65–99)
GLUCOSE SERPL-MCNC: 101 MG/DL (ref 65–140)
MAGNESIUM SERPL-MCNC: 2 MG/DL (ref 1.9–2.7)
PHOSPHATE SERPL-MCNC: 3.3 MG/DL (ref 2.3–4.1)
POTASSIUM SERPL-SCNC: 4 MMOL/L (ref 3.5–5.3)
SODIUM SERPL-SCNC: 140 MMOL/L (ref 135–147)

## 2025-06-04 PROCEDURE — 83735 ASSAY OF MAGNESIUM: CPT

## 2025-06-04 PROCEDURE — 80048 BASIC METABOLIC PNL TOTAL CA: CPT

## 2025-06-04 PROCEDURE — 84100 ASSAY OF PHOSPHORUS: CPT

## 2025-06-04 PROCEDURE — 99024 POSTOP FOLLOW-UP VISIT: CPT | Performed by: SURGERY

## 2025-06-04 RX ADMIN — ACETAMINOPHEN 650 MG: 325 TABLET ORAL at 05:45

## 2025-06-04 RX ADMIN — LEVOTHYROXINE SODIUM 50 MCG: 0.05 TABLET ORAL at 05:43

## 2025-06-04 NOTE — PROGRESS NOTES
Progress Note - Surgery-General   Name: Nicolle Wood 75 y.o. female I MRN: [id number]  Unit/Bed#: E2 -01 I Date of Admission: 6/3/2025   Date of Service: 6/3/2025 I Hospital Day: 1     Assessment & Plan  Malignant neoplasm of upper-outer quadrant of right breast in female, estrogen receptor negative (HCC)  75-year-old female postop day 1 s/p right mastectomy with sentinel lymph node biopsy.  Incisions clean dry and intact, tolerating pain, and tolerating diet.    Plan:  - Discharge today   - VNA for drain care  - Pain and nausea control Prn  - Monitor drain output   - Trend labs  - Trend vitals   - IS use     24 Hour Events : No acute overnight events  Subjective : Patient feeling well this morning. She is currently in minimal pain that she rates a 3 out of 10 located near the incision site. She denies any nausea, vomiting, fevers or chills    Objective :  Temp:  [96.8 °F (36 °C)-98 °F (36.7 °C)] 98 °F (36.7 °C)  HR:  [51-74] 67  BP: (100-126)/(57-68) 100/61  Resp:  [15-20] 17  SpO2:  [90 %-100 %] 90 %  O2 Device: None (Room air)  Nasal Cannula O2 Flow Rate (L/min):  [3 L/min-4 L/min] 3 L/min    Physical Exam  Constitutional:       General: She is not in acute distress.     Appearance: Normal appearance. She is not ill-appearing, toxic-appearing or diaphoretic.     Cardiovascular:      Rate and Rhythm: Normal rate and regular rhythm.      Pulses: Normal pulses.      Heart sounds: Normal heart sounds. No murmur heard.  Pulmonary:      Effort: Pulmonary effort is normal. No respiratory distress.      Breath sounds: Normal breath sounds. No wheezing.   Abdominal:      General: Abdomen is flat. There is no distension.      Palpations: Abdomen is soft.      Tenderness: There is no abdominal tenderness. There is no guarding.     Skin:     General: Skin is warm and dry.      Coloration: Skin is not jaundiced.     Neurological:      General: No focal deficit present.      Mental Status: She is alert and oriented to  person, place, and time.     Right Breast Incision: clean, dry and intact. No saturation, surrounding erythema, or ecchymosis of area. Area soft and appropriately tender      Lab Results: I have reviewed the following results:  Recent Labs     06/04/25  0455   SODIUM 140   K 4.0   *   CO2 26   BUN 11   CREATININE 0.61   GLUC 101   CALCIUM 8.0*   MG 2.0   PHOS 3.3          VTE Pharmacologic Prophylaxis: VTE covered by:    None     VTE Mechanical Prophylaxis: sequential compression device

## 2025-06-04 NOTE — QUICK NOTE
Post Op Check Note -Surgical- Oncology SAEED Wood 75 y.o. female MRN: [id number]  Unit/Bed#: E2 -01 Encounter: [id number]      Subjective:   76 yo POD 0 s/p right mastectomy with sentinel lymph node biopsy. Patient is doing well postop. Patient states pain is minimal at this time. She denies any nausea, vomiting, fevers or chills.    Objective:     Vitals:    06/03/25 1459   BP: 100/61   Pulse: 67   Resp: 17   Temp: 98 °F (36.7 °C)   SpO2: 90%     Physical Exam:  General: Alert and oriented x 4  CV: RRR; no murmurs, gallops, or rubs  Resp: No SOB; lungs CTA - no wheezes, rales or rhonchi  Abd: BS x 4 quadrants, soft and nontender  Right breast site: sutures covered with glue. Surrounding area soft, without erythema, swelling or discharge. Neurovascularly intact.    Labs:  0   Lab Value Date/Time    HCT 37.0 05/19/2025 1025    HCT 36.8 05/07/2025 0941    HCT 40.6 03/26/2025 0929    HGB 11.7 05/19/2025 1025    HGB 11.4 (L) 05/07/2025 0941    HGB 12.1 03/26/2025 0929    INR 1.06 10/04/2021 1243    WBC 5.44 05/19/2025 1025    WBC 9.05 05/07/2025 0941    WBC 4.78 03/26/2025 0929    ESR 91 (H) 04/27/2018 1344    CRP <3.0 10/08/2018 1517     Meds:  Current Medications[1]    Assessment/Plan:   Assessment:   75 y.o.female post operative day 0 right mastectomy with sentinel lymph node biopsy. Pain controlled, tolerating diet, and incisions clean.    Plan:  Incentive spirometry  Continue surgical bra, surgical gauze for support  Up and out of bed  DVT prophylaxis  Ice and analgesics  Will continue to assess for acute blood loss anemia  Frequent neurovascular exams  Trend labs  Trend vitals      Venkata Worrell PA-C                [1]   Current Facility-Administered Medications:     acetaminophen (TYLENOL) tablet 650 mg, 650 mg, Oral, Q6H PRN, Vince Combs DO    HYDROmorphone (DILAUDID) injection 0.2 mg, 0.2 mg, Intravenous, Q3H PRN, Vince Combs DO    levothyroxine tablet 50 mcg, 50 mcg, Oral, Early Morning,  Vince Combs DO, 50 mcg at 06/03/25 1217    multi-electrolyte (Plasmalyte-A/Isolyte-S PH 7.4/Normosol-R) IV solution, 75 mL/hr, Intravenous, Continuous, Vince Combs DO, Stopped at 06/03/25 1711    ondansetron (ZOFRAN) injection 4 mg, 4 mg, Intravenous, Q6H PRN, Vince Combs DO    oxyCODONE (ROXICODONE) IR tablet 5 mg, 5 mg, Oral, Q4H PRN, Vince Combs DO    oxyCODONE (ROXICODONE) split tablet 2.5 mg, 2.5 mg, Oral, Q4H PRN, Vince Combs DO, 2.5 mg at 06/03/25 1217

## 2025-06-04 NOTE — PLAN OF CARE
Problem: PAIN - ADULT  Goal: Verbalizes/displays adequate comfort level or baseline comfort level  Description: Interventions:  - Encourage patient to monitor pain and request assistance  - Assess pain using appropriate pain scale  - Administer analgesics as ordered based on type and severity of pain and evaluate response  - Implement non-pharmacological measures as appropriate and evaluate response  - Consider cultural and social influences on pain and pain management  - Notify physician/advanced practitioner if interventions unsuccessful or patient reports new pain  - Educate patient/family on pain management process including their role and importance of  reporting pain   - Provide non-pharmacologic/complimentary pain relief interventions  Outcome: Progressing     Problem: Knowledge Deficit  Goal: Patient/family/caregiver demonstrates understanding of disease process, treatment plan, medications, and discharge instructions  Description: Complete learning assessment and assess knowledge base.  Interventions:  - Provide teaching at level of understanding  - Provide teaching via preferred learning methods  Outcome: Progressing     Problem: DISCHARGE PLANNING  Goal: Discharge to home or other facility with appropriate resources  Description: INTERVENTIONS:  - Identify barriers to discharge w/patient and caregiver  - Arrange for needed discharge resources and transportation as appropriate  - Identify discharge learning needs (meds, wound care, etc.)  - Arrange for interpretive services to assist at discharge as needed  - Refer to Case Management Department for coordinating discharge planning if the patient needs post-hospital services based on physician/advanced practitioner order or complex needs related to functional status, cognitive ability, or social support system  Outcome: Progressing

## 2025-06-04 NOTE — ASSESSMENT & PLAN NOTE
75-year-old female postop day 1 s/p right mastectomy with sentinel lymph node biopsy.  Incisions clean dry and intact, tolerating pain, and tolerating diet.    Plan:  - Discharge today   - VNA for drain care  - Pain and nausea control Prn  - Monitor drain output   - Trend labs  - Trend vitals   - IS use

## 2025-06-04 NOTE — PLAN OF CARE
Problem: PAIN - ADULT  Goal: Verbalizes/displays adequate comfort level or baseline comfort level  Description: Interventions:  - Encourage patient to monitor pain and request assistance  - Assess pain using appropriate pain scale  - Administer analgesics as ordered based on type and severity of pain and evaluate response  - Implement non-pharmacological measures as appropriate and evaluate response  - Consider cultural and social influences on pain and pain management  - Notify physician/advanced practitioner if interventions unsuccessful or patient reports new pain  - Educate patient/family on pain management process including their role and importance of  reporting pain   - Provide non-pharmacologic/complimentary pain relief interventions  Outcome: Progressing     Problem: INFECTION - ADULT  Goal: Absence or prevention of progression during hospitalization  Description: INTERVENTIONS:  - Assess and monitor for signs and symptoms of infection  - Monitor lab/diagnostic results  - Monitor all insertion sites, i.e. indwelling lines, tubes, and drains  - Monitor endotracheal if appropriate and nasal secretions for changes in amount and color  - [place] appropriate cooling/warming therapies per order  - Administer medications as ordered  - Instruct and encourage patient and family to use good hand hygiene technique  - Identify and instruct in appropriate isolation precautions for identified infection/condition  Outcome: Progressing  Goal: Absence of fever/infection during neutropenic period  Description: INTERVENTIONS:  - Monitor WBC  - Perform strict hand hygiene  - Limit to healthy visitors only  - No plants, dried, fresh or silk flowers with evangelista in patient room  Outcome: Progressing     Problem: SAFETY ADULT  Goal: Patient will remain free of falls  Description: INTERVENTIONS:  - Educate patient/family on patient safety including physical limitations  - Instruct patient to call for assistance with activity   -  Consider consulting OT/PT to assist with strengthening/mobility based on AM PAC & JH-HLM score  - Consult OT/PT to assist with strengthening/mobility   - Keep Call bell within reach  - Keep bed low and locked with side rails adjusted as appropriate  - Keep care items and personal belongings within reach  - Initiate and maintain comfort rounds  - Make Fall Risk Sign visible to staff  - Offer Toileting every 2 Hours, in advance of need  - Initiate/Maintain bed alarm  - Obtain necessary fall risk management equipment:  socks, yellow bracelet, call bell  - Apply yellow socks and bracelet for high fall risk patients  - Consider moving patient to room near nurses station  Outcome: Progressing  Goal: Maintain or return to baseline ADL function  Description: INTERVENTIONS:  -  Assess patient's ability to carry out ADLs; assess patient's baseline for ADL function and identify physical deficits which impact ability to perform ADLs (bathing, care of mouth/teeth, toileting, grooming, dressing, etc.)  - Assess/evaluate cause of self-care deficits   - Assess range of motion  - Assess patient's mobility; develop plan if impaired  - Assess patient's need for assistive devices and provide as appropriate  - Encourage maximum independence but intervene and supervise when necessary  - Involve family in performance of ADLs  - Assess for home care needs following discharge   - Consider OT consult to assist with ADL evaluation and planning for discharge  - Provide patient education as appropriate  - Monitor functional capacity and physical performance, use of AM PAC & JH-HLM   - Monitor gait, balance and fatigue with ambulation    Outcome: Progressing     Problem: DISCHARGE PLANNING  Goal: Discharge to home or other facility with appropriate resources  Description: INTERVENTIONS:  - Identify barriers to discharge w/patient and caregiver  - Arrange for needed discharge resources and transportation as appropriate  - Identify discharge  learning needs (meds, wound care, etc.)  - Arrange for interpretive services to assist at discharge as needed  - Refer to Case Management Department for coordinating discharge planning if the patient needs post-hospital services based on physician/advanced practitioner order or complex needs related to functional status, cognitive ability, or social support system  Outcome: Progressing     Problem: Knowledge Deficit  Goal: Patient/family/caregiver demonstrates understanding of disease process, treatment plan, medications, and discharge instructions  Description: Complete learning assessment and assess knowledge base.  Interventions:  - Provide teaching at level of understanding  - Provide teaching via preferred learning methods  Outcome: Progressing

## 2025-06-04 NOTE — RESTORATIVE TECHNICIAN NOTE
Restorative Technician Note      Patient Name: Nicolle Wood     Restorative Tech Visit Date: 06/04/25  Note Type: Mobility  Patient Position Upon Consult: Supine  Activity Performed: Ambulated  Assistive Device: Roller walker  Patient Position at End of Consult: All needs within reach; Bedside chair

## 2025-06-04 NOTE — CASE MANAGEMENT
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    Palliative care patient 2024    Goals of care, counseling/discussion 2024    Weight loss 2024    Drug-induced Cushing's syndrome (HCC) 2023    Dyslipidemia 2023    Tremor 08/15/2023    Abnormal imaging of central nervous system 08/15/2023    Balance disorder 08/15/2023    Rectal bleeding 2023    Age-related osteoporosis without current pathological fracture 2022    RLS (restless legs syndrome) 2021    Left hip pain 10/14/2021    Urinary retention 10/15/2020    Moderate protein-calorie malnutrition (HCC) 10/15/2020    CLL (chronic lymphocytic leukemia) (HCC) 10/14/2020    Generalized weakness 10/14/2020    Severe protein-calorie malnutrition (Emanuel: less than 60% of standard weight) (HCC) 10/14/2020    Anxiety 10/14/2020    Autoimmune hemolytic anemia (HCC)     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HCC)     Idiopathic thrombocytopenic purpura (ITP) ([facility])     Hematoma of right lower leg 2020    Ambulatory dysfunction 2018    Small vessel disease (HCC) 2018    Blurry vision, right eye 2018    Vitamin D deficiency 2018    Other forms of systemic lupus erythematosus (HCC) 05/10/2018    Abnormal MRI 2018    Dizziness 2018    Elevated sedimentation rate measurement 2018    Hypergammaglobulinemia 2018    Major depressive disorder with current active episode,  unspecified depression episode severity, unspecified whether recurrent     Sjogren's disease (HCC) 05/13/2014    Esophageal reflux 07/24/2012    Hypothyroidism 07/15/2012      LOS (days): 1  Geometric Mean LOS (GMLOS) (days): 2.5  Days to GMLOS:     OBJECTIVE:    Risk of Unplanned Readmission Score: 12.09         Current admission status: Outpatient Surgery     Preferred Pharmacy:   [facility] [place] - [address]  [address]  [address]  [place] [address]  Phone: [phone] Fax: [phone]    [facility] [address] [place], [place] - [address]  [address] [facility] [address]  Phone: [phone] Fax: [phone]    Primary Care Provider: Kwame Ovalles,     Primary Insurance: MEDICARE  Secondary Insurance: AARP    ASSESSMENT:  Active Health Care Proxies       Brenda Min [facility]   [place]   Primary Phone: [phone] (Mobile)  Home Phone: [phone]                 Advance Directives  Does patient have a Health Care POA?: No  Was patient offered paperwork?: Yes (provided info)  Does patient currently have a Health Care decision maker?: Yes, please see Health Care Proxy section  Does patient have Advance Directives?: No  Was patient offered paperwork?: Yes  Primary Contact: Brenda Arrington [phone]    Patient Information  Admitted from:: Home  Mental Status: Alert  During Assessment patient was accompanied by: Not accompanied during assessment  Assessment information provided by:: Patient  Primary Caregiver: Self  Support Systems: Self, Family members, Friends/neighbors  What city do you live in?: [place]  Home entry access options. Select all that apply.: Stairs  Number of steps to enter home.: 1  Do the steps have railings?: Yes  Type of Current Residence: Apartment  Floor Level: 1  Upon entering residence, is there a bedroom on the main floor (no further steps)?: Yes  Upon entering residence, is there a bathroom on the main  floor (no further steps)?: Yes  Living Arrangements: Lives Alone  Is patient a ?: No    Activities of Daily Living Prior to Admission  Functional Status: Independent  Completes ADLs independently?: Yes  Ambulates independently?: Yes  Does patient use assisted devices?: Yes  Assisted Devices (DME) used: Straight Cane  Does patient currently own DME?: Yes  What DME does the patient currently own?: Straight Cane, Walker  Does patient have a history of Outpatient Therapy (PT/OT)?: No  Does the patient have a history of Short-Term Rehab?: No  Does patient have a history of HHC?: No  Does patient currently have HHC?: No    Current Home Health Care  [facility]    Patient Information Continued  Income Source: Pension/MCC (SSI)  Does patient have prescription coverage?: Yes  Can the patient afford their medications and any related supplies (such as glucometers or test strips)?: N/A  Does patient receive dialysis treatments?: Yes  Does patient have a history of substance abuse?: No       Means of Transportation  Means of Transport to [facility]:: Drives Self (Niece will transport post operatively)    DISCHARGE DETAILS:    Discharge planning discussed with:: patient  Freedom of Choice: Yes  Comments - Freedom of Choice: Home with [facility] - reserved in aidin  CM contacted family/caregiver?: Yes  Were Treatment Team discharge recommendations reviewed with patient/caregiver?: Yes  Did patient/caregiver verbalize understanding of patient care needs?: Yes  Were patient/caregiver advised of the risks associated with not following Treatment Team discharge recommendations?: Yes    Contacts  Patient Contacts: Brenda Min (niyany) M- [phone];  h - [phone]  Relationship to Patient:: Family  Contact Method: Phone  Phone Number: [phone]  Reason/Outcome: Continuity of Care, Emergency Contact, Discharge Planning    Requested Home Health Care         Is the patient interested in HHC at  discharge?: Yes  Home Health Discipline requested:: Nursing  Home Health Agency Name:: [facility]  HHA External Referral Reason (only applicable if external HHA name selected): Patient has established relationship with provider  Home Health Follow-Up Provider:: PCP  Home Health Services Needed:: Wound/Ostomy Care, Post-Op Care and Assessment  Homebound Criteria Met:: Requires the Assistance of Another Person for Safe Ambulation or to Leave the Home, Uses an Assist Device (i.e. cane, walker, etc)  Supporting Clincal Findings:: Fatigues Easliy in Short Distances, Limited Endurance (needs nursing service for post op mastectomy, drain care)    DME Referral Provided  Referral made for DME?: No    Other Referral/Resources/Interventions Provided:  Interventions: HHC  Referral Comments: [facility] referral for SN [facility] reserved    Would you like to participate in our [facility] service program?  : No - Declined    Treatment Team Recommendation: Home with Home Health Care  Discharge Destination Plan:: Home with Home Health Care  Transport at Discharge : Family      Additional Comments: CM met with the patient at the bedside for intake assessment and discharge planning. CM introduced self and reviewed role. The patient informed CM that she lives alone in a first floor apartment, ambulates with a cane but owns a walker. Patient will be staying with her niece Tabby immediately post op. [facility]. The service will start at the niece's home than the patient's home when she goes home. CM department will continue to follow for discharge planning.

## 2025-06-04 NOTE — PROGRESS NOTES
Patient:    MRN:  [id number]    Viktoria Request ID:  [id number]    Level of care reserved:  Home Health Agency    Partner Reserved:  [facility], [place] [address] [phone]    Clinical needs requested:    Geography searched:  [address]    Start of Service:    Request sent:  8:39am EDT on 6/4/2025 by Thea Louie    Partner reserved:  8:55am EDT on 6/4/2025 by Thea Louie    Choice list shared:

## 2025-06-05 ENCOUNTER — PATIENT OUTREACH (OUTPATIENT)
Dept: HEMATOLOGY ONCOLOGY | Facility: CLINIC | Age: 75
End: 2025-06-05

## 2025-06-05 ENCOUNTER — TELEPHONE (OUTPATIENT)
Age: 75
End: 2025-06-05

## 2025-06-05 ENCOUNTER — HOME CARE VISIT (OUTPATIENT)
Dept: HOME HEALTH SERVICES | Facility: HOME HEALTHCARE | Age: 75
End: 2025-06-05
Attending: SURGERY
Payer: MEDICARE

## 2025-06-05 VITALS
HEART RATE: 77 BPM | SYSTOLIC BLOOD PRESSURE: 104 MMHG | OXYGEN SATURATION: 97 % | TEMPERATURE: 97.2 F | DIASTOLIC BLOOD PRESSURE: 60 MMHG

## 2025-06-05 PROCEDURE — G0299 HHS/HOSPICE OF RN EA 15 MIN: HCPCS

## 2025-06-05 PROCEDURE — 10330081 VN NO-PAY CLAIM PROCEDURE

## 2025-06-05 PROCEDURE — 400013 VN SOC

## 2025-06-05 NOTE — TELEPHONE ENCOUNTER
1st attempt for post op call-Left message for patient to call back. [employer] number provided [phone].

## 2025-06-05 NOTE — PROGRESS NOTES
Received a voice message from patient's niece Brenda mentioning the following:    Hi Irene, this is Brenda Tyson Calling. I'm the niece of Nicolle Fairchild. She had surgery with Doctor Trinidad on Tuesday. And I'm just questioning, and I know that she does have a social work that maybe Doctor Jm hooked her up with, but I can't find that phone number. I'm not sure if you can help me with that, but I'm just looking at maybe getting Meals on Wheels set up for her. I think she'd be more inclined to eat if the meals were delivered and I don't know how to even start that process. So if you have that information, if you could give me a call back. I'm at [phone]. Or if you have access to her record at all to see her other social workers. I could probably work with them as well, but I can't find that phone number. Thank you so much. Bye.  You received a voice mail from [phone].    *Called and spoke with Brenda and she mentioned that she was aware that she should be in touch with SW regarding the meals on wheels. I mentioned to Brenda that I could follow up with SW and make them aware. She asked who Nicolle's SW was I mentioned it was Jacqueline and she mentioned that she would call her. Provided patient with FANG-Jacqueline Bernstein direct phone number to further discuss.     Brenda was thankful for my call back and is aware that should she have any questions or concerns in the future that she can reach me directly again #[phone].

## 2025-06-06 ENCOUNTER — PATIENT OUTREACH (OUTPATIENT)
Dept: CASE MANAGEMENT | Facility: OTHER | Age: 75
End: 2025-06-06

## 2025-06-06 NOTE — PROGRESS NOTES
OSW returned call to patient's niece regarding Meals on Wheels. Niece reported that patient is still in her house but will be returning to her home in the next few days. Brenda reported that patient has next to no pain, the nurse was there yesterday and said that the incision looks good. Patient hasn't showered yet, will try today. Patient doesn't have much of an appetite. Brenda has been encouraging patient to eat, preparing food for patient, trying to get calories/protein and fats into patient.     OSW will make referral for Meals on Wheels. OSW placed call [phone] message left for Trudi. Referral placed, Brenda's contact information provided for referral.

## 2025-06-09 ENCOUNTER — HOME CARE VISIT (OUTPATIENT)
Dept: HOME HEALTH SERVICES | Facility: HOME HEALTHCARE | Age: 75
End: 2025-06-09
Payer: MEDICARE

## 2025-06-09 PROCEDURE — 88342 IMHCHEM/IMCYTCHM 1ST ANTB: CPT | Performed by: STUDENT IN AN ORGANIZED HEALTH CARE EDUCATION/TRAINING PROGRAM

## 2025-06-09 PROCEDURE — G0299 HHS/HOSPICE OF RN EA 15 MIN: HCPCS

## 2025-06-09 PROCEDURE — 88344 IMHCHEM/IMCYTCHM EA MLT ANTB: CPT | Performed by: STUDENT IN AN ORGANIZED HEALTH CARE EDUCATION/TRAINING PROGRAM

## 2025-06-09 PROCEDURE — 88307 TISSUE EXAM BY PATHOLOGIST: CPT | Performed by: STUDENT IN AN ORGANIZED HEALTH CARE EDUCATION/TRAINING PROGRAM

## 2025-06-09 PROCEDURE — 88341 IMHCHEM/IMCYTCHM EA ADD ANTB: CPT | Performed by: STUDENT IN AN ORGANIZED HEALTH CARE EDUCATION/TRAINING PROGRAM

## 2025-06-10 VITALS
TEMPERATURE: 97.3 F | DIASTOLIC BLOOD PRESSURE: 58 MMHG | HEART RATE: 77 BPM | RESPIRATION RATE: 18 BRPM | OXYGEN SATURATION: 96 % | SYSTOLIC BLOOD PRESSURE: 88 MMHG

## 2025-06-10 NOTE — CASE COMMUNICATION
Hello, I had a home visit w/ your pt yesterday. She is indep keeping a log of all drainage output from her LATRELL. The instructions state to alert you when the output is < 30mL per day. She has had a 20mL output for several days and I just wanted to make you aware. Thank you.

## 2025-06-10 NOTE — TELEPHONE ENCOUNTER
Final attempt to reach pt for POC sx on 6/3.  LMOM to call back to [employer] number given.  No further attempts will be made to reach pt.

## 2025-06-11 ENCOUNTER — HOME CARE VISIT (OUTPATIENT)
Dept: HOME HEALTH SERVICES | Facility: HOME HEALTHCARE | Age: 75
End: 2025-06-11
Payer: MEDICARE

## 2025-06-11 ENCOUNTER — TELEPHONE (OUTPATIENT)
Dept: SURGICAL ONCOLOGY | Facility: CLINIC | Age: 75
End: 2025-06-11

## 2025-06-11 ENCOUNTER — PATIENT OUTREACH (OUTPATIENT)
Dept: CASE MANAGEMENT | Facility: OTHER | Age: 75
End: 2025-06-11

## 2025-06-11 NOTE — PROGRESS NOTES
OSW placed call to patient to check in with her. Patient reported that she returned home yesterday and that the visiting nurses are continuing to visit and will be coming Friday. Patient reported that she has an appointment tomorrow to have the drain removed. Patient reported that she does have some pain but that tylenol helps with pain control. Patient reported that her niece did help with meal prep and that she has been keeping a record of the food that she is eating. She has drinking some of the shakes and mixing them with ice cream and she has enjoyed that. Patient is trying to get back in her routine but admits that she is taking it slow.     OSW will continue to follow.

## 2025-06-11 NOTE — TELEPHONE ENCOUNTER
Called patient to offer sooner post-op appointment for this Monday at 2:30 in [place] office due to low drain output. Patient requested that I call her niece, Brenda, to see if she is available to bring her in that day.    Call placed to Brenda per patient request, received LUIS CHAMBERS offering sooner appointment time on 6/16 at 2:30pm in [place] office. Provided direct Teams number for call back

## 2025-06-11 NOTE — CASE COMMUNICATION
Pt is returning to her home. Please update info.     New address: [address], [place] [address].    Thank you.

## 2025-06-12 ENCOUNTER — OFFICE VISIT (OUTPATIENT)
Dept: SURGICAL ONCOLOGY | Facility: CLINIC | Age: 75
End: 2025-06-12

## 2025-06-12 VITALS
HEART RATE: 91 BPM | HEIGHT: 66 IN | WEIGHT: 103 LBS | DIASTOLIC BLOOD PRESSURE: 70 MMHG | OXYGEN SATURATION: 98 % | SYSTOLIC BLOOD PRESSURE: 110 MMHG | BODY MASS INDEX: 16.55 KG/M2

## 2025-06-12 DIAGNOSIS — Z17.1 MALIGNANT NEOPLASM OF UPPER-OUTER QUADRANT OF RIGHT BREAST IN FEMALE, ESTROGEN RECEPTOR NEGATIVE (HCC): Primary | ICD-10-CM

## 2025-06-12 DIAGNOSIS — C50.411 MALIGNANT NEOPLASM OF UPPER-OUTER QUADRANT OF RIGHT BREAST IN FEMALE, ESTROGEN RECEPTOR NEGATIVE (HCC): Primary | ICD-10-CM

## 2025-06-12 LAB
DME PARACHUTE DELIVERY DATE REQUESTED: NORMAL
DME PARACHUTE ITEM DESCRIPTION: NORMAL
DME PARACHUTE ORDER STATUS: NORMAL
DME PARACHUTE SUPPLIER NAME: NORMAL
DME PARACHUTE SUPPLIER PHONE: NORMAL

## 2025-06-12 PROCEDURE — 99024 POSTOP FOLLOW-UP VISIT: CPT | Performed by: SURGERY

## 2025-06-12 NOTE — CASE COMMUNICATION
This message is informative only.  No action required.      Phone call to Kenny Mcnair to schedule [facility] eval. She reported that Pt. did not feel the need for   Sp tx.   Discussed letting Nurse know if she would need sp tx in the future.

## 2025-06-12 NOTE — PROGRESS NOTES
Name: Nicolle Wood      : 1950      MRN: [id number]  Encounter Provider: Li Abdi MD  Encounter Date: 2025   Encounter department: [facility] [place]  :  Assessment & Plan  Malignant neoplasm of upper-outer quadrant of right breast in female, estrogen receptor negative (HCC)         75-year-old female status post right mastectomy and sentinel node biopsy for multifocal malignancy of the right breast.  Her core needle biopsies revealed invasive ductal carcinoma as well as multiple sites of DCIS.  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  She is healing well with no signs of infection.  Her drain was removed today in the office.  We will see her again in 6 months for survivorship visit.  I will however present her at an upcoming tumor board given the apocrine phenotype to discuss additional treatment, if any.      History of Present Illness   Nicolle Wood is a 75 y.o. year old female who presents for a postop visit after right mastectomy.  She is feeling well overall.  She has had very little drain output.  She has some irritation at the suture that is holding the drain in place.     Oncology History   Cancer Staging   Malignant neoplasm of upper-outer quadrant of right breast in female, estrogen receptor negative (HCC)  Staging form: Breast, AJCC 8th Edition  - Clinical stage from 2025: Stage IB (cT1c, cN0, cM0, G1, ER-, LA-, HER2-) - Signed by Li Abdi MD on 2025  Stage prefix: Initial diagnosis  Method of lymph node assessment: Clinical  Histologic grading system: 3 grade system  Oncology History   Malignant neoplasm of upper-outer quadrant of right breast in female, estrogen receptor negative (HCC)   3/6/2025 Observation    NM PET CT    No suspicious FDG avid lymphadenopathy in the neck, chest, abdomen, or pelvis.  Incidentally noted FDG avid right breast nodule measuring 0.9 x 0.7 cm suspicious for  malignancy. There is also suspicious asymmetric FDG uptake in the right breast parenchyma more superiorly. Recommend further evaluation beginning with mammography. Mild asymmetric increased uptake in the left adrenal gland with SUV max 3.4 with questionable nodularity on CT. This is nonspecific and could be related to an underlying adrenal adenoma. This can be reassessed on follow-up PET/CT or with dedicated MRI of the adrenals.     4/28/2025 Biopsy    Right US-guided bx    A. 9:00 4cmfn  IDC  Grade 1  ER <1, NC <1, HER2 1+  2cm    B. 10:00 6cmfn  DCIS  Grade 2  ER <1, NC <1    C. 9:00 periareolar  DCIS  Grade 2  ER <1, NC <1     4/28/2025 -  Cancer Staged    Staging form: Breast, AJCC 8th Edition  - Clinical stage from 4/28/2025: Stage IB (cT1c, cN0, cM0, G1, ER-, NC-, HER2-) - Signed by Li Abdi MD on 5/14/2025  Stage prefix: Initial diagnosis  Method of lymph node assessment: Clinical  Histologic grading system: 3 grade system       5/13/2025 - 5/13/2025 Chemotherapy    DOXOrubicin (ADRIAMYCIN), 60 mg/m2 = 89 mg, 0 of 4 cycles  cyclophosphamide (CYTOXAN) IVPB, 600 mg/m2 = 888 mg, 0 of 4 cycles  CARBOplatin (PARAPLATIN) IVPB (GOG AUC DOSING), , 0 of 4 cycles  PACLItaxel (TAXOL) chemo IVPB, 80 mg/m2 = 118.2 mg, 0 of 4 cycles  pembrolizumab (KEYTRUDA) IVPB, 200 mg, 0 of 17 cycles     6/3/2025 Surgery    Right mastectomy w/ SLNB    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   Largest foci 12mm.   DCIS present, positive for EIC.   LVI present.   All margins negative for invasive carcinoma and DCIS.   0/1 sentinel lymph node, 0/1 intramammary lymph node        Review of Systems   Constitutional: Negative.  Negative for appetite change, fever and unexpected weight change.   HENT: Negative.  Negative for trouble swallowing.    Eyes: Negative.    Respiratory: Negative.  Negative for cough and shortness of breath.    Cardiovascular: Negative.  Negative for chest pain.   Gastrointestinal: Negative.   "Negative for abdominal pain, nausea and vomiting.   Endocrine: Negative.    Genitourinary: Negative.  Negative for dysuria.   Musculoskeletal: Negative.  Negative for arthralgias and myalgias.   Skin: Negative.    Allergic/Immunologic: Negative.    Neurological: Negative.  Negative for headaches.   Hematological: Negative.  Negative for adenopathy. Does not bruise/bleed easily.   Psychiatric/Behavioral: Negative.      A complete review of systems is negative other than that noted above in the HPI.    Past Medical History   Past Medical History[1]  Past Surgical History[2]  Family History[3]   reports that she has never smoked. She has never been exposed to tobacco smoke. She has never used smokeless tobacco. She reports that she does not drink alcohol and does not use drugs.  Current Outpatient Medications   Medication Instructions    acetaminophen (TYLENOL) 500 mg, 2 times daily    ALPRAZolam (XANAX) 0.5 mg, Oral, Daily at bedtime PRN    Calquence 100 MG TABS Take 1 tablet (100mg total)  by mouth once daily.    levothyroxine 50 mcg, Oral, Daily    methocarbamol (ROBAXIN) 500 mg, Oral, Every 8 hours PRN    naloxone (NARCAN) 4 mg/0.1 mL nasal spray Administer 1 spray into a nostril. If no response after 2-3 minutes, give another dose in the other nostril using a new spray.    OLANZapine (ZYPREXA ZYDIS) 5 mg, Oral, Daily at bedtime    oxyCODONE-acetaminophen (Percocet) 5-325 mg per tablet 1 tablet, Oral, Every 6 hours PRN    sertraline (ZOLOFT) 50 mg, Oral, Daily    Vitamin D, Cholecalciferol, 10 MCG (400 UNIT) CHEW 10 tablets, Oral, Daily   Allergies[4]        Objective   /70 (BP Location: Left arm, Patient Position: Sitting)   Pulse 91   Ht 5' 6\" (1.676 m)   Wt 46.7 kg (103 lb)   SpO2 98%   BMI 16.62 kg/m²     Pain Screening:     ECOG    Physical Exam  Constitutional:       General: She is not in acute distress.     Appearance: Normal appearance.   Chest:   Breasts:     Right: Skin change (Well-healing " "mastectomy incision with mild resolving ecchymosis but no signs of infection, LATRELL drain removed today in the office) present.     Neurological:      Mental Status: She is alert and oriented to person, place, and time.     Psychiatric:         Mood and Affect: Mood normal.          Labs: I have reviewed pertinent labs.     Admission on 06/03/2025, Discharged on 06/04/2025   Component Date Value Ref Range Status    Case Report 06/03/2025    Final                    Value:Surgical Pathology Report                         Case: [id number]                                  Authorizing Provider:  Li Abdi MD           Collected:           06/03/2025 0945              Ordering Location:     [facility]        Received:            06/03/2025 [address] [place] [address] Operating Room                                                     Pathologist:           Deepika Bai MD                                                         Specimens:   A) - Lymph Node, [place], RIGHT SENTINEL LYMPH NODE #1                                             B) - Breast, Right, RIGHT BREAST STITCH SHORT SUPERIOR LONG LATERAL                        Final Diagnosis 06/03/2025    Final                    Value:A. Lymph node (1), \"right sentinel lymph node #1\"; biopsy:       - One lymph node, negative for malignancy (0/1), see note     B. Breast and lymph node (1), \"right\"; mastectomy:       - Multifocal invasive apocrine adenocarcinoma, see note        - [place] grade 2 of 3 (total score: 6 of 9)     - Invasive carcinoma max. dimension = 12 millimeters; additional foci measuring 2 mm each     - Ductal carcinoma in situ (DCIS): Present; positive for extensive intraductal component (EIC); measuring at least 66 mm         - Cribriform, apocrine, solid architectural patterns, nuclear grade 3 of 3, with comedo-type necrosis      - Lymphovascular invasion: Present      - Microcalcifications: Present in DCIS     "  - Resection margins negative for invasive and in-situ carcinoma     - Benign skin     - Unremarkable nipple     - Previous biopsy site changes x 3      - One lymph node, negative for malignancy (0/1)      - See synoptic report        Note 06/03/2025    Final                    Value:If clinically indicated, the most appropriate tissue block(s) for ancillary testing are block(s): B13 > B14     On blocks A1 and A2, immunohistochemical stains for AE1/AE3 and  are negative supporting the diagnosis. On blocks B14 and B25, breast multiplex immunohistochemical stains support the diagnosis of invasive carcinoma. Additional immunohistochemical stains on blocks B16 and B20 for SMMHC and Calponin are positive supporting the diagnosis of DCIS. An immunohistochemical stain on block B30 for AE1/AE3 is negative supporting the diagnosis.     Intradepartmental consultation is in agreement (WT).      Additional Information 06/03/2025    Final                    Value:Interpretation performed at [facility], [address] [place] [address]    All reported additional testing was performed with appropriately reactive controls.  These tests were developed and their performance characteristics determined by [facility] or appropriate performing facility, though some tests may be performed on tissues which have not been validated for performance characteristics (such as staining performed on alcohol exposed cell blocks and decalcified tissues).  Results should be interpreted with caution and in the context of the patients’ clinical condition. These tests may not be cleared or approved by the U.S. Food and Drug Administration, though the FDA has determined that such clearance or approval is not necessary. These tests are used for clinical purposes and they should not be regarded as investigational or for research. This laboratory has been approved by CLIA 88, designated as a high-complexity laboratory and is  qualified to perform                           these tests.  .      Synoptic Checklist 06/03/2025    Final                    Value:INVASIVE CARCINOMA OF THE BREAST: Resection                            INVASIVE CARCINOMA OF THE BREAST: RESECTION - All Specimens                            8th Edition - Protocol posted: 6/19/2024                                                        SPECIMEN                               Procedure:    Total mastectomy                               Specimen Laterality:    Right                                                        TUMOR                               Tumor Site:    Upper outer quadrant                               Tumor Site:    Lower outer quadrant                               Histologic Type:    Apocrine adenocarcinoma                               Histologic Grade ([place] Histologic Score):                                     Glandular (Acinar) / Tubular Differentiation:    Score 3                                 Nuclear Pleomorphism:    Score 2                                 Mitotic Rate:    Score 1                                 Overall Grade:    Grade 2 (scores of 6 or 7)                               Tumor Size:    Greatest dimension of largest invasive focus (Millimeters): 12 mm                               Tumor Focality:    Multiple foci of invasive carcinoma                                 Number of Foci:    At least: 3                               Ductal Carcinoma In Situ (DCIS):    Present                                 :    Positive for extensive intraductal component (EIC)                                 Size (Extent) of DCIS:    Estimated size (extent) of DCIS is at least (Millimeters): 66 mm                                 Architectural Patterns:    Cribriform                                 Architectural Patterns:    Solid                                 Architectural Patterns:    Apocrine                                 Nuclear Grade:     "Grade III (high)                                 Necrosis:    Present, central (expansive \"comedo\" necrosis)                                 Number of Blocks with DCIS:    23                                 Number of Blocks Examined:    35                               Lobular Carcinoma In Situ (LCIS):    Not identified                               Lymphatic and / or Vascular Invasion:    Present                               Dermal Lymphatic and / or Vascular Invasion:    Not identified                               Microcalcifications:    Present in DCIS                               Treatment Effect in the Breast:    No known presurgical therapy                                                        MARGINS                               Margin Status for Invasive Carcinoma:    All margins negative for invasive carcinoma                                 Distance from Invasive Carcinoma to Closest Margin:    7 mm                                 Closest Margin(s) to Invasive Carcinoma:    Posterior                               Margin Status for DCIS:    All margins negative for DCIS                                 Distance from DCIS to Closest Margin:    Less than: 1 mm                                 Closest Margin(s) to DCIS:    Posterior                                                        REGIONAL LYMPH NODES                               Regional Lymph Node Status:                                     :    All regional lymph nodes negative for tumor                                 Total Number of Lymph Nodes Examined (sentinel and non-sentinel):    2                                 Number of [place] Nodes Examined:    1                                                        pTNM CLASSIFICATION (AJCC 8th Edition)                               Reporting of pT, pN, and (when applicable) pM categories is based on information available to the pathologist at the time the report is issued. As per the AJCC (Chapter " "1, 8th Ed.) it is the managing physician's responsibility to establish the final pathologic stage based upon all pertinent information, including but potentially not limited to this pathology report.                               pT Category:    pT1c                               T Suffix:    (m)                               pN Category:    pN0                               N Suffix:    (sn)                                                           Breast Biomarker Testing Performed on Previous Biopsy:                                     Estrogen Receptor (ER) Status:    Negative                               Breast Biomarker Testing Performed on Previous Biopsy:                                     Progesterone Receptor (PgR) Status:    Negative                               Breast Biomarker Testing Performed on Previous Biopsy:                                     HER2 (by immunohistochemistry):    Negative (Score 1+)                                 Testing Performed on Case Number:    [id number]      Gross Description 06/03/2025    Final                    Value:A. The specimen is received in formalin, labeled with the patient's name and hospital number, and is designated \" right sentinel lymph node #1\".  The specimen consists of a 2.0 x 1.4 x 0.6 cm tan lymph node.  The specimen is serially sectioned along the short axis and entirely submitted sequentially in 2 cassettes.  B. The specimen is received in formalin, labeled with the patient's name and hospital number, and is designated \" right breast stitch short superior long lateral\".  The specimen consists of a 220.5 g right breast mastectomy specimen (16.5 x 15.5 x 3.3 cm) with an attached tan-pink skin ellipse (13.3 x 7.0 cm).  The nipple areolar complex is flush with the surrounding skin and measures 3.5 x 3.5 cm.  The grossly unremarkable nipple measures 1.1 cm in diameter.  2 flesh-colored papules are identified on the skin surface in the upper outer " quadrant, each measuring 0.4 cm.  These areas are 1.5 cm away from the nearest peripheral skin margin at 10:00.  No other lesions are                           identified on the skin surface.  The specimen is inked as follows: Upper outer quadrant-yellow, upper inner quadrant-blue, lower outer quadrant-green, lower inner quadrant-orange and entire deep margin-black.  The specimen is serially sectioned to reveal 3 separate biopsy sites.    Biopsy Site 1  In the periareolar region of the breast at 6:00 in the lower outer quadrant, 2 cm from the nipple is a 0.6 cm in diameter previous biopsy cavity.  The biopsy cavity is filled with blue gelatinous material and contains a butterfly clip.  The biopsy cavity is 1.5 cm from the deep margin, 1.8 cm from the inferior margin and is greater than 2.0 cm from the remaining margins.    Biopsy Site 2  In the lower outer quadrant at 7-8 o'clock, 3 cm from the nipple is a 1.0 (medial-lateral) x 1.2 (superior-inferior) x 1.1 cm (anterior-posterior) tan-white firm mass with spiculated margins containing a [place]  reflector.  The mass is 0.7 cm from the deep margin, 1.0 cm from the inferior margin and is                           greater than 2.0 cm from the remaining margins.    Biopsy Site 3  In the mid central upper outer quadrant/lower outer quadrant at 9:00, 6 cm from the nipple is a 2.0 (medial-lateral) x 1.0 (superior to inferior) x 0.8 cm (anterior-posterior) tan-gray firm fibrous area containing a mini [place]  reflector.  This area is 0.1 cm from the deep margin and is greater than 2.0 cm from the remaining margins.    Biopsy site 1 and 2 are 1.0 cm from one another in a medial-lateral diameter.  Biopsy site 1 and 3 are 4.5 cm from one another in both a medial-lateral and superior to inferior diameter.  Biopsy site 2 and 3 are 4.5 cm from one another in a superior-inferior diameter.  Overall, all 3 biopsy sites measure 4.5 cm medial-lateral, 4.5 cm superior-inferior and  2.0 cm anterior-posterior.  The breast parenchyma consists of 30% dense white gritty fibrous tissue and the remainder yellow/white grossly unremarkable adipose tissue.  No other lesions are grossly identified.  Representative                           sections are submitted as follows:    1: Nipple, perpendicular section  2: Base of nipple  3: 2 papules, entirely submitted  4: Peripheral skin margin at 10:00, en face  5: Section medial to biopsy site 1  6-8: Biopsy site 1 entirely submitted sequentially from medial to lateral, includes deep margin, cassette 7 section with butterfly clip  9: Section lateral to biopsy site 1/section in between biopsy site 1 and biopsy site 2  10: Inferior margin closest to biopsy site 1  11: Bridging tissue in between biopsy site 1 and biopsy site 2-3/section medial to biopsy site 2  12-14: Biopsy site 2 entirely submitted sequentially from medial to lateral, includes deep margin, cassette 13 section with [place]  reflector  15: Section lateral to biopsy site 2  16-17: Inferior margin closest to biopsy site 2  18-19: Bridging tissue in between biopsy site 2 and 3  20: Section medial to biopsy site 3  21-26: Biopsy site 3 entirely submitted sequentially from medial to lateral, includes deep margin,                           cassette 24 section with mini [place]  reflector  27: Section lateral to biopsy site 3  28-29: Representative sections from the upper outer quadrant  30-31: Representative sections from the lower outer quadrant away from biopsy sites  32-33: Representative sections from the upper inner quadrant  34-35: Representative sections from the lower inner quadrant    Note: The estimated total formalin fixation time based upon information provided by the submitting clinician and the standard processing schedule is 34.0 hours.  The cold ischemia time based upon information provided by the submitting clinician and receiving staff in the laboratory is within 1 minute.   RRavotti      Clinical Information 06/03/2025    Final                    Value:PATHOLOGY RESULTS:   1) Mass [A] (Right; 9 o'clock; Middle)  The pathology results indicate a Malignant finding with invasive breast carcinoma and ductal carcinoma in situ.   Radiology and pathology are concordant.     3) Mass [D] (Right; 10 o'clock; 6 cm from nipple)  The pathology results indicate a Malignant finding with ductal carcinoma in situ.   Radiology and pathology are concordant.     2) Asymmetry [B] (Right; Outer Central; Anterior)  The pathology results indicate a Malignant finding with ductal carcinoma in situ.   Radiology and pathology are concordant.     In review of the patient's recent imaging, the right malignancy appears multifocal. Suspicious masses cover an area of of approximately 7.1 cm measured on the cc view from the periareolar clip to the upper outer quadrant posterior [place] clip.  Ultrasound of the right axilla was reperformed on 4/28/2025 and showed no suspicious adenopathy.      Sodium 06/04/2025 140  135 - 147 mmol/L Final    Potassium 06/04/2025 4.0  3.5 - 5.3 mmol/L Final    Chloride 06/04/2025 109 (H)  96 - 108 mmol/L Final    CO2 06/04/2025 26  21 - 32 mmol/L Final    ANION GAP 06/04/2025 5  4 - 13 mmol/L Final    BUN 06/04/2025 11  5 - 25 mg/dL Final    Creatinine 06/04/2025 0.61  0.60 - 1.30 mg/dL Final    Standardized to IDMS reference method    Glucose 06/04/2025 101  65 - 140 mg/dL Final    If the patient is fasting, the ADA then defines impaired fasting glucose as > 100 mg/dL and diabetes as > or equal to 123 mg/dL.    Glucose, Fasting 06/04/2025 101 (H)  65 - 99 mg/dL Final    Calcium 06/04/2025 8.0 (L)  8.4 - 10.2 mg/dL Final    eGFR 06/04/2025 90  ml/min/1.73sq m Final    Magnesium 06/04/2025 2.0  1.9 - 2.7 mg/dL Final    Phosphorus 06/04/2025 3.3  2.3 - 4.1 mg/dL Final   Office Visit on 05/23/2025   Component Date Value Ref Range Status    Ventricular Rate 05/23/2025 76  BPM Final    Atrial  Rate 05/23/2025 76  BPM Final    HI Interval 05/23/2025 160  ms Final    QRSD Interval 05/23/2025 80  ms Final    QT Interval 05/23/2025 396  ms Final    QTC Interval 05/23/2025 445  ms Final    P Axis 05/23/2025 27  degrees Final    QRS Axis 05/23/2025 31  degrees Final    T Wave Axis 05/23/2025 61  degrees Final   Appointment on 05/19/2025   Component Date Value Ref Range Status    Ventricular Rate 05/19/2025 75  BPM Final    Atrial Rate 05/19/2025 75  BPM Final    HI Interval 05/19/2025 160  ms Final    QRSD Interval 05/19/2025 78  ms Final    QT Interval 05/19/2025 396  ms Final    QTC Interval 05/19/2025 443  ms Final    P Axis 05/19/2025 40  degrees Final    QRS Axis 05/19/2025 43  degrees Final    T Wave Axis 05/19/2025 66  degrees Final   Appointment on 05/19/2025   Component Date Value Ref Range Status    Color, UA 05/19/2025 Light Yellow   Final    Clarity, UA 05/19/2025 Clear   Final    Specific Gravity, UA 05/19/2025 1.005  1.003 - 1.030 Final    pH, UA 05/19/2025 6.5  4.5, 5.0, 5.5, 6.0, 6.5, 7.0, 7.5, 8.0 Final    Leukocytes, UA 05/19/2025 Small (A)  Negative Final    Nitrite, UA 05/19/2025 Negative  Negative Final    Protein, UA 05/19/2025 Negative  Negative mg/dl Final    Glucose, UA 05/19/2025 Negative  Negative mg/dl Final    Ketones, UA 05/19/2025 Negative  Negative mg/dl Final    Urobilinogen, UA 05/19/2025 <2.0  <2.0 mg/dl mg/dl Final    Bilirubin, UA 05/19/2025 Negative  Negative Final    Occult Blood, UA 05/19/2025 Negative  Negative Final    Sodium 05/19/2025 137  135 - 147 mmol/L Final    Potassium 05/19/2025 3.7  3.5 - 5.3 mmol/L Final    Chloride 05/19/2025 104  96 - 108 mmol/L Final    CO2 05/19/2025 28  21 - 32 mmol/L Final    ANION GAP 05/19/2025 5  4 - 13 mmol/L Final    BUN 05/19/2025 15  5 - 25 mg/dL Final    Creatinine 05/19/2025 0.68  0.60 - 1.30 mg/dL Final    Standardized to IDMS reference method    Glucose 05/19/2025 104  65 - 140 mg/dL Final    If the patient is fasting, the  ADA then defines impaired fasting glucose as > 100 mg/dL and diabetes as > or equal to 123 mg/dL.    Calcium 05/19/2025 8.7  8.4 - 10.2 mg/dL Final    AST 05/19/2025 12 (L)  13 - 39 U/L Final    ALT 05/19/2025 4 (L)  7 - 52 U/L Final    Specimen collection should occur prior to Sulfasalazine administration due to the potential for falsely depressed results.     Alkaline Phosphatase 05/19/2025 38  34 - 104 U/L Final    Total Protein 05/19/2025 6.9  6.4 - 8.4 g/dL Final    Albumin 05/19/2025 3.9  3.5 - 5.0 g/dL Final    Total Bilirubin 05/19/2025 0.51  0.20 - 1.00 mg/dL Final    Use of this assay is not recommended for patients undergoing treatment with eltrombopag due to the potential for falsely elevated results.  N-acetyl-p-benzoquinone imine (metabolite of Acetaminophen) will generate erroneously low results in samples for patients that have taken an overdose of Acetaminophen.    eGFR 05/19/2025 87  ml/min/1.73sq m Final    WBC 05/19/2025 5.44  4.31 - 10.16 Thousand/uL Final    RBC 05/19/2025 3.97  3.81 - 5.12 Million/uL Final    Hemoglobin 05/19/2025 11.7  11.5 - 15.4 g/dL Final    Hematocrit 05/19/2025 37.0  34.8 - 46.1 % Final    MCV 05/19/2025 93  82 - 98 fL Final    MCH 05/19/2025 29.5  26.8 - 34.3 pg Final    MCHC 05/19/2025 31.6  31.4 - 37.4 g/dL Final    RDW 05/19/2025 14.2  11.6 - 15.1 % Final    MPV 05/19/2025 11.8  8.9 - 12.7 fL Final    Platelets 05/19/2025 148 (L)  149 - 390 Thousands/uL Final    nRBC 05/19/2025 0  /100 WBCs Final    Segmented % 05/19/2025 79 (H)  43 - 75 % Final    Immature Grans % 05/19/2025 0  0 - 2 % Final    Lymphocytes % 05/19/2025 15  14 - 44 % Final    Monocytes % 05/19/2025 5  4 - 12 % Final    Eosinophils Relative 05/19/2025 0  0 - 6 % Final    Basophils Relative 05/19/2025 1  0 - 1 % Final    Absolute Neutrophils 05/19/2025 4.30  1.85 - 7.62 Thousands/µL Final    Absolute Immature Grans 05/19/2025 0.02  0.00 - 0.20 Thousand/uL Final    Absolute Lymphocytes 05/19/2025 0.79   0.60 - 4.47 Thousands/µL Final    Absolute Monocytes 05/19/2025 0.28  0.17 - 1.22 Thousand/µL Final    Eosinophils Absolute 05/19/2025 0.02  0.00 - 0.61 Thousand/µL Final    Basophils Absolute 05/19/2025 0.03  0.00 - 0.10 Thousands/µL Final    RBC, UA 05/19/2025 None Seen  None Seen, 1-2 /hpf Final    WBC, UA 05/19/2025 4-10 (A)  None Seen, 1-2 /hpf Final    Epithelial Cells 05/19/2025 Occasional  None Seen, Occasional /hpf Final    Bacteria, UA 05/19/2025 Occasional  None Seen, Occasional /hpf Final   Office Visit on 05/13/2025   Component Date Value Ref Range Status    CARIS PD-L1 (22C3) 04/28/2025 Negative   Final    CARIS Genomic Loss of Heterozygosi* 04/28/2025 Low 3%   Final    CARIS Microsatellite Instability -* 04/28/2025 Stable   Final    CARIS Tumor Mutational [place] - Ex* 04/28/2025 Low 3 per Mb   Final    CARIS ANDROGEN RECEPTOR 04/28/2025 Positive   Final    CARIS Estrogen Receptor 04/28/2025 Negative   Final    CARIS Her2/Chuy 04/28/2025 Low   Final    CARIS Progesterone Receptor 04/28/2025 Negative   Final    CARIS PTEN 04/28/2025 Positive   Final    CARIS HLA-A - [place] 04/28/2025 A*01:01,A*11:01   Final    CARIS HLA-B - [place] 04/28/2025 B*08:01,B*55:01   Final    CARIS HLA-C - [place] 04/28/2025 C*03:03,C*07:01   Final     Pathology: I have reviewed pathology reports described above.           [1]   Past Medical History:  Diagnosis Date    Anxiety     Autoimmune hemolytic anemia (HCC)     Cataract     Disease of thyroid gland     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HCC)     GERD (gastroesophageal reflux disease)     Hypothyroidism     Hypothyroidism     Idiopathic thrombocytopenic purpura (ITP) (HCC)     Menopause     Sjogrens syndrome (HCC)    [2]   Past Surgical History:  Procedure Laterality Date    BREAST BIOPSY Right 04/28/2025    CATARACT EXTRACTION Left     COLONOSCOPY  2012    per pt    FL LUMBAR PUNCTURE DIAGNOSTIC  05/11/2018    MASTECTOMY W/ SENTINEL NODE BIOPSY Right 6/3/2025    Procedure: RIGHT MASTECTOMY  WITH BIOPSY LYMPH NODE SENTINEL & LYMPHATIC MAPPING; 0800 NUC MED;  Surgeon: Li Abdi MD;  Location: [facility];  Service: Surgical Oncology    TOOTH EXTRACTION      US GUIDANCE BREAST BIOPSY RIGHT EACH ADDITIONAL Right 4/28/2025    US GUIDANCE BREAST BIOPSY RIGHT EACH ADDITIONAL Right 4/28/2025    US GUIDED BREAST BIOPSY RIGHT COMPLETE Right 4/28/2025   [3]   Family History  Problem Relation Name Age of Onset    Colon cancer Mother      Heart disease Mother          cardiac disorder     Cancer Mother      Liver cancer Father      Cancer Father      Heart disease Brother     [4] No Known Allergies

## 2025-06-13 ENCOUNTER — HOME CARE VISIT (OUTPATIENT)
Dept: HOME HEALTH SERVICES | Facility: HOME HEALTHCARE | Age: 75
End: 2025-06-13
Payer: MEDICARE

## 2025-06-13 ENCOUNTER — DOCUMENTATION (OUTPATIENT)
Dept: HEMATOLOGY ONCOLOGY | Facility: CLINIC | Age: 75
End: 2025-06-13

## 2025-06-13 PROCEDURE — G0155 HHCP-SVS OF CSW,EA 15 MIN: HCPCS

## 2025-06-13 NOTE — PROGRESS NOTES
In-basket message received from Dr. Abdi to add patient to the breast [facility] on 6/23/2025. Chart reviewed and prep completed.

## 2025-06-16 ENCOUNTER — HOME CARE VISIT (OUTPATIENT)
Dept: HOME HEALTH SERVICES | Facility: HOME HEALTHCARE | Age: 75
End: 2025-06-16
Payer: MEDICARE

## 2025-06-16 VITALS
TEMPERATURE: 97.1 F | HEART RATE: 73 BPM | RESPIRATION RATE: 18 BRPM | DIASTOLIC BLOOD PRESSURE: 58 MMHG | SYSTOLIC BLOOD PRESSURE: 112 MMHG | OXYGEN SATURATION: 96 %

## 2025-06-16 PROCEDURE — G0299 HHS/HOSPICE OF RN EA 15 MIN: HCPCS

## 2025-06-18 ENCOUNTER — PATIENT OUTREACH (OUTPATIENT)
Dept: CASE MANAGEMENT | Facility: OTHER | Age: 75
End: 2025-06-18

## 2025-06-18 NOTE — PROGRESS NOTES
OSW placed call to patient to check in with her. Patient reported that the drain has been removed and has been healing up.     Meals on Wheels came yesterday, she signed up which will start on Tuesday, paid $67.00 for two weeks.     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 Discussed importance of staying hydrated especially with the reported increasing temperatures the next few days.     Patient does report increased pain above where her drain was. Patient denies any leakage, reddened area or area being hot to touch. Patient was agreeable to OSW sending message to Home Health RN who is scheduled to visit with her tomorrow to provide her with an update.

## 2025-06-19 ENCOUNTER — TELEPHONE (OUTPATIENT)
Facility: HOSPITAL | Age: 75
End: 2025-06-19

## 2025-06-19 ENCOUNTER — HOME CARE VISIT (OUTPATIENT)
Dept: HOME HEALTH SERVICES | Facility: HOME HEALTHCARE | Age: 75
End: 2025-06-19
Payer: MEDICARE

## 2025-06-19 VITALS
HEART RATE: 83 BPM | RESPIRATION RATE: 18 BRPM | TEMPERATURE: 97.3 F | SYSTOLIC BLOOD PRESSURE: 104 MMHG | OXYGEN SATURATION: 95 % | DIASTOLIC BLOOD PRESSURE: 64 MMHG

## 2025-06-19 PROCEDURE — G0299 HHS/HOSPICE OF RN EA 15 MIN: HCPCS

## 2025-06-19 NOTE — TELEPHONE ENCOUNTER
Requested to have a call back in 1 month to schedule at the [facility]. Call back number is [phone]

## 2025-06-20 ENCOUNTER — HOME CARE VISIT (OUTPATIENT)
Dept: HOME HEALTH SERVICES | Facility: HOME HEALTHCARE | Age: 75
End: 2025-06-20
Payer: MEDICARE

## 2025-06-20 VITALS — HEART RATE: 88 BPM | OXYGEN SATURATION: 98 % | SYSTOLIC BLOOD PRESSURE: 106 MMHG | DIASTOLIC BLOOD PRESSURE: 63 MMHG

## 2025-06-20 PROCEDURE — G0153 HHCP-SVS OF S/L PATH,EA 15MN: HCPCS

## 2025-06-22 NOTE — CASE COMMUNICATION
This message is informative only.  No action required.     [facility] made 6.20.25    Impression. Mild.moderate oral stage dysphagia characterized by  vertical mastication and constant tongue movement.    Also observed rapid continual eating and swallowing several boluses at one time  Possible Mild pharyngeal stage dysphagia characterized by audible swallow on thin liquids.    Observed functional speech intelligibility with low volume.  and mild hoarseness.  Observes language to be WFL in conversational speech.    oriented x3.  Pt. denied any difficulty with memory.  Mild [place]  Pt.'s vision reportedly is blury with glasses but she can read by adjusting to the print.       Rec.   Sp tx 1x1, 2x3  Cont informal eval  Practice assignments given  Dysphagia level 2--puree/soft foods w compensatory techniques-- eat single small bites slowly, chew well,  w chin tuck before swal low.   Cont on thin liquids between meals as per [place]uso water protocol  NT liquids w meal  ex. Johana pear, peach, earlene,     and water to applesauce to NT consistency.                                      Naked juices  ex. strawberry/banana  Aspiration precautions--moniter temp and lung condition.  Contact DrCarolina if change in either one.  Mouth wash at least 2x per day w toothettes if possible.  Continue to Moniter weight daily as needed-- present weight 96 lbs.    goal weight 110 lbs.        14 lbs. loss in 2 mo.    Continue Ensure and protein shakes--1 per day  Moniter hoarse voice.  ENT Consult if hoarse voice continues  VBS w sp if swallowing difficulty continues.    Practice  exercises with spirometer-- starting at 1000  5x  2 to 3x per day

## 2025-06-23 ENCOUNTER — DOCUMENTATION (OUTPATIENT)
Dept: HEMATOLOGY ONCOLOGY | Facility: CLINIC | Age: 75
End: 2025-06-23

## 2025-06-23 ENCOUNTER — APPOINTMENT (OUTPATIENT)
Dept: LAB | Facility: CLINIC | Age: 75
End: 2025-06-23
Attending: INTERNAL MEDICINE
Payer: MEDICARE

## 2025-06-23 ENCOUNTER — OFFICE VISIT (OUTPATIENT)
Dept: HEMATOLOGY ONCOLOGY | Facility: CLINIC | Age: 75
End: 2025-06-23
Payer: MEDICARE

## 2025-06-23 VITALS
TEMPERATURE: 97.9 F | DIASTOLIC BLOOD PRESSURE: 62 MMHG | RESPIRATION RATE: 18 BRPM | OXYGEN SATURATION: 99 % | WEIGHT: 97 LBS | HEART RATE: 81 BPM | HEIGHT: 66 IN | SYSTOLIC BLOOD PRESSURE: 100 MMHG | BODY MASS INDEX: 15.59 KG/M2

## 2025-06-23 DIAGNOSIS — C91.10 CLL (CHRONIC LYMPHOCYTIC LEUKEMIA) (HCC): Primary | ICD-10-CM

## 2025-06-23 DIAGNOSIS — M81.0 AGE-RELATED OSTEOPOROSIS WITHOUT CURRENT PATHOLOGICAL FRACTURE: Primary | ICD-10-CM

## 2025-06-23 DIAGNOSIS — C91.10 CLL (CHRONIC LYMPHOCYTIC LEUKEMIA) (HCC): ICD-10-CM

## 2025-06-23 DIAGNOSIS — Z17.1 MALIGNANT NEOPLASM OF UPPER-OUTER QUADRANT OF RIGHT BREAST IN FEMALE, ESTROGEN RECEPTOR NEGATIVE (HCC): ICD-10-CM

## 2025-06-23 DIAGNOSIS — D69.3 IDIOPATHIC THROMBOCYTOPENIC PURPURA (ITP) (HCC): ICD-10-CM

## 2025-06-23 DIAGNOSIS — C50.411 MALIGNANT NEOPLASM OF UPPER-OUTER QUADRANT OF RIGHT BREAST IN FEMALE, ESTROGEN RECEPTOR NEGATIVE (HCC): ICD-10-CM

## 2025-06-23 DIAGNOSIS — M81.0 AGE-RELATED OSTEOPOROSIS WITHOUT CURRENT PATHOLOGICAL FRACTURE: ICD-10-CM

## 2025-06-23 PROBLEM — R70.0 ELEVATED SEDIMENTATION RATE MEASUREMENT: Status: RESOLVED | Noted: 2018-05-02 | Resolved: 2025-06-23

## 2025-06-23 PROBLEM — S80.11XA HEMATOMA OF RIGHT LOWER LEG: Status: RESOLVED | Noted: 2020-06-21 | Resolved: 2025-06-23

## 2025-06-23 LAB
ALBUMIN SERPL BCG-MCNC: 4.2 G/DL (ref 3.5–5)
ALP SERPL-CCNC: 43 U/L (ref 34–104)
ALT SERPL W P-5'-P-CCNC: 6 U/L (ref 7–52)
ANION GAP SERPL CALCULATED.3IONS-SCNC: 7 MMOL/L (ref 4–13)
AST SERPL W P-5'-P-CCNC: 16 U/L (ref 13–39)
BASOPHILS # BLD AUTO: 0.02 THOUSANDS/ÂΜL (ref 0–0.1)
BASOPHILS NFR BLD AUTO: 0 % (ref 0–1)
BILIRUB SERPL-MCNC: 0.52 MG/DL (ref 0.2–1)
BUN SERPL-MCNC: 19 MG/DL (ref 5–25)
CALCIUM SERPL-MCNC: 9.6 MG/DL (ref 8.4–10.2)
CHLORIDE SERPL-SCNC: 106 MMOL/L (ref 96–108)
CO2 SERPL-SCNC: 27 MMOL/L (ref 21–32)
CREAT SERPL-MCNC: 0.71 MG/DL (ref 0.6–1.3)
EOSINOPHIL # BLD AUTO: 0.01 THOUSAND/ÂΜL (ref 0–0.61)
EOSINOPHIL NFR BLD AUTO: 0 % (ref 0–6)
ERYTHROCYTE [DISTWIDTH] IN BLOOD BY AUTOMATED COUNT: 14.8 % (ref 11.6–15.1)
GFR SERPL CREATININE-BSD FRML MDRD: 84 ML/MIN/1.73SQ M
GLUCOSE P FAST SERPL-MCNC: 97 MG/DL (ref 65–99)
HCT VFR BLD AUTO: 39.1 % (ref 34.8–46.1)
HGB BLD-MCNC: 12.1 G/DL (ref 11.5–15.4)
IMM GRANULOCYTES # BLD AUTO: 0.04 THOUSAND/UL (ref 0–0.2)
IMM GRANULOCYTES NFR BLD AUTO: 0 % (ref 0–2)
LYMPHOCYTES # BLD AUTO: 0.88 THOUSANDS/ÂΜL (ref 0.6–4.47)
LYMPHOCYTES NFR BLD AUTO: 8 % (ref 14–44)
MCH RBC QN AUTO: 30 PG (ref 26.8–34.3)
MCHC RBC AUTO-ENTMCNC: 30.9 G/DL (ref 31.4–37.4)
MCV RBC AUTO: 97 FL (ref 82–98)
MONOCYTES # BLD AUTO: 0.39 THOUSAND/ÂΜL (ref 0.17–1.22)
MONOCYTES NFR BLD AUTO: 4 % (ref 4–12)
NEUTROPHILS # BLD AUTO: 9.36 THOUSANDS/ÂΜL (ref 1.85–7.62)
NEUTS SEG NFR BLD AUTO: 88 % (ref 43–75)
NRBC BLD AUTO-RTO: 0 /100 WBCS
PLATELET # BLD AUTO: 175 THOUSANDS/UL (ref 149–390)
PMV BLD AUTO: 12.5 FL (ref 8.9–12.7)
POTASSIUM SERPL-SCNC: 4.6 MMOL/L (ref 3.5–5.3)
PROT SERPL-MCNC: 6.9 G/DL (ref 6.4–8.4)
RBC # BLD AUTO: 4.03 MILLION/UL (ref 3.81–5.12)
SODIUM SERPL-SCNC: 140 MMOL/L (ref 135–147)
WBC # BLD AUTO: 10.7 THOUSAND/UL (ref 4.31–10.16)

## 2025-06-23 PROCEDURE — 80053 COMPREHEN METABOLIC PANEL: CPT

## 2025-06-23 PROCEDURE — 85025 COMPLETE CBC W/AUTO DIFF WBC: CPT

## 2025-06-23 PROCEDURE — 99215 OFFICE O/P EST HI 40 MIN: CPT | Performed by: INTERNAL MEDICINE

## 2025-06-23 PROCEDURE — 36415 COLL VENOUS BLD VENIPUNCTURE: CPT

## 2025-06-23 NOTE — PROGRESS NOTES
Name: Nicolle Wood      : 1950      MRN: [id number]  Encounter Provider: Manuel Connell MD  Encounter Date: 2025   Encounter department: [facility]  :  Assessment & Plan  Age-related osteoporosis without current pathological fracture  DEXA scan on 2025 showed osteoporosis, she is on Prolia every 6-month, will check BMP today and prior to each Prolia       Idiopathic thrombocytopenic purpura (ITP) (HCC)  Had been on Nplate for many years with significant improvement, now platelets plateauing at 140,000, also she had a history of autoimmune hemolytic anemia both of these might be related to CLL involvement in the bone marrow, had been on acalabrutinib currently 100 mg p.o. daily with improvement of the hemoglobin no more anemia       Malignant neoplasm of upper-outer quadrant of right breast in female, estrogen receptor negative (HCC)  Stage I triple negative right-sided breast cancer status postmastectomy T1c, N0 with 2 negative sentinel lymph node clear margin, given her advanced age and comorbidities the patient is not a candidate for adjuvant chemotherapy, there is a risk of this cancer coming back in systematic pattern and about 30% of the cases       CLL (chronic lymphocytic leukemia) (HCC)           Assessment & Plan        No follow-ups on file.    History of Present Illness   Chief Complaint   Patient presents with    Follow-up     History of Present Illness  Nicolle Wood is a 75 year-old  female who was admitted to [facility] 2020 regarding easy bruising and bleeding.      She has history of polyclonal gammopathy, with no evidence of monoclonal protein, Sjogren syndrome, autoimmune connective tissue disease with highly elevated sed rate, CRP, BALDO, rheumatoid factor. PT and PTT normal.      She is followed by Dr. Alexandra with Rheumatology. She was prescribed hydroxychloroquine 200 milligram p.o. B.i.d.      2020 She was found to have grade 4  thrombocytopenia with platelet count of 1000, hemoglobin 10, WBC 7.2, 63% neutrophils, 34 percent lymphocytes.     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     She was treated with dexamethasone for 3 days with improvement of platelet count up to 62,000 6/22/21      On 06/25/2020 platelet count of 10,000      Initiated on Promacta 75 milligram p.o. Daily in July 2020, platelet count up gradually with platelet count of 73,000 on 08/17/2020. hemoglobin 11.4, WBC 5.0   8/28/20: Patient had CBC as gums were bleeding that morning AM, no further bleeding. Platelet count was 19,000.      Promacta increased to 150mg every other day and 75 mg the other days.   Platelet count was 12,000 8/31/20 and she was advised to initiate prednisone 40mg po daily, tapering by 10mg po weekly. Promacta returned to 75mg po daily.   Platelet count increased to 173,000 9/4/20.      She had BMBX 9/9/20: B-cell lymphoproliferative disorder, compatible with chronic lymphocytic leukemia. Virtually absent stainable storage iron.   On flow cytometry, CLL phenotype accounted for 11%; CD5+, CD23+, CD20+ (dim) and CD 38-      9/14/20: platelet count 283.   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 b.i.d. since the beginning of October 2020.     She was seen on 10/14/2020 with severe depression requiring admission to the hospital, she is on Zoloft 25 mg p.o. daily, Synthroid 75 micro g p.o. daily      Acalabrutinib was  reduced to 100 mg p.o. daily 11/2020    Stage I (T1c, N0, grade 3) right-sided multifocal breast cancer status postmastectomy in June 2025, ER/AZ HER2 negative, -2 sentinel lymph node  Oncology History   Cancer Staging   Malignant neoplasm of upper-outer quadrant of right breast in female, estrogen receptor negative (HCC)  Staging form: Breast, AJCC 8th Edition  - Clinical stage from 4/28/2025: Stage IB (cT1c, cN0, cM0, G1, ER-, AZ-, HER2-) - Signed by Li Abdi MD on 5/14/2025  Stage prefix: Initial diagnosis  Method of lymph node assessment: Clinical  Histologic grading system: 3 grade system  - Pathologic stage from 6/3/2025: Stage IB (pT1c, pN0(sn), cM0, G2, ER-, AZ-, HER2-) - Signed by Li Abdi MD on 6/12/2025  Stage prefix: Initial diagnosis  Method of lymph node assessment: [place] lymph node biopsy  Histologic grading system: 3 grade system  Oncology History   Malignant neoplasm of upper-outer quadrant of right breast in female, estrogen receptor negative (HCC)   3/6/2025 Observation    NM PET CT    No suspicious FDG avid lymphadenopathy in the neck, chest, abdomen, or pelvis.  Incidentally noted FDG avid right breast nodule measuring 0.9 x 0.7 cm suspicious for malignancy. There is also suspicious asymmetric FDG uptake in the right breast parenchyma more superiorly. Recommend further evaluation beginning with mammography. Mild asymmetric increased uptake in the left adrenal gland with SUV max 3.4 with questionable nodularity on CT. This is nonspecific and could be related to an underlying adrenal adenoma. This can be reassessed on follow-up PET/CT or with dedicated MRI of the adrenals.     4/28/2025 Biopsy    Right US-guided bx    A. 9:00 4cmfn  IDC  Grade 1  ER <1, AZ <1, HER2 1+  2cm    B. 10:00 6cmfn  DCIS  Grade 2  ER <1, AZ <1    C. 9:00 periareolar  DCIS  Grade 2  ER <1, AZ <1     4/28/2025 -  Cancer Staged    Staging form: Breast, AJCC 8th Edition  - Clinical stage from 4/28/2025: Stage IB  (cT1c, cN0, cM0, G1, ER-, CO-, HER2-) - Signed by Li Abdi MD on 5/14/2025  Stage prefix: Initial diagnosis  Method of lymph node assessment: Clinical  Histologic grading system: 3 grade system       5/13/2025 - 5/13/2025 Chemotherapy    DOXOrubicin (ADRIAMYCIN), 60 mg/m2 = 89 mg, 0 of 4 cycles  cyclophosphamide (CYTOXAN) IVPB, 600 mg/m2 = 888 mg, 0 of 4 cycles  CARBOplatin (PARAPLATIN) IVPB (GOG AUC DOSING), , 0 of 4 cycles  PACLItaxel (TAXOL) chemo IVPB, 80 mg/m2 = 118.2 mg, 0 of 4 cycles  pembrolizumab (KEYTRUDA) IVPB, 200 mg, 0 of 17 cycles     6/3/2025 Surgery    Right mastectomy w/ SLNB    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   Largest foci 12mm.   DCIS present, positive for EIC.   LVI present.   All margins negative for invasive carcinoma and DCIS.   0/1 sentinel lymph node, 0/1 intramammary lymph node     6/3/2025 -  Cancer Staged    Staging form: Breast, AJCC 8th Edition  - Pathologic stage from 6/3/2025: Stage IB (pT1c, pN0(sn), cM0, G2, ER-, CO-, HER2-) - Signed by Li Abdi MD on 6/12/2025  Stage prefix: Initial diagnosis  Method of lymph node assessment: [place] lymph node biopsy  Histologic grading system: 3 grade system            Review of Systems   Constitutional:  Negative for chills and fever.   HENT:  Negative for ear pain and sore throat.    Eyes:  Negative for pain and visual disturbance.   Respiratory:  Negative for cough and shortness of breath.    Cardiovascular:  Negative for chest pain and palpitations.   Gastrointestinal:  Negative for abdominal pain and vomiting.   Genitourinary:  Negative for dysuria and hematuria.   Musculoskeletal:  Negative for arthralgias and back pain.   Skin:  Negative for color change and rash.   Neurological:  Negative for seizures and syncope.   All other systems reviewed and are negative.          Objective   /62 (BP Location: Left arm, Patient Position: Sitting, Cuff Size: Adult)   Pulse 81   Temp 97.9 °F (36.6 °C)  "(Temporal)   Resp 18   Ht 5' 6\" (1.676 m)   Wt 44 kg (97 lb)   SpO2 99%   BMI 15.66 kg/m²     Pain Screening:  Pain Score: 0-No pain  ECOG   2  Physical Exam  Vitals reviewed.   Constitutional:       General: She is not in acute distress.     Appearance: She is well-developed and underweight. She is not diaphoretic.   HENT:      Head: Normocephalic and atraumatic.     Eyes:      Conjunctiva/sclera: Conjunctivae normal.     Neck:      Trachea: No tracheal deviation.     Cardiovascular:      Rate and Rhythm: Normal rate and regular rhythm.      Heart sounds: No murmur heard.     No friction rub. No gallop.   Pulmonary:      Effort: Pulmonary effort is normal. No respiratory distress.      Breath sounds: Normal breath sounds. No wheezing or rales.   Chest:      Chest wall: No tenderness.   Abdominal:      General: There is no distension.      Palpations: Abdomen is soft.      Tenderness: There is no abdominal tenderness.     Musculoskeletal:      Cervical back: Normal range of motion and neck supple.   Lymphadenopathy:      Cervical: No cervical adenopathy.     Skin:     General: Skin is warm and dry.      Coloration: Skin is not pale.      Findings: No erythema.     Neurological:      Mental Status: She is alert.     Psychiatric:         Behavior: Behavior normal.         Thought Content: Thought content normal.       Physical Exam      Results    Labs: I have reviewed the following labs:  Lab Results   Component Value Date/Time    WBC 5.44 05/19/2025 10:25 AM    RBC 3.97 05/19/2025 10:25 AM    Hemoglobin 11.7 05/19/2025 10:25 AM    Hematocrit 37.0 05/19/2025 10:25 AM    MCV 93 05/19/2025 10:25 AM    MCH 29.5 05/19/2025 10:25 AM    RDW 14.2 05/19/2025 10:25 AM    Platelets 148 (L) 05/19/2025 10:25 AM    Segmented % 79 (H) 05/19/2025 10:25 AM    Lymphocytes % 15 05/19/2025 10:25 AM    Monocytes % 5 05/19/2025 10:25 AM    Eosinophils Relative 0 05/19/2025 10:25 AM    Basophils Relative 1 05/19/2025 10:25 AM    " Immature Grans % 0 05/19/2025 10:25 AM    Absolute Neutrophils 4.30 05/19/2025 10:25 AM     Lab Results   Component Value Date/Time    Potassium 4.0 06/04/2025 04:55 AM    Chloride 109 (H) 06/04/2025 04:55 AM    CO2 26 06/04/2025 04:55 AM    BUN 11 06/04/2025 04:55 AM    Creatinine 0.61 06/04/2025 04:55 AM    Glucose, Fasting 101 (H) 06/04/2025 04:55 AM    Calcium 8.0 (L) 06/04/2025 04:55 AM    AST 12 (L) 05/19/2025 10:25 AM    ALT 4 (L) 05/19/2025 10:25 AM    Alkaline Phosphatase 38 05/19/2025 10:25 AM    Total Protein 6.9 05/19/2025 10:25 AM    Albumin 3.9 05/19/2025 10:25 AM    Total Bilirubin 0.51 05/19/2025 10:25 AM    eGFR 90 06/04/2025 04:55 AM

## 2025-06-23 NOTE — ASSESSMENT & PLAN NOTE
DEXA scan on 2/2025 showed osteoporosis, she is on Prolia every 6-month, will check BMP today and prior to each Prolia

## 2025-06-23 NOTE — ASSESSMENT & PLAN NOTE
Stage I triple negative right-sided breast cancer status postmastectomy T1c, N0 with 2 negative sentinel lymph node clear margin, given her advanced age and comorbidities the patient is not a candidate for adjuvant chemotherapy, there is a risk of this cancer coming back in systematic pattern and about 30% of the cases

## 2025-06-23 NOTE — PROGRESS NOTES
BREAST MULTIDISCIPLINARY CANCER CONFERENCE      NCCN guidelines were readily available for review at this discussion    DATE:  6/23/2025    PRESENTING DOCTOR: Dr. Li Abdi  OTHER RELEVANT PROVIDERS: Dr. 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 female adult who was presented at the Breast Oncology Multidisciplinary Cancer Conference today to review her surgical pathology and discuss a treatment plan. Patient is s/p right mastectomy on 6/6/2025    Genetics: pt declined    Genomics: N/A    Imaging/Studies reviewed:   []Mammogram  [] Breast Ultrasound  [] Breast MRI  [] CT scan  [] MRI  [] PET CT  [] Bone Scan  [x] N/A      Pathology: Slides reviewed      PHYSICIAN RECOMMENDED PLAN:  Recommend adjuvant CMF vs. Observation, pt to see Dr Connell 6/23/2025        All specialty physicians & supportive services available.  Providers contributing to final recommendations noted below.    Hematology Oncology: [x]    Radiation Oncology: []    Surgical Oncology/Breast Surgeon: [x]    Plastic Surgeon: []    Clinical Trials: Patient reviewed and not a candidate at this time for a clinical trial at [facility]     Genetics: Pt declined genetic testing    Financial Advocacy: No needs identified at this time    Social Work: Established    Follow-up Appointments:    Future Appointments   Date Time Provider Department Center   6/23/2025 11:20 AM Manuel Connell MD [facility]   6/25/2025 To Be Determined AALIYAH Sheikh VN [facility]   6/25/2025 12:00 PM BE INF QUICK CHAIR [facility]   6/26/2025 To Be Determined Savanah Balnd RN VN [facility]   6/27/2025 To Be Determined AALIYAH Sheikh [facility]N [facility]   6/30/2025 To Be Determined AALIYAH Sheikh [facility]N [facility]   7/1/2025 To Be Determined  AALIYAH Sheikh [facility]N [facility]   7/2/2025  1:30 PM BE HV ECHO 1 BE HV Car NI [address]   7/3/2025 To Be Determined Savanah Bland RN VN [facility]   7/8/2025 To Be Determined Savanah Bland RN VN [facility]   7/9/2025 To Be Determined AALIYAH Sheikh VN [facility]   7/11/2025 To Be Determined AALIYAH Sheikh VN  [facility]   9/15/2025  1:00 PM BE MAMMO RBC 3 BE RBC Mammo BE RBC   1/8/2026 12:30 PM MON[facility]AT [facility]G ONC [facility] Practice-Onc         Team agreed to plan.The final treatment plan will be left to the discretion of the patient and the treating physician.     DISCLAIMERS:  TO THE TREATING PHYSICIAN:  This conference is a meeting of clinicians from various specialty areas who evaluate and discuss patients for whom a multidisciplinary treatment approach is being considered. Please note that the above opinion was a consensus of the conference attendees and is intended only to assist in quality care of the discussed patient.  The responsibility for follow up on the input given during the conference, along with any final decisions regarding plan of care, is that of the patient and the patient's provider. Accordingly, appointments have only been recommended based on this information and have NOT been scheduled unless otherwise noted.      TO THE PATIENT:  This summary is a brief record of major aspects of your cancer treatment. You may choose to share a copy with any of your doctors or nurses. However, this is not a detailed or comprehensive record of your care.

## 2025-06-24 ENCOUNTER — HOME CARE VISIT (OUTPATIENT)
Dept: HOME HEALTH SERVICES | Facility: HOME HEALTHCARE | Age: 75
End: 2025-06-24
Payer: MEDICARE

## 2025-06-24 NOTE — Clinical Note
Dr. Ovalles- I wanted to follow up on the request from 6/13 for the MA 51 to be completed and faxed over to the [facility]. I heard from Nicolle's niece Tabby and she was asking for an update. Can you please let me know if this was done and I will follow up with the [facility] to begin the assessment and placement process. Thank you, Lesa

## 2025-06-25 ENCOUNTER — HOSPITAL ENCOUNTER (OUTPATIENT)
Dept: INFUSION CENTER | Facility: HOSPITAL | Age: 75
Discharge: HOME | End: 2025-06-25
Attending: INTERNAL MEDICINE
Payer: MEDICARE

## 2025-06-25 ENCOUNTER — DOCUMENTATION (OUTPATIENT)
Dept: HEMATOLOGY ONCOLOGY | Facility: CLINIC | Age: 75
End: 2025-06-25

## 2025-06-25 ENCOUNTER — HOME CARE VISIT (OUTPATIENT)
Dept: HOME HEALTH SERVICES | Facility: HOME HEALTHCARE | Age: 75
End: 2025-06-25
Payer: MEDICARE

## 2025-06-25 VITALS — WEIGHT: 98 LBS | HEIGHT: 66 IN | BODY MASS INDEX: 15.75 KG/M2

## 2025-06-25 DIAGNOSIS — M81.0 AGE-RELATED OSTEOPOROSIS WITHOUT CURRENT PATHOLOGICAL FRACTURE: Primary | ICD-10-CM

## 2025-06-25 PROCEDURE — 96372 THER/PROPH/DIAG INJ SC/IM: CPT

## 2025-06-25 RX ADMIN — DENOSUMAB 60 MG: 60 INJECTION SUBCUTANEOUS at 11:53

## 2025-06-25 NOTE — CASE COMMUNICATION
"This message is informative only.  No action required.     Phone call to Pt. to schedule sp tx session 6.25.25.   She reported that she did not feel the need to continue sp tx.     Phone call to Kenny Mcnair who reported that she encouraged Pt. to continue with sp tx. but it is hard to change Pt's decision.  She reportedly would speak with her again.  Kenny reported that Pt. likes the naturally NT Naked drinks and is drinking them.    Mikki pimentel also reported that Pt is a \"lonely loner\" and is applying for admission to a facility.  "

## 2025-06-25 NOTE — PROGRESS NOTES
Breast pathology case list for the month of June reviewed for [facility] presentation. Patient was presented on 5/19/2025 and 6/23/2025.

## 2025-06-26 ENCOUNTER — HOME CARE VISIT (OUTPATIENT)
Dept: HOME HEALTH SERVICES | Facility: HOME HEALTHCARE | Age: 75
End: 2025-06-26
Payer: MEDICARE

## 2025-06-26 PROCEDURE — G0299 HHS/HOSPICE OF RN EA 15 MIN: HCPCS

## 2025-06-27 ENCOUNTER — TELEPHONE (OUTPATIENT)
Age: 75
End: 2025-06-27

## 2025-06-27 ENCOUNTER — HOME CARE VISIT (OUTPATIENT)
Dept: HOME HEALTH SERVICES | Facility: HOME HEALTHCARE | Age: 75
End: 2025-06-27
Payer: MEDICARE

## 2025-06-27 VITALS
DIASTOLIC BLOOD PRESSURE: 64 MMHG | TEMPERATURE: 96.8 F | OXYGEN SATURATION: 96 % | HEART RATE: 84 BPM | RESPIRATION RATE: 16 BRPM | SYSTOLIC BLOOD PRESSURE: 108 MMHG

## 2025-06-27 NOTE — TELEPHONE ENCOUNTER
[facility] visiting nurses called and stated they will be faxing over an MA51 form for pcp to be completed. I provided the best fax number for the form to be sent.

## 2025-06-28 NOTE — CASE COMMUNICATION
This message is informative only.   No action required.     [facility] Nurse Savanah reported that Pt. did not see the need to continue sp tx.       Sp Eval made 6.20.25 only.   Impression. Mild.moderate oral stage dysphagia characterized by vertical mastication and constant tongue movement.   Also observed rapid continual eating and swallowing several boluses at one time   Possible Mild pharyngeal stage dysphagia characterized by audible swal low on thin liquids.   Observed functional speech intelligibility with low volume. and mild hoarseness. Observes language to be WFL in conversational speech. oriented x3.   Pt. denied any difficulty with memory. Mild [place]   Pt.'s vision reportedly is blury with glasses but she can read by adjusting to the print.       Rec.   Phone dc from sp tx at Pt's request.    Practice assignments given   Dysphagia level 2--puree/soft foods w compens atory techniques-- eat single small bites slowly, chew well, w chin tuck before swallow.   Cont on thin liquids between meals as per [place] water protocol   NT liquids w meal ex. Johana pear, peach, earlene, and water to applesauce to NT consistency.   NT Naked juices ex. strawberry/banana   Aspiration precautions--moniter temp and lung condition. Contact Dr. if change in either one.   Mouth wash at least 2x per day w toothettes if possibl e.   Continue to Moniter weight daily as needed--present weight 96 lbs. goal weight 110 lbs. 14 lbs. loss in 2 mo.   Continue Ensure and protein shakes--1 per day   Moniter hoarse voice.   ENT Consult if hoarse voice continues   VBS w sp if swallowing difficulty continues.   Practice exercises with spirometer-- starting at 1000 5x 2 to 3x per day   Refer if change in status

## 2025-07-01 ENCOUNTER — PATIENT OUTREACH (OUTPATIENT)
Dept: HEMATOLOGY ONCOLOGY | Facility: CLINIC | Age: 75
End: 2025-07-01

## 2025-07-01 ENCOUNTER — TELEPHONE (OUTPATIENT)
Dept: FAMILY MEDICINE CLINIC | Facility: CLINIC | Age: 75
End: 2025-07-01

## 2025-07-01 NOTE — PROGRESS NOTES
I reached out and spoke with Nicolle to follow up and to review for any new changes in barriers to care and offer supportive services as needed.     Barriers noted previously and outcome of interventions;  Previously no barriers to care and currently no new barriers to care.  Nicolle is still eating and drinking well- she did mention trying to eat a little more.  Patient does not drive however her niece Tabby helps with assisting patient to her appointments.  Nicolle is also established with [facility] as well for when Tabby is not available.  Nicolle mentioned that currently all is well, she has no concerns to speak of at this time.  Patient mentioned that she wanted to make sure her echo was cancelled as she did not need that appointment any longer. I mentioned to Nicolle that I did not see it on her appointment desk and that her next appointment was 9/15 for her Mammogram.  Patient did not have any additional questions OR concerns for me.  Nicolle has my contact information should she have any questions or concerns relating to her care in the future-mentioned she can reach me directly.  Patient was thankful for my call.    Reviewed for any new barriers as noted below.    Are you having any side effects from your treatment?No    Are you eating and drinking normally? yes    Have you been experiencing any uncontrolled pain related to your cancer diagnosis? No    If not already established, have needs changed for a palliative care referral? no    Do you have a good support system? Yes     Are you interested in any support groups? Not at this time.    How are you doing with transportation to your appointments? Niece Tabby or [facility].    Do you have any questions or concerns regarding your treatment plan? No    Any new financial concerns for your household or medical bills? No    Do you know when your upcoming appointments are? yes  Future Appointments   Date Time Provider Department Center   9/15/2025  1:00 PM [facility]  [facility] 3 [facility]   10/3/2025  1:30 PM Aisha Holly PA-C [facility]   12/22/2025 11:30 AM [facility]ICK [facility]IR [facility]   1/8/2026 12:30 PM [facility]          Based on individual needs I will follow up with them in 4/5 weeks. I have provided my direct contact information and welcome them to contact me if their needs as discussed above change. They were appreciative for the call.

## 2025-07-01 NOTE — TELEPHONE ENCOUNTER
Received fax from [facility]  placed on providers desk  can take 10-14 business day to be completed once completed chart will be updated

## 2025-07-03 ENCOUNTER — PATIENT OUTREACH (OUTPATIENT)
Dept: CASE MANAGEMENT | Facility: OTHER | Age: 75
End: 2025-07-03

## 2025-07-03 NOTE — PROGRESS NOTES
OSW placed call to patient to check in. Patient reported that she is feeling a little down today. Patient reported that one of her friends was bossing her around and telling her what to do.     Tabby has been feeling better, had visited the other day and dropped off groceries and sat and spent time with her.     Patient reported that she has been drinking some protein shakes and naked drinks and eating some of the meals on wheels. Continues to drink vitamin water and elite water.     Patient reported that she just completed a jigsaw puzzle 350 pieces which she reports she was very proud of. Still doing her word searches but reports that she can't do things for long periods of time. OSW reinforced that it's okay to take breaks and to return at a later time because she is still exercising her mind.

## 2025-07-07 DIAGNOSIS — F41.9 ANXIETY: ICD-10-CM

## 2025-07-07 NOTE — TELEPHONE ENCOUNTER
Reason for call:   [x] Refill   [] Prior Auth  [] Other:     Office:   [x] PCP/Provider - Kwame Ovalles DO /[facility]     [] Specialty/Provider -     Medication: ALPRAZolam (XANAX) 0.5 mg tablet     Dose/Frequency:  Take 1 tablet (0.5 mg total) by mouth daily at bedtime as needed for anxiety     Quantity: 30    Pharmacy: [address]   Does the patient have enough for 3 days?   [x] Yes   [] No - Send as HP to POD    Mail Away Pharmacy   Does the patient have enough for 10 days?   [] Yes   [] No - Send as HP to POD

## 2025-07-08 RX ORDER — ALPRAZOLAM 0.5 MG
0.5 TABLET ORAL
Qty: 30 TABLET | Refills: 0 | Status: SHIPPED | OUTPATIENT
Start: 2025-07-08

## 2025-07-09 ENCOUNTER — PATIENT OUTREACH (OUTPATIENT)
Dept: CASE MANAGEMENT | Facility: OTHER | Age: 75
End: 2025-07-09

## 2025-07-09 NOTE — PROGRESS NOTES
OSW placed call to patient to check in. Patient reported that her friend has been out of town so she hasn't been getting on her with negativity. Patient reported that she didn't do anything for the 4th of July but she did hear the fireworks.     Patient reported that she started a new jigsaw puzzle but stated that she got a little stuck with it. Patient reported that she does a little at a time. Patient continues to work on her word puzzles.     Patient reported that she was thinking about heating up her meal from [employer] prior to OSW call as she hasn't eaten anything yet today. Patient did have an electrolyte drink and some coffee though today. Discussed importance of hydration during the warm temperatures and eating even small portions/ drinking protein shakes.     Patient appreciative of call.

## 2025-07-17 ENCOUNTER — PATIENT OUTREACH (OUTPATIENT)
Dept: CASE MANAGEMENT | Facility: OTHER | Age: 75
End: 2025-07-17

## 2025-07-17 NOTE — PROGRESS NOTES
OSW outreached to patient to check in with her.     Patient reported that     Word find   Plastic sewing working on [employer]  Getting bored with tv   Reading about 4 or 5 books at the same time. Patient reported that she enjoys mystery books.     Patient reported that she has been reading since she has been little. Patient reported that she belongs to a book club that she gets 4 new books a month in the mail.     Getting Me[employer] on Wheels. Will eat 1/2 meal for lunch and 1/2 for dinner. Patient will drink a protein shake in the evening.     Drinking coffee, drinking water with electrolytes     Patient reported that she feels sad some days. Patient reported that she worries some days, worries about everything.     Used to go to [place] [facility] in [facility] to play [place] on a Thursday. Patient unsure how she will get to an activity if she were to attend. Niece works unable to drive to appointments. OSW will look into potential options for assistance.

## 2025-07-28 ENCOUNTER — PATIENT OUTREACH (OUTPATIENT)
Dept: CASE MANAGEMENT | Facility: OTHER | Age: 75
End: 2025-07-28

## 2025-07-29 ENCOUNTER — NURSE TRIAGE (OUTPATIENT)
Age: 75
End: 2025-07-29

## 2025-07-29 ENCOUNTER — PATIENT OUTREACH (OUTPATIENT)
Dept: CASE MANAGEMENT | Facility: OTHER | Age: 75
End: 2025-07-29

## 2025-07-29 DIAGNOSIS — F41.9 ANXIETY: ICD-10-CM

## 2025-07-29 RX ORDER — ALPRAZOLAM 0.5 MG
0.5 TABLET ORAL EVERY 6 HOURS PRN
Qty: 30 TABLET | Refills: 0 | Status: SHIPPED | OUTPATIENT
Start: 2025-07-29 | End: 2025-07-30 | Stop reason: SDUPTHER

## 2025-07-30 DIAGNOSIS — F41.9 ANXIETY: ICD-10-CM

## 2025-07-30 RX ORDER — ALPRAZOLAM 0.5 MG
0.5 TABLET ORAL
Qty: 30 TABLET | Refills: 0 | Status: SHIPPED | OUTPATIENT
Start: 2025-08-04

## 2025-07-30 RX ORDER — ALPRAZOLAM 0.5 MG
0.5 TABLET ORAL EVERY 6 HOURS PRN
Qty: 30 TABLET | Refills: 0 | Status: SHIPPED | OUTPATIENT
Start: 2025-07-30 | End: 2025-07-30 | Stop reason: SDUPTHER

## 2025-08-04 ENCOUNTER — PATIENT OUTREACH (OUTPATIENT)
Dept: CASE MANAGEMENT | Facility: OTHER | Age: 75
End: 2025-08-04

## 2025-08-06 ENCOUNTER — PATIENT OUTREACH (OUTPATIENT)
Dept: HEMATOLOGY ONCOLOGY | Facility: CLINIC | Age: 75
End: 2025-08-06

## 2025-08-15 ENCOUNTER — PATIENT OUTREACH (OUTPATIENT)
Dept: CASE MANAGEMENT | Facility: OTHER | Age: 75
End: 2025-08-15

## 2025-08-15 ENCOUNTER — PATIENT OUTREACH (OUTPATIENT)
Dept: HEMATOLOGY ONCOLOGY | Facility: CLINIC | Age: 75
End: 2025-08-15

## 2025-08-19 DIAGNOSIS — C91.10 CLL (CHRONIC LYMPHOCYTIC LEUKEMIA) (HCC): ICD-10-CM

## 2025-08-20 RX ORDER — ACALABRUTINIB 100 MG/1
1 TABLET, FILM COATED ORAL DAILY
Qty: 30 TABLET | Refills: 10 | Status: SHIPPED | OUTPATIENT
Start: 2025-08-20

## (undated) DEVICE — CHEST/BREAST DRAPE: Brand: CONVERTORS

## (undated) DEVICE — BETHLEHEM UNIVERSAL MINOR GEN: Brand: CARDINAL HEALTH

## (undated) DEVICE — SUT MONOCRYL 4-0 PS-2 27 IN Y426H

## (undated) DEVICE — LAPAROTOMY SPONGE - RF AND X-RAY DETECTABLE PRE-WASHED, WITH RINGS: Brand: SITUATE

## (undated) DEVICE — GLOVE SRG BIOGEL 6

## (undated) DEVICE — PROVE COVER: Brand: UNBRANDED

## (undated) DEVICE — LAPAROTOMY SPONGE - RF AND X-RAY DETECTABLE PRE-WASHED: Brand: SITUATE

## (undated) DEVICE — DRAPE SHEET THREE QUARTER

## (undated) DEVICE — CLIP APPLIER: Brand: PREMIUM SURGICLIP II

## (undated) DEVICE — NEPTUNE E-SEP SMOKE EVACUATION PENCIL, COATED, 70MM BLADE, ROCKER SWITCH: Brand: NEPTUNE E-SEP

## (undated) DEVICE — INTENDED FOR TISSUE SEPARATION, AND OTHER PROCEDURES THAT REQUIRE A SHARP SURGICAL BLADE TO PUNCTURE OR CUT.: Brand: BARD-PARKER SAFETY BLADES SIZE 10, STERILE

## (undated) DEVICE — TUBING SUCTION 5MM X 12 FT

## (undated) DEVICE — REM POLYHESIVE ADULT PATIENT RETURN ELECTRODE: Brand: VALLEYLAB

## (undated) DEVICE — SUT ETHILON 2-0 FS 18 IN 664H

## (undated) DEVICE — SUT SILK 2-0 SH 30 IN K833H

## (undated) DEVICE — SURGICEL 2 X 14

## (undated) DEVICE — BRA SURGICAL SZ SMALL (30-33)

## (undated) DEVICE — SUT MONOCRYL 3-0 SH 27 IN Y416H

## (undated) DEVICE — JP PERF DRN SIL FLT 10MM FULL: Brand: CARDINAL HEALTH

## (undated) DEVICE — EXOFIN PRECISION PEN HIGH VISCOSITY TOPICAL SKIN ADHESIVE: Brand: EXOFIN PRECISION PEN, 1G

## (undated) DEVICE — SCD SEQUENTIAL COMPRESSION COMFORT SLEEVE MEDIUM KNEE LENGTH: Brand: KENDALL SCD

## (undated) DEVICE — MEDI-VAC YANKAUER SUCTION HANDLE W/BULBOUS AND CONTROL VENT: Brand: CARDINAL HEALTH

## (undated) DEVICE — JACKSON-PRATT 100CC BULB RESERVOIR: Brand: CARDINAL HEALTH

## (undated) DEVICE — ANTIBACTERIAL UNDYED BRAIDED (POLYGLACTIN 910), SYNTHETIC ABSORBABLE SUTURE: Brand: COATED VICRYL